# Patient Record
Sex: FEMALE | Race: WHITE | NOT HISPANIC OR LATINO | Employment: OTHER | URBAN - NONMETROPOLITAN AREA
[De-identification: names, ages, dates, MRNs, and addresses within clinical notes are randomized per-mention and may not be internally consistent; named-entity substitution may affect disease eponyms.]

---

## 2017-02-28 ENCOUNTER — TELEPHONE (OUTPATIENT)
Dept: INTERNAL MEDICINE | Facility: CLINIC | Age: 67
End: 2017-02-28

## 2017-02-28 RX ORDER — BUDESONIDE AND FORMOTEROL FUMARATE DIHYDRATE 160; 4.5 UG/1; UG/1
AEROSOL RESPIRATORY (INHALATION)
Qty: 1 INHALER | Refills: 11 | Status: SHIPPED | OUTPATIENT
Start: 2017-02-28 | End: 2017-11-14 | Stop reason: SDUPTHER

## 2017-02-28 NOTE — TELEPHONE ENCOUNTER
----- Message from Lissa Osullivan sent at 2/28/2017 12:00 PM EST -----  Contact: PATIENT  Patient called today stating that she has a coupon for Symbicort that would make her only pay $25/month, but needs a prescription written for a full year in order for the coupon to take affect. Patient would like this done, if possible. And if so, please call in to [facility] and let her know when done. Thank you.

## 2017-04-18 ENCOUNTER — OFFICE VISIT (OUTPATIENT)
Dept: PULMONOLOGY | Facility: CLINIC | Age: 67
End: 2017-04-18

## 2017-04-18 VITALS
DIASTOLIC BLOOD PRESSURE: 70 MMHG | SYSTOLIC BLOOD PRESSURE: 120 MMHG | WEIGHT: 206.8 LBS | HEART RATE: 86 BPM | RESPIRATION RATE: 16 BRPM | OXYGEN SATURATION: 96 % | HEIGHT: 65 IN | TEMPERATURE: 98.9 F | BODY MASS INDEX: 34.45 KG/M2

## 2017-04-18 DIAGNOSIS — K21.9 GASTROESOPHAGEAL REFLUX DISEASE WITHOUT ESOPHAGITIS: ICD-10-CM

## 2017-04-18 DIAGNOSIS — J43.2 CENTRILOBULAR EMPHYSEMA (HCC): Primary | ICD-10-CM

## 2017-04-18 DIAGNOSIS — Z99.81 DEPENDENCE ON SUPPLEMENTAL OXYGEN: ICD-10-CM

## 2017-04-18 DIAGNOSIS — J84.9 INTERSTITIAL LUNG DISEASE (HCC): ICD-10-CM

## 2017-04-18 DIAGNOSIS — M05.10 RHEUMATOID LUNG DISEASE (HCC): ICD-10-CM

## 2017-04-18 PROCEDURE — 99214 OFFICE O/P EST MOD 30 MIN: CPT | Performed by: NURSE PRACTITIONER

## 2017-04-18 NOTE — PROGRESS NOTES
[facility] Pulmonary Follow up    CHIEF COMPLAINT    Chronic respiratory failure    HISTORY OF PRESENT ILLNESS    Vanna Rincon is a 66 y.o.female here today for follow-up with a history of severe COPD with an FEV1 in the 20s.  As well as rheumatic lung disease.  She is on continuous oxygen at 4 L nasal cannula.  She is on Embrel for the last 16 years for her rheumatoid arthritis.    Since her last visit she has received her liquid oxygen.  It is still quite a bit heavy and cumbersome with her RA.  She still thinking about what she is can he do this summer when she goes to the pool with her oxygen.  Currently she is tolerating pulse dose at 3 L nasal cannula with activity.  She is occasionally some gas pains which precipitate some panic attack.  She does not have a rescue inhaler and would like to see if that would help her shortness of breath during the panic attacks.    She's been doing quite well.  She's very pleased that she is less short of breath and able to do her activities.  She continues on Symbicort twice a day.  She has no cough or sputum production.      Patient Active Problem List   Diagnosis   • enlg LN right axilla, bx benign 3/16   • Benign essential hypertension   • Chronic obstructive pulmonary disease   • Chronic pain syndrome   • Gastroesophageal reflux disease   • Hyperlipidemia   • Rheumatoid arthritis involving multiple sites with positive rheumatoid factor   • Rheumatoid lung disease   • Chronic respiratory failure with hypoxia   • Bronchitis   • Encounter for long-term current use of medication   • Anemia   • Anxiety   • Interstitial lung disease   • Dependence on supplemental oxygen   • BMI 36.0-36.9,adult   • Emphysema lung       Allergies   Allergen Reactions   • Ciprofloxacin    • Doxycycline    • Lipitor [Atorvastatin]    • Sulfa Antibiotics        Current Outpatient Prescriptions:   •  budesonide-formoterol (SYMBICORT) 160-4.5 MCG/ACT inhaler, INHALE TWO PUFFS BY MOUTH TWICE DAILY.  RINSE AFTER USE, Disp: 1 inhaler, Rfl: 11  •  Etanercept (ENBREL SURECLICK) 50 MG/ML solution auto-injector, Inject  under the skin., Disp: , Rfl:   •  etodolac XL (LODINE XL) 600 MG 24 hr tablet, Take 1 tablet by mouth daily., Disp: , Rfl:   •  fentaNYL (DURAGESIC) 75 MCG/HR patch, , Disp: , Rfl:   •  HYDROcodone-acetaminophen (NORCO)  MG per tablet, Take  by mouth., Disp: , Rfl:   •  loratadine (CLARITIN) 10 MG tablet, Take 1 tablet by mouth daily., Disp: 90 tablet, Rfl: 3  •  Multiple Vitamins-Minerals (CENTRUM ADULTS PO), Take  by mouth., Disp: , Rfl:   •  pantoprazole (PROTONIX) 40 MG EC tablet, Take 1 tablet by mouth daily., Disp: 90 tablet, Rfl: 3  •  promethazine-codeine (PHENERGAN with CODEINE) 6.25-10 MG/5ML syrup, Take 5 mL by mouth every 6 (six) hours as needed for cough., Disp: 118 mL, Rfl: 0  •  sertraline (ZOLOFT) 50 MG tablet, Take 1 tablet by mouth Daily., Disp: 90 tablet, Rfl: 3  •  valsartan (DIOVAN) 320 MG tablet, Take 1 tablet by mouth Daily., Disp: 90 tablet, Rfl: 3  MEDICATION LIST AND ALLERGIES REVIEWED.    Social History   Substance Use Topics   • Smoking status: Former Smoker     Packs/day: 1.00     Years: 25.00     Types: Cigarettes     Quit date: 1991   • Smokeless tobacco: Not on file   • Alcohol use No       FAMILY AND SOCIAL HISTORY REVIEWED.    Review of Systems   Constitutional: Negative for chills, fatigue, fever and unexpected weight change.   HENT: Negative for congestion, nosebleeds, postnasal drip, rhinorrhea, sinus pressure and trouble swallowing.    Respiratory: Negative for cough, chest tightness, shortness of breath and wheezing.    Cardiovascular: Negative for chest pain and leg swelling.   Gastrointestinal: Negative for abdominal pain, constipation, diarrhea, nausea and vomiting.   Genitourinary: Negative for dysuria, frequency, hematuria and urgency.   Musculoskeletal: Positive for arthralgias. Negative for myalgias.   Neurological: Negative for dizziness, weakness,  "numbness and headaches.   All other systems reviewed and are negative.  .    /70  Pulse 86  Temp 98.9 °F (37.2 °C)  Resp 16  Ht 65\" (165.1 cm)  Wt 206 lb 12.8 oz (93.8 kg)  SpO2 96%  PF (!) 3 L/min  BMI 34.41 kg/m2  Physical Exam   Constitutional: She is oriented to person, place, and time. She appears well-developed and well-nourished.   HENT:   Head: Normocephalic and atraumatic.   Eyes: EOM are normal. Pupils are equal, round, and reactive to light.   Neck: Normal range of motion. Neck supple.   Cardiovascular: Normal rate and regular rhythm.    No murmur heard.  Pulmonary/Chest: Effort normal and breath sounds normal. No respiratory distress. She has no wheezes. She has no rales.   Very decreased but no wheezing or rales   Abdominal: Soft. Bowel sounds are normal. She exhibits no distension.   Musculoskeletal: Normal range of motion. She exhibits no edema.   Neurological: She is alert and oriented to person, place, and time.   Skin: Skin is warm and dry. No erythema.   Psychiatric: She has a normal mood and affect. Her behavior is normal.   Vitals reviewed.      RESULTS        PROBLEM LIST    Problem List Items Addressed This Visit        Respiratory    Chronic obstructive pulmonary disease - Primary    Overview     Description: A.  Emphysema with severe physiology on PFTs.  Patient is a former smoker.         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 Adequate oxygenation at rest but desaturates with walking after three minutes. She will be less than 90%.  PFT: Vital capacity is 1.83 or 51%, FEV1 is 0.7 or 25%, ratio is 38%. Minimal response to bronchodilation consistent with severe obstruction and restriction         Interstitial lung disease    Overview      · A.  History of rheumatoid lung with bronchiolitis obliterans            Digestive    Gastroesophageal reflux disease       Other    Dependence on supplemental " oxygen    Overview      · A.  2 L oxygen ordered each bedtime and when necessary.                 DISCUSSION    Continue on Symbicort.  I gave her sample of Pro Air today to see if this does help during her panic attack episodes.  Continue on oxygen at 3 L as tolerated, pulse dose.    Follow-up in 6 months.    Hope Patel, JENNY  04/18/201712:06 PM  Electronically signed     Please note that portions of this note were completed with a voice recognition program. Efforts were made to edit the dictations, but occasionally words are mistranscribed.      CC: Jose Daniel Lundberg, DO

## 2017-04-25 ENCOUNTER — OFFICE VISIT (OUTPATIENT)
Dept: INTERNAL MEDICINE | Facility: CLINIC | Age: 67
End: 2017-04-25

## 2017-04-25 VITALS
TEMPERATURE: 98.1 F | HEIGHT: 65 IN | SYSTOLIC BLOOD PRESSURE: 120 MMHG | WEIGHT: 204 LBS | RESPIRATION RATE: 14 BRPM | OXYGEN SATURATION: 96 % | BODY MASS INDEX: 33.99 KG/M2 | DIASTOLIC BLOOD PRESSURE: 64 MMHG | HEART RATE: 76 BPM

## 2017-04-25 DIAGNOSIS — J40 BRONCHITIS: ICD-10-CM

## 2017-04-25 DIAGNOSIS — I10 BENIGN ESSENTIAL HYPERTENSION: ICD-10-CM

## 2017-04-25 DIAGNOSIS — J43.2 CENTRILOBULAR EMPHYSEMA (HCC): Primary | ICD-10-CM

## 2017-04-25 DIAGNOSIS — E78.01 FAMILIAL HYPERCHOLESTEROLEMIA: ICD-10-CM

## 2017-04-25 DIAGNOSIS — F41.9 ANXIETY: ICD-10-CM

## 2017-04-25 PROCEDURE — 99214 OFFICE O/P EST MOD 30 MIN: CPT | Performed by: FAMILY MEDICINE

## 2017-04-25 RX ORDER — PROMETHAZINE HYDROCHLORIDE AND CODEINE PHOSPHATE 6.25; 1 MG/5ML; MG/5ML
5 SYRUP ORAL EVERY 6 HOURS PRN
Qty: 118 ML | Refills: 0 | Status: SHIPPED | OUTPATIENT
Start: 2017-04-25 | End: 2017-09-20 | Stop reason: SDUPTHER

## 2017-04-25 NOTE — PROGRESS NOTES
Subjective   Vanna Rincon is a 66 y.o. female.     History of Present Illness   Vanna had yearly labs that were done in October.  She is due to have lipids every 6 months, but she defers testing right now, as her values have been stable for 2 years without statin use.    She wants a rx for cough syrup that she may need PRN.    She has anxiety attacks every now and then and asks for soemthing for them, that isn't controlled.  Her anxiety attacks last about 10 minutes if that.  She feels that it mostly happens when she has gas.        The following portions of the patient's history were reviewed and updated as appropriate: allergies, current medications, past social history and problem list.    Review of Systems   Constitutional: Negative for appetite change, chills, fatigue, fever and unexpected weight change.   HENT: Negative for congestion, ear pain, hearing loss, nosebleeds, postnasal drip, rhinorrhea, sore throat, tinnitus and trouble swallowing.    Eyes: Negative for photophobia, discharge and visual disturbance.   Respiratory: Negative for cough, chest tightness, shortness of breath and wheezing.    Cardiovascular: Negative for chest pain, palpitations and leg swelling.   Gastrointestinal: Negative for abdominal distention, abdominal pain, blood in stool, constipation, diarrhea, nausea and vomiting.   Endocrine: Negative for cold intolerance, heat intolerance, polydipsia, polyphagia and polyuria.   Musculoskeletal: Negative for arthralgias, back pain, joint swelling, myalgias, neck pain and neck stiffness.   Skin: Negative for color change, pallor, rash and wound.   Allergic/Immunologic: Negative for environmental allergies, food allergies and immunocompromised state.   Neurological: Negative for dizziness, tremors, seizures, weakness, numbness and headaches.   Hematological: Negative for adenopathy. Does not bruise/bleed easily.   Psychiatric/Behavioral: Negative for agitation, behavioral problems,  "confusion, hallucinations, self-injury and suicidal ideas. The patient is not nervous/anxious.        Objective   /64  Pulse 76  Temp 98.1 °F (36.7 °C)  Resp 14  Ht 65\" (165.1 cm)  Wt 204 lb (92.5 kg)  SpO2 96% Comment: ON 3 LPM  BMI 33.95 kg/m2  Physical Exam   Constitutional: She is oriented to person, place, and time. She appears well-developed and well-nourished. No distress.   HENT:   Head: Normocephalic and atraumatic.   Right Ear: External ear normal.   Left Ear: External ear normal.   Nose: Nose normal.   Mouth/Throat: Oropharynx is clear and moist.   Eyes: Conjunctivae and EOM are normal. Pupils are equal, round, and reactive to light. Right eye exhibits no discharge. Left eye exhibits no discharge. No scleral icterus.   Neck: Normal range of motion. Neck supple. No JVD present. No tracheal deviation present. No thyromegaly present.   Cardiovascular: Normal rate, regular rhythm, normal heart sounds and intact distal pulses.  Exam reveals no gallop and no friction rub.    No murmur heard.  Pulmonary/Chest: Effort normal and breath sounds normal. No stridor. No respiratory distress. She has no wheezes. She has no rales. She exhibits no tenderness.   Abdominal: Soft. Bowel sounds are normal. She exhibits no distension and no mass. There is no tenderness. There is no rebound and no guarding. No hernia.   Musculoskeletal: Normal range of motion. She exhibits no edema, tenderness or deformity.   Lymphadenopathy:     She has no cervical adenopathy.   Neurological: She is alert and oriented to person, place, and time. She has normal reflexes. She displays normal reflexes. No cranial nerve deficit. She exhibits normal muscle tone.   Skin: Skin is warm and dry. No rash noted. No erythema. No pallor.   Psychiatric: She has a normal mood and affect. Her behavior is normal. Judgment normal.       Assessment/Plan   Problem List Items Addressed This Visit        Cardiovascular and Mediastinum    Benign " essential hypertension    Hyperlipidemia       Respiratory    Chronic obstructive pulmonary disease - Primary    Relevant Medications    promethazine-codeine (PHENERGAN with CODEINE) 6.25-10 MG/5ML syrup    Bronchitis    Relevant Medications    promethazine-codeine (PHENERGAN with CODEINE) 6.25-10 MG/5ML syrup       Other    Anxiety

## 2017-08-22 ENCOUNTER — TELEPHONE (OUTPATIENT)
Dept: INTERNAL MEDICINE | Facility: CLINIC | Age: 67
End: 2017-08-22

## 2017-08-22 RX ORDER — PANTOPRAZOLE SODIUM 40 MG/1
40 TABLET, DELAYED RELEASE ORAL DAILY
Qty: 90 TABLET | Refills: 3 | Status: SHIPPED | OUTPATIENT
Start: 2017-08-22 | End: 2022-07-29

## 2017-08-22 RX ORDER — PANTOPRAZOLE SODIUM 40 MG/1
40 TABLET, DELAYED RELEASE ORAL DAILY
Qty: 90 TABLET | Refills: 3 | Status: SHIPPED | OUTPATIENT
Start: 2017-08-22 | End: 2017-08-22 | Stop reason: SDUPTHER

## 2017-08-22 NOTE — TELEPHONE ENCOUNTER
Patient called back stating that the medcation was still sent to [facility] and it needed to be sent to the new mail order, with the number attached in previous message. Also, she wants to know if the [facility] order can be canceled so that she doesn't get charged for it.

## 2017-08-22 NOTE — TELEPHONE ENCOUNTER
Requesting refill of pantoprazole, needs to go to [facility], [phone]. Patient is almost out.

## 2017-09-20 ENCOUNTER — OFFICE VISIT (OUTPATIENT)
Dept: INTERNAL MEDICINE | Facility: CLINIC | Age: 67
End: 2017-09-20

## 2017-09-20 VITALS
SYSTOLIC BLOOD PRESSURE: 150 MMHG | DIASTOLIC BLOOD PRESSURE: 70 MMHG | OXYGEN SATURATION: 97 % | HEIGHT: 65 IN | HEART RATE: 76 BPM | WEIGHT: 205 LBS | BODY MASS INDEX: 34.16 KG/M2

## 2017-09-20 DIAGNOSIS — J40 BRONCHITIS: ICD-10-CM

## 2017-09-20 DIAGNOSIS — Z99.81 DEPENDENCE ON SUPPLEMENTAL OXYGEN: ICD-10-CM

## 2017-09-20 DIAGNOSIS — J43.1 PANLOBULAR EMPHYSEMA (HCC): Primary | ICD-10-CM

## 2017-09-20 DIAGNOSIS — J96.11 CHRONIC RESPIRATORY FAILURE WITH HYPOXIA (HCC): ICD-10-CM

## 2017-09-20 DIAGNOSIS — I10 BENIGN ESSENTIAL HYPERTENSION: ICD-10-CM

## 2017-09-20 PROCEDURE — 99213 OFFICE O/P EST LOW 20 MIN: CPT | Performed by: FAMILY MEDICINE

## 2017-09-20 RX ORDER — HYDROCHLOROTHIAZIDE 25 MG/1
25 TABLET ORAL DAILY
Qty: 30 TABLET | Refills: 11 | Status: SHIPPED | OUTPATIENT
Start: 2017-09-20 | End: 2019-08-21 | Stop reason: SDUPTHER

## 2017-09-20 RX ORDER — PROMETHAZINE HYDROCHLORIDE AND CODEINE PHOSPHATE 6.25; 1 MG/5ML; MG/5ML
5 SYRUP ORAL EVERY 6 HOURS PRN
Qty: 240 ML | Refills: 0 | Status: SHIPPED | OUTPATIENT
Start: 2017-09-20 | End: 2017-12-19

## 2017-09-20 NOTE — PROGRESS NOTES
"Subjective   Vanna Rincon is a 67 y.o. female.     History of Present Illness   Vanna has been having a bad week with drainage. She asks today for refill of cough syrup.   She is having symptoms of HTN, and the 150 systolic is noted today.  The following portions of the patient's history were reviewed and updated as appropriate: allergies, current medications, past social history and problem list.    Review of Systems   Constitutional: Positive for fatigue. Negative for appetite change, chills, fever and unexpected weight change.   HENT: Negative for congestion, ear pain, hearing loss, nosebleeds, postnasal drip, rhinorrhea, sore throat, tinnitus and trouble swallowing.    Eyes: Negative for photophobia, discharge and visual disturbance.   Respiratory: Negative for cough, chest tightness, shortness of breath and wheezing.    Cardiovascular: Negative for chest pain, palpitations and leg swelling.   Gastrointestinal: Negative for abdominal distention, abdominal pain, blood in stool, constipation, diarrhea, nausea and vomiting.   Endocrine: Negative for cold intolerance, heat intolerance, polydipsia, polyphagia and polyuria.   Musculoskeletal: Negative for arthralgias, back pain, joint swelling, myalgias, neck pain and neck stiffness.   Skin: Negative for color change, pallor, rash and wound.   Allergic/Immunologic: Negative for environmental allergies, food allergies and immunocompromised state.   Neurological: Positive for tremors and weakness. Negative for dizziness, seizures, numbness and headaches.   Hematological: Negative for adenopathy. Does not bruise/bleed easily.   Psychiatric/Behavioral: Negative for agitation, behavioral problems, confusion, hallucinations, self-injury and suicidal ideas. The patient is not nervous/anxious.        Objective   /70  Pulse 76  Ht 65\" (165.1 cm)  Wt 205 lb (93 kg)  SpO2 97% Comment: 3LPM  BMI 34.11 kg/m2  Physical Exam   Constitutional: She is oriented to " person, place, and time. She appears well-developed and well-nourished. No distress.   HENT:   Head: Normocephalic and atraumatic.   Right Ear: External ear normal.   Left Ear: External ear normal.   Nose: Nose normal.   Mouth/Throat: Oropharynx is clear and moist.   Eyes: Conjunctivae and EOM are normal. Pupils are equal, round, and reactive to light. Right eye exhibits no discharge. Left eye exhibits no discharge. No scleral icterus.   Neck: Normal range of motion. Neck supple. No JVD present. No tracheal deviation present. No thyromegaly present.   Cardiovascular: Normal rate, regular rhythm, normal heart sounds and intact distal pulses.  Exam reveals no gallop and no friction rub.    No murmur heard.  Pulmonary/Chest: Effort normal and breath sounds normal. No stridor. No respiratory distress. She has no wheezes. She has no rales. She exhibits no tenderness.   Abdominal: Soft. Bowel sounds are normal. She exhibits no distension and no mass. There is no tenderness. There is no rebound and no guarding. No hernia.   Musculoskeletal: Normal range of motion. She exhibits no edema, tenderness or deformity.   Lymphadenopathy:     She has no cervical adenopathy.   Neurological: She is alert and oriented to person, place, and time. She has normal reflexes. She displays normal reflexes. No cranial nerve deficit. She exhibits normal muscle tone.   Skin: Skin is warm and dry. No rash noted. No erythema. No pallor.   Psychiatric: She has a normal mood and affect. Her behavior is normal. Judgment normal.       Assessment/Plan   Problem List Items Addressed This Visit        Cardiovascular and Mediastinum    Benign essential hypertension    Relevant Medications    hydrochlorothiazide (HYDRODIURIL) 25 MG tablet       Respiratory    Chronic respiratory failure with hypoxia    Emphysema lung - Primary       Other    Dependence on supplemental oxygen

## 2017-10-04 ENCOUNTER — TELEPHONE (OUTPATIENT)
Dept: INTERNAL MEDICINE | Facility: CLINIC | Age: 67
End: 2017-10-04

## 2017-10-24 ENCOUNTER — OFFICE VISIT (OUTPATIENT)
Dept: PULMONOLOGY | Facility: CLINIC | Age: 67
End: 2017-10-24

## 2017-10-24 VITALS
SYSTOLIC BLOOD PRESSURE: 136 MMHG | RESPIRATION RATE: 16 BRPM | OXYGEN SATURATION: 94 % | HEART RATE: 83 BPM | DIASTOLIC BLOOD PRESSURE: 72 MMHG | HEIGHT: 65 IN | WEIGHT: 205 LBS | BODY MASS INDEX: 34.16 KG/M2 | TEMPERATURE: 97.6 F

## 2017-10-24 DIAGNOSIS — M05.10 RHEUMATOID LUNG DISEASE (HCC): ICD-10-CM

## 2017-10-24 DIAGNOSIS — J96.11 CHRONIC RESPIRATORY FAILURE WITH HYPOXIA (HCC): Primary | ICD-10-CM

## 2017-10-24 DIAGNOSIS — J43.2 CENTRILOBULAR EMPHYSEMA (HCC): ICD-10-CM

## 2017-10-24 DIAGNOSIS — M05.79 RHEUMATOID ARTHRITIS INVOLVING MULTIPLE SITES WITH POSITIVE RHEUMATOID FACTOR (HCC): ICD-10-CM

## 2017-10-24 DIAGNOSIS — Z99.81 DEPENDENCE ON SUPPLEMENTAL OXYGEN: ICD-10-CM

## 2017-10-24 DIAGNOSIS — D64.9 ANEMIA, UNSPECIFIED TYPE: ICD-10-CM

## 2017-10-24 DIAGNOSIS — J84.9 INTERSTITIAL LUNG DISEASE (HCC): ICD-10-CM

## 2017-10-24 PROCEDURE — 90662 IIV NO PRSV INCREASED AG IM: CPT | Performed by: NURSE PRACTITIONER

## 2017-10-24 PROCEDURE — G0008 ADMIN INFLUENZA VIRUS VAC: HCPCS | Performed by: NURSE PRACTITIONER

## 2017-10-24 PROCEDURE — 99214 OFFICE O/P EST MOD 30 MIN: CPT | Performed by: NURSE PRACTITIONER

## 2017-10-24 RX ORDER — AZELASTINE 1 MG/ML
2 SPRAY, METERED NASAL 2 TIMES DAILY
Qty: 1 EACH | Refills: 3 | Status: SHIPPED | OUTPATIENT
Start: 2017-10-24 | End: 2017-12-04

## 2017-10-24 NOTE — PROGRESS NOTES
[facility] Pulmonary Follow up    CHIEF COMPLAINT    COPD, chronic respiratory failure, interstitial lung disease    HISTORY OF PRESENT ILLNESS    Vanna Rincon is a 67 y.o.female here today for routine follow-up.  She has a history of severe obstructive airway disease, rheumatic lung disease, chronic respiratory failure on continuous oxygen at 3-4 L nasal cannula.  She does have a history of rheumatoid arthritis and is been on Enbrel for around 16 years.  She has tolerated it well.  She also takes an NSAID daily to help with pain.  She has been having a bit of tendinitis in her upper extremities as well as worsening wrist pain lately.    She has chronic respiratory failure due to her underlying interstitial lung disease and severe obstructive airway disease.  She uses liquid oxygen at 3 L nasal cannula with activity and continuous oxygen at 3-4 L at home.  She had a bit of worsening shortness of air over the last several weeks with follow-up with her primary care.  She is a bit hypertensive and her water pill was increased.  Her breathing is much better now.  Her blood pressures still running high 180s to 160s.  She has a follow-up on Thursday.   She has no wheezing or tightness at this time.  Her dyspnea has improved.    She is complaining of a bit of sinus congestion and postnasal drainage with a non-productive dry hacking cough.  She is on Claritin and has taken it for several years.    She continues to use her Symbicort twice daily with good results.  She does have a pro-air to use as needed.  She will also uses these when she begins hyperventilating from panic attack.    Patient Active Problem List   Diagnosis   • enlg LN right axilla, bx benign 3/16   • Benign essential hypertension   • Chronic obstructive pulmonary disease   • Chronic pain syndrome   • Gastroesophageal reflux disease   • Hyperlipidemia   • Rheumatoid arthritis involving multiple sites with positive rheumatoid factor   • Rheumatoid lung  disease   • Chronic respiratory failure with hypoxia   • Bronchitis   • Encounter for long-term current use of medication   • Anemia   • Anxiety   • Interstitial lung disease   • Dependence on supplemental oxygen   • BMI 36.0-36.9,adult   • Emphysema lung       Allergies   Allergen Reactions   • Ciprofloxacin    • Doxycycline    • Lipitor [Atorvastatin]    • Sulfa Antibiotics        Current Outpatient Prescriptions:   •  budesonide-formoterol (SYMBICORT) 160-4.5 MCG/ACT inhaler, INHALE TWO PUFFS BY MOUTH TWICE DAILY. RINSE AFTER USE, Disp: 1 inhaler, Rfl: 11  •  Etanercept (ENBREL SURECLICK) 50 MG/ML solution auto-injector, Inject  under the skin., Disp: , Rfl:   •  etodolac XL (LODINE XL) 600 MG 24 hr tablet, Take 1 tablet by mouth daily., Disp: , Rfl:   •  fentaNYL (DURAGESIC) 75 MCG/HR patch, , Disp: , Rfl:   •  hydrochlorothiazide (HYDRODIURIL) 25 MG tablet, Take 1 tablet by mouth Daily., Disp: 30 tablet, Rfl: 11  •  HYDROcodone-acetaminophen (NORCO)  MG per tablet, Take  by mouth., Disp: , Rfl:   •  loratadine (CLARITIN) 10 MG tablet, Take 1 tablet by mouth daily., Disp: 90 tablet, Rfl: 3  •  Multiple Vitamins-Minerals (CENTRUM ADULTS PO), Take  by mouth., Disp: , Rfl:   •  pantoprazole (PROTONIX) 40 MG EC tablet, Take 1 tablet by mouth Daily., Disp: 90 tablet, Rfl: 3  •  promethazine-codeine (PHENERGAN with CODEINE) 6.25-10 MG/5ML syrup, Take 5 mL by mouth Every 6 (Six) Hours As Needed for Cough., Disp: 240 mL, Rfl: 0  •  sertraline (ZOLOFT) 50 MG tablet, Take 1 tablet by mouth Daily., Disp: 90 tablet, Rfl: 3  •  valsartan (DIOVAN) 320 MG tablet, Take 1 tablet by mouth Daily., Disp: 90 tablet, Rfl: 3  •  azelastine (ASTELIN) 0.1 % nasal spray, 2 sprays into each nostril 2 (Two) Times a Day. Use in each nostril as directed, Disp: 1 each, Rfl: 3  MEDICATION LIST AND ALLERGIES REVIEWED.    Social History   Substance Use Topics   • Smoking status: Former Smoker     Packs/day: 1.00     Years: 25.00     Types:  "Cigarettes     Quit date: 1991   • Smokeless tobacco: Former User   • Alcohol use No       FAMILY AND SOCIAL HISTORY REVIEWED.    Review of Systems   Constitutional: Positive for fatigue. Negative for chills, fever and unexpected weight change.   HENT: Positive for congestion, postnasal drip, rhinorrhea and sinus pain. Negative for nosebleeds, sinus pressure and trouble swallowing.    Respiratory: Positive for shortness of breath. Negative for cough, chest tightness and wheezing.    Cardiovascular: Negative for chest pain and leg swelling.   Gastrointestinal: Negative for abdominal pain, constipation, diarrhea, nausea and vomiting.   Genitourinary: Negative for dysuria, frequency, hematuria and urgency.   Musculoskeletal: Positive for arthralgias, joint swelling and myalgias.   Neurological: Negative for dizziness, weakness, numbness and headaches.   All other systems reviewed and are negative.  .    /72  Pulse 83  Temp 97.6 °F (36.4 °C)  Resp 16  Ht 65\" (165.1 cm)  Wt 205 lb (93 kg)  SpO2 94% Comment: 4 L P/D  PF (!) 4 L/min  BMI 34.11 kg/m2    Physical Exam   Constitutional: She is oriented to person, place, and time. She appears well-developed and well-nourished.   Obese   HENT:   Head: Normocephalic and atraumatic.   Eyes: EOM are normal. Pupils are equal, round, and reactive to light.   Neck: Normal range of motion. Neck supple.   Cardiovascular: Normal rate and regular rhythm.    No murmur heard.  Pulmonary/Chest: Effort normal. No respiratory distress. She has no wheezes. She has no rales.   Decreased bilaterally   Abdominal: Soft. Bowel sounds are normal. She exhibits no distension.   Musculoskeletal: Normal range of motion. She exhibits edema.   Neurological: She is alert and oriented to person, place, and time.   Skin: Skin is warm and dry. No erythema.   Psychiatric: She has a normal mood and affect. Her behavior is normal.   Vitals reviewed.        RESULTS        PROBLEM LIST    Problem " List Items Addressed This Visit        Respiratory    Chronic obstructive pulmonary disease    Overview     Description: A.  Emphysema with severe physiology on PFTs.  Patient is a former smoker.         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 Adequate oxygenation at rest but desaturates with walking after three minutes. She will be less than 90%.  PFT: Vital capacity is 1.83 or 51%, FEV1 is 0.7 or 25%, ratio is 38%. Minimal response to bronchodilation consistent with severe obstruction and restriction         Relevant Medications    azelastine (ASTELIN) 0.1 % nasal spray    Chronic respiratory failure with hypoxia - Primary    Interstitial lung disease    Overview      · A.  History of rheumatoid lung with bronchiolitis obliterans         Relevant Medications    azelastine (ASTELIN) 0.1 % nasal spray       Musculoskeletal and Integument    Rheumatoid arthritis involving multiple sites with positive rheumatoid factor    Overview     Description: A.  Diagnosed in 2001 with history of methotrexate and Enbrel therapy.            Hematopoietic and Hemostatic    Anemia       Other    Dependence on supplemental oxygen    Overview      · A.  2 L oxygen ordered each bedtime and when necessary.                 DISCUSSION    For her allergic rhinitis, sinusitis asked her to change her Claritin to Zyrtec.  As well as add some Astelin nasal spray to help with her nasal congestion.  She doesn't appear to be infected or need antibiotics at this time.  But she'll call back if she worsens.  She will get her flu shot today.  She is up-to-date on her pneumonia vaccine and received her Prevnar 13 this year.  Continue on her oxygen, continuously at 3-4 L.  Continue on her Symbicort twice daily and pro-air as needed.  She brought some labs with her today from her primary care, she is anemic at  10 and 33.  Her C-reactive protein is elevated at 24 sedimentation rate is normal at 30.  We discussed proper nutrition with her anemia.  Including increasing her protein and vegetables.  She will look into Meals on Wheels.  Due to her decreased mobility and her 's debility they do not eat full meals.  [facility].    Follow-up in 6 months with full PFTs and chest x-ray.    Hope Patel, APRN  10/24/194185:32 AM  Electronically signed     Please note that portions of this note were completed with a voice recognition program. Efforts were made to edit the dictations, but occasionally words are mistranscribed.      CC: Jose Daniel Lundberg, DO

## 2017-10-26 ENCOUNTER — OFFICE VISIT (OUTPATIENT)
Dept: INTERNAL MEDICINE | Facility: CLINIC | Age: 67
End: 2017-10-26

## 2017-10-26 VITALS
WEIGHT: 205 LBS | HEIGHT: 65 IN | HEART RATE: 88 BPM | OXYGEN SATURATION: 95 % | DIASTOLIC BLOOD PRESSURE: 60 MMHG | SYSTOLIC BLOOD PRESSURE: 110 MMHG | BODY MASS INDEX: 34.16 KG/M2

## 2017-10-26 DIAGNOSIS — I10 ESSENTIAL HYPERTENSION: Primary | ICD-10-CM

## 2017-10-26 DIAGNOSIS — R71.8 ABNORMAL RBC INDICES: ICD-10-CM

## 2017-10-26 PROCEDURE — 99213 OFFICE O/P EST LOW 20 MIN: CPT | Performed by: FAMILY MEDICINE

## 2017-10-26 NOTE — PROGRESS NOTES
"Subjective   Vanna Rincon is a 67 y.o. female.     History of Present Illness   Vanna was started last month on hctz for her BP.  The systolic is improved today.  She is with labs from last month, and she shows low RBC indices and suggestive of iron def.  She is fine with checking labs for iron status today.  VSS. NAD.  She does see blood with hard BM's at times.        The following portions of the patient's history were reviewed and updated as appropriate: allergies, current medications, past social history and problem list.    Review of Systems   Constitutional: Negative for appetite change, chills, fatigue, fever and unexpected weight change.   HENT: Negative for congestion, ear pain, hearing loss, nosebleeds, postnasal drip, rhinorrhea, sore throat, tinnitus and trouble swallowing.    Eyes: Negative for photophobia, discharge and visual disturbance.   Respiratory: Negative for cough, chest tightness, shortness of breath and wheezing.    Cardiovascular: Negative for chest pain, palpitations and leg swelling.   Gastrointestinal: Negative for abdominal distention, abdominal pain, blood in stool, constipation, diarrhea, nausea and vomiting.   Endocrine: Negative for cold intolerance, heat intolerance, polydipsia, polyphagia and polyuria.   Musculoskeletal: Negative for arthralgias, back pain, joint swelling, myalgias, neck pain and neck stiffness.   Skin: Negative for color change, pallor, rash and wound.   Allergic/Immunologic: Negative for environmental allergies, food allergies and immunocompromised state.   Neurological: Negative for dizziness, tremors, seizures, weakness, numbness and headaches.   Hematological: Negative for adenopathy. Does not bruise/bleed easily.   Psychiatric/Behavioral: Negative for agitation, behavioral problems, confusion, hallucinations, self-injury and suicidal ideas. The patient is not nervous/anxious.        Objective   /60  Pulse 88  Ht 65\" (165.1 cm)  Wt 205 lb (93 " kg)  SpO2 95% Comment: 4LPM  BMI 34.11 kg/m2  Physical Exam   Constitutional: She is oriented to person, place, and time. She appears well-developed and well-nourished. No distress.   HENT:   Head: Normocephalic and atraumatic.   Right Ear: External ear normal.   Left Ear: External ear normal.   Nose: Nose normal.   Mouth/Throat: Oropharynx is clear and moist.   Eyes: Conjunctivae and EOM are normal. Pupils are equal, round, and reactive to light. Right eye exhibits no discharge. Left eye exhibits no discharge. No scleral icterus.   Neck: Normal range of motion. Neck supple. No JVD present. No tracheal deviation present. No thyromegaly present.   Cardiovascular: Normal rate, regular rhythm, normal heart sounds and intact distal pulses.  Exam reveals no gallop and no friction rub.    No murmur heard.  Pulmonary/Chest: Effort normal and breath sounds normal. No stridor. No respiratory distress. She has no wheezes. She has no rales. She exhibits no tenderness.   Abdominal: Soft. Bowel sounds are normal. She exhibits no distension and no mass. There is no tenderness. There is no rebound and no guarding. No hernia.   Musculoskeletal: Normal range of motion. She exhibits no edema, tenderness or deformity.   Lymphadenopathy:     She has no cervical adenopathy.   Neurological: She is alert and oriented to person, place, and time. She has normal reflexes. She displays normal reflexes. No cranial nerve deficit. She exhibits normal muscle tone.   Skin: Skin is warm and dry. No rash noted. No erythema. No pallor.   Psychiatric: She has a normal mood and affect. Her behavior is normal. Judgment normal.       Assessment/Plan   Problem List Items Addressed This Visit        Cardiovascular and Mediastinum    Essential hypertension - Primary    Relevant Orders    Ferritin (Completed)    Iron Profile (Completed)    Erythropoietin (Completed)    CBC & Differential (Completed)       Hematopoietic and Hemostatic    Abnormal RBC  indices    Relevant Orders    Ferritin (Completed)    Iron Profile (Completed)    Erythropoietin (Completed)    CBC & Differential (Completed)    POC Occult Blood Stool

## 2017-10-27 LAB
BASOPHILS # BLD AUTO: 0.02 10*3/MM3 (ref 0–0.2)
BASOPHILS NFR BLD AUTO: 0.3 % (ref 0–2.5)
EOSINOPHIL # BLD AUTO: 0.26 10*3/MM3 (ref 0–0.7)
EOSINOPHIL NFR BLD AUTO: 3.9 % (ref 0–7)
EPO SERPL-ACNC: 4.5 MIU/ML (ref 2.6–18.5)
ERYTHROCYTE [DISTWIDTH] IN BLOOD BY AUTOMATED COUNT: 12.8 % (ref 11.5–14.5)
FERRITIN SERPL-MCNC: 43 NG/ML (ref 11.1–264)
HCT VFR BLD AUTO: 33 % (ref 37–47)
HGB BLD-MCNC: 9.8 G/DL (ref 12–16)
IMM GRANULOCYTES # BLD: 0.02 10*3/MM3 (ref 0–0.06)
IMM GRANULOCYTES NFR BLD: 0.3 % (ref 0–0.6)
IRON SATN MFR SERPL: 13 % (ref 11–46)
IRON SERPL-MCNC: 37 MCG/DL (ref 37–181)
LYMPHOCYTES # BLD AUTO: 1.27 10*3/MM3 (ref 0.6–3.4)
LYMPHOCYTES NFR BLD AUTO: 19 % (ref 10–50)
MCH RBC QN AUTO: 26 PG (ref 27–31)
MCHC RBC AUTO-ENTMCNC: 29.7 G/DL (ref 30–37)
MCV RBC AUTO: 87.5 FL (ref 81–99)
MONOCYTES # BLD AUTO: 0.37 10*3/MM3 (ref 0–0.9)
MONOCYTES NFR BLD AUTO: 5.5 % (ref 0–12)
NEUTROPHILS # BLD AUTO: 4.73 10*3/MM3 (ref 2–6.9)
NEUTROPHILS NFR BLD AUTO: 71 % (ref 37–80)
NRBC BLD AUTO-RTO: 0 /100 WBC (ref 0–0)
PLATELET # BLD AUTO: 269 10*3/MM3 (ref 130–400)
RBC # BLD AUTO: 3.77 10*6/MM3 (ref 4.2–5.4)
TIBC SERPL-MCNC: 276 MCG/DL (ref 261–497)
UIBC SERPL-MCNC: 239 MCG/DL
WBC # BLD AUTO: 6.67 10*3/MM3 (ref 4.8–10.8)

## 2017-11-01 DIAGNOSIS — R71.8 ABNORMAL RBC INDICES: ICD-10-CM

## 2017-11-01 LAB
DEVELOPER EXPIRATION DATE: ABNORMAL
DEVELOPER LOT NUMBER: ABNORMAL
EXPIRATION DATE: ABNORMAL
FECAL OCCULT BLOOD SCREEN, POC: POSITIVE
Lab: ABNORMAL
NEGATIVE CONTROL: NEGATIVE
POSITIVE CONTROL: POSITIVE

## 2017-11-01 PROCEDURE — 82274 ASSAY TEST FOR BLOOD FECAL: CPT | Performed by: FAMILY MEDICINE

## 2017-11-09 ENCOUNTER — OFFICE VISIT (OUTPATIENT)
Dept: INTERNAL MEDICINE | Facility: CLINIC | Age: 67
End: 2017-11-09

## 2017-11-09 VITALS
HEART RATE: 66 BPM | HEIGHT: 65 IN | OXYGEN SATURATION: 91 % | SYSTOLIC BLOOD PRESSURE: 120 MMHG | WEIGHT: 203 LBS | DIASTOLIC BLOOD PRESSURE: 70 MMHG | BODY MASS INDEX: 33.82 KG/M2

## 2017-11-09 DIAGNOSIS — D63.8 ANEMIA OF CHRONIC DISEASE: Primary | ICD-10-CM

## 2017-11-09 DIAGNOSIS — I10 BENIGN ESSENTIAL HYPERTENSION: ICD-10-CM

## 2017-11-09 DIAGNOSIS — K64.8 INTERNAL HEMORRHOIDS WITH COMPLICATION: ICD-10-CM

## 2017-11-09 PROCEDURE — 99213 OFFICE O/P EST LOW 20 MIN: CPT | Performed by: FAMILY MEDICINE

## 2017-11-09 RX ORDER — VALSARTAN 320 MG/1
320 TABLET ORAL DAILY
Qty: 90 TABLET | Refills: 3 | Status: SHIPPED | OUTPATIENT
Start: 2017-11-09 | End: 2018-09-14

## 2017-11-09 NOTE — PROGRESS NOTES
Subjective   Vanna Rincon is a 67 y.o. female.     History of Present Illness   Vanna had iron studies last month.  HGB was down to 9.8.  She has quite a bit of blood in her BM at times, and she knows she has internal hemorrhoids.  VSS. NAD.    Shes fine with checking with DR. Pastrana for colonoscopy and internal hemorrhoid banding.    She had normal EPO levels and her hgb was 9.8.  She may have anemia of chronic disease.        The following portions of the patient's history were reviewed and updated as appropriate: allergies, current medications, past social history and problem list.    Review of Systems   Constitutional: Negative for appetite change, chills, fatigue, fever and unexpected weight change.   HENT: Negative for congestion, ear pain, hearing loss, nosebleeds, postnasal drip, rhinorrhea, sore throat, tinnitus and trouble swallowing.    Eyes: Negative for photophobia, discharge and visual disturbance.   Respiratory: Negative for cough, chest tightness, shortness of breath and wheezing.    Cardiovascular: Negative for chest pain, palpitations and leg swelling.   Gastrointestinal: Negative for abdominal distention, abdominal pain, blood in stool, constipation, diarrhea, nausea and vomiting.   Endocrine: Negative for cold intolerance, heat intolerance, polydipsia, polyphagia and polyuria.   Musculoskeletal: Negative for arthralgias, back pain, joint swelling, myalgias, neck pain and neck stiffness.   Skin: Negative for color change, pallor, rash and wound.   Allergic/Immunologic: Negative for environmental allergies, food allergies and immunocompromised state.   Neurological: Negative for dizziness, tremors, seizures, weakness, numbness and headaches.   Hematological: Negative for adenopathy. Does not bruise/bleed easily.   Psychiatric/Behavioral: Negative for agitation, behavioral problems, confusion, hallucinations, self-injury and suicidal ideas. The patient is not nervous/anxious.        Objective  "  /70  Pulse 66  Ht 65\" (165.1 cm)  Wt 203 lb (92.1 kg)  SpO2 91% Comment: 3LPM  BMI 33.78 kg/m2  Physical Exam   Constitutional: She is oriented to person, place, and time. She appears well-developed and well-nourished. No distress.   HENT:   Head: Normocephalic and atraumatic.   Right Ear: External ear normal.   Left Ear: External ear normal.   Nose: Nose normal.   Mouth/Throat: Oropharynx is clear and moist.   Eyes: Conjunctivae and EOM are normal. Pupils are equal, round, and reactive to light. Right eye exhibits no discharge. Left eye exhibits no discharge. No scleral icterus.   Neck: Normal range of motion. Neck supple. No JVD present. No tracheal deviation present. No thyromegaly present.   Cardiovascular: Normal rate, regular rhythm, normal heart sounds and intact distal pulses.  Exam reveals no gallop and no friction rub.    No murmur heard.  Pulmonary/Chest: Effort normal and breath sounds normal. No stridor. No respiratory distress. She has no wheezes. She has no rales. She exhibits no tenderness.   Abdominal: Soft. Bowel sounds are normal. She exhibits no distension and no mass. There is no tenderness. There is no rebound and no guarding. No hernia.   Musculoskeletal: Normal range of motion. She exhibits no edema, tenderness or deformity.   Lymphadenopathy:     She has no cervical adenopathy.   Neurological: She is alert and oriented to person, place, and time. She has normal reflexes. She displays normal reflexes. No cranial nerve deficit. She exhibits normal muscle tone.   Skin: Skin is warm and dry. No rash noted. No erythema. No pallor.   Psychiatric: She has a normal mood and affect. Her behavior is normal. Judgment normal.       Assessment/Plan   Problem List Items Addressed This Visit        Other    Internal hemorrhoids with complication    Relevant Orders    Erythropoietin    CBC & Differential    Ambulatory Referral to Gastroenterology      Other Visit Diagnoses     Anemia of chronic " disease    -  Primary    Relevant Orders    Erythropoietin    CBC & Differential    Ambulatory Referral to Gastroenterology           FU in 2 omonths, sooner if needed.

## 2017-11-10 LAB
BASOPHILS # BLD AUTO: 0.04 10*3/MM3 (ref 0–0.2)
BASOPHILS NFR BLD AUTO: 0.4 % (ref 0–2.5)
EOSINOPHIL # BLD AUTO: 0.22 10*3/MM3 (ref 0–0.7)
EOSINOPHIL NFR BLD AUTO: 2.2 % (ref 0–7)
EPO SERPL-ACNC: 9.3 MIU/ML (ref 2.6–18.5)
ERYTHROCYTE [DISTWIDTH] IN BLOOD BY AUTOMATED COUNT: 12.7 % (ref 11.5–14.5)
HCT VFR BLD AUTO: 35.4 % (ref 37–47)
HGB BLD-MCNC: 10.8 G/DL (ref 12–16)
IMM GRANULOCYTES # BLD: 0.04 10*3/MM3 (ref 0–0.06)
IMM GRANULOCYTES NFR BLD: 0.4 % (ref 0–0.6)
LYMPHOCYTES # BLD AUTO: 1.81 10*3/MM3 (ref 0.6–3.4)
LYMPHOCYTES NFR BLD AUTO: 18.2 % (ref 10–50)
MCH RBC QN AUTO: 26.6 PG (ref 27–31)
MCHC RBC AUTO-ENTMCNC: 30.5 G/DL (ref 30–37)
MCV RBC AUTO: 87.2 FL (ref 81–99)
MONOCYTES # BLD AUTO: 0.64 10*3/MM3 (ref 0–0.9)
MONOCYTES NFR BLD AUTO: 6.5 % (ref 0–12)
NEUTROPHILS # BLD AUTO: 7.17 10*3/MM3 (ref 2–6.9)
NEUTROPHILS NFR BLD AUTO: 72.3 % (ref 37–80)
NRBC BLD AUTO-RTO: 0 /100 WBC (ref 0–0)
PLATELET # BLD AUTO: 281 10*3/MM3 (ref 130–400)
RBC # BLD AUTO: 4.06 10*6/MM3 (ref 4.2–5.4)
WBC # BLD AUTO: 9.92 10*3/MM3 (ref 4.8–10.8)

## 2017-11-14 RX ORDER — BUDESONIDE AND FORMOTEROL FUMARATE DIHYDRATE 160; 4.5 UG/1; UG/1
AEROSOL RESPIRATORY (INHALATION)
Qty: 1 INHALER | Refills: 11 | Status: SHIPPED | OUTPATIENT
Start: 2017-11-14 | End: 2017-12-04

## 2017-11-14 RX ORDER — BUDESONIDE AND FORMOTEROL FUMARATE DIHYDRATE 160; 4.5 UG/1; UG/1
AEROSOL RESPIRATORY (INHALATION)
Qty: 1 INHALER | Refills: 3 | Status: SHIPPED | OUTPATIENT
Start: 2017-11-14 | End: 2017-12-04 | Stop reason: SDUPTHER

## 2017-11-21 DIAGNOSIS — R06.09 DYSPNEA ON EXERTION: Primary | ICD-10-CM

## 2017-12-04 ENCOUNTER — HOSPITAL ENCOUNTER (OUTPATIENT)
Dept: CARDIOLOGY | Facility: HOSPITAL | Age: 67
Discharge: HOME | End: 2017-12-04
Attending: FAMILY MEDICINE

## 2017-12-04 ENCOUNTER — OFFICE VISIT (OUTPATIENT)
Dept: CARDIOLOGY | Facility: CLINIC | Age: 67
End: 2017-12-04

## 2017-12-04 VITALS
HEIGHT: 66 IN | DIASTOLIC BLOOD PRESSURE: 62 MMHG | BODY MASS INDEX: 33.27 KG/M2 | HEART RATE: 93 BPM | SYSTOLIC BLOOD PRESSURE: 110 MMHG | WEIGHT: 207 LBS | OXYGEN SATURATION: 94 %

## 2017-12-04 DIAGNOSIS — R06.09 DYSPNEA ON EXERTION: ICD-10-CM

## 2017-12-04 DIAGNOSIS — I10 ESSENTIAL HYPERTENSION: ICD-10-CM

## 2017-12-04 DIAGNOSIS — R06.09 DYSPNEA ON EXERTION: Primary | ICD-10-CM

## 2017-12-04 PROCEDURE — 93000 ELECTROCARDIOGRAM COMPLETE: CPT | Performed by: INTERNAL MEDICINE

## 2017-12-04 PROCEDURE — 99204 OFFICE O/P NEW MOD 45 MIN: CPT | Performed by: INTERNAL MEDICINE

## 2017-12-04 RX ORDER — BUDESONIDE AND FORMOTEROL FUMARATE DIHYDRATE 160; 4.5 UG/1; UG/1
2 AEROSOL RESPIRATORY (INHALATION)
COMMUNITY
End: 2020-12-01 | Stop reason: SDUPTHER

## 2017-12-04 RX ORDER — BISACODYL 5 MG/1
5 TABLET, DELAYED RELEASE ORAL DAILY PRN
COMMUNITY
End: 2018-02-13

## 2017-12-04 RX ORDER — SIMETHICONE 125 MG
125 TABLET,CHEWABLE ORAL EVERY 6 HOURS PRN
COMMUNITY

## 2017-12-04 NOTE — PROGRESS NOTES
[facility] at [facility]  Consultation H&P  Vanna Rincon  1950  [phone]    VISIT DATE:  12/04/2017    PCP: Tatiana Jefferson MD  [address]  [facility] [address]    IDENTIFICATION: A 67 y.o. female from [place], [place]    CC:  Chief Complaint   Patient presents with   • BAKER       PROBLEM LIST:  1. HTN  2. HLD  1. 10/18/16 lipids:  TG 95 HDL 67 and   3. COPD, on chronic O2 4L  1. Follows with Har[facility]   4. Rheumatoid lung disease  1. Follows with Dr. Lan   5. GERD  6. Chronic pain  7. Former tobacco abuse, cessation 1991  8. Anxiety/depression  9. Surgical Hx  1. Tubal ligation  2. Tonsillectomy    Allergies  Allergies   Allergen Reactions   • Ciprofloxacin    • Doxycycline    • Lipitor [Atorvastatin]    • Sulfa Antibiotics        Current Medications    Current Outpatient Prescriptions:   •  bisacodyl (DULCOLAX) 5 MG EC tablet, Take 5 mg by mouth Daily As Needed for Constipation., Disp: , Rfl:   •  budesonide-formoterol (SYMBICORT) 160-4.5 MCG/ACT inhaler, Inhale 2 puffs 2 (Two) Times a Day., Disp: , Rfl:   •  Etanercept (ENBREL SURECLICK) 50 MG/ML solution auto-injector, Inject  under the skin., Disp: , Rfl:   •  etodolac XL (LODINE XL) 600 MG 24 hr tablet, Take 1 tablet by mouth daily., Disp: , Rfl:   •  fentaNYL (DURAGESIC) 75 MCG/HR patch, , Disp: , Rfl:   •  fluticasone (VERAMYST) 27.5 MCG/SPRAY nasal spray, 2 sprays into each nostril Daily., Disp: , Rfl:   •  hydrochlorothiazide (HYDRODIURIL) 25 MG tablet, Take 1 tablet by mouth Daily., Disp: 30 tablet, Rfl: 11  •  HYDROcodone-acetaminophen (NORCO)  MG per tablet, Take  by mouth Every 6 (Six) Hours As Needed., Disp: , Rfl:   •  loratadine (CLARITIN) 10 MG tablet, Take 1 tablet by mouth daily., Disp: 90 tablet, Rfl: 3  •  Multiple Vitamins-Minerals (CENTRUM ADULTS PO), Take  by mouth Daily., Disp: , Rfl:   •  O2 (OXYGEN), Inhale 4 L/min 1 (One) Time., Disp: , Rfl:   •  pantoprazole (PROTONIX)  "40 MG EC tablet, Take 1 tablet by mouth Daily., Disp: 90 tablet, Rfl: 3  •  promethazine-codeine (PHENERGAN with CODEINE) 6.25-10 MG/5ML syrup, Take 5 mL by mouth Every 6 (Six) Hours As Needed for Cough., Disp: 240 mL, Rfl: 0  •  sertraline (ZOLOFT) 50 MG tablet, Take 1 tablet by mouth Daily., Disp: 90 tablet, Rfl: 3  •  simethicone (MYLICON) 125 MG chewable tablet, Chew 125 mg Every 6 (Six) Hours As Needed for Flatulence., Disp: , Rfl:   •  valsartan (DIOVAN) 320 MG tablet, Take 1 tablet by mouth Daily., Disp: 90 tablet, Rfl: 3     History of Present Illness   HPI  This is a 67-year-old  female the above mentioned PMH who presents for consult from PCP Dr. Lundberg for dyspnea on exertion.    Pt reports a 2-3 month history of worsening BAKER with very light exertion. Pt has chronic lung dz but there was a marked difference form her baseline with exertion. She has been using more O2 and using her inhalers more often with minimal improvement. She reports some cough when she is especially dyspneic. She has been on continuous O2 at 4L for over a year, had been on O2 at night for several years before.    She reports she's had cardiac testing in the past 15+ years ago that was nl, data deficient. Took Lipitor in the past for \"borderline HLD\", but d/c'd it due to myalgias. No hx DM, pre DM. BP has been well controlled.  Is fairly sedentary. NSAID use daily with etodolac.She is on various pain medication as well, all for RA. She has been on embrel for RA for 15 years. Her RA has worsened her COPD. Her weight has been stable. She plans to have a colonoscopy to evaluate for her anemia.    Pt denies any chest pain, orthopnea, PND, palpitations, lower extremity edema, or claudication. Pt denies history of CHF, DVT, PE, MI, CVA, TIA, or rheumatic fever.     ROS  Review of Systems   Constitution: Positive for malaise/fatigue.   HENT: Positive for hearing loss.    Cardiovascular: Positive for dyspnea on exertion.   Respiratory: " "Positive for cough and shortness of breath.    Endocrine: Positive for cold intolerance.   Musculoskeletal: Positive for arthritis, muscle cramps and myalgias.   Gastrointestinal: Positive for dysphagia and hematochezia.   Psychiatric/Behavioral: Positive for memory loss. The patient is nervous/anxious.    All other systems reviewed and are negative.      SOCIAL HX  Social History     Social History   • Marital status:      Spouse name: N/A   • Number of children: N/A   • Years of education: N/A     Occupational History   • disabled      Social History Main Topics   • Smoking status: Former Smoker     Packs/day: 1.00     Years: 25.00     Types: Cigarettes     Quit date: 1991   • Smokeless tobacco: Never Used   • Alcohol use No   • Drug use: No   • Sexual activity: Defer      Comment:      Other Topics Concern   • Not on file     Social History Narrative       FAMILY HX  Family History   Problem Relation Age of Onset   • Glaucoma Mother    • Coronary artery disease Mother    • Cancer Mother      bile duct   • Stroke Father    • Stomach cancer Father    • Diabetes Sister        Vitals:    12/04/17 1400   BP: 110/62   BP Location: Right arm   Patient Position: Sitting   Pulse: 93   SpO2: 94%   Weight: 207 lb (93.9 kg)   Height: 66\" (167.6 cm)       PHYSICAL EXAMINATION:  Physical Exam   Constitutional: She is oriented to person, place, and time. She appears well-developed and well-nourished. No distress.   obese   HENT:   Head: Normocephalic and atraumatic.   Right Ear: External ear normal.   Left Ear: External ear normal.   Nose: Nose normal.   Eyes: Conjunctivae and EOM are normal.   Neck: Neck supple. No hepatojugular reflux and no JVD present. Carotid bruit is not present. No thyromegaly present.   Cardiovascular: Normal rate, regular rhythm, S1 normal, S2 normal, normal heart sounds, intact distal pulses and normal pulses.  Exam reveals no gallop, no distant heart sounds and no midsystolic click.  "   No murmur heard.  Pulses:       Radial pulses are 2+ on the right side, and 2+ on the left side.        Dorsalis pedis pulses are 2+ on the right side, and 2+ on the left side.        Posterior tibial pulses are 2+ on the right side, and 2+ on the left side.   Pulmonary/Chest: Effort normal. No respiratory distress. She has decreased breath sounds. She has no wheezes. She has rhonchi (faint). She has no rales.   On O2 via nc at 4L   Abdominal: Soft. Bowel sounds are normal. There is no hepatosplenomegaly. There is no tenderness.   Musculoskeletal: Normal range of motion. She exhibits no edema.   Neurological: She is alert and oriented to person, place, and time.   No focal deficits.   Skin: Skin is warm and dry. No erythema.   Psychiatric: She has a normal mood and affect. Thought content normal.   Nursing note and vitals reviewed.      Diagnostic Data:    ECG 12 Lead  Date/Time: 12/4/2017 2:36 PM  Performed by: HIMANSHU CRAWFORD  Authorized by: HIMANSHU CRAWFORD   Rhythm: sinus rhythm  BPM: 88            Lab Results   Component Value Date    CHLPL 200 (H) 10/18/2016    TRIG 95 10/18/2016    HDL 67 10/18/2016    LDLDIRECT 118 03/27/2014     Lab Results   Component Value Date    GLUCOSE 89 03/27/2014    BUN 25 10/18/2016    CREATININE 0.96 10/18/2016     10/18/2016    K 4.6 10/18/2016    CL 97 10/18/2016    CO2 24 10/18/2016     Lab Results   Component Value Date    HGBA1C 5.6 10/02/2015     Lab Results   Component Value Date    WBC 9.92 11/09/2017    HGB 10.8 (L) 11/09/2017    HCT 35.4 (L) 11/09/2017     11/09/2017       ASSESSMENT:   Diagnosis Plan   1. Dyspnea on exertion  ECG 12 Lead   2. Essential hypertension         PLAN:  1. We anticipated completing an echo today, however the pt had a panic attack on the echo table and we were unable to complete it today. She does live in [place] so we will schedule her for an echo here at another time. Will need to document pulmonary pressures.  2. BP  controlled, continue antihypertensives    Scribed for Kraig Marquez MD by Jessa Camara PA-C. 12/4/2017  3:33 PM   I, Kraig Marquez MD, personally performed the services described in this documentation as scribed by the above named individual in my presence, and it is both accurate and complete.  12/4/2017  4:02 PM    Kraig Marquez MD, [facility]

## 2017-12-11 ENCOUNTER — TELEPHONE (OUTPATIENT)
Dept: CARDIOLOGY | Facility: CLINIC | Age: 67
End: 2017-12-11

## 2017-12-11 RX ORDER — ALPRAZOLAM 0.5 MG/1
TABLET ORAL
Qty: 2 TABLET | Refills: 0 | OUTPATIENT
Start: 2017-12-11 | End: 2017-12-19

## 2017-12-11 NOTE — TELEPHONE ENCOUNTER
Called patient back and left VM that if she is needing anxiety medication prior to echo she needs to contact her PCP to get that medication as instructed at her appt. Last week. Call back with any questions

## 2017-12-12 ENCOUNTER — OFFICE VISIT (OUTPATIENT)
Dept: GASTROENTEROLOGY | Facility: CLINIC | Age: 67
End: 2017-12-12

## 2017-12-12 ENCOUNTER — PREP FOR SURGERY (OUTPATIENT)
Dept: OTHER | Facility: HOSPITAL | Age: 67
End: 2017-12-12

## 2017-12-12 VITALS
DIASTOLIC BLOOD PRESSURE: 58 MMHG | TEMPERATURE: 97.4 F | BODY MASS INDEX: 32.62 KG/M2 | HEIGHT: 66 IN | SYSTOLIC BLOOD PRESSURE: 107 MMHG | RESPIRATION RATE: 20 BRPM | HEART RATE: 99 BPM | WEIGHT: 203 LBS

## 2017-12-12 DIAGNOSIS — Z12.11 COLON CANCER SCREENING: ICD-10-CM

## 2017-12-12 DIAGNOSIS — K92.1 MELENA: ICD-10-CM

## 2017-12-12 DIAGNOSIS — R11.0 NAUSEA: Chronic | ICD-10-CM

## 2017-12-12 DIAGNOSIS — K62.5 BRIGHT RED BLOOD PER RECTUM: ICD-10-CM

## 2017-12-12 DIAGNOSIS — R74.8 ELEVATED ALKALINE PHOSPHATASE LEVEL: ICD-10-CM

## 2017-12-12 DIAGNOSIS — D64.9 ANEMIA, UNSPECIFIED TYPE: Primary | ICD-10-CM

## 2017-12-12 DIAGNOSIS — K59.00 CONSTIPATION, UNSPECIFIED CONSTIPATION TYPE: ICD-10-CM

## 2017-12-12 DIAGNOSIS — R19.5 HEME POSITIVE STOOL: ICD-10-CM

## 2017-12-12 DIAGNOSIS — R10.32 LEFT LOWER QUADRANT PAIN: ICD-10-CM

## 2017-12-12 DIAGNOSIS — K59.00 CONSTIPATION, UNSPECIFIED CONSTIPATION TYPE: Chronic | ICD-10-CM

## 2017-12-12 DIAGNOSIS — R13.10 DYSPHAGIA, UNSPECIFIED TYPE: ICD-10-CM

## 2017-12-12 DIAGNOSIS — R13.10 DYSPHAGIA, UNSPECIFIED TYPE: Chronic | ICD-10-CM

## 2017-12-12 DIAGNOSIS — R11.0 NAUSEA: ICD-10-CM

## 2017-12-12 DIAGNOSIS — D64.9 ANEMIA, UNSPECIFIED TYPE: ICD-10-CM

## 2017-12-12 DIAGNOSIS — R10.32 LEFT LOWER QUADRANT PAIN: Primary | ICD-10-CM

## 2017-12-12 PROCEDURE — 99215 OFFICE O/P EST HI 40 MIN: CPT | Performed by: NURSE PRACTITIONER

## 2017-12-12 RX ORDER — METRONIDAZOLE 250 MG/1
250 TABLET ORAL 4 TIMES DAILY
Qty: 28 TABLET | Refills: 0 | Status: SHIPPED | OUTPATIENT
Start: 2017-12-12 | End: 2017-12-19

## 2017-12-12 RX ORDER — SODIUM CHLORIDE 9 MG/ML
70 INJECTION, SOLUTION INTRAVENOUS CONTINUOUS PRN
Status: CANCELLED | OUTPATIENT
Start: 2017-12-12

## 2017-12-12 RX ORDER — AMOXICILLIN AND CLAVULANATE POTASSIUM 875; 125 MG/1; MG/1
1 TABLET, FILM COATED ORAL 2 TIMES DAILY
Qty: 14 TABLET | Refills: 0 | Status: SHIPPED | OUTPATIENT
Start: 2017-12-12 | End: 2017-12-19

## 2017-12-12 RX ORDER — ONDANSETRON 4 MG/1
4 TABLET, FILM COATED ORAL EVERY 8 HOURS PRN
Qty: 30 TABLET | Refills: 1 | Status: SHIPPED | OUTPATIENT
Start: 2017-12-12 | End: 2018-01-11

## 2017-12-12 NOTE — PATIENT INSTRUCTIONS
1. Antireflux measures: Avoid fried, fatty foods, alcohol, chocolate, coffee, tea,  soft drinks, peppermint and spearmint, spicy foods, tomatoes and tomato based foods, onion based foods, and smoking. Other antireflux measures include weight reduction if overweight, avoiding tight clothing around the abdomen, elevating the head of the bed 6 inches with blocks under the head board, and don't drink or eat before going to bed and avoid lying down immediately after meals.  2. Pantoprazole 40 mg 1 tablet by mouth in the am 30 minutes before breakfast.  3. Zofran 4 mg 1 po every 8 hours as needed for nausea.  4. Treatment for diverticulitis:  A. Low-fat low fiber diet for 5 days thereafter low-fat high-fiber diet.   B. Augmemtin 875/125 mg 1 tablet by mouth twice a day. Side effects discussed.  C. Flagyl (metronidazole) tablets 250 mg. Take 1 tablet one by mouth 4 times a day for 7 days. Side effects were discussed.  D. Avoid laxatives, enemas for next 5 days. However, for constipation the patient may use stool softeners.  E. Colonoscopy: Description of the procedure, risks, benefits, alternatives and options, including nonoperative options, were discussed with the patient in detail. The patient understands and wishes to proceed, although it may be advisable to wait 3-4 weeks to schedule procedure.  5. Possible evaluation of elevated alkaline phosphatase level in the future.  6. Avoid NSAIDs.  7. Upper endoscopy-EGD: Description of the procedure, risks, benefits, alternatives and options, including nonoperative options, were discussed with the patient in detail. The patient understands and wishes to proceed.

## 2017-12-12 NOTE — PROGRESS NOTES
"Chief Complaint   Patient presents with   • Anemia   • Abdominal Pain   • Black or Bloody Stool   • Constipation   • Difficulty Swallowing   • Nausea   • Liver Eval     The patient has a history of anemia for the past 3 months. The patient has not been aware of anemia in the past. The patient has had a few episodes of bright red blood per rectum in the past, and has had black stools for the past 1 week. The patient has 2-3 episodes of black stools per day. For the past 2-3 days, her symptoms have improved since she stopped her NSAIDs. The patient denies hematemesis. No history of vaginal bleeding.     The patient has a history of left lower quadrant pain for past 1-2 weeks. The patient may have pain intermittently throughout the day. The patient has had improvement with the pain since stopping her NSAIDs. The pain is described as moderate. It is a dull, aching pain. Nothing worsened the pain, but stopping NSAID use has improved the pain.    The patient has a long-standing history of nausea. The patient has nausea 4-5 times per week. The nausea has been unchanged. Eating does not affect the nausea. The patient is not taking anything for the nausea.    The patient has a long-standing history of difficulty swallowing. This may occur once per week. Solids typically cause difficulty swallowing, but liquids do not cause a problem.     There is a long-standing history of constipation. The patient has at least 1 bowel movement per day. The patient does not drink water. She does not eat high fiber as she does not drink enough water for the fiber to work. The patient does take laxatives 4-5 days per week to have a bowel movement. She has tried stool softeners, but it did not work. She has also tried Miralax, but she did not feel like it \"cleaned out\" her bowels.    The patient has not been aware of elevated liver enzymes, but recently she has had mildly elevated alkaline phosphatase. The patient does have rheumatoid " arthritis.    The patient's last colonoscopy was in 2012. There is no family history of colon cancer.    Anemia   Presents for initial visit. The condition has lasted for 3 months. Symptoms include abdominal pain. There has been no bruising/bleeding easily, fever or palpitations. Signs of blood loss that are present include hematochezia and melena. Signs of blood loss that are not present include hematemesis and vaginal bleeding. Past treatments include nothing.   Abdominal Pain   This is a new problem. Episode onset: 1-2 weeks. The onset quality is sudden. The problem occurs daily. The problem has been gradually improving. The pain is located in the LLQ. The pain is moderate. The quality of the pain is aching and dull. The abdominal pain does not radiate. Associated symptoms include arthralgias, constipation, diarrhea, hematochezia, melena, myalgias and nausea. Pertinent negatives include no dysuria, fever, headaches, hematuria or vomiting. Nothing aggravates the pain. The pain is relieved by nothing. Treatments tried: stopping NSAIDs. The treatment provided significant relief.   Constipation   This is a chronic problem. Episode onset: over 50 years. The problem is unchanged. Her stool frequency is 1 time per day. The stool is described as firm. The patient is not on a high fiber diet. There has not been adequate water intake. Associated symptoms include abdominal pain, diarrhea, hematochezia, melena and nausea. Pertinent negatives include no fever or vomiting. She has tried laxatives for the symptoms. The treatment provided significant relief.   Difficulty Swallowing   This is a chronic problem. Episode onset: over 20 years. Episode frequency: once per week. The problem has been unchanged. Associated symptoms include abdominal pain, arthralgias, chills, coughing, fatigue, joint swelling, myalgias and nausea. Pertinent negatives include no chest pain, fever, headaches, rash or vomiting. The symptoms are aggravated  by eating. She has tried nothing for the symptoms.   Nausea   This is a chronic problem. The current episode started more than 1 year ago. Episode frequency: The patient has nausea 4-5 times per week. The problem has been unchanged. Associated symptoms include abdominal pain, arthralgias, chills, coughing, fatigue, joint swelling, myalgias and nausea. Pertinent negatives include no chest pain, fever, headaches, rash or vomiting. Nothing aggravates the symptoms. She has tried nothing for the symptoms.   Rectal Bleeding   This is a new problem. The current episode started more than 1 month ago. The problem occurs daily. The problem has been gradually improving. Associated symptoms include abdominal pain, arthralgias, chills, coughing, fatigue, joint swelling, myalgias and nausea. Pertinent negatives include no chest pain, fever, headaches, rash or vomiting. Nothing aggravates the symptoms. She has tried nothing for the symptoms.     Review of Systems   Constitutional: Positive for chills and fatigue. Negative for appetite change, fever and unexpected weight change.   HENT: Positive for nosebleeds and trouble swallowing. Negative for mouth sores.    Eyes: Positive for itching. Negative for discharge and redness.   Respiratory: Positive for cough and shortness of breath. Negative for apnea.    Cardiovascular: Negative for chest pain, palpitations and leg swelling.   Gastrointestinal: Positive for abdominal pain, anal bleeding, blood in stool, constipation, diarrhea, hematochezia, melena and nausea. Negative for abdominal distention, hematemesis and vomiting.   Endocrine: Positive for cold intolerance and heat intolerance. Negative for polydipsia.   Genitourinary: Negative for dysuria, hematuria, urgency and vaginal bleeding.   Musculoskeletal: Positive for arthralgias, joint swelling and myalgias.   Skin: Negative for rash.   Allergic/Immunologic: Negative for food allergies and immunocompromised state.   Neurological:  Negative for dizziness, seizures, syncope and headaches.   Hematological: Negative for adenopathy. Does not bruise/bleed easily.   Psychiatric/Behavioral: Negative for dysphoric mood. The patient is nervous/anxious. The patient is not hyperactive.      Patient Active Problem List   Diagnosis   • enlg LN right axilla, bx benign 3/16   • Benign essential hypertension   • Chronic obstructive pulmonary disease   • Chronic pain syndrome   • Gastroesophageal reflux disease   • Hyperlipidemia   • Rheumatoid arthritis involving multiple sites with positive rheumatoid factor   • Rheumatoid lung disease   • Chronic respiratory failure with hypoxia   • Bronchitis   • Encounter for long-term current use of medication   • Anemia   • Anxiety   • Interstitial lung disease   • Dependence on supplemental oxygen   • BMI 36.0-36.9,adult   • Emphysema lung   • Essential hypertension   • Abnormal RBC indices   • Internal hemorrhoids with complication   • Bright red blood per rectum   • Melena   • Heme positive stool   • Left lower quadrant pain   • Nausea   • Dysphagia   • Constipation   • Colon cancer screening   • Elevated alkaline phosphatase level     Past Medical History:   Diagnosis Date   • Abnormal mammogram    • Acute UTI    • Arthritis     knee, right   • Back pain    • Candida vaginitis    • CHF (congestive heart failure)     not diagnosed   • Chronic pain syndrome     disc disease, lumbar, neck   • Community acquired pneumonia    • COPD (chronic obstructive pulmonary disease)     Emphysema with severe physiology on PFTs.  Patient is a former smoker.   • Cough    • Deafness    • Dependence on supplemental oxygen      2 L oxygen ordered each bedtime and when necessary.   • Former smoker    • GERD (gastroesophageal reflux disease)    • H/O chest x-ray 08/11/2015    Rase base opacity consistent with pneumonia or atelectasis   • H/O chest x-ray 06/24/2015    Right basilar airspace disease and effusion consistent with a pneumonia.  F/U to radiographic reolution is recommended   • H/O chest x-ray 03/09/2010    Cardiac silhoutte is not enlarged. Lungs aare inflated. Mild nonspecifiec interstitial changes. No pleual effusions or dense consolidations. Scattered granulomatous changes. Spine with mild kyphosis but no osteopenia. No significant adenopathy   • H/O echocardiogram 03/16/2010    Normal study   • History of PFTs 12/13/2011    Goo pt cooperation and effort. Pt given 3 puffs of xopenex. PFT is acceptable and reproducible   • History of PFTs 08/11/2011    Pt unable to produce acceptabel and reproducible PFT. Pt was given 3 puffs of xopenex. Pt gave good effort Best pleth of two attempts   • History of PFTs 02/24/2011    PFT acceptable and reproducible. Pt gave good effort. Pt used bronchodilator less than 2 hours prior to testing   • Hyperlipidemia    • Hypertension    • Interstitial lung disease     History of rheumatoid lung with bronchiolitis obliterans   • Kidney stones     x 1   • Nephrolithiasis     2007, passed   • Panic attack    • Pneumonia    • RA (rheumatoid arthritis)    • Rheumatoid arthritis     · A.  Diagnosed in 2001 with history of methotrexate and Enbrel therapy.   • Rheumatoid lung     bronchiolitis obliterans   • Sinus problem    • Vertigo      Past Surgical History:   Procedure Laterality Date   • COLONOSCOPY  2012   • ORAL ANTRAL FISTULA CLOSURE      gums   • TONSILLECTOMY     • TUBAL ABDOMINAL LIGATION     • UPPER GASTROINTESTINAL ENDOSCOPY  2012     Family History   Problem Relation Age of Onset   • Glaucoma Mother    • Coronary artery disease Mother    • Liver cancer Mother      bile duct cancer   • Stroke Father    • Stomach cancer Father    • Diabetes Sister    • Colon cancer Neg Hx    • Ulcerative colitis Neg Hx      Social History   Substance Use Topics   • Smoking status: Former Smoker     Packs/day: 1.00     Years: 25.00     Types: Cigarettes     Start date: 1963     Quit date: 1991   • Smokeless tobacco: Never  Used   • Alcohol use No       Current Outpatient Prescriptions:   •  ALPRAZolam (XANAX) 0.5 MG tablet, TAKE 1-2 TAB PO X ONCE FOR SCAN, Disp: 2 tablet, Rfl: 0  •  bisacodyl (DULCOLAX) 5 MG EC tablet, Take 5 mg by mouth Daily As Needed for Constipation., Disp: , Rfl:   •  budesonide-formoterol (SYMBICORT) 160-4.5 MCG/ACT inhaler, Inhale 2 puffs 2 (Two) Times a Day., Disp: , Rfl:   •  Etanercept (ENBREL SURECLICK) 50 MG/ML solution auto-injector, Inject  under the skin., Disp: , Rfl:   •  fentaNYL (DURAGESIC) 75 MCG/HR patch, Place 1 patch on the skin Every 72 (Seventy-Two) Hours., Disp: , Rfl:   •  fluticasone (VERAMYST) 27.5 MCG/SPRAY nasal spray, 2 sprays into each nostril Daily., Disp: , Rfl:   •  hydrochlorothiazide (HYDRODIURIL) 25 MG tablet, Take 1 tablet by mouth Daily., Disp: 30 tablet, Rfl: 11  •  HYDROcodone-acetaminophen (NORCO)  MG per tablet, Take  by mouth Every 8 (Eight) Hours As Needed., Disp: , Rfl:   •  loratadine (CLARITIN) 10 MG tablet, Take 1 tablet by mouth daily., Disp: 90 tablet, Rfl: 3  •  Multiple Vitamins-Minerals (CENTRUM ADULTS PO), Take  by mouth Daily., Disp: , Rfl:   •  O2 (OXYGEN), Inhale 4 L/min 1 (One) Time., Disp: , Rfl:   •  pantoprazole (PROTONIX) 40 MG EC tablet, Take 1 tablet by mouth Daily., Disp: 90 tablet, Rfl: 3  •  promethazine-codeine (PHENERGAN with CODEINE) 6.25-10 MG/5ML syrup, Take 5 mL by mouth Every 6 (Six) Hours As Needed for Cough., Disp: 240 mL, Rfl: 0  •  sertraline (ZOLOFT) 50 MG tablet, Take 1 tablet by mouth Daily., Disp: 90 tablet, Rfl: 3  •  simethicone (MYLICON) 125 MG chewable tablet, Chew 125 mg Every 6 (Six) Hours As Needed for Flatulence., Disp: , Rfl:   •  valsartan (DIOVAN) 320 MG tablet, Take 1 tablet by mouth Daily., Disp: 90 tablet, Rfl: 3  •  amoxicillin-clavulanate (AUGMENTIN) 875-125 MG per tablet, Take 1 tablet by mouth 2 (Two) Times a Day., Disp: 14 tablet, Rfl: 0  •  metroNIDAZOLE (FLAGYL) 250 MG tablet, Take 1 tablet by mouth 4 (Four)  "Times a Day for 7 days. Take 1 tablet four times daily x 7 days, Disp: 28 tablet, Rfl: 0  •  ondansetron (ZOFRAN) 4 MG tablet, Take 1 tablet by mouth Every 8 (Eight) Hours As Needed for Nausea or Vomiting for up to 30 days., Disp: 30 tablet, Rfl: 1  •  Probiotic capsule, Take 1 capsule by mouth Daily., Disp: 30 capsule, Rfl: 1    Allergies   Allergen Reactions   • Ciprofloxacin Other (See Comments)     \"Hurt all over\"   • Doxycycline    • Lipitor [Atorvastatin] Other (See Comments)     Hurt all over     • Sulfa Antibiotics      /58  Pulse 99  Temp 97.4 °F (36.3 °C)  Resp 20  Ht 167.6 cm (66\")  Wt 92.1 kg (203 lb)  LMP  (LMP Unknown)  Breastfeeding? No  BMI 32.77 kg/m2    Physical Exam   Constitutional: She is oriented to person, place, and time. She appears well-developed and well-nourished. No distress.   HENT:   Head: Normocephalic and atraumatic.   Right Ear: Hearing and external ear normal.   Left Ear: Hearing and external ear normal.   Nose: Nose normal.   Mouth/Throat: Oropharynx is clear and moist and mucous membranes are normal. Mucous membranes are not pale, not dry and not cyanotic. No oral lesions. No oropharyngeal exudate.   Eyes: Conjunctivae and EOM are normal. Right eye exhibits no discharge. Left eye exhibits no discharge.   Neck: Trachea normal. Neck supple. No JVD present. No edema present. No thyroid mass and no thyromegaly present.   Cardiovascular: Normal rate, regular rhythm, S2 normal and normal heart sounds.  Exam reveals no gallop, no S3 and no friction rub.    No murmur heard.  Pulmonary/Chest: Effort normal and breath sounds normal. No respiratory distress (uses 4L O2 per NC). She exhibits no tenderness.   Abdominal: Normal appearance and bowel sounds are normal. She exhibits no distension, no ascites and no mass. There is no splenomegaly or hepatomegaly. There is tenderness (moderate) in the left lower quadrant. There is no rigidity, no rebound and no guarding. No hernia. "       Vascular Status -  Her exam exhibits no right foot edema. Her exam exhibits no left foot edema.  Lymphadenopathy:     She has no cervical adenopathy.        Left: No supraclavicular adenopathy present.   Neurological: She is alert and oriented to person, place, and time. She has normal strength. No cranial nerve deficit or sensory deficit. Gait (difficulty walking at times) abnormal.   Skin: No rash noted. She is not diaphoretic. No cyanosis. No pallor. Nails show no clubbing.   Psychiatric: She has a normal mood and affect.   Nursing note and vitals reviewed.  Stigmata of chronic liver disease:  None.  Asterixis:  None.    Laboratory Results:  Upon review of records:    Dated 9/7/2017 glucose 94 BUN 27 creatinine 1.0 sodium 142 potassium 4.7 chloride 97 CO2 30 calcium 8.8 albumin 4.1 total bilirubin <0.2 alkaline phosphatase 122 AST 17 ALT 9W BC 7.0 hemoglobin 10.4 hematocrit 33.1 platelet count 273 MCV 84 CRP 24.0    Dated 10/26/2017 WBC 6.67 hemoglobin 9.8 hematocrit 33.0 platelet count 269 MCV 87.5 ferritin 43.0 iron 37 TIBC 2 76 units  iron saturation 13% erythropoietin 4.5    Dated 10/30/2017 fecal occult blood positive    Dated 11/9/2017 WBC 9.92 hemoglobin 10.8 hematocrit 35.4 platelet count 281 MCV 87.2    Assessment and Plan:    Vanna was seen today for anemia, abdominal pain, black or bloody stool, constipation, difficulty swallowing, nausea and liver eval.    Diagnoses and all orders for this visit:    Anemia, unspecified type  Comments:  History of new onset anemia.  Of interest, the patient does have a history of melena and bright red blood per rectum.    Bright red blood per rectum  Comments:  Differentials include hemorrhoids, fissure, vascular ectasia, diverticular bleed, underlying colonic neoplastic disease.    Melena  Comments:  Differentials include peptic ulcer disease.  Improved after stopping NSAIDs.    Heme positive stool  Comments:  History of heme positive stools on recent  labs.    Left lower quadrant pain  Comments:  Consistent with possible diverticulitis.  Orders:  -     amoxicillin-clavulanate (AUGMENTIN) 875-125 MG per tablet; Take 1 tablet by mouth 2 (Two) Times a Day.  -     metroNIDAZOLE (FLAGYL) 250 MG tablet; Take 1 tablet by mouth 4 (Four) Times a Day for 7 days. Take 1 tablet four times daily x 7 days  -     Probiotic capsule; Take 1 capsule by mouth Daily.    Nausea  Comments:  Differentials include peptic ulcer disease, pancreatobiliary disease.  Orders:  -     ondansetron (ZOFRAN) 4 MG tablet; Take 1 tablet by mouth Every 8 (Eight) Hours As Needed for Nausea or Vomiting for up to 30 days.    Dysphagia, unspecified type  Comments:  Differentials include Schatzki's ring, esophagitis, esophageal dysmotility.    Constipation, unspecified constipation type  Comments:  History of long-standing constipation.  Of interest, patient takes stimulant laxatives on a regular basis.  Orders:  -     Probiotic capsule; Take 1 capsule by mouth Daily.    Colon cancer screening  Comments:  Last colonoscopy was in 2012.  No family history of colon cancer.    Elevated alkaline phosphatase level  Comments:  History of elevated alkaline phosphatase level.  Of interest, patient has history of rheumatoid arthritis.        Plan  and Patient Instructions:  Patient Instructions   1. Antireflux measures: Avoid fried, fatty foods, alcohol, chocolate, coffee, tea,  soft drinks, peppermint and spearmint, spicy foods, tomatoes and tomato based foods, onion based foods, and smoking. Other antireflux measures include weight reduction if overweight, avoiding tight clothing around the abdomen, elevating the head of the bed 6 inches with blocks under the head board, and don't drink or eat before going to bed and avoid lying down immediately after meals.  2. Pantoprazole 40 mg 1 tablet by mouth in the am 30 minutes before breakfast.  3. Zofran 4 mg 1 po every 8 hours as needed for nausea.  4. Treatment for  diverticulitis:  A. Low-fat low fiber diet for 5 days thereafter low-fat high-fiber diet.   B. Augmemtin 875/125 mg 1 tablet by mouth twice a day. Side effects discussed.  C. Flagyl (metronidazole) tablets 250 mg. Take 1 tablet one by mouth 4 times a day for 7 days. Side effects were discussed.  D. Avoid laxatives, enemas for next 5 days. However, for constipation the patient may use stool softeners.  E. Colonoscopy: Description of the procedure, risks, benefits, alternatives and options, including nonoperative options, were discussed with the patient in detail. The patient understands and wishes to proceed, although it may be advisable to wait 3-4 weeks to schedule procedure.  5. Possible evaluation of elevated alkaline phosphatase level in the future.  6. Avoid NSAIDs.  7. Upper endoscopy-EGD: Description of the procedure, risks, benefits, alternatives and options, including nonoperative options, were discussed with the patient in detail. The patient understands and wishes to proceed.    Vj Weathers, APRN

## 2017-12-15 ENCOUNTER — ANESTHESIA EVENT (OUTPATIENT)
Dept: GASTROENTEROLOGY | Facility: HOSPITAL | Age: 67
End: 2017-12-15

## 2017-12-15 ENCOUNTER — HOSPITAL ENCOUNTER (OUTPATIENT)
Facility: HOSPITAL | Age: 67
Setting detail: HOSPITAL OUTPATIENT SURGERY
Discharge: HOME | End: 2017-12-15
Attending: INTERNAL MEDICINE | Admitting: INTERNAL MEDICINE

## 2017-12-15 ENCOUNTER — ANESTHESIA (OUTPATIENT)
Dept: GASTROENTEROLOGY | Facility: HOSPITAL | Age: 67
End: 2017-12-15

## 2017-12-15 VITALS
OXYGEN SATURATION: 100 % | DIASTOLIC BLOOD PRESSURE: 72 MMHG | TEMPERATURE: 98.1 F | WEIGHT: 203 LBS | RESPIRATION RATE: 18 BRPM | BODY MASS INDEX: 32.62 KG/M2 | SYSTOLIC BLOOD PRESSURE: 129 MMHG | HEIGHT: 66 IN | HEART RATE: 84 BPM

## 2017-12-15 DIAGNOSIS — K92.1 MELENA: ICD-10-CM

## 2017-12-15 DIAGNOSIS — D64.9 ANEMIA, UNSPECIFIED TYPE: ICD-10-CM

## 2017-12-15 DIAGNOSIS — R13.10 DYSPHAGIA, UNSPECIFIED TYPE: ICD-10-CM

## 2017-12-15 DIAGNOSIS — R11.0 NAUSEA: ICD-10-CM

## 2017-12-15 PROCEDURE — 25010000002 ONDANSETRON PER 1 MG: Performed by: NURSE ANESTHETIST, CERTIFIED REGISTERED

## 2017-12-15 PROCEDURE — 43239 EGD BIOPSY SINGLE/MULTIPLE: CPT | Performed by: INTERNAL MEDICINE

## 2017-12-15 PROCEDURE — 43249 ESOPH EGD DILATION <30 MM: CPT | Performed by: INTERNAL MEDICINE

## 2017-12-15 PROCEDURE — 88305 TISSUE EXAM BY PATHOLOGIST: CPT | Performed by: INTERNAL MEDICINE

## 2017-12-15 PROCEDURE — C1726 CATH, BAL DIL, NON-VASCULAR: HCPCS | Performed by: INTERNAL MEDICINE

## 2017-12-15 PROCEDURE — 25010000002 PROPOFOL 200 MG/20ML EMULSION: Performed by: NURSE ANESTHETIST, CERTIFIED REGISTERED

## 2017-12-15 RX ORDER — KETAMINE HYDROCHLORIDE 50 MG/ML
INJECTION, SOLUTION, CONCENTRATE INTRAMUSCULAR; INTRAVENOUS AS NEEDED
Status: DISCONTINUED | OUTPATIENT
Start: 2017-12-15 | End: 2017-12-15 | Stop reason: SURG

## 2017-12-15 RX ORDER — ONDANSETRON 2 MG/ML
INJECTION INTRAMUSCULAR; INTRAVENOUS AS NEEDED
Status: DISCONTINUED | OUTPATIENT
Start: 2017-12-15 | End: 2017-12-15 | Stop reason: SURG

## 2017-12-15 RX ORDER — PANTOPRAZOLE SODIUM 40 MG/10ML
40 INJECTION, POWDER, LYOPHILIZED, FOR SOLUTION INTRAVENOUS ONCE
Status: COMPLETED | OUTPATIENT
Start: 2017-12-15 | End: 2017-12-15

## 2017-12-15 RX ORDER — SODIUM CHLORIDE 9 MG/ML
70 INJECTION, SOLUTION INTRAVENOUS CONTINUOUS PRN
Status: DISCONTINUED | OUTPATIENT
Start: 2017-12-15 | End: 2017-12-15 | Stop reason: HOSPADM

## 2017-12-15 RX ORDER — PROPOFOL 10 MG/ML
INJECTION, EMULSION INTRAVENOUS AS NEEDED
Status: DISCONTINUED | OUTPATIENT
Start: 2017-12-15 | End: 2017-12-15 | Stop reason: SURG

## 2017-12-15 RX ADMIN — SODIUM CHLORIDE 70 ML/HR: 9 INJECTION, SOLUTION INTRAVENOUS at 06:48

## 2017-12-15 RX ADMIN — PROPOFOL 50 MG: 10 INJECTION, EMULSION INTRAVENOUS at 07:25

## 2017-12-15 RX ADMIN — KETAMINE HYDROCHLORIDE 10 MG: 50 INJECTION, SOLUTION INTRAMUSCULAR; INTRAVENOUS at 07:34

## 2017-12-15 RX ADMIN — PROPOFOL 40 MG: 10 INJECTION, EMULSION INTRAVENOUS at 07:28

## 2017-12-15 RX ADMIN — PROPOFOL 40 MG: 10 INJECTION, EMULSION INTRAVENOUS at 07:34

## 2017-12-15 RX ADMIN — PANTOPRAZOLE SODIUM 40 MG: 40 INJECTION, POWDER, FOR SOLUTION INTRAVENOUS at 08:36

## 2017-12-15 RX ADMIN — ONDANSETRON 4 MG: 2 INJECTION INTRAMUSCULAR; INTRAVENOUS at 07:23

## 2017-12-15 RX ADMIN — PROPOFOL 50 MG: 10 INJECTION, EMULSION INTRAVENOUS at 07:31

## 2017-12-15 RX ADMIN — LIDOCAINE HYDROCHLORIDE 40 MG: 20 INJECTION, SOLUTION INTRAVENOUS at 07:25

## 2017-12-15 NOTE — DISCHARGE INSTRUCTIONS
Postprocedure instructions:  Nothing by mouth until fully alert.  Bedrest until fully alert.  Vital signs as routine.    Diet:   Nothing by mouth for 60 minutes, then if no chest pains the patient may have Clear liquids diet (No Sodas) for 6 hours.  May advance to soft diet in 6 hours if no chest pains, Fever or chills, nausea vomiting or bleeding.    The patient may eat in upright position, chew well, take small bites and take medications in upright position.   The patient should drink water after 3-4 bites, and liberally with medications.   The patient should remain upright for about 10 minutes after eating and taking oral medications.      Blood Thinner and other medications Directions:  Avoid Aspirin & other NSAIDS.  Tylenol is okay.    Other instructions:  The patient should avoid medications that have a potential to cause pill esophagitis including potassium pills, tetracycline capsules, iron pills, NSAIDs and bisphosphonates.      Follow-up:    DR. BEATRIZ LOUIS in 4 weeks.Office phone # [phone].      To assist you in voiding:  Drink plenty of fluids  Listen to running water while attempting to void.    If you are unable to urinate and you have an uncomfortable urge to void or it has been   6 hours since you were discharged, return to the Emergency Room

## 2017-12-15 NOTE — ANESTHESIA PREPROCEDURE EVALUATION
Anesthesia Evaluation     no history of anesthetic complications:  NPO Solid Status: > 8 hours  NPO Liquid Status: > 8 hours     Airway   Mallampati: II  Neck ROM: full  no difficulty expected  Dental - normal exam     Pulmonary - normal exam    breath sounds clear to auscultation  (+) pneumonia resolved , COPD severe,   Cardiovascular - normal exam    ECG reviewed  Rhythm: regular  Rate: normal    (+) hypertension well controlled, CHF, hyperlipidemia      Neuro/Psych  (+) dizziness/light headedness, psychiatric history Anxiety,    GI/Hepatic/Renal/Endo    (+) obesity,  GERD poorly controlled,     Musculoskeletal     (+) back pain, chronic pain,   Abdominal    Substance History      OB/GYN          Other   (+) arthritis     ROS/Med Hx Other: Hgb 10.8  RA  O2 24hr/day                                    Anesthesia Plan    ASA 3     MAC     Anesthetic plan and risks discussed with patient.

## 2017-12-15 NOTE — PLAN OF CARE
Problem: GI Endoscopy (Adult)  Goal: Signs and Symptoms of Listed Potential Problems Will be Absent or Manageable (GI Endoscopy)  Outcome: Outcome(s) achieved Date Met:  12/15/17    12/15/17 0753   GI Endoscopy   Problems Assessed (GI Endoscopy) all   Problems Present (GI Endoscopy) none

## 2017-12-15 NOTE — PLAN OF CARE
Problem: GI Endoscopy (Adult)  Goal: Signs and Symptoms of Listed Potential Problems Will be Absent or Manageable (GI Endoscopy)  Outcome: Ongoing (interventions implemented as appropriate)    12/15/17 4757   GI Endoscopy   Problems Assessed (GI Endoscopy) all   Problems Present (GI Endoscopy) none

## 2017-12-15 NOTE — ANESTHESIA POSTPROCEDURE EVALUATION
Patient: Vanna Rincon    Procedure Summary     Date Anesthesia Start Anesthesia Stop Room / Location    12/15/17 0722 0742 [facility] ENDOSCOPY 2 / [facility] ENDOSCOPY       Procedure Diagnosis Surgeon Provider    ESOPHAGOGASTRODUODENOSCOPY with biopsies and esophageal balloon dilitation (N/A Esophagus) Esophageal ring; Hiatal hernia; Erosive gastritis; Duodenitis  (Melena [K92.1]; Nausea [R11.0]; Anemia, unspecified type [D64.9]; Dysphagia, unspecified type [R13.10]) MD Diogenes Johnson CRNA          Anesthesia Type: MAC  Last vitals  BP   129/60 (12/15/17 0746)   Temp   98.1 °F (36.7 °C) (12/15/17 0746)   Pulse   81 (12/15/17 0746)   Resp   18 (12/15/17 0746)     SpO2   100 % (12/15/17 0746)     Post Anesthesia Care and Evaluation    Patient location during evaluation: bedside  Patient participation: complete - patient participated  Level of consciousness: awake and alert  Pain management: satisfactory to patient  Airway patency: patent  Anesthetic complications: No anesthetic complications  PONV Status: controlled  Cardiovascular status: acceptable and stable  Respiratory status: acceptable  Hydration status: acceptable

## 2017-12-15 NOTE — OP NOTE
PROCEDURE:  Upper Endoscopy with  biopsies.    DATE OF PROCEDURE: December 15, 2017.    REFERRING PROVIDER:  Jose Daniel Lundberg DO.     INSTRUMENT:  Olympus GIF H 190 video endoscope     INDICATIONS OF THE PROCEDURE:  This is a 67-year-old white female with history of recurrent dysphagia, anemia, nausea and history of melena.  Currently undergoing upper endoscopy for further evaluation.       BIOPSIES: Second portion of duodenum.  Gastric antrum, angularis and body of the stomach.       MEDICATIONS:  MAC.     PHOTOGRAPHS:  Photographs were included in the medical records.     CONSENT/PREPROCEDURE EVALUATION:  Risks, benefits, alternatives and options of the procedure including risks of anesthesia/sedation were discussed and informed consent was obtained prior to the procedure. History and physical examination were performed and nothing precluded the test.     REPORT:  The patient was placed in left lateral decubitus position. Once under the influence of IV sedation, the instrument was inserted into the mouth and esophagus was intubated under direct vision without difficulty.     Esophagus:  Z line was noted to be around 36 cm.    A moderate sliding hiatal hernia around 4 cm was noted.  No Bishop's esophagus was seen.  A distal esophageal ring was seen.     Stomach:  Antrum:  Erythematous-erosive  gastritis.  Angulus, lesser and greater curves: Normal.  Retroflex examination: Sliding hiatal hernia.  Cardia and fundus:  Normal.     Body of the stomach: Erythematous gastritis.  Good distensibility of the stomach was achieved no giant folds were noted.   Biopsies were obtained from the gastric antrum, angularis and body of the stomach.    Pylorus and pyloric channel:  normal.     Duodenum:  Bulb:Mild erythematous bulbar duodenitis.  Second portion: normal.  No scalloping was seen in the second portion of duodenum.  Biopsies were obtained from the second portion of duodenum.    Intervention:  Distal esophagus was dilated  using 15-16.5-18 mm CRE balloon to 18 mm under vision without difficulty.  Some blood was noted no active bleeding was seen.      The upper GI tract was decompressed and the scope was pulled out of the patient. The patient tolerated the procedure well.     DIAGNOSES:     1. Distal esophageal ring (Schatzki's type).  2. Sliding hiatal hernia around 4 cm.  3. Erythematous-erosive gastritis.  4. Erythematous bulbar duodenitis.    RECOMMENDATIONS:  1.  Dietary instructions.  2.  Pantoprazole 40 mg 1 p.o. q.a.m. 1/2 hour before breakfast.  3.  Follow biopsies.  4.  Follow up in office.     Thank you very much for letting me participate in the care of this patient. Please do not hesitate to call me if you have any questions.

## 2017-12-18 ENCOUNTER — HOSPITAL ENCOUNTER (OUTPATIENT)
Dept: CARDIOLOGY | Facility: HOSPITAL | Age: 67
Discharge: HOME | End: 2017-12-18

## 2017-12-18 PROCEDURE — 93306 TTE W/DOPPLER COMPLETE: CPT | Performed by: INTERNAL MEDICINE

## 2017-12-18 PROCEDURE — 93306 TTE W/DOPPLER COMPLETE: CPT

## 2017-12-19 ENCOUNTER — OFFICE VISIT (OUTPATIENT)
Dept: INTERNAL MEDICINE | Facility: CLINIC | Age: 67
End: 2017-12-19

## 2017-12-19 VITALS
TEMPERATURE: 98.7 F | WEIGHT: 204 LBS | OXYGEN SATURATION: 96 % | DIASTOLIC BLOOD PRESSURE: 60 MMHG | SYSTOLIC BLOOD PRESSURE: 110 MMHG | BODY MASS INDEX: 32.78 KG/M2 | RESPIRATION RATE: 14 BRPM | HEART RATE: 82 BPM | HEIGHT: 66 IN

## 2017-12-19 DIAGNOSIS — F41.9 ANXIETY: ICD-10-CM

## 2017-12-19 DIAGNOSIS — Z99.81 DEPENDENCE ON SUPPLEMENTAL OXYGEN: ICD-10-CM

## 2017-12-19 DIAGNOSIS — F11.90 CHRONIC NARCOTIC USE: ICD-10-CM

## 2017-12-19 DIAGNOSIS — M05.10 RHEUMATOID LUNG DISEASE (HCC): ICD-10-CM

## 2017-12-19 DIAGNOSIS — I10 ESSENTIAL HYPERTENSION: ICD-10-CM

## 2017-12-19 DIAGNOSIS — Z79.899 HIGH RISK MEDICATION USE: ICD-10-CM

## 2017-12-19 DIAGNOSIS — F41.0 PANIC ATTACK: Primary | ICD-10-CM

## 2017-12-19 LAB
BH CV ECHO MEAS - IVSD: 0.9 CM
BH CV ECHO MEAS - LVIDD: 4.6 CM
BH CV ECHO MEAS - LVIDS: 3.3 CM
BH CV ECHO MEAS - LVPWD: 0.8 CM
BH CV ECHO MEAS - RVSP: 13 MMHG
BH CV ECHO MEAS - TAPSE (>1.6): 1.9 CM2
BH CV XLRA - RV BASE: 3.4 CM
BH CV XLRA - RV LENGTH: 4.9 CM
BH CV XLRA - RV MID: 2.7 CM
LEFT ATRIUM VOLUME INDEX: 26 ML/M2
LV EF 2D ECHO EST: 60 %
MAXIMAL PREDICTED HEART RATE: 155 BPM
STRESS TARGET HR: 132 BPM

## 2017-12-19 PROCEDURE — 99214 OFFICE O/P EST MOD 30 MIN: CPT | Performed by: FAMILY MEDICINE

## 2017-12-19 RX ORDER — LORAZEPAM 0.5 MG/1
0.5 TABLET ORAL DAILY PRN
Qty: 30 TABLET | Refills: 1 | Status: SHIPPED | OUTPATIENT
Start: 2017-12-19

## 2017-12-19 RX ORDER — SERTRALINE HYDROCHLORIDE 100 MG/1
100 TABLET, FILM COATED ORAL DAILY
Qty: 90 TABLET | Refills: 3 | Status: SHIPPED | OUTPATIENT
Start: 2017-12-19

## 2017-12-19 NOTE — PROGRESS NOTES
"Subjective    Vanna Rincon is a 67 y.o. female here for:  Chief Complaint   Patient presents with   • Establish Care     Transfer care from Dr. Lundberg to Dr. Jefferson   • Hypertension   • Hyperlipidemia     History of Present Illness     Here to establish care with me. Was seeing Dr. Lundberg. Had EGD recently, scheduled for colonoscopy. Wanted heart checked to make sure she could hold up to \"puking\" for the upcoming scope. Was able to get the ECHO done with use of fast acting benzo. She notes that's the best sleep she's ever gotten that night, was able to get great rest and felt better the next morning. Difficulty sleeping is a chronic issue. She'd like to have more of the medicine to help if possible. Takes chronic narcotic for her chronic pain. On oxygen therapy chronically. Suffers from rheumatoid lung. Sees specialist for the autoimmune disease and uses inhalers to help with breathing. Also has essential hypertension which is well controlled with HCTZ and ARB.    The following portions of the patient's history were reviewed and updated as appropriate: allergies, current medications, past family history, past medical history, past social history, past surgical history and problem list.    Review of Systems   Constitutional: Positive for fatigue.   Respiratory: Positive for shortness of breath.    Musculoskeletal: Positive for arthralgias.   Psychiatric/Behavioral: Positive for sleep disturbance. The patient is nervous/anxious.        Vitals:    12/19/17 1439   BP: 110/60   Pulse: 82   Resp: 14   Temp: 98.7 °F (37.1 °C)   SpO2: 96%         Objective   Physical Exam   Constitutional: She is oriented to person, place, and time. Vital signs are normal. She appears well-developed and well-nourished. She is active. She does not have a sickly appearance. She does not appear ill.   Appears stated age. Well groomed.    HENT:   Head: Normocephalic and atraumatic. Hair is normal.   Right Ear: Hearing normal.   Left Ear: " Hearing normal.   Nose: Nose normal.   Eyes: EOM and lids are normal. Pupils are equal, round, and reactive to light. No scleral icterus.   Neck: Phonation normal. Neck supple.   Cardiovascular: Normal rate, regular rhythm and normal heart sounds.  Exam reveals no gallop and no friction rub.    No murmur heard.  Pulmonary/Chest: Effort normal and breath sounds normal.   Wearing nasal canula   Musculoskeletal: She exhibits no deformity.   Neurological: She is alert and oriented to person, place, and time. She displays no tremor. No cranial nerve deficit. Gait abnormal.   Skin: Skin is warm. No rash noted. She is not diaphoretic. No cyanosis. Nails show no clubbing.   Psychiatric: Her speech is normal and behavior is normal. Judgment and thought content normal. Her mood appears anxious. Cognition and memory are normal.   Nursing note and vitals reviewed.          Assessment/Plan       Vanna was seen today for establish care, hypertension and hyperlipidemia.    Diagnoses and all orders for this visit:    Panic attack  -     LORazepam (ATIVAN) 0.5 MG tablet; Take 1 tablet by mouth Daily As Needed for Anxiety.    Anxiety  -     sertraline (ZOLOFT) 100 MG tablet; Take 1 tablet by mouth Daily.    Rheumatoid lung disease    Dependence on supplemental oxygen    Essential hypertension  Comments:  Continue Valsartan and HCTZ.    Chronic narcotic use    High risk medication use        · Discussed use of narcotic plus benzodiazepine, increased risk of overdose which she understands and is willing to accept to get more rest. Follow up with specialists and return to clinic in about six weeks for recheck.      Tatiana Jefferson MD    Please note that portions of this note were completed with a voice recognition program. Efforts were made to edit dictation, but occasionally words are mistranscribed.

## 2017-12-20 LAB
LAB AP CASE REPORT: NORMAL
Lab: NORMAL
PATH REPORT.FINAL DX SPEC: NORMAL

## 2017-12-22 ENCOUNTER — TELEPHONE (OUTPATIENT)
Dept: INTERNAL MEDICINE | Facility: CLINIC | Age: 67
End: 2017-12-22

## 2017-12-22 RX ORDER — FLUCONAZOLE 150 MG/1
150 TABLET ORAL ONCE
Qty: 1 TABLET | Refills: 0 | Status: SHIPPED | OUTPATIENT
Start: 2017-12-22 | End: 2017-12-22

## 2017-12-27 DIAGNOSIS — R06.09 DYSPNEA ON EXERTION: Primary | ICD-10-CM

## 2018-01-23 RX ORDER — CETIRIZINE HYDROCHLORIDE 10 MG/1
10 TABLET ORAL DAILY
COMMUNITY
End: 2020-06-23

## 2018-01-23 RX ORDER — PROMETHAZINE HYDROCHLORIDE AND CODEINE PHOSPHATE 6.25; 1 MG/5ML; MG/5ML
5 SYRUP ORAL EVERY 6 HOURS PRN
COMMUNITY
End: 2022-06-23

## 2018-01-29 ENCOUNTER — ANESTHESIA (OUTPATIENT)
Dept: GASTROENTEROLOGY | Facility: HOSPITAL | Age: 68
End: 2018-01-29

## 2018-01-29 ENCOUNTER — ANESTHESIA EVENT (OUTPATIENT)
Dept: GASTROENTEROLOGY | Facility: HOSPITAL | Age: 68
End: 2018-01-29

## 2018-01-29 ENCOUNTER — HOSPITAL ENCOUNTER (OUTPATIENT)
Facility: HOSPITAL | Age: 68
Setting detail: HOSPITAL OUTPATIENT SURGERY
Discharge: HOME | End: 2018-01-29
Attending: INTERNAL MEDICINE | Admitting: INTERNAL MEDICINE

## 2018-01-29 VITALS
SYSTOLIC BLOOD PRESSURE: 162 MMHG | HEART RATE: 86 BPM | WEIGHT: 204 LBS | OXYGEN SATURATION: 100 % | HEIGHT: 66 IN | DIASTOLIC BLOOD PRESSURE: 74 MMHG | RESPIRATION RATE: 24 BRPM | BODY MASS INDEX: 32.78 KG/M2 | TEMPERATURE: 97.9 F

## 2018-01-29 DIAGNOSIS — Z12.11 COLON CANCER SCREENING: ICD-10-CM

## 2018-01-29 DIAGNOSIS — D64.9 ANEMIA, UNSPECIFIED TYPE: ICD-10-CM

## 2018-01-29 DIAGNOSIS — R10.32 LEFT LOWER QUADRANT PAIN: ICD-10-CM

## 2018-01-29 DIAGNOSIS — K59.00 CONSTIPATION, UNSPECIFIED CONSTIPATION TYPE: ICD-10-CM

## 2018-01-29 DIAGNOSIS — K62.5 BRIGHT RED BLOOD PER RECTUM: ICD-10-CM

## 2018-01-29 PROCEDURE — 25010000002 PROPOFOL 200 MG/20ML EMULSION: Performed by: NURSE ANESTHETIST, CERTIFIED REGISTERED

## 2018-01-29 PROCEDURE — S0260 H&P FOR SURGERY: HCPCS | Performed by: INTERNAL MEDICINE

## 2018-01-29 PROCEDURE — 45382 COLONOSCOPY W/CONTROL BLEED: CPT | Performed by: INTERNAL MEDICINE

## 2018-01-29 PROCEDURE — 88305 TISSUE EXAM BY PATHOLOGIST: CPT | Performed by: INTERNAL MEDICINE

## 2018-01-29 PROCEDURE — 45390 COLONOSCOPY W/RESECTION: CPT | Performed by: INTERNAL MEDICINE

## 2018-01-29 PROCEDURE — 45385 COLONOSCOPY W/LESION REMOVAL: CPT | Performed by: INTERNAL MEDICINE

## 2018-01-29 DEVICE — CLIPPING DEVICE
Type: IMPLANTABLE DEVICE | Status: FUNCTIONAL
Brand: RESOLUTION CLIP

## 2018-01-29 RX ORDER — SODIUM CHLORIDE 9 MG/ML
70 INJECTION, SOLUTION INTRAVENOUS CONTINUOUS PRN
Status: DISCONTINUED | OUTPATIENT
Start: 2018-01-29 | End: 2018-01-29 | Stop reason: HOSPADM

## 2018-01-29 RX ORDER — PROPOFOL 10 MG/ML
INJECTION, EMULSION INTRAVENOUS AS NEEDED
Status: DISCONTINUED | OUTPATIENT
Start: 2018-01-29 | End: 2018-01-29 | Stop reason: SURG

## 2018-01-29 RX ORDER — SODIUM CHLORIDE 0.9 % (FLUSH) 0.9 %
3 SYRINGE (ML) INJECTION AS NEEDED
Status: DISCONTINUED | OUTPATIENT
Start: 2018-01-29 | End: 2018-01-29 | Stop reason: HOSPADM

## 2018-01-29 RX ADMIN — PROPOFOL 50 MG: 10 INJECTION, EMULSION INTRAVENOUS at 08:41

## 2018-01-29 RX ADMIN — PROPOFOL 50 MG: 10 INJECTION, EMULSION INTRAVENOUS at 08:50

## 2018-01-29 RX ADMIN — PROPOFOL 50 MG: 10 INJECTION, EMULSION INTRAVENOUS at 09:15

## 2018-01-29 RX ADMIN — PROPOFOL 50 MG: 10 INJECTION, EMULSION INTRAVENOUS at 09:10

## 2018-01-29 RX ADMIN — PROPOFOL 50 MG: 10 INJECTION, EMULSION INTRAVENOUS at 09:00

## 2018-01-29 RX ADMIN — PROPOFOL 50 MG: 10 INJECTION, EMULSION INTRAVENOUS at 09:05

## 2018-01-29 RX ADMIN — PROPOFOL 50 MG: 10 INJECTION, EMULSION INTRAVENOUS at 08:55

## 2018-01-29 RX ADMIN — PROPOFOL 50 MG: 10 INJECTION, EMULSION INTRAVENOUS at 08:45

## 2018-01-29 RX ADMIN — LIDOCAINE HYDROCHLORIDE 60 MG: 20 INJECTION, SOLUTION INTRAVENOUS at 08:41

## 2018-01-29 RX ADMIN — SODIUM CHLORIDE 70 ML/HR: 9 INJECTION, SOLUTION INTRAVENOUS at 08:17

## 2018-01-29 NOTE — ANESTHESIA PREPROCEDURE EVALUATION
Anesthesia Evaluation     Patient summary reviewed and Nursing notes reviewed   NPO Solid Status: > 8 hours  NPO Liquid Status: > 8 hours     Airway   Mallampati: I  TM distance: >3 FB  Neck ROM: full  no difficulty expected  Dental      Pulmonary - normal exam    breath sounds clear to auscultation  (+) a smoker Former, COPD severe,   (-) pneumonia  Cardiovascular - normal exam  Exercise tolerance: poor (<4 METS)    Rhythm: regular  Rate: normal    (+) hypertension,   (-) CHF      Neuro/Psych  (+) dizziness/light headedness, psychiatric history,     GI/Hepatic/Renal/Endo    (+) obesity,  GERD,     Musculoskeletal     (+) arthralgias, back pain, myalgias,   Abdominal    Substance History      OB/GYN          Other   (+) arthritis     ROS/Med Hx Other: Oxygen dependent 4LPM    +RA                                          Anesthesia Plan    ASA 4     MAC     intravenous induction   Anesthetic plan and risks discussed with patient.

## 2018-01-29 NOTE — OP NOTE
PROCEDURE:  Colonoscopy to the terminal ileum with 4 hot snare and 6 cold snare polypectomies, submucosal injection of normal saline and placement of a resolution clip to coapt the margins.      DATE OF PROCEDURE:  January 29, 2018    REFERRING PROVIDER:  Tatiana Jefferson MD     INSTRUMENT USED:  Olympus PCF H 190 videocolonoscope.      INDICATIONS OF THE PROCEDURE:  This is a 67-year-old white female for colon cancer screening. There is history of bright red blood per rectum, constipation and anemia.      BIOPSIES:  Ascending colon: 2 cold snare polypectomies. Hepatic flexure: 2 hot snare polypectomies. One polyp could not be retrieved. Transverse colon: 2 cold snare polypectomies. Rectum: 2 hot and 2 cold snare polypectomies.      PHOTOGRAPHS:  Photographs were included in the medical records.     MEDICATIONS:  MAC.       CONSENT/PREPROCEDURE EVALUATION:  Risks, benefits, alternatives and options of the procedure including risks of sedation/anesthesia were discussed with the patient and informed consent was obtained prior to the procedure.  History and physical examination were performed and nothing precluded the test.      REPORT:  The patient was placed in left lateral decubitus position and a digital examination was performed.  Once under the influence of IV sedation, the instrument was inserted into the rectum and advanced under direct vision to cecum which was identified by the ileocecal valve, triradiate folds and appendiceal orifice. The scope was then maneuvered into the terminal ileum.        FINDINGS:      Digital rectal examination:  Good anal tone.  No perianal pathology.  No mass.        Terminal ileum:  7-8 cm.  Normal.     Cecum and ascending colon: 2, 5 mm sessile polyps in the ascending colon were removed with cold snare.       Hepatic flexure, transverse colon, splenic flexure:  Scant diverticulosis was noted. At the hepatic flexure 2, 6-10 mm sessile polyps were noted. 1 was removed with  hot snare. A second larger polyp was raised by submucosal injection of normal saline 1 ml. The polyp was then removed with the hot snare. Margins were coapted using a resolution clip. Unfortunately this polyp could not be retrieved. 2, 5 mm sessile polyps in the transverse colon were removed with cold snare.    Descending colon, sigmoid colon and rectum:  Diverticulosis.  4, 5-7 mm sessile polyps in the rectum were seen. 2 were removed with hot snare and 2 with cold snare techniques.  A retroflex examination within the rectum revealed internal hemorrhoids.        The scope was then straightened, the lower GI tract was decompressed, and the scope was pulled out of the patient.  The patient tolerated the procedure well.  There were no immediate complications and the patient was transferred in stable condition for post procedure observation.      TECHNICAL DATA:   1. [place] prep score: 8 (3+2+3).    2. Anus to cecal time: 5  minutes.  3. Difficulty of examination:  Average.    4. Withdrawal time: 10 minutes.  5. Procedure time: 29 minutes  6. Retroflex examination in right colon: Yes.    7. Second look Rectum to cecum with decompression: Yes.    DIAGNOSES:    1. Diverticulosis involving the left colon, and transverse colon.  2. Colon polyps.  3. Internal hemorrhoids.      RECOMMENDATIONS:     Dietary instructions.  Follow biopsies.    Follow-up:   2-3 weeks.   Followup colonoscopy:   Depending on the biopsy results.       Thank you very much for letting me participate in the care of this patient. Please do not hesitate to call me if you have any questions.

## 2018-01-29 NOTE — DISCHARGE INSTRUCTIONS
Rest today  No pushing,pulling,tugging,heavy lifting, or strenuous activity   No major decision making,driving,or drinking alcoholic beverages for 24 hours due to the sedation you received  Always use good hand hygiene/washing technique  No driving on pain medication.    To assist you in voiding:  Drink plenty of fluids  Listen to running water while attempting to void.    If you are unable to urinate and you have an uncomfortable urge to void or it has been   6 hours since you were discharged, return to the Emergency Room.    Postprocedure instructions:  Nothing by mouth total fully alert.  Bedrest until fully alert.  Once fully alert may have clear liquids.  Avoid soda beverages.  Advance diet as tolerated.  Vital signs as routine.    Diet:   Low Fat Diet.   Fiber One Cereal-40% Nutty Clusters 1/4 cup daily   Vale's All Bran-Bran Buds 1/4 cup daily.  Use skim, 1, 2 % or soy milk.  Drink 5-6 glasses of water daily.    Blood Thinner Directions:     Avoid Aspirin & other NSAIDS for _7__ days.  Tylenol is okay.    Treatments:    If constipation persists use Elizabeth magnesium caplet (Over-the-counter).  Take one orally at night.    CORTIZONE 10 OINTMENT (hydrocortisone 1% ointment) over the counter.  AVOID CREAM OR GEL.  Apply anorectally -3 times a day for 1 week.  May need to use a liner to protect the garments.    SITZ bath 3 times a day.  20 minutes each time for 2 days.        Call [facility] at [phone] or come to the Emergency Department if you experience the following: Chest pain, abdominal pain, bleeding (vomiting of blood or coffee colored material, black stools or lonnie blood in stools), fever/chills, nausea and vomiting or dizziness.      Follow-up:  DR. BEATRIZ LOUIS in 4 weeks.Office phone # [phone].    Follow-up colonoscopy:  Depending on the biopsy results.

## 2018-01-29 NOTE — ANESTHESIA POSTPROCEDURE EVALUATION
Patient: Vanna Rincon    Procedure Summary     Date Anesthesia Start Anesthesia Stop Room / Location    01/29/18 0835 0918 [facility] ENDOSCOPY 2 / [facility] ENDOSCOPY       Procedure Diagnosis Surgeon Provider    COLONOSCOPY WITH COLD SNARE POLYPECTOMY X 6; SUBMUCOSAL INJECTION OF SALINE; HOT SNARE POLYPECTOMY X 4; CLIP PLACEMENT X 1 (N/A Anus) Bright red blood per rectum; Left lower quadrant pain; Colon cancer screening; Constipation, unspecified constipation type; Anemia, unspecified type  (Bright red blood per rectum [K62.5]; Left lower quadrant pain [R10.32]; Colon cancer screening [Z12.11]; Constipation, unspecified constipation type [K59.00]; Anemia, unspecified type [D64.9]) MD Cole Johnson CRNA          Anesthesia Type: MAC  Last vitals  BP   162/74 (01/29/18 1000)   Temp   97.9 °F (36.6 °C) (01/29/18 1000)   Pulse   86 (01/29/18 1000)   Resp   24 (01/29/18 1000)     SpO2   100 % (01/29/18 1000)     Post Anesthesia Care and Evaluation    Patient location during evaluation: bedside  Patient participation: complete - patient participated  Level of consciousness: awake  Pain score: 0  Pain management: adequate  Airway patency: patent  Anesthetic complications: No anesthetic complications  PONV Status: controlled  Cardiovascular status: acceptable and stable  Respiratory status: acceptable and room air  Hydration status: acceptable

## 2018-01-29 NOTE — PLAN OF CARE
Problem: GI Endoscopy (Adult)  Goal: Signs and Symptoms of Listed Potential Problems Will be Absent or Manageable (GI Endoscopy)  Outcome: Ongoing (interventions implemented as appropriate)   01/29/18 0749   GI Endoscopy   Problems Assessed (GI Endoscopy) all   Problems Present (GI Endoscopy) none

## 2018-02-01 LAB
LAB AP CASE REPORT: NORMAL
Lab: NORMAL
PATH REPORT.FINAL DX SPEC: NORMAL

## 2018-02-08 ENCOUNTER — OFFICE VISIT (OUTPATIENT)
Dept: GASTROENTEROLOGY | Facility: CLINIC | Age: 68
End: 2018-02-08

## 2018-02-08 VITALS
BODY MASS INDEX: 32.47 KG/M2 | SYSTOLIC BLOOD PRESSURE: 111 MMHG | DIASTOLIC BLOOD PRESSURE: 89 MMHG | HEIGHT: 66 IN | RESPIRATION RATE: 18 BRPM | TEMPERATURE: 97.8 F | WEIGHT: 202 LBS | HEART RATE: 95 BPM

## 2018-02-08 DIAGNOSIS — R74.8 ELEVATED ALKALINE PHOSPHATASE LEVEL: Chronic | ICD-10-CM

## 2018-02-08 DIAGNOSIS — K29.70 GASTRITIS WITHOUT BLEEDING, UNSPECIFIED CHRONICITY, UNSPECIFIED GASTRITIS TYPE: Chronic | ICD-10-CM

## 2018-02-08 DIAGNOSIS — K59.00 CONSTIPATION, UNSPECIFIED CONSTIPATION TYPE: Chronic | ICD-10-CM

## 2018-02-08 DIAGNOSIS — K57.30 DIVERTICULOSIS OF COLON: Chronic | ICD-10-CM

## 2018-02-08 DIAGNOSIS — K62.5 BRIGHT RED BLOOD PER RECTUM: Chronic | ICD-10-CM

## 2018-02-08 DIAGNOSIS — K63.5 POLYP OF COLON, UNSPECIFIED PART OF COLON, UNSPECIFIED TYPE: ICD-10-CM

## 2018-02-08 DIAGNOSIS — R10.32 LEFT LOWER QUADRANT PAIN: ICD-10-CM

## 2018-02-08 DIAGNOSIS — R11.0 NAUSEA: ICD-10-CM

## 2018-02-08 DIAGNOSIS — D64.9 ANEMIA, UNSPECIFIED TYPE: Primary | ICD-10-CM

## 2018-02-08 DIAGNOSIS — R13.10 DYSPHAGIA, UNSPECIFIED TYPE: Chronic | ICD-10-CM

## 2018-02-08 DIAGNOSIS — K64.8 INTERNAL HEMORRHOIDS: Chronic | ICD-10-CM

## 2018-02-08 DIAGNOSIS — K29.80 DUODENITIS: ICD-10-CM

## 2018-02-08 DIAGNOSIS — K44.9 HIATAL HERNIA: Chronic | ICD-10-CM

## 2018-02-08 DIAGNOSIS — K92.1 MELENA: Chronic | ICD-10-CM

## 2018-02-08 DIAGNOSIS — R19.5 HEME POSITIVE STOOL: ICD-10-CM

## 2018-02-08 DIAGNOSIS — K22.2 SCHATZKI'S RING: ICD-10-CM

## 2018-02-08 PROBLEM — Z12.11 COLON CANCER SCREENING: Status: RESOLVED | Noted: 2017-12-12 | Resolved: 2018-02-08

## 2018-02-08 PROCEDURE — 99215 OFFICE O/P EST HI 40 MIN: CPT | Performed by: NURSE PRACTITIONER

## 2018-02-08 RX ORDER — ONDANSETRON HYDROCHLORIDE 8 MG/1
TABLET, FILM COATED ORAL EVERY 8 HOURS PRN
COMMUNITY

## 2018-02-08 NOTE — PATIENT INSTRUCTIONS
1. Antireflux measures: Avoid fried, fatty foods, alcohol, chocolate, coffee, tea,  soft drinks, peppermint and spearmint, spicy foods, tomatoes and tomato based foods, onion based foods, and smoking. Other antireflux measures include weight reduction if overweight, avoiding tight clothing around the abdomen, elevating the head of the bed 6 inches with blocks under the head board, and don't drink or eat before going to bed and avoid lying down immediately after meals.  2. Pantoprazole 40 mg 1 tablet by mouth in the am 30 minutes before breakfast.  3. Zofran 4 mg 1 po every 8 hours as needed for nausea.  4. High fiber diet with liberal water intake. Discussed in details.  5. Avoid laxatives.  6. Linzess 145 mcg 1 po in the am 1 hour before breakfast, sample given. If no improvement, increase to 290 mcg 1 po in the am 1 hour before breakfast, sample given. Patient is to call back with dosage that works so this can be electronically scripted.  7. CBC, CMP, iron panel, ferritin, TSH, vitamin B 12   8. Abdominal ultrasound.  9. Follow up colonoscopy in 2-3 years.  10. Follow up: 4 weeks

## 2018-02-08 NOTE — PROGRESS NOTES
"Chief Complaint   Patient presents with   • Follow-up     colon and egd   • Constipation     The patient has a history of anemia for the past 6 months. The patient has not been aware of anemia in the past. The patient has not had any recent labs. She has not had any further episodes of bright red blood per rectum or melena. The patient denies hematemesis. No history of vaginal bleeding.      The patient has a history of left lower quadrant pain in the past. The patient has completed antibiotics and has not had any further episodes of the pain. The pain was described as mild. It was a dull, aching pain.      The patient has a long-standing history of nausea. The patient has nausea 1-2 times per week. The nausea has been improving.  Eating does not affect the nausea. The patient is taking Zofran for the nausea with significant improvement.     The patient has a long-standing history of difficulty swallowing. This may occur once per week. Solids typically cause difficulty swallowing, but liquids do not cause a problem. Swallowing has gradually improved.     There is a long-standing history of constipation. The patient has at least 1 bowel movement per day. This has been unchanged. The patient does not drink water. She does not eat high fiber as she is afraid she does not drink enough water for the fiber to work. The patient does take laxatives 4-5 days per week to have a bowel movement. She has tried stool softeners, but it did not work. She has also tried Miralax, but she did not feel like it \"cleaned out\" her bowels. She would like to try Linzess.     The patient has not been aware of elevated liver enzymes, on previous she had mildly elevated alkaline phosphatase. The patient does have rheumatoid arthritis.    Constipation   This is a chronic problem. Episode onset: over 50 years. The problem is unchanged. Her stool frequency is 1 time per day. The stool is described as firm. The patient is not on a high fiber diet. " There has not been adequate water intake. Associated symptoms include back pain. Pertinent negatives include no abdominal pain, diarrhea, fever, hematochezia, melena, nausea or vomiting. She has tried laxatives for the symptoms. The treatment provided significant relief.   Anemia   Presents for follow-up visit. The condition has lasted for 3 months. There has been no abdominal pain, bruising/bleeding easily, fever or palpitations. Signs of blood loss that are not present include hematemesis, hematochezia, melena and vaginal bleeding. Past treatments include nothing.   Abdominal Pain   This is a recurrent problem. Episode onset: 1-2 weeks. The problem has been resolved. The pain is located in the LLQ. Associated symptoms include arthralgias, constipation and myalgias. Pertinent negatives include no diarrhea, dysuria, fever, headaches, hematochezia, hematuria, melena, nausea or vomiting. She has tried antibiotics for the symptoms. The treatment provided significant relief. Prior diagnostic workup includes upper endoscopy and lower endoscopy.   Difficulty Swallowing   This is a chronic problem. Episode onset: over 20 years. Episode frequency: once per week. The problem has been gradually improving. Associated symptoms include arthralgias, coughing, fatigue, joint swelling and myalgias. Pertinent negatives include no abdominal pain, chest pain, chills, fever, headaches, nausea, rash or vomiting. The symptoms are aggravated by eating. She has tried nothing for the symptoms.   Nausea   This is a chronic problem. The current episode started more than 1 year ago. Episode frequency: 1-2 times per week. The problem has been gradually improving. Associated symptoms include arthralgias, coughing, fatigue, joint swelling and myalgias. Pertinent negatives include no abdominal pain, chest pain, chills, fever, headaches, nausea, rash or vomiting. Nothing aggravates the symptoms. Treatments tried: Zofran. The treatment provided  significant relief.   Rectal Bleeding   This is a new problem. The current episode started more than 1 month ago. The problem occurs rarely. The problem has been rapidly improving. Associated symptoms include arthralgias, coughing, fatigue, joint swelling and myalgias. Pertinent negatives include no abdominal pain, chest pain, chills, fever, headaches, nausea, rash or vomiting. Nothing aggravates the symptoms. She has tried nothing for the symptoms.     Review of Systems   Constitutional: Positive for fatigue. Negative for appetite change, chills, fever and unexpected weight change.   HENT: Negative for mouth sores, nosebleeds and trouble swallowing.    Eyes: Negative for discharge and redness.   Respiratory: Positive for cough and shortness of breath. Negative for apnea.    Cardiovascular: Negative for chest pain, palpitations and leg swelling.   Gastrointestinal: Positive for constipation. Negative for abdominal distention, abdominal pain, anal bleeding, blood in stool, diarrhea, hematemesis, hematochezia, melena, nausea and vomiting.   Endocrine: Positive for cold intolerance. Negative for heat intolerance and polydipsia.   Genitourinary: Negative for dysuria, hematuria, urgency and vaginal bleeding.   Musculoskeletal: Positive for arthralgias, back pain, joint swelling and myalgias.   Skin: Negative for rash.   Allergic/Immunologic: Negative for food allergies and immunocompromised state.   Neurological: Negative for dizziness, seizures, syncope and headaches.   Hematological: Negative for adenopathy. Does not bruise/bleed easily.   Psychiatric/Behavioral: Negative for dysphoric mood. The patient is nervous/anxious. The patient is not hyperactive.      Patient Active Problem List   Diagnosis   • enlg LN right axilla, bx benign 3/16   • Benign essential hypertension   • Chronic obstructive pulmonary disease   • Chronic pain syndrome   • Gastroesophageal reflux disease   • Hyperlipidemia   • Rheumatoid arthritis  "involving multiple sites with positive rheumatoid factor   • Rheumatoid lung disease   • Chronic respiratory failure with hypoxia   • Anemia   • Anxiety   • Interstitial lung disease   • Dependence on supplemental oxygen   • BMI 36.0-36.9,adult   • Emphysema lung   • Essential hypertension   • Abnormal RBC indices   • Internal hemorrhoids with complication   • Bright red blood per rectum   • Melena   • Heme positive stool   • Left lower quadrant pain   • Nausea   • Dysphagia   • Constipation   • Elevated alkaline phosphatase level   • Schatzki's ring   • Hiatal hernia   • Gastritis without bleeding   • Duodenitis   • Diverticulosis of colon   • Polyp of colon   • Internal hemorrhoids     Past Medical History:   Diagnosis Date   • Abnormal mammogram    • Acute UTI    • Arthritis     knee, right   • Back pain    • Candida vaginitis    • CHF (congestive heart failure)     not diagnosed   • Chronic pain syndrome     disc disease, lumbar, neck   • Community acquired pneumonia    • COPD (chronic obstructive pulmonary disease)     Emphysema with severe physiology on PFTs.  Patient is a former smoker.   • Cough    • Deafness    • Dependence on supplemental oxygen     PT REPORTS \"4L ALL TIMES\"   • Former smoker    • Gastric bleed    • GERD (gastroesophageal reflux disease)    • H/O chest x-ray 08/11/2015    Rase base opacity consistent with pneumonia or atelectasis   • H/O chest x-ray 06/24/2015    Right basilar airspace disease and effusion consistent with a pneumonia. F/U to radiographic reolution is recommended   • H/O chest x-ray 03/09/2010    Cardiac silhoutte is not enlarged. Lungs aare inflated. Mild nonspecifiec interstitial changes. No pleual effusions or dense consolidations. Scattered granulomatous changes. Spine with mild kyphosis but no osteopenia. No significant adenopathy   • H/O echocardiogram 03/16/2010    Normal study   • History of PFTs 12/13/2011    Goo pt cooperation and effort. Pt given 3 puffs of " xopenex. PFT is acceptable and reproducible   • History of PFTs 08/11/2011    Pt unable to produce acceptabel and reproducible PFT. Pt was given 3 puffs of xopenex. Pt gave good effort Best pleth of two attempts   • History of PFTs 02/24/2011    PFT acceptable and reproducible. Pt gave good effort. Pt used bronchodilator less than 2 hours prior to testing   • Hyperlipidemia    • Hypertension    • Interstitial lung disease     History of rheumatoid lung with bronchiolitis obliterans   • Kidney stones     x 1   • Nephrolithiasis     2007, passed   • On home oxygen therapy     REPORTS USES AT 4L NC AT ALL TIMES   • Panic attack    • Pneumonia    • PRB (rectal bleeding)    • RA (rheumatoid arthritis)    • Rheumatoid arthritis     · A.  Diagnosed in 2001 with history of methotrexate and Enbrel therapy.   • Rheumatoid lung     bronchiolitis obliterans   • Sinus problem    • Vertigo      Past Surgical History:   Procedure Laterality Date   • COLONOSCOPY  2012   • COLONOSCOPY N/A 1/29/2018    Procedure: COLONOSCOPY WITH COLD SNARE POLYPECTOMY X 6; SUBMUCOSAL INJECTION OF SALINE; HOT SNARE POLYPECTOMY X 4; CLIP PLACEMENT X 1;  Surgeon: Kenney Pastrana MD;  Location: [facility] ENDOSCOPY;  Service:    • ENDOSCOPY N/A 12/15/2017    Procedure: ESOPHAGOGASTRODUODENOSCOPY with biopsies and esophageal balloon dilitation;  Surgeon: Kenney Pastrana MD;  Location: [facility] ENDOSCOPY;  Service:    • MOUTH SURGERY      REPORTS IMPLANT X2   • ORAL ANTRAL FISTULA CLOSURE      gums   • TONSILLECTOMY     • TUBAL ABDOMINAL LIGATION     • UPPER GASTROINTESTINAL ENDOSCOPY  2012   • UPPER GASTROINTESTINAL ENDOSCOPY  12/15/2017     Family History   Problem Relation Age of Onset   • Glaucoma Mother    • Coronary artery disease Mother    • Liver cancer Mother      bile duct cancer   • Stroke Father    • Stomach cancer Father    • Diabetes Sister    • Colon cancer Neg Hx    • Ulcerative colitis Neg Hx      Social History   Substance Use Topics   • Smoking  status: Former Smoker     Packs/day: 1.00     Years: 25.00     Types: Cigarettes     Start date: 1963     Quit date: 1991   • Smokeless tobacco: Never Used   • Alcohol use No       Current Outpatient Prescriptions:   •  bisacodyl (DULCOLAX) 5 MG EC tablet, Take 5 mg by mouth Daily As Needed for Constipation., Disp: , Rfl:   •  budesonide-formoterol (SYMBICORT) 160-4.5 MCG/ACT inhaler, Inhale 2 puffs 2 (Two) Times a Day., Disp: , Rfl:   •  cetirizine (zyrTEC) 10 MG tablet, Take 10 mg by mouth Daily., Disp: , Rfl:   •  Etanercept (ENBREL SURECLICK) 50 MG/ML solution auto-injector, Inject  under the skin 1 (One) Time Per Week., Disp: , Rfl:   •  fentaNYL (DURAGESIC) 75 MCG/HR patch, Place 1 patch on the skin Every 72 (Seventy-Two) Hours., Disp: , Rfl:   •  fluticasone (VERAMYST) 27.5 MCG/SPRAY nasal spray, 2 sprays into each nostril As Needed., Disp: , Rfl:   •  hydrochlorothiazide (HYDRODIURIL) 25 MG tablet, Take 1 tablet by mouth Daily., Disp: 30 tablet, Rfl: 11  •  HYDROcodone-acetaminophen (NORCO)  MG per tablet, Take 1 tablet by mouth Every 8 (Eight) Hours As Needed for Mild Pain ., Disp: , Rfl:   •  LORazepam (ATIVAN) 0.5 MG tablet, Take 1 tablet by mouth Daily As Needed for Anxiety., Disp: 30 tablet, Rfl: 1  •  Multiple Vitamins-Minerals (CENTRUM ADULTS PO), Take 1 tablet by mouth Daily., Disp: , Rfl:   •  O2 (OXYGEN), Inhale 4 L/min Continuous., Disp: , Rfl:   •  ondansetron (ZOFRAN) 8 MG tablet, Take  by mouth Every 8 (Eight) Hours As Needed for Nausea or Vomiting., Disp: , Rfl:   •  pantoprazole (PROTONIX) 40 MG EC tablet, Take 1 tablet by mouth Daily., Disp: 90 tablet, Rfl: 3  •  Probiotic capsule, Take 1 capsule by mouth Daily., Disp: 30 capsule, Rfl: 1  •  promethazine-codeine (PHENERGAN with CODEINE) 6.25-10 MG/5ML syrup, Take 5 mL by mouth Every 6 (Six) Hours As Needed for Cough., Disp: , Rfl:   •  sertraline (ZOLOFT) 100 MG tablet, Take 1 tablet by mouth Daily., Disp: 90 tablet, Rfl: 3  •   "simethicone (MYLICON) 125 MG chewable tablet, Chew 125 mg Every 6 (Six) Hours As Needed for Flatulence., Disp: , Rfl:   •  valsartan (DIOVAN) 320 MG tablet, Take 1 tablet by mouth Daily., Disp: 90 tablet, Rfl: 3    Allergies   Allergen Reactions   • Ciprofloxacin Other (See Comments)     \"Hurt all over\"   • Doxycycline Other (See Comments)     \"makes other medication stay in my system longer\"   • Lipitor [Atorvastatin] Other (See Comments)     Hurt all over     • Sulfa Antibiotics Other (See Comments)     \"cant remember\"     /89  Pulse 95  Temp 97.8 °F (36.6 °C)  Resp 18  Ht 167.6 cm (66\")  Wt 91.6 kg (202 lb)  LMP  (LMP Unknown)  BMI 32.6 kg/m2    Physical Exam   Constitutional: She is oriented to person, place, and time. She appears well-developed and well-nourished. No distress.   HENT:   Head: Normocephalic and atraumatic.   Right Ear: Hearing and external ear normal.   Left Ear: Hearing and external ear normal.   Nose: Nose normal.   Mouth/Throat: Oropharynx is clear and moist and mucous membranes are normal. Mucous membranes are not pale, not dry and not cyanotic. No oral lesions. No oropharyngeal exudate.   Eyes: Conjunctivae and EOM are normal. Right eye exhibits no discharge. Left eye exhibits no discharge.   Neck: Trachea normal. Neck supple. No JVD present. No edema present. No thyroid mass and no thyromegaly present.   Cardiovascular: Normal rate, regular rhythm, S2 normal and normal heart sounds.  Exam reveals no gallop, no S3 and no friction rub.    No murmur heard.  Pulmonary/Chest: Effort normal and breath sounds normal. No respiratory distress (patient on oxygen per nasal cannula). She exhibits no tenderness.   Abdominal: Normal appearance and bowel sounds are normal. She exhibits no distension, no ascites and no mass. There is no splenomegaly or hepatomegaly. There is no tenderness. There is no rigidity, no rebound and no guarding. No hernia.       Vascular Status -  Her exam exhibits " no right foot edema. Her exam exhibits no left foot edema.  Lymphadenopathy:     She has no cervical adenopathy.        Left: No supraclavicular adenopathy present.   Neurological: She is alert and oriented to person, place, and time. She has normal strength. No cranial nerve deficit or sensory deficit.   Skin: No rash noted. She is not diaphoretic. No cyanosis. No pallor. Nails show no clubbing.   Psychiatric: She has a normal mood and affect.   Nursing note and vitals reviewed.  Stigmata of chronic liver disease:  None.  Asterixis:  None.    Laboratory Results:  Upon review of records:     Dated 9/7/2017 glucose 94 BUN 27 creatinine 1.0 sodium 142 potassium 4.7 chloride 97 CO2 30 calcium 8.8 albumin 4.1 total bilirubin <0.2 alkaline phosphatase 122 AST 17 ALT 9W BC 7.0 hemoglobin 10.4 hematocrit 33.1 platelet count 273 MCV 84 CRP 24.0     Dated 10/26/2017 WBC 6.67 hemoglobin 9.8 hematocrit 33.0 platelet count 269 MCV 87.5 ferritin 43.0 iron 37 TIBC 2 76 units  iron saturation 13% erythropoietin 4.5     Dated 10/30/2017 fecal occult blood positive     Dated 11/9/2017 WBC 9.92 hemoglobin 10.8 hematocrit 35.4 platelet count 281 MCV 87.2     Procedures:    Upon review of records:    EGD dated 12/15/2017 reveals distal esophageal ring, Schatzki's type.  Sliding hiatal hernia around 4 centimeter.  Erythematous erosive gastritis.  Erythematous bulbar duodenitis.  Second portion of duodenum biopsy reveals unremarkable small bowel mucosa, no increased inflammation or adenomatous changes identified.  Antrum body angulus biopsy reveals mild chronic reactive gastritis.  No organisms identified on stain.    Assessment and Plan:    Vanna was seen today for follow-up and constipation.    Diagnoses and all orders for this visit:    Anemia, unspecified type  Comments:  No recent labs.  The patient denies any further episodes of bright red blood per rectum or melena.  Orders:  -     CBC & Differential; Future  -      Comprehensive Metabolic Panel; Future  -     Iron Profile; Future  -     Ferritin; Future  -     TSH; Future  -     Vitamin B12; Future    Nausea  Comments:  Improving.  Orders:  -     CBC & Differential; Future  -     Comprehensive Metabolic Panel; Future  -     Iron Profile; Future  -     Ferritin; Future  -     TSH; Future  -     Vitamin B12; Future  -     US Abdomen Limited; Future    Dysphagia, unspecified type  Comments:  Likely secondary to Schatzki's ring.  Dilated to 18 mm.    Bright red blood per rectum  Comments:  Improved.  Likely secondary to internal hemorrhoids.    Constipation, unspecified constipation type  Comments:  History of long-standing constipation.  Patient does take laxatives 4-5 days per week. Of interest, patient is on narcotic pain medications.    Melena  Comments:  Resolved.    Heme positive stool  Comments:  Likely secondary to internal hemorrhoids.  Orders:  -     CBC & Differential; Future  -     Comprehensive Metabolic Panel; Future  -     Iron Profile; Future  -     Ferritin; Future  -     TSH; Future  -     Vitamin B12; Future    Elevated alkaline phosphatase level  Comments:  Previous labs with mildly elevated alkaline phosphatase level.  Orders:  -     Alkaline Phosphatase, Isoenzymes; Future    Left lower quadrant pain  Comments:  Resolved with antibiotics.    Schatzki's ring  Comments:  Dilated to 18mm    Hiatal hernia  Comments:  Sliding hiatal hernia around 4 centimeter.    Gastritis without bleeding, unspecified chronicity, unspecified gastritis type  Comments:  Negative for H. pylori.    Duodenitis  Comments:  No evidence of celiac disease.    Diverticulosis of colon  Comments:  Diverticulosis involving the left colon and transverse colon.  Stable.    Polyp of colon, unspecified part of colon, unspecified type  Comments:  Tubular adenomas.    Internal hemorrhoids  Comments:  Stable.  Uncomplicated.        Plan  and Patient Instructions:  Patient Instructions   1.  Antireflux measures: Avoid fried, fatty foods, alcohol, chocolate, coffee, tea,  soft drinks, peppermint and spearmint, spicy foods, tomatoes and tomato based foods, onion based foods, and smoking. Other antireflux measures include weight reduction if overweight, avoiding tight clothing around the abdomen, elevating the head of the bed 6 inches with blocks under the head board, and don't drink or eat before going to bed and avoid lying down immediately after meals.  2. Pantoprazole 40 mg 1 tablet by mouth in the am 30 minutes before breakfast.  3. Zofran 4 mg 1 po every 8 hours as needed for nausea.  4. High fiber diet with liberal water intake. Discussed in details.  5. Avoid laxatives.  6. Linzess 145 mcg 1 po in the am 1 hour before breakfast, sample given. If no improvement, increase to 290 mcg 1 po in the am 1 hour before breakfast, sample given. Patient is to call back with dosage that works so this can be electronically scripted.  7. CBC, CMP, iron panel, ferritin, TSH, vitamin B 12   8. Abdominal ultrasound.  9. Follow up colonoscopy in 2-3 years.  10. Follow up: 4 weeks    JENNY Hayden

## 2018-02-09 LAB
ALBUMIN SERPL-MCNC: 4.1 G/DL (ref 3.5–5)
ALBUMIN/GLOB SERPL: 1.2 G/DL (ref 1–2)
ALP BONE CFR SERPL: 32 % (ref 14–68)
ALP INTEST CFR SERPL: 3 % (ref 0–18)
ALP LIVER CFR SERPL: 65 % (ref 18–85)
ALP SERPL-CCNC: 105 U/L (ref 38–126)
ALT SERPL-CCNC: 23 U/L (ref 13–69)
AMBIG ABBREV CMP14 DEFAULT: NORMAL
AST SERPL-CCNC: 16 U/L (ref 15–46)
BASOPHILS # BLD AUTO: 0.03 10*3/MM3 (ref 0–0.2)
BASOPHILS NFR BLD AUTO: 0.3 % (ref 0–2.5)
BILIRUB SERPL-MCNC: 0.3 MG/DL (ref 0.2–1.3)
BUN SERPL-MCNC: 31 MG/DL (ref 7–20)
BUN/CREAT SERPL: 19.4 (ref 7.1–23.5)
CALCIUM SERPL-MCNC: 9.6 MG/DL (ref 8.4–10.2)
CHLORIDE SERPL-SCNC: 97 MMOL/L (ref 98–107)
CO2 SERPL-SCNC: 35 MMOL/L (ref 26–30)
CREAT SERPL-MCNC: 1.6 MG/DL (ref 0.6–1.3)
EOSINOPHIL # BLD AUTO: 0.08 10*3/MM3 (ref 0–0.7)
EOSINOPHIL NFR BLD AUTO: 0.8 % (ref 0–7)
ERYTHROCYTE [DISTWIDTH] IN BLOOD BY AUTOMATED COUNT: 12.2 % (ref 11.5–14.5)
FERRITIN SERPL-MCNC: 45.3 NG/ML (ref 11.1–264)
GFR SERPLBLD CREATININE-BSD FMLA CKD-EPI: 32 ML/MIN/1.73
GFR SERPLBLD CREATININE-BSD FMLA CKD-EPI: 39 ML/MIN/1.73
GLOBULIN SER CALC-MCNC: 3.4 GM/DL
GLUCOSE SERPL-MCNC: 107 MG/DL (ref 74–98)
HCT VFR BLD AUTO: 31.4 % (ref 37–47)
HGB BLD-MCNC: 9.6 G/DL (ref 12–16)
IMM GRANULOCYTES # BLD: 0.03 10*3/MM3 (ref 0–0.06)
IMM GRANULOCYTES NFR BLD: 0.3 % (ref 0–0.6)
IRON SATN MFR SERPL: 10 % (ref 11–46)
IRON SERPL-MCNC: 27 MCG/DL (ref 37–181)
LYMPHOCYTES # BLD AUTO: 1.69 10*3/MM3 (ref 0.6–3.4)
LYMPHOCYTES NFR BLD AUTO: 17.2 % (ref 10–50)
MCH RBC QN AUTO: 26.3 PG (ref 27–31)
MCHC RBC AUTO-ENTMCNC: 30.6 G/DL (ref 30–37)
MCV RBC AUTO: 86 FL (ref 81–99)
MONOCYTES # BLD AUTO: 0.57 10*3/MM3 (ref 0–0.9)
MONOCYTES NFR BLD AUTO: 5.8 % (ref 0–12)
NEUTROPHILS # BLD AUTO: 7.43 10*3/MM3 (ref 2–6.9)
NEUTROPHILS NFR BLD AUTO: 75.6 % (ref 37–80)
NRBC BLD AUTO-RTO: 0 /100 WBC (ref 0–0)
PLATELET # BLD AUTO: 297 10*3/MM3 (ref 130–400)
POTASSIUM SERPL-SCNC: 4.4 MMOL/L (ref 3.5–5.1)
PROT SERPL-MCNC: 7.5 G/DL (ref 6.3–8.2)
RBC # BLD AUTO: 3.65 10*6/MM3 (ref 4.2–5.4)
SODIUM SERPL-SCNC: 142 MMOL/L (ref 137–145)
TIBC SERPL-MCNC: 264 MCG/DL (ref 261–497)
TSH SERPL DL<=0.005 MIU/L-ACNC: 1.51 MIU/ML (ref 0.47–4.68)
UIBC SERPL-MCNC: 237 MCG/DL
VIT B12 SERPL-MCNC: 762 PG/ML (ref 239–931)
WBC # BLD AUTO: 9.83 10*3/MM3 (ref 4.8–10.8)

## 2018-02-13 ENCOUNTER — OFFICE VISIT (OUTPATIENT)
Dept: GASTROENTEROLOGY | Facility: CLINIC | Age: 68
End: 2018-02-13

## 2018-02-13 VITALS — BODY MASS INDEX: 33.11 KG/M2 | TEMPERATURE: 97.3 F | HEIGHT: 66 IN | RESPIRATION RATE: 18 BRPM | WEIGHT: 206 LBS

## 2018-02-13 DIAGNOSIS — R79.89 ELEVATED LFTS: ICD-10-CM

## 2018-02-13 DIAGNOSIS — J44.9 CHRONIC OBSTRUCTIVE PULMONARY DISEASE, UNSPECIFIED COPD TYPE (HCC): Primary | ICD-10-CM

## 2018-02-13 DIAGNOSIS — D50.9 IRON DEFICIENCY ANEMIA, UNSPECIFIED IRON DEFICIENCY ANEMIA TYPE: ICD-10-CM

## 2018-02-13 PROCEDURE — 99214 OFFICE O/P EST MOD 30 MIN: CPT | Performed by: INTERNAL MEDICINE

## 2018-02-13 NOTE — PROGRESS NOTES
Chief Complaint   Patient presents with   • Anemia   • Elevated Hepatic Enzymes   • Hematochezia     History of Present Illness     The patient has a history of rather severe constipation off and on for the last 30-40 years. . This is described as hard stools perhaps one bowel movement twice a week. The patient takes frequent laxatives to have a bowel movement. There is no associated rectal pain. The patient denies further abdominal pain. There is no nausea or vomiting. Her dysphagic symptoms have significantly improved.  There is history of anemia.  Her bright red blood per rectum has improved. Now the patient has occasional bright red blood per rectum. There is history of mild elevation of alkaline phosphatase in the recent past. However of follow-up laboratory test revealed a normal alkaline phosphatase. Of interest that the patient has history of rheumatoid arthritis.    Review of Systems   Constitutional: Negative for appetite change, chills, fatigue, fever and unexpected weight change.   HENT: Negative for mouth sores, nosebleeds and trouble swallowing.    Eyes: Negative for discharge and redness.   Respiratory: Positive for cough and shortness of breath. Negative for apnea.    Cardiovascular: Negative for chest pain, palpitations and leg swelling.   Gastrointestinal: Negative for abdominal distention, abdominal pain, anal bleeding, blood in stool, constipation, diarrhea, nausea and vomiting.   Endocrine: Negative for cold intolerance, heat intolerance and polydipsia.   Genitourinary: Negative for dysuria, hematuria and urgency.   Musculoskeletal: Positive for arthralgias and back pain. Negative for joint swelling and myalgias.   Skin: Negative for rash.   Allergic/Immunologic: Negative for food allergies and immunocompromised state.   Neurological: Negative for dizziness, seizures, syncope and headaches.   Hematological: Negative for adenopathy. Does not bruise/bleed easily.   Psychiatric/Behavioral: Negative  "for dysphoric mood. The patient is not nervous/anxious and is not hyperactive.      Patient Active Problem List   Diagnosis   • enlg LN right axilla, bx benign 3/16   • Benign essential hypertension   • Chronic obstructive pulmonary disease   • Chronic pain syndrome   • Gastroesophageal reflux disease   • Hyperlipidemia   • Rheumatoid arthritis involving multiple sites with positive rheumatoid factor   • Rheumatoid lung disease   • Chronic respiratory failure with hypoxia   • Anemia   • Anxiety   • Interstitial lung disease   • Dependence on supplemental oxygen   • BMI 36.0-36.9,adult   • Emphysema lung   • Essential hypertension   • Abnormal RBC indices   • Internal hemorrhoids with complication   • Bright red blood per rectum   • Melena   • Heme positive stool   • Left lower quadrant pain   • Nausea   • Dysphagia   • Constipation   • Elevated alkaline phosphatase level   • Schatzki's ring   • Hiatal hernia   • Gastritis without bleeding   • Duodenitis   • Diverticulosis of colon   • Polyp of colon   • Internal hemorrhoids     Past Medical History:   Diagnosis Date   • Abnormal mammogram    • Acute UTI    • Arthritis     knee, right   • Back pain    • Candida vaginitis    • CHF (congestive heart failure)     not diagnosed   • Chronic pain syndrome     disc disease, lumbar, neck   • Community acquired pneumonia    • COPD (chronic obstructive pulmonary disease)     Emphysema with severe physiology on PFTs.  Patient is a former smoker.   • Cough    • Deafness    • Dependence on supplemental oxygen     PT REPORTS \"4L ALL TIMES\"   • Former smoker    • Gastric bleed    • GERD (gastroesophageal reflux disease)    • H/O chest x-ray 08/11/2015    Rase base opacity consistent with pneumonia or atelectasis   • H/O chest x-ray 06/24/2015    Right basilar airspace disease and effusion consistent with a pneumonia. F/U to radiographic reolution is recommended   • H/O chest x-ray 03/09/2010    Cardiac silhoutte is not enlarged. " Lungs aare inflated. Mild nonspecifiec interstitial changes. No pleual effusions or dense consolidations. Scattered granulomatous changes. Spine with mild kyphosis but no osteopenia. No significant adenopathy   • H/O echocardiogram 03/16/2010    Normal study   • History of PFTs 12/13/2011    Goo pt cooperation and effort. Pt given 3 puffs of xopenex. PFT is acceptable and reproducible   • History of PFTs 08/11/2011    Pt unable to produce acceptabel and reproducible PFT. Pt was given 3 puffs of xopenex. Pt gave good effort Best pleth of two attempts   • History of PFTs 02/24/2011    PFT acceptable and reproducible. Pt gave good effort. Pt used bronchodilator less than 2 hours prior to testing   • Hyperlipidemia    • Hypertension    • Interstitial lung disease     History of rheumatoid lung with bronchiolitis obliterans   • Kidney stones     x 1   • Nephrolithiasis     2007, passed   • On home oxygen therapy     REPORTS USES AT 4L NC AT ALL TIMES   • Panic attack    • Pneumonia    • PRB (rectal bleeding)    • RA (rheumatoid arthritis)    • Rheumatoid arthritis     · A.  Diagnosed in 2001 with history of methotrexate and Enbrel therapy.   • Rheumatoid lung     bronchiolitis obliterans   • Sinus problem    • Vertigo      Past Surgical History:   Procedure Laterality Date   • COLONOSCOPY  2012   • COLONOSCOPY N/A 1/29/2018    Procedure: COLONOSCOPY WITH COLD SNARE POLYPECTOMY X 6; SUBMUCOSAL INJECTION OF SALINE; HOT SNARE POLYPECTOMY X 4; CLIP PLACEMENT X 1;  Surgeon: Kenney Pastrana MD;  Location: [facility];  Service:    • ENDOSCOPY N/A 12/15/2017    Procedure: ESOPHAGOGASTRODUODENOSCOPY with biopsies and esophageal balloon dilitation;  Surgeon: Kenney Pastrana MD;  Location: [facility];  Service:    • MOUTH SURGERY      REPORTS IMPLANT X2   • ORAL ANTRAL FISTULA CLOSURE      gums   • TONSILLECTOMY     • TUBAL ABDOMINAL LIGATION     • UPPER GASTROINTESTINAL ENDOSCOPY  2012   • UPPER GASTROINTESTINAL  ENDOSCOPY  12/15/2017     Family History   Problem Relation Age of Onset   • Glaucoma Mother    • Coronary artery disease Mother    • Liver cancer Mother      bile duct cancer   • Stroke Father    • Stomach cancer Father    • Diabetes Sister    • Colon cancer Neg Hx    • Ulcerative colitis Neg Hx      Social History   Substance Use Topics   • Smoking status: Former Smoker     Packs/day: 1.00     Years: 25.00     Types: Cigarettes     Start date: 1963     Quit date: 1991   • Smokeless tobacco: Never Used   • Alcohol use No       Current Outpatient Prescriptions:   •  budesonide-formoterol (SYMBICORT) 160-4.5 MCG/ACT inhaler, Inhale 2 puffs 2 (Two) Times a Day., Disp: , Rfl:   •  cetirizine (zyrTEC) 10 MG tablet, Take 10 mg by mouth Daily., Disp: , Rfl:   •  Etanercept (ENBREL SURECLICK) 50 MG/ML solution auto-injector, Inject  under the skin 1 (One) Time Per Week., Disp: , Rfl:   •  fentaNYL (DURAGESIC) 75 MCG/HR patch, Place 1 patch on the skin Every 72 (Seventy-Two) Hours., Disp: , Rfl:   •  fluticasone (VERAMYST) 27.5 MCG/SPRAY nasal spray, 2 sprays into each nostril As Needed., Disp: , Rfl:   •  hydrochlorothiazide (HYDRODIURIL) 25 MG tablet, Take 1 tablet by mouth Daily., Disp: 30 tablet, Rfl: 11  •  HYDROcodone-acetaminophen (NORCO)  MG per tablet, Take 1 tablet by mouth Every 8 (Eight) Hours As Needed for Mild Pain ., Disp: , Rfl:   •  LORazepam (ATIVAN) 0.5 MG tablet, Take 1 tablet by mouth Daily As Needed for Anxiety., Disp: 30 tablet, Rfl: 1  •  Multiple Vitamins-Minerals (CENTRUM ADULTS PO), Take 1 tablet by mouth Daily., Disp: , Rfl:   •  O2 (OXYGEN), Inhale 4 L/min Continuous., Disp: , Rfl:   •  ondansetron (ZOFRAN) 8 MG tablet, Take  by mouth Every 8 (Eight) Hours As Needed for Nausea or Vomiting., Disp: , Rfl:   •  pantoprazole (PROTONIX) 40 MG EC tablet, Take 1 tablet by mouth Daily., Disp: 90 tablet, Rfl: 3  •  Probiotic capsule, Take 1 capsule by mouth Daily., Disp: 30 capsule, Rfl: 1  •   "promethazine-codeine (PHENERGAN with CODEINE) 6.25-10 MG/5ML syrup, Take 5 mL by mouth Every 6 (Six) Hours As Needed for Cough., Disp: , Rfl:   •  sertraline (ZOLOFT) 100 MG tablet, Take 1 tablet by mouth Daily., Disp: 90 tablet, Rfl: 3  •  simethicone (MYLICON) 125 MG chewable tablet, Chew 125 mg Every 6 (Six) Hours As Needed for Flatulence., Disp: , Rfl:   •  valsartan (DIOVAN) 320 MG tablet, Take 1 tablet by mouth Daily., Disp: 90 tablet, Rfl: 3  •  linaclotide (LINZESS) 145 MCG capsule capsule, Take 1 capsule by mouth Every Morning Before Breakfast., Disp: 90 capsule, Rfl: 3    Allergies   Allergen Reactions   • Ciprofloxacin Other (See Comments)     \"Hurt all over\"   • Doxycycline Other (See Comments)     \"makes other medication stay in my system longer\"   • Lipitor [Atorvastatin] Other (See Comments)     Hurt all over     • Sulfa Antibiotics Other (See Comments)     \"cant remember\"       Temperature 97.3 °F (36.3 °C), resp. rate 18, height 167.6 cm (65.98\"), weight 93.4 kg (206 lb), not currently breastfeeding.    Physical Exam   Constitutional: She is oriented to person, place, and time. She appears well-developed and well-nourished. No distress.   HENT:   Head: Normocephalic and atraumatic.   Right Ear: Hearing and external ear normal.   Left Ear: Hearing and external ear normal.   Nose: Nose normal.   Mouth/Throat: Oropharynx is clear and moist and mucous membranes are normal. Mucous membranes are not pale, not dry and not cyanotic. No oral lesions. No oropharyngeal exudate.   Eyes: Conjunctivae and EOM are normal. Right eye exhibits no discharge. Left eye exhibits no discharge. No scleral icterus.   Neck: Trachea normal. Neck supple. No JVD present. No edema present. No thyroid mass and no thyromegaly present.   Cardiovascular: Normal rate, regular rhythm, S2 normal and normal heart sounds.  Exam reveals no gallop, no S3 and no friction rub.    No murmur heard.  Pulmonary/Chest: Effort normal and breath " sounds normal. No respiratory distress. She has no wheezes. She has no rales. She exhibits no tenderness.   Abdominal: Soft. Normal appearance and bowel sounds are normal. She exhibits no distension, no ascites and no mass. There is no splenomegaly or hepatomegaly. There is no tenderness. There is no rigidity, no rebound and no guarding. No hernia.   Musculoskeletal: She exhibits no tenderness or deformity.       Vascular Status -  Her exam exhibits no right foot edema. Her exam exhibits no left foot edema.  Lymphadenopathy:     She has no cervical adenopathy.        Left: No supraclavicular adenopathy present.   Neurological: She is alert and oriented to person, place, and time. She has normal strength. No cranial nerve deficit or sensory deficit. She exhibits normal muscle tone. Coordination normal.   Skin: No rash noted. She is not diaphoretic. No cyanosis. No pallor. Nails show no clubbing.   Psychiatric: She has a normal mood and affect. Her behavior is normal. Judgment and thought content normal.   Nursing note and vitals reviewed.    Stigmata of chronic liver disease:  None.  Asterixis:  None.    Assessment and Plan:      Vanna was seen today for anemia, elevated hepatic enzymes and hematochezia.    Diagnoses and all orders for this visit:    Chronic obstructive pulmonary disease, unspecified COPD type  -     Alpha - 1 - Antitrypsin; Future  -     Alpha - 1 - Antitrypsin Phenotype; Future    Iron deficiency anemia, unspecified iron deficiency anemia type  -     CT Abdomen Pelvis With Contrast Enterography; Future  -     CBC & Differential; Future    Elevated LFTs  -     Comprehensive Metabolic Panel; Future    Other orders  -     linaclotide (LINZESS) 145 MCG capsule capsule; Take 1 capsule by mouth Every Morning Before Breakfast.              Plan  and Patient Instructions:    Patient Instructions   1. Reflux measures.  2. Low-fat-high-fiber diet with liberal water intake.  3. Pantoprazole 40 mg 1 by mouth  every morning one half hour before breakfast.  4. Linzess capsule 145 µg. Take 1 capsule on empty stomach in the morning.  5. Iron infusion. Unfortunately, due to significant side effects the patient is unable to tolerate oral iron therapy.  6. Followed by H&H in the future.  7. Follow-up LFTs perhaps in 3 months. Should there be persistent elevation further workup is recommended.  8. Follow-up in 3 months with H&H, and liver function tests.  9. CT enterography. No IV contrast. The patient may call back regarding results.        Kenney Pastrana MD

## 2018-02-13 NOTE — PATIENT INSTRUCTIONS
1. Reflux measures.  2. Low-fat-high-fiber diet with liberal water intake.  3. Pantoprazole 40 mg 1 by mouth every morning one half hour before breakfast.  4. Linzess capsule 145 µg. Take 1 capsule on empty stomach in the morning.  5. Iron infusion. Unfortunately, due to significant side effects the patient is unable to tolerate oral iron therapy.  6. Followed by H&H in the future.  7. Follow-up LFTs perhaps in 3 months. Should there be persistent elevation further workup is recommended.  8. Follow-up in 3 months with H&H, and liver function tests.  9. CT enterography. No IV contrast. The patient may call back regarding results.

## 2018-02-14 PROBLEM — D50.9 IRON DEFICIENCY ANEMIA: Status: ACTIVE | Noted: 2018-02-14

## 2018-02-15 ENCOUNTER — HOSPITAL ENCOUNTER (OUTPATIENT)
Dept: INFUSION THERAPY | Facility: HOSPITAL | Age: 68
Setting detail: INFUSION SERIES
Discharge: HOME | End: 2018-02-15

## 2018-02-15 ENCOUNTER — HOSPITAL ENCOUNTER (OUTPATIENT)
Dept: ULTRASOUND IMAGING | Facility: HOSPITAL | Age: 68
Discharge: HOME | End: 2018-02-15
Admitting: NURSE PRACTITIONER

## 2018-02-15 VITALS
RESPIRATION RATE: 18 BRPM | HEART RATE: 83 BPM | DIASTOLIC BLOOD PRESSURE: 48 MMHG | SYSTOLIC BLOOD PRESSURE: 138 MMHG | OXYGEN SATURATION: 92 % | TEMPERATURE: 98.6 F

## 2018-02-15 DIAGNOSIS — D50.9 IRON DEFICIENCY ANEMIA, UNSPECIFIED IRON DEFICIENCY ANEMIA TYPE: ICD-10-CM

## 2018-02-15 DIAGNOSIS — R11.0 NAUSEA: Chronic | ICD-10-CM

## 2018-02-15 PROCEDURE — 76705 ECHO EXAM OF ABDOMEN: CPT

## 2018-02-15 PROCEDURE — 25010000002 IRON SUCROSE PER 1 MG: Performed by: INTERNAL MEDICINE

## 2018-02-15 PROCEDURE — 96366 THER/PROPH/DIAG IV INF ADDON: CPT

## 2018-02-15 PROCEDURE — 96365 THER/PROPH/DIAG IV INF INIT: CPT

## 2018-02-15 RX ORDER — SODIUM CHLORIDE 9 MG/ML
250 INJECTION, SOLUTION INTRAVENOUS ONCE
Status: CANCELLED | OUTPATIENT
Start: 2018-02-15

## 2018-02-15 RX ORDER — SODIUM CHLORIDE 9 MG/ML
250 INJECTION, SOLUTION INTRAVENOUS ONCE
Status: COMPLETED | OUTPATIENT
Start: 2018-02-15 | End: 2018-02-15

## 2018-02-15 RX ADMIN — IRON SUCROSE 175 MG: 20 INJECTION, SOLUTION INTRAVENOUS at 09:15

## 2018-02-15 RX ADMIN — SODIUM CHLORIDE 250 ML: 9 INJECTION, SOLUTION INTRAVENOUS at 08:28

## 2018-02-15 RX ADMIN — IRON SUCROSE 25 MG: 20 INJECTION, SOLUTION INTRAVENOUS at 08:26

## 2018-02-15 NOTE — PATIENT INSTRUCTIONS
Iron Sucrose injection  What is this medicine?  IRON SUCROSE (URIAH clemons) is an iron complex. Iron is used to make healthy red blood cells, which carry oxygen and nutrients throughout the body. This medicine is used to treat iron deficiency anemia in people with chronic kidney disease.  This medicine may be used for other purposes; ask your health care provider or pharmacist if you have questions.  COMMON BRAND NAME(S): Venofer  What should I tell my health care provider before I take this medicine?  They need to know if you have any of these conditions:  -anemia not caused by low iron levels  -heart disease  -high levels of iron in the blood  -kidney disease  -liver disease  -an unusual or allergic reaction to iron, other medicines, foods, dyes, or preservatives  -pregnant or trying to get pregnant  -breast-feeding  How should I use this medicine?  This medicine is for infusion into a vein. It is given by a health care professional in a hospital or clinic setting.  Talk to your pediatrician regarding the use of this medicine in children. While this drug may be prescribed for children as young as 2 years for selected conditions, precautions do apply.  Overdosage: If you think you have taken too much of this medicine contact a poison control center or emergency room at once.  NOTE: This medicine is only for you. Do not share this medicine with others.  What if I miss a dose?  It is important not to miss your dose. Call your doctor or health care professional if you are unable to keep an appointment.  What may interact with this medicine?  Do not take this medicine with any of the following medications:  -deferoxamine  -dimercaprol  -other iron products  This medicine may also interact with the following medications:  -chloramphenicol  -deferasirox  This list may not describe all possible interactions. Give your health care provider a list of all the medicines, herbs, non-prescription drugs, or dietary  supplements you use. Also tell them if you smoke, drink alcohol, or use illegal drugs. Some items may interact with your medicine.  What should I watch for while using this medicine?  Visit your doctor or healthcare professional regularly. Tell your doctor or healthcare professional if your symptoms do not start to get better or if they get worse. You may need blood work done while you are taking this medicine.  You may need to follow a special diet. Talk to your doctor. Foods that contain iron include: whole grains/cereals, dried fruits, beans, or peas, leafy green vegetables, and organ meats (liver, kidney).  What side effects may I notice from receiving this medicine?  Side effects that you should report to your doctor or health care professional as soon as possible:  -allergic reactions like skin rash, itching or hives, swelling of the face, lips, or tongue  -breathing problems  -changes in blood pressure  -cough  -fast, irregular heartbeat  -feeling faint or lightheaded, falls  -fever or chills  -flushing, sweating, or hot feelings  -joint or muscle aches/pains  -seizures  -swelling of the ankles or feet  -unusually weak or tired  Side effects that usually do not require medical attention (report to your doctor or health care professional if they continue or are bothersome):  -diarrhea  -feeling achy  -headache  -irritation at site where injected  -nausea, vomiting  -stomach upset  -tiredness  This list may not describe all possible side effects. Call your doctor for medical advice about side effects. You may report side effects to FDA at 0-283-FDA-3616.  Where should I keep my medicine?  This drug is given in a hospital or clinic and will not be stored at home.  NOTE: This sheet is a summary. It may not cover all possible information. If you have questions about this medicine, talk to your doctor, pharmacist, or health care provider.  © 2018 [employer] (2012-09-27 17:14:35    Post IV Iron  Infusion    Notify your doctor/practitioner or come to the emergency room if the following occurs:    1.  Rash  2.  Nausea and vomiting  3.  Diarrhea   4.  Blood in your urine  5.  Rapid heartbeat   6.  Chest pain  7.  Breathing difficulty  8.  Chest pain / tightness  9.  Any other unusual symptoms  10.  Decrease of blood pressure (if Ferumoxytol was given)

## 2018-02-20 ENCOUNTER — HOSPITAL ENCOUNTER (OUTPATIENT)
Dept: CT IMAGING | Facility: HOSPITAL | Age: 68
Discharge: HOME | End: 2018-02-20
Attending: INTERNAL MEDICINE | Admitting: INTERNAL MEDICINE

## 2018-02-20 DIAGNOSIS — D50.9 IRON DEFICIENCY ANEMIA, UNSPECIFIED IRON DEFICIENCY ANEMIA TYPE: ICD-10-CM

## 2018-02-20 DIAGNOSIS — J44.9 CHRONIC OBSTRUCTIVE PULMONARY DISEASE, UNSPECIFIED COPD TYPE (HCC): ICD-10-CM

## 2018-02-20 LAB
ALBUMIN SERPL-MCNC: 4 G/DL (ref 3.5–5)
ALBUMIN/GLOB SERPL: 1.1 G/DL (ref 1–2)
ALP SERPL-CCNC: 109 U/L (ref 38–126)
ALT SERPL W P-5'-P-CCNC: 21 U/L (ref 13–69)
ANION GAP SERPL CALCULATED.3IONS-SCNC: 18 MMOL/L
AST SERPL-CCNC: 21 U/L (ref 15–46)
BASOPHILS # BLD AUTO: 0.04 10*3/MM3 (ref 0–0.2)
BASOPHILS NFR BLD AUTO: 0.4 % (ref 0–2.5)
BILIRUB SERPL-MCNC: 0.2 MG/DL (ref 0.2–1.3)
BUN BLD-MCNC: 36 MG/DL (ref 7–20)
BUN/CREAT SERPL: 18.9 (ref 7.1–23.5)
CALCIUM SPEC-SCNC: 9.2 MG/DL (ref 8.4–10.2)
CHLORIDE SERPL-SCNC: 98 MMOL/L (ref 98–107)
CO2 SERPL-SCNC: 32 MMOL/L (ref 26–30)
CREAT BLD-MCNC: 1.9 MG/DL (ref 0.6–1.3)
DEPRECATED RDW RBC AUTO: 39.8 FL (ref 37–54)
EOSINOPHIL # BLD AUTO: 0.37 10*3/MM3 (ref 0–0.7)
EOSINOPHIL NFR BLD AUTO: 3.5 % (ref 0–7)
ERYTHROCYTE [DISTWIDTH] IN BLOOD BY AUTOMATED COUNT: 12.7 % (ref 11.5–14.5)
FERRITIN SERPL-MCNC: 134 NG/ML (ref 11.1–264)
GFR SERPL CREATININE-BSD FRML MDRD: 27 ML/MIN/1.73
GLOBULIN UR ELPH-MCNC: 3.7 GM/DL
GLUCOSE BLD-MCNC: 118 MG/DL (ref 74–98)
HCT VFR BLD AUTO: 30 % (ref 37–47)
HGB BLD-MCNC: 9.3 G/DL (ref 12–16)
IMM GRANULOCYTES # BLD: 0.05 10*3/MM3 (ref 0–0.06)
IMM GRANULOCYTES NFR BLD: 0.5 % (ref 0–0.6)
IRON 24H UR-MRATE: 22 MCG/DL (ref 37–181)
IRON SATN MFR SERPL: 8 % (ref 11–46)
LYMPHOCYTES # BLD AUTO: 2.14 10*3/MM3 (ref 0.6–3.4)
LYMPHOCYTES NFR BLD AUTO: 20.2 % (ref 10–50)
MCH RBC QN AUTO: 26.7 PG (ref 27–31)
MCHC RBC AUTO-ENTMCNC: 31 G/DL (ref 30–37)
MCV RBC AUTO: 86.2 FL (ref 81–99)
MONOCYTES # BLD AUTO: 0.68 10*3/MM3 (ref 0–0.9)
MONOCYTES NFR BLD AUTO: 6.4 % (ref 0–12)
NEUTROPHILS # BLD AUTO: 7.29 10*3/MM3 (ref 2–6.9)
NEUTROPHILS NFR BLD AUTO: 69 % (ref 37–80)
NRBC BLD MANUAL-RTO: 0 /100 WBC (ref 0–0)
PLATELET # BLD AUTO: 284 10*3/MM3 (ref 130–400)
PMV BLD AUTO: 8.9 FL (ref 6–12)
POTASSIUM BLD-SCNC: 4 MMOL/L (ref 3.5–5.1)
PROT SERPL-MCNC: 7.7 G/DL (ref 6.3–8.2)
RBC # BLD AUTO: 3.48 10*6/MM3 (ref 4.2–5.4)
SODIUM BLD-SCNC: 144 MMOL/L (ref 137–145)
TIBC SERPL-MCNC: 271 MCG/DL (ref 261–497)
TSH SERPL DL<=0.05 MIU/L-ACNC: 2.37 MIU/ML (ref 0.47–4.68)
VIT B12 BLD-MCNC: 831 PG/ML (ref 239–931)
WBC NRBC COR # BLD: 10.57 10*3/MM3 (ref 4.8–10.8)

## 2018-02-20 PROCEDURE — 80053 COMPREHEN METABOLIC PANEL: CPT | Performed by: NURSE PRACTITIONER

## 2018-02-20 PROCEDURE — 74176 CT ABD & PELVIS W/O CONTRAST: CPT

## 2018-02-20 PROCEDURE — 82607 VITAMIN B-12: CPT | Performed by: NURSE PRACTITIONER

## 2018-02-20 PROCEDURE — 82104 ALPHA-1-ANTITRYPSIN PHENO: CPT | Performed by: INTERNAL MEDICINE

## 2018-02-20 PROCEDURE — 84075 ASSAY ALKALINE PHOSPHATASE: CPT | Performed by: NURSE PRACTITIONER

## 2018-02-20 PROCEDURE — 83550 IRON BINDING TEST: CPT | Performed by: NURSE PRACTITIONER

## 2018-02-20 PROCEDURE — 36415 COLL VENOUS BLD VENIPUNCTURE: CPT | Performed by: NURSE PRACTITIONER

## 2018-02-20 PROCEDURE — 82728 ASSAY OF FERRITIN: CPT | Performed by: NURSE PRACTITIONER

## 2018-02-20 PROCEDURE — 82103 ALPHA-1-ANTITRYPSIN TOTAL: CPT | Performed by: INTERNAL MEDICINE

## 2018-02-20 PROCEDURE — 84443 ASSAY THYROID STIM HORMONE: CPT | Performed by: NURSE PRACTITIONER

## 2018-02-20 PROCEDURE — 83540 ASSAY OF IRON: CPT | Performed by: NURSE PRACTITIONER

## 2018-02-20 PROCEDURE — 85025 COMPLETE CBC W/AUTO DIFF WBC: CPT | Performed by: NURSE PRACTITIONER

## 2018-02-20 PROCEDURE — 84080 ASSAY ALKALINE PHOSPHATASES: CPT | Performed by: NURSE PRACTITIONER

## 2018-02-22 ENCOUNTER — HOSPITAL ENCOUNTER (OUTPATIENT)
Dept: INFUSION THERAPY | Facility: HOSPITAL | Age: 68
Setting detail: INFUSION SERIES
Discharge: HOME | End: 2018-02-22

## 2018-02-22 VITALS
DIASTOLIC BLOOD PRESSURE: 71 MMHG | HEART RATE: 89 BPM | RESPIRATION RATE: 18 BRPM | SYSTOLIC BLOOD PRESSURE: 144 MMHG | OXYGEN SATURATION: 96 % | TEMPERATURE: 98.9 F

## 2018-02-22 DIAGNOSIS — D50.9 IRON DEFICIENCY ANEMIA, UNSPECIFIED IRON DEFICIENCY ANEMIA TYPE: ICD-10-CM

## 2018-02-22 LAB
ALP BONE CFR SERPL: 27 % (ref 14–68)
ALP INTEST CFR SERPL: 10 % (ref 0–18)
ALP LIVER CFR SERPL: 62 % (ref 18–85)
ALP SERPL-CCNC: 107 IU/L (ref 39–117)

## 2018-02-22 PROCEDURE — 25010000002 IRON SUCROSE PER 1 MG: Performed by: INTERNAL MEDICINE

## 2018-02-22 PROCEDURE — 96365 THER/PROPH/DIAG IV INF INIT: CPT

## 2018-02-22 RX ORDER — SODIUM CHLORIDE 9 MG/ML
250 INJECTION, SOLUTION INTRAVENOUS ONCE
Status: DISCONTINUED | OUTPATIENT
Start: 2018-02-22 | End: 2018-02-24 | Stop reason: HOSPADM

## 2018-02-22 RX ORDER — SODIUM CHLORIDE 9 MG/ML
250 INJECTION, SOLUTION INTRAVENOUS ONCE
Status: CANCELLED | OUTPATIENT
Start: 2018-02-22

## 2018-02-22 RX ADMIN — IRON SUCROSE 200 MG: 20 INJECTION, SOLUTION INTRAVENOUS at 14:56

## 2018-02-23 LAB
A1AT SERPL-MCNC: 139 MG/DL (ref 90–200)
PHENOTYPE: NORMAL

## 2018-02-26 DIAGNOSIS — K59.00 CONSTIPATION, UNSPECIFIED CONSTIPATION TYPE: Primary | ICD-10-CM

## 2018-05-08 ENCOUNTER — OFFICE VISIT (OUTPATIENT)
Dept: PULMONOLOGY | Facility: CLINIC | Age: 68
End: 2018-05-08

## 2018-05-08 VITALS
RESPIRATION RATE: 20 BRPM | OXYGEN SATURATION: 95 % | SYSTOLIC BLOOD PRESSURE: 108 MMHG | TEMPERATURE: 97.7 F | HEIGHT: 66 IN | WEIGHT: 200.5 LBS | DIASTOLIC BLOOD PRESSURE: 60 MMHG | BODY MASS INDEX: 32.22 KG/M2 | HEART RATE: 102 BPM

## 2018-05-08 DIAGNOSIS — J96.11 CHRONIC RESPIRATORY FAILURE WITH HYPOXIA (HCC): ICD-10-CM

## 2018-05-08 DIAGNOSIS — M05.10 RHEUMATOID LUNG DISEASE (HCC): ICD-10-CM

## 2018-05-08 DIAGNOSIS — K21.9 GASTROESOPHAGEAL REFLUX DISEASE WITHOUT ESOPHAGITIS: ICD-10-CM

## 2018-05-08 DIAGNOSIS — M05.79 RHEUMATOID ARTHRITIS INVOLVING MULTIPLE SITES WITH POSITIVE RHEUMATOID FACTOR (HCC): ICD-10-CM

## 2018-05-08 DIAGNOSIS — J43.2 CENTRILOBULAR EMPHYSEMA (HCC): ICD-10-CM

## 2018-05-08 DIAGNOSIS — J84.9 INTERSTITIAL LUNG DISEASE (HCC): Primary | ICD-10-CM

## 2018-05-08 PROCEDURE — 94375 RESPIRATORY FLOW VOLUME LOOP: CPT | Performed by: NURSE PRACTITIONER

## 2018-05-08 PROCEDURE — 99214 OFFICE O/P EST MOD 30 MIN: CPT | Performed by: NURSE PRACTITIONER

## 2018-05-08 NOTE — PROGRESS NOTES
[facility] Pulmonary Follow up    CHIEF COMPLAINT    COPD, chronic respiration failure, interstitial lung disease    HISTORY OF PRESENT ILLNESS    Vanna Rincon is a 67 y.o.female here today for routine six-month follow-up.  We see her for interstitial lung disease with history of rheumatoid arthritis, severe obstructive airway disease, and chronic respiratory failure.  She does have a history of rheumatoid arthritis and then on Enbrel for greater than 15 years.  She had been taking some NSAIDs to help with her daily pain however she's had to stop those due to some kidney function issues as well as GI issues.  Since she has stopped her NSAIDs her pain has worsened extremely.  She is fairly debilitated now and rarely gets out of her home.  [facility] and no longer can do that secondary to the pain.    She does have chronic respiratory failure with her underlying interstitial disease as well as severe obstructive airway disease.  She uses 4 L continuously.  Her shortness of breath is basically at baseline, however she has had overall decrease in her activity as noted above.  She did not have any acute illnesses over the winter.    She does have some seasonal allergies but is not complaining of any worsening postnasal drainage or congestion today.  She takes some Flonase as needed.    She is on the patient assistance program for her Symbicort she, she takes it twice daily with some albuterol as needed.        Patient Active Problem List   Diagnosis   • enlg LN right axilla, bx benign 3/16   • Benign essential hypertension   • Chronic obstructive pulmonary disease   • Chronic pain syndrome   • Gastroesophageal reflux disease   • Hyperlipidemia   • Rheumatoid arthritis involving multiple sites with positive rheumatoid factor   • Rheumatoid lung disease   • Chronic respiratory failure with hypoxia   • Anemia   • Anxiety   • Interstitial lung disease   • Dependence on supplemental  "oxygen   • BMI 36.0-36.9,adult   • Emphysema lung   • Essential hypertension   • Abnormal RBC indices   • Internal hemorrhoids with complication   • Bright red blood per rectum   • Melena   • Heme positive stool   • Left lower quadrant pain   • Nausea   • Dysphagia   • Constipation   • Elevated alkaline phosphatase level   • Schatzki's ring   • Hiatal hernia   • Gastritis without bleeding   • Duodenitis   • Diverticulosis of colon   • Polyp of colon   • Internal hemorrhoids   • Iron deficiency anemia       Allergies   Allergen Reactions   • Ciprofloxacin Other (See Comments)     \"Hurt all over\"   • Doxycycline Other (See Comments)     \"makes other medication stay in my system longer\"   • Lipitor [Atorvastatin] Other (See Comments)     Hurt all over     • Sulfa Antibiotics Other (See Comments)     \"cant remember\"       Current Outpatient Prescriptions:   •  budesonide-formoterol (SYMBICORT) 160-4.5 MCG/ACT inhaler, Inhale 2 puffs 2 (Two) Times a Day., Disp: , Rfl:   •  cetirizine (zyrTEC) 10 MG tablet, Take 10 mg by mouth Daily., Disp: , Rfl:   •  Etanercept (ENBREL SURECLICK) 50 MG/ML solution auto-injector, Inject  under the skin 1 (One) Time Per Week., Disp: , Rfl:   •  fentaNYL (DURAGESIC) 75 MCG/HR patch, Place 1 patch on the skin Every 72 (Seventy-Two) Hours., Disp: , Rfl:   •  fluticasone (VERAMYST) 27.5 MCG/SPRAY nasal spray, 2 sprays into each nostril As Needed., Disp: , Rfl:   •  hydrochlorothiazide (HYDRODIURIL) 25 MG tablet, Take 1 tablet by mouth Daily., Disp: 30 tablet, Rfl: 11  •  HYDROcodone-acetaminophen (NORCO)  MG per tablet, Take 1 tablet by mouth Every 8 (Eight) Hours As Needed for Mild Pain ., Disp: , Rfl:   •  linaclotide (LINZESS) 145 MCG capsule capsule, 1 tablet daily 30 minutes before meal, Disp: 30 capsule, Rfl: 11  •  LORazepam (ATIVAN) 0.5 MG tablet, Take 1 tablet by mouth Daily As Needed for Anxiety., Disp: 30 tablet, Rfl: 1  •  Multiple Vitamins-Minerals (CENTRUM ADULTS PO), Take " 1 tablet by mouth Daily., Disp: , Rfl:   •  O2 (OXYGEN), Inhale 4 L/min Continuous., Disp: , Rfl:   •  ondansetron (ZOFRAN) 8 MG tablet, Take  by mouth Every 8 (Eight) Hours As Needed for Nausea or Vomiting., Disp: , Rfl:   •  pantoprazole (PROTONIX) 40 MG EC tablet, Take 1 tablet by mouth Daily., Disp: 90 tablet, Rfl: 3  •  Probiotic capsule, Take 1 capsule by mouth Daily., Disp: 30 capsule, Rfl: 1  •  promethazine-codeine (PHENERGAN with CODEINE) 6.25-10 MG/5ML syrup, Take 5 mL by mouth Every 6 (Six) Hours As Needed for Cough., Disp: , Rfl:   •  sertraline (ZOLOFT) 100 MG tablet, Take 1 tablet by mouth Daily., Disp: 90 tablet, Rfl: 3  •  simethicone (MYLICON) 125 MG chewable tablet, Chew 125 mg Every 6 (Six) Hours As Needed for Flatulence., Disp: , Rfl:   •  valsartan (DIOVAN) 320 MG tablet, Take 1 tablet by mouth Daily., Disp: 90 tablet, Rfl: 3  MEDICATION LIST AND ALLERGIES REVIEWED.    Social History   Substance Use Topics   • Smoking status: Former Smoker     Packs/day: 1.00     Years: 25.00     Types: Cigarettes     Start date: 1963     Quit date: 1991   • Smokeless tobacco: Never Used   • Alcohol use No       FAMILY AND SOCIAL HISTORY REVIEWED.    Review of Systems   Constitutional: Positive for activity change. Negative for chills, fatigue, fever and unexpected weight change.   HENT: Positive for postnasal drip. Negative for congestion, nosebleeds, rhinorrhea, sinus pressure and trouble swallowing.    Respiratory: Positive for shortness of breath. Negative for cough, chest tightness and wheezing.    Cardiovascular: Positive for leg swelling. Negative for chest pain and palpitations.   Gastrointestinal: Negative for abdominal pain, constipation, diarrhea, nausea and vomiting.   Genitourinary: Negative for dysuria, frequency, hematuria and urgency.   Musculoskeletal: Positive for arthralgias, gait problem, joint swelling, myalgias and neck pain.   Neurological: Positive for weakness. Negative for dizziness,  "numbness and headaches.   All other systems reviewed and are negative.  .    /60 (BP Location: Right arm, Patient Position: Sitting, Cuff Size: Adult)   Pulse 102   Temp 97.7 °F (36.5 °C)   Resp 20   Ht 167.6 cm (65.98\")   Wt 90.9 kg (200 lb 8 oz)   LMP  (LMP Unknown)   SpO2 95%   BMI 32.38 kg/m²     Immunization History   Administered Date(s) Administered   • Flu Vaccine High Dose PF 65YR+ 10/24/2017   • Flu Vaccine Quad PF >18YRS 10/04/2016   • Pneumococcal Conjugate 13-Valent (PCV13) 10/01/2015   • Pneumococcal Polysaccharide (PPSV23) 10/01/2013       Physical Exam   Constitutional: She is oriented to person, place, and time. She appears well-developed and well-nourished.   HENT:   Head: Normocephalic and atraumatic.   Eyes: EOM are normal. Pupils are equal, round, and reactive to light.   Neck: Normal range of motion. Neck supple.   Cardiovascular: Normal rate and regular rhythm.    No murmur heard.  Pulmonary/Chest: Effort normal and breath sounds normal. No respiratory distress. She has no wheezes. She has no rales.   Abdominal: Soft. Bowel sounds are normal. She exhibits no distension.   Musculoskeletal: Normal range of motion. She exhibits no edema.   Neurological: She is alert and oriented to person, place, and time.   Skin: Skin is warm and dry. No erythema.   Psychiatric: She has a normal mood and affect. Her behavior is normal.   Vitals reviewed.        RESULTS    PFTS in the office today, read by me:  Very severe obstructive airway disease, no significant changes from last year with an FEV1 0.59, 24%    PA and lateral chest x-ray done in the office today, reviewed by me:  No significant changes in bilateral interstitial lung disease.  No acute infiltrates or effusions.    PROBLEM LIST    Problem List Items Addressed This Visit        Respiratory    Chronic obstructive pulmonary disease    Overview     Description: A.  Emphysema with severe physiology on PFTs.  Patient is a former smoker.   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 Adequate oxygenation at rest but desaturates with walking after three minutes. She will be less than 90%.  PFT: Vital capacity is 1.83 or 51%, FEV1 is 0.7 or 25%, ratio is 38%. Minimal response to bronchodilation consistent with severe obstruction and restriction         Chronic respiratory failure with hypoxia    Interstitial lung disease - Primary    Overview      · A.  History of rheumatoid lung with bronchiolitis obliterans         Relevant Orders    XR Chest PA & Lateral    Spirometry Without Bronchodilator (Completed)       Digestive    Gastroesophageal reflux disease       Musculoskeletal and Integument    Rheumatoid arthritis involving multiple sites with positive rheumatoid factor    Overview     Description: A.  Diagnosed in 2001 with history of methotrexate and Enbrel therapy.           Other Visit Diagnoses    None.           DISCUSSION    We did discuss management of her rheumatoid arthritis, she will follow-up with her rheumatologist for further management of her chronic pain.  As well as her worsening functional decline.    Continue on her oxygen at 4 L nasal cannula, continuously.  She uses [employer] in [place].  It is beneficial, she requires continuous oxygen, and she is compliant.    Continue on her Symbicort twice daily and albuterol as needed.    Follow-up in 6 months or sooner if needed for any worsening shortness of air, acute illnesses, or difficulties.    I spent 25 minutes with the patient. I spent > 50% percent of this time counseling and discussing diagnostic testing, evaluation, current status and management.    Hope Patel, JENNY  05/08/201812:12 PM  Electronically signed     Please note that portions of this note were completed with a voice recognition program. Efforts were made to edit the dictations, but occasionally words are mistranscribed.      CC:  Tatiana Jefferson MD

## 2018-05-21 DIAGNOSIS — R74.8 ELEVATED LIVER ENZYMES: ICD-10-CM

## 2018-05-21 DIAGNOSIS — D64.9 ANEMIA, UNSPECIFIED TYPE: Primary | ICD-10-CM

## 2018-05-22 LAB
ALBUMIN SERPL-MCNC: 3.9 G/DL (ref 3.5–5)
ALBUMIN/GLOB SERPL: 1.1 G/DL (ref 1–2)
ALP SERPL-CCNC: 114 U/L (ref 38–126)
ALT SERPL-CCNC: 19 U/L (ref 13–69)
AMBIG ABBREV CMP14 DEFAULT: NORMAL
AST SERPL-CCNC: 17 U/L (ref 15–46)
BASOPHILS # BLD AUTO: 0.02 10*3/MM3 (ref 0–0.2)
BASOPHILS NFR BLD AUTO: 0.2 % (ref 0–2.5)
BILIRUB SERPL-MCNC: 0.1 MG/DL (ref 0.2–1.3)
BUN SERPL-MCNC: 32 MG/DL (ref 7–20)
BUN/CREAT SERPL: 26.7 (ref 7.1–23.5)
CALCIUM SERPL-MCNC: 9.1 MG/DL (ref 8.4–10.2)
CHLORIDE SERPL-SCNC: 98 MMOL/L (ref 98–107)
CO2 SERPL-SCNC: 32 MMOL/L (ref 26–30)
CREAT SERPL-MCNC: 1.2 MG/DL (ref 0.6–1.3)
DIFFERENTIAL COMMENT: NORMAL
EOSINOPHIL # BLD AUTO: 0.26 10*3/MM3 (ref 0–0.7)
EOSINOPHIL NFR BLD AUTO: 3.1 % (ref 0–7)
ERYTHROCYTE [DISTWIDTH] IN BLOOD BY AUTOMATED COUNT: 14.4 % (ref 11.5–14.5)
GFR SERPLBLD CREATININE-BSD FMLA CKD-EPI: 45 ML/MIN/1.73
GFR SERPLBLD CREATININE-BSD FMLA CKD-EPI: 55 ML/MIN/1.73
GLOBULIN SER CALC-MCNC: 3.5 GM/DL
GLUCOSE SERPL-MCNC: 103 MG/DL (ref 74–98)
HCT VFR BLD AUTO: 30 % (ref 37–47)
HGB BLD-MCNC: 8.9 G/DL (ref 12–16)
IMM GRANULOCYTES # BLD: 0.03 10*3/MM3 (ref 0–0.06)
IMM GRANULOCYTES NFR BLD: 0.4 % (ref 0–0.6)
LYMPHOCYTES # BLD AUTO: 2.04 10*3/MM3 (ref 0.6–3.4)
LYMPHOCYTES NFR BLD AUTO: 24.6 % (ref 10–50)
MCH RBC QN AUTO: 24.7 PG (ref 27–31)
MCHC RBC AUTO-ENTMCNC: 29.7 G/DL (ref 30–37)
MCV RBC AUTO: 83.1 FL (ref 81–99)
MONOCYTES # BLD AUTO: 0.63 10*3/MM3 (ref 0–0.9)
MONOCYTES NFR BLD AUTO: 7.6 % (ref 0–12)
NEUTROPHILS # BLD AUTO: 5.31 10*3/MM3 (ref 2–6.9)
NEUTROPHILS NFR BLD AUTO: 64.1 % (ref 37–80)
NRBC BLD AUTO-RTO: 0 /100 WBC (ref 0–0)
PLATELET # BLD AUTO: 285 10*3/MM3 (ref 130–400)
PLATELET BLD QL SMEAR: NORMAL
POTASSIUM SERPL-SCNC: 4.3 MMOL/L (ref 3.5–5.1)
PROT SERPL-MCNC: 7.4 G/DL (ref 6.3–8.2)
RBC # BLD AUTO: 3.61 10*6/MM3 (ref 4.2–5.4)
RBC MORPH BLD: NORMAL
SODIUM SERPL-SCNC: 141 MMOL/L (ref 137–145)
WBC # BLD AUTO: 8.29 10*3/MM3 (ref 4.8–10.8)

## 2018-05-23 ENCOUNTER — OFFICE VISIT (OUTPATIENT)
Dept: GASTROENTEROLOGY | Facility: CLINIC | Age: 68
End: 2018-05-23

## 2018-05-23 VITALS
RESPIRATION RATE: 20 BRPM | BODY MASS INDEX: 32.62 KG/M2 | SYSTOLIC BLOOD PRESSURE: 142 MMHG | TEMPERATURE: 98 F | DIASTOLIC BLOOD PRESSURE: 52 MMHG | WEIGHT: 203 LBS | HEART RATE: 94 BPM | HEIGHT: 66 IN

## 2018-05-23 DIAGNOSIS — K59.00 CONSTIPATION, UNSPECIFIED CONSTIPATION TYPE: Primary | ICD-10-CM

## 2018-05-23 DIAGNOSIS — R12 HEARTBURN: ICD-10-CM

## 2018-05-23 DIAGNOSIS — D50.0 IRON DEFICIENCY ANEMIA DUE TO CHRONIC BLOOD LOSS: ICD-10-CM

## 2018-05-23 DIAGNOSIS — K62.5 BRBPR (BRIGHT RED BLOOD PER RECTUM): ICD-10-CM

## 2018-05-23 PROCEDURE — 99214 OFFICE O/P EST MOD 30 MIN: CPT | Performed by: INTERNAL MEDICINE

## 2018-05-25 LAB
FERRITIN SERPL-MCNC: 110 NG/ML (ref 11.1–264)
IRON SATN MFR SERPL: 12 % (ref 11–46)
IRON SERPL-MCNC: 31 MCG/DL (ref 37–181)
TIBC SERPL-MCNC: 252 MCG/DL (ref 261–497)
UIBC SERPL-MCNC: 222 MCG/DL
WRITTEN AUTHORIZATION: NORMAL

## 2018-07-12 ENCOUNTER — OFFICE VISIT (OUTPATIENT)
Dept: CARDIOLOGY | Facility: CLINIC | Age: 68
End: 2018-07-12

## 2018-07-12 VITALS
HEIGHT: 66 IN | DIASTOLIC BLOOD PRESSURE: 52 MMHG | HEART RATE: 89 BPM | BODY MASS INDEX: 32.47 KG/M2 | WEIGHT: 202 LBS | SYSTOLIC BLOOD PRESSURE: 126 MMHG

## 2018-07-12 DIAGNOSIS — R06.09 DOE (DYSPNEA ON EXERTION): Primary | ICD-10-CM

## 2018-07-12 DIAGNOSIS — I10 ESSENTIAL HYPERTENSION: ICD-10-CM

## 2018-07-12 DIAGNOSIS — E78.2 MIXED HYPERLIPIDEMIA: ICD-10-CM

## 2018-07-12 PROCEDURE — 99213 OFFICE O/P EST LOW 20 MIN: CPT | Performed by: INTERNAL MEDICINE

## 2018-07-12 NOTE — PROGRESS NOTES
"[facility]  Consultation H&P  Vanna Rincon  1950  [phone]    VISIT DATE:  12/04/2017    PCP: Jose Daniel Lundberg,   [address]  [facility] [address]    IDENTIFICATION: A 67 y.o. female from [place], [place]    CC:  Chief Complaint   Patient presents with   • Dyspnea on exertion   • Shortness of Breath       PROBLEM LIST:  1. HTN  2. HLD  1. 10/18/16 lipids:  TG 95 HDL 67 and   3. COPD, on chronic O2 4L  1. Follows with Harjeet   2. 11/17 echo EF 60%, rvsp 13  4. Rheumatoid lung disease  1. Follows with Dr. Lan   5. GERD  6. Chronic pain  7. Former tobacco abuse, cessation 1991  8. Anxiety/depression  9. Surgical Hx  1. Tubal ligation  2. Tonsillectomy    Allergies  Allergies   Allergen Reactions   • Ciprofloxacin Other (See Comments)     \"Hurt all over\"   • Doxycycline Other (See Comments)     \"makes other medication stay in my system longer\"   • Lipitor [Atorvastatin] Other (See Comments)     Hurt all over     • Sulfa Antibiotics Other (See Comments)     \"cant remember\"       Current Medications    Current Outpatient Prescriptions:   •  budesonide-formoterol (SYMBICORT) 160-4.5 MCG/ACT inhaler, Inhale 2 puffs 2 (Two) Times a Day., Disp: , Rfl:   •  cetirizine (zyrTEC) 10 MG tablet, Take 10 mg by mouth Daily., Disp: , Rfl:   •  Etanercept (ENBREL SURECLICK) 50 MG/ML solution auto-injector, Inject  under the skin 1 (One) Time Per Week., Disp: , Rfl:   •  fentaNYL (DURAGESIC) 75 MCG/HR patch, Place 1 patch on the skin Every 72 (Seventy-Two) Hours., Disp: , Rfl:   •  fluticasone (VERAMYST) 27.5 MCG/SPRAY nasal spray, 2 sprays into each nostril As Needed., Disp: , Rfl:   •  hydrochlorothiazide (HYDRODIURIL) 25 MG tablet, Take 1 tablet by mouth Daily., Disp: 30 tablet, Rfl: 11  •  HYDROcodone-acetaminophen (NORCO)  MG per tablet, Take 1 tablet by mouth Every 8 (Eight) Hours As Needed for Mild Pain ., Disp: , Rfl:   •  LORazepam (ATIVAN) 0.5 MG " tablet, Take 1 tablet by mouth Daily As Needed for Anxiety., Disp: 30 tablet, Rfl: 1  •  Multiple Vitamins-Minerals (CENTRUM ADULTS PO), Take 1 tablet by mouth Daily., Disp: , Rfl:   •  O2 (OXYGEN), Inhale 4 L/min Continuous., Disp: , Rfl:   •  ondansetron (ZOFRAN) 8 MG tablet, Take  by mouth Every 8 (Eight) Hours As Needed for Nausea or Vomiting., Disp: , Rfl:   •  pantoprazole (PROTONIX) 40 MG EC tablet, Take 1 tablet by mouth Daily., Disp: 90 tablet, Rfl: 3  •  promethazine-codeine (PHENERGAN with CODEINE) 6.25-10 MG/5ML syrup, Take 5 mL by mouth Every 6 (Six) Hours As Needed for Cough., Disp: , Rfl:   •  sertraline (ZOLOFT) 100 MG tablet, Take 1 tablet by mouth Daily., Disp: 90 tablet, Rfl: 3  •  simethicone (MYLICON) 125 MG chewable tablet, Chew 125 mg Every 6 (Six) Hours As Needed for Flatulence., Disp: , Rfl:   •  valsartan (DIOVAN) 320 MG tablet, Take 1 tablet by mouth Daily., Disp: 90 tablet, Rfl: 3     History of Present Illness   HPI  This is a 67-year-old  female the above mentioned PMH who presents for fu from PCP Dr. Lundberg for dyspnea on exertion.   She continues high flow O2 and antiinflammatories .  She is concerned she will not be able to get her pain medication.l      Pt denies any chest pain, orthopnea, PND, palpitations, lower extremity edema, or claudication. Pt denies history of CHF, DVT, PE, MI, CVA, TIA, or rheumatic fever.     ROS  Review of Systems   Respiratory: Positive for shortness of breath.        SOCIAL HX  Social History     Social History   • Marital status:      Spouse name: N/A   • Number of children: N/A   • Years of education: N/A     Occupational History   • disabled      Social History Main Topics   • Smoking status: Former Smoker     Packs/day: 1.00     Years: 25.00     Types: Cigarettes     Start date: 1963     Quit date: 1991   • Smokeless tobacco: Never Used   • Alcohol use No   • Drug use: No   • Sexual activity: Defer      Comment:      Other  "Topics Concern   • Not on file     Social History Narrative   • No narrative on file       FAMILY HX  Family History   Problem Relation Age of Onset   • Glaucoma Mother    • Coronary artery disease Mother    • Liver cancer Mother         bile duct cancer   • Stroke Father    • Stomach cancer Father    • Diabetes Sister    • Colon cancer Neg Hx    • Ulcerative colitis Neg Hx        Vitals:    07/12/18 1357   BP: 126/52   BP Location: Right arm   Patient Position: Sitting   Pulse: 89   Weight: 91.6 kg (202 lb)   Height: 166.4 cm (65.5\")       PHYSICAL EXAMINATION:  Physical Exam   Constitutional: She is oriented to person, place, and time. She appears well-developed and well-nourished. No distress.   obese   HENT:   Head: Normocephalic and atraumatic.   Right Ear: External ear normal.   Left Ear: External ear normal.   Nose: Nose normal.   Eyes: Conjunctivae and EOM are normal.   Neck: Neck supple. No hepatojugular reflux and no JVD present. Carotid bruit is not present. No thyromegaly present.   Cardiovascular: Normal rate, regular rhythm, S1 normal, S2 normal, normal heart sounds, intact distal pulses and normal pulses.  Exam reveals no gallop, no distant heart sounds and no midsystolic click.    No murmur heard.  Pulses:       Radial pulses are 2+ on the right side, and 2+ on the left side.        Dorsalis pedis pulses are 2+ on the right side, and 2+ on the left side.        Posterior tibial pulses are 2+ on the right side, and 2+ on the left side.   Pulmonary/Chest: Effort normal. No respiratory distress. She has decreased breath sounds. She has wheezes. She has rhonchi (faint). She has no rales.   On O2 via nc at 4L   Abdominal: Soft. Bowel sounds are normal. There is no hepatosplenomegaly. There is no tenderness.   Musculoskeletal: Normal range of motion. She exhibits no edema.   Neurological: She is alert and oriented to person, place, and time.   No focal deficits.   Skin: Skin is warm and dry. No erythema. "   Psychiatric: She has a normal mood and affect. Thought content normal.   Nursing note and vitals reviewed.      Diagnostic Data:  Procedures  Lab Results   Component Value Date    CHLPL 200 (H) 10/18/2016    TRIG 95 10/18/2016    HDL 67 10/18/2016     Lab Results   Component Value Date    GLUCOSE 118 (H) 02/20/2018    BUN 32 (H) 05/22/2018    CREATININE 1.20 05/22/2018     05/22/2018    K 4.3 05/22/2018    CL 98 05/22/2018    CO2 32.0 (H) 05/22/2018     Lab Results   Component Value Date    HGBA1C 5.6 10/02/2015     Lab Results   Component Value Date    WBC 10.57 02/20/2018    HGB 8.9 (L) 05/22/2018    HCT 30.0 (L) 05/22/2018     05/22/2018       ASSESSMENT:   Diagnosis Plan   1. BAKER (dyspnea on exertion)     2. Essential hypertension     3. Mixed hyperlipidemia         PLAN:  BAKER- baseline severe copd, interestingly echo not c/w pulmonary htn.    Htn controlled    Fu 1 yr   7/12/2018  2:05 PM    Kraig Marquez MD, FACC

## 2018-07-20 ENCOUNTER — TELEPHONE (OUTPATIENT)
Dept: GASTROENTEROLOGY | Facility: CLINIC | Age: 68
End: 2018-07-20

## 2018-07-27 ENCOUNTER — TELEPHONE (OUTPATIENT)
Dept: GASTROENTEROLOGY | Facility: CLINIC | Age: 68
End: 2018-07-27

## 2018-07-27 NOTE — TELEPHONE ENCOUNTER
FOLLOWING UP ON OVERDUE IRON STUDIES AND SCHEDULING CANCELLED APPT.LEFT MESSAGES AT BOTH NUMBERS. WILL MAIL LETTER.

## 2018-08-17 ENCOUNTER — TELEPHONE (OUTPATIENT)
Dept: GASTROENTEROLOGY | Facility: CLINIC | Age: 68
End: 2018-08-17

## 2018-08-17 NOTE — TELEPHONE ENCOUNTER
FOLLOWING UP ON OVERDUE IRON STUDIES AND SCHEDULING CANCELLED APPT. LEFT MESSAGES AT BOTH NUMBERS.

## 2018-09-14 ENCOUNTER — OFFICE VISIT (OUTPATIENT)
Dept: PULMONOLOGY | Facility: CLINIC | Age: 68
End: 2018-09-14

## 2018-09-14 VITALS
WEIGHT: 202 LBS | DIASTOLIC BLOOD PRESSURE: 86 MMHG | TEMPERATURE: 98.6 F | SYSTOLIC BLOOD PRESSURE: 130 MMHG | HEART RATE: 90 BPM | HEIGHT: 65 IN | BODY MASS INDEX: 33.66 KG/M2 | OXYGEN SATURATION: 94 %

## 2018-09-14 DIAGNOSIS — M05.10 RHEUMATOID LUNG DISEASE (HCC): ICD-10-CM

## 2018-09-14 DIAGNOSIS — Z99.81 DEPENDENCE ON SUPPLEMENTAL OXYGEN: ICD-10-CM

## 2018-09-14 DIAGNOSIS — K21.9 GASTROESOPHAGEAL REFLUX DISEASE WITHOUT ESOPHAGITIS: ICD-10-CM

## 2018-09-14 DIAGNOSIS — J43.2 CENTRILOBULAR EMPHYSEMA (HCC): Primary | ICD-10-CM

## 2018-09-14 DIAGNOSIS — J84.9 INTERSTITIAL LUNG DISEASE (HCC): ICD-10-CM

## 2018-09-14 DIAGNOSIS — M05.79 RHEUMATOID ARTHRITIS INVOLVING MULTIPLE SITES WITH POSITIVE RHEUMATOID FACTOR (HCC): ICD-10-CM

## 2018-09-14 DIAGNOSIS — J96.11 CHRONIC RESPIRATORY FAILURE WITH HYPOXIA (HCC): ICD-10-CM

## 2018-09-14 PROBLEM — E66.811 CLASS 1 OBESITY DUE TO EXCESS CALORIES WITH BODY MASS INDEX (BMI) OF 33.0 TO 33.9 IN ADULT: Status: ACTIVE | Noted: 2018-09-14

## 2018-09-14 PROBLEM — E66.09 CLASS 1 OBESITY DUE TO EXCESS CALORIES WITH BODY MASS INDEX (BMI) OF 33.0 TO 33.9 IN ADULT: Status: ACTIVE | Noted: 2018-09-14

## 2018-09-14 PROCEDURE — 99214 OFFICE O/P EST MOD 30 MIN: CPT | Performed by: NURSE PRACTITIONER

## 2018-09-14 RX ORDER — IRBESARTAN 300 MG/1
TABLET ORAL
COMMUNITY
Start: 2018-08-22 | End: 2019-08-21 | Stop reason: SDUPTHER

## 2018-09-14 NOTE — PROGRESS NOTES
[facility] Pulmonary Follow up    CHIEF COMPLAINT    Shortness of air    HISTORY OF PRESENT ILLNESS    Vanna Rincon is a 68 y.o.female here today for complaints of worsening shortness of air.  We follow her here in the office for chronic respiratory failure is multifactorial including interstitial lung disease related to her rheumatoid arthritis, obstructive airway disease, and generalized debility.  She does have rheumatoid arthritis and is had had some adjustments in her medications due to chronic kidney disease and GI issues.  Overall she's become quite debilitated lately.  Now she's having worsening shortness of air.  She's had no acute exacerbations.  She's had no acute illnesses, worsening bronchospasms, cough or sputum production.  She does have a chronic dry nonproductive cough.    She's also been having quite a bit of abdominal issues with constipation and bloating.  When she is having a bad day she will have an anxiety attack or panic attack related to her dyspnea.    She has chronic joint pain secondary to her rheumatoid arthritis that limits her activity level.  Recently she fell and twisted her right ankle.  It's a bit tender and swollen today.        Patient Active Problem List   Diagnosis   • enlg LN right axilla, bx benign 3/16   • Benign essential hypertension   • Chronic obstructive pulmonary disease (CMS/HCC)   • Chronic pain syndrome   • Gastroesophageal reflux disease   • Hyperlipidemia   • Rheumatoid arthritis involving multiple sites with positive rheumatoid factor (CMS/HCC)   • Rheumatoid lung disease (CMS/HCC)   • Chronic respiratory failure with hypoxia (CMS/HCC)   • Anemia   • Anxiety   • Interstitial lung disease (CMS/HCC)   • Dependence on supplemental oxygen   • Emphysema lung (CMS/HCC)   • Essential hypertension   • Abnormal RBC indices   • Internal hemorrhoids with complication   • Bright red blood per rectum   • Melena   • Heme positive stool   • Left lower quadrant pain   • Nausea  "  • Dysphagia   • Constipation   • Elevated alkaline phosphatase level   • Schatzki's ring   • Hiatal hernia   • Gastritis without bleeding   • Duodenitis   • Diverticulosis of colon   • Polyp of colon   • Internal hemorrhoids   • Iron deficiency anemia   • Class 1 obesity due to excess calories with body mass index (BMI) of 33.0 to 33.9 in adult       Allergies   Allergen Reactions   • Ciprofloxacin Other (See Comments)     \"Hurt all over\"   • Doxycycline Other (See Comments)     \"makes other medication stay in my system longer\"   • Lipitor [Atorvastatin] Other (See Comments)     Hurt all over     • Sulfa Antibiotics Other (See Comments)     \"cant remember\"       Current Outpatient Prescriptions:   •  budesonide-formoterol (SYMBICORT) 160-4.5 MCG/ACT inhaler, Inhale 2 puffs 2 (Two) Times a Day., Disp: , Rfl:   •  cetirizine (zyrTEC) 10 MG tablet, Take 10 mg by mouth Daily., Disp: , Rfl:   •  Etanercept (ENBREL SURECLICK) 50 MG/ML solution auto-injector, Inject  under the skin 1 (One) Time Per Week., Disp: , Rfl:   •  fentaNYL (DURAGESIC) 75 MCG/HR patch, Place 1 patch on the skin Every 72 (Seventy-Two) Hours., Disp: , Rfl:   •  fluticasone (VERAMYST) 27.5 MCG/SPRAY nasal spray, 2 sprays into each nostril As Needed., Disp: , Rfl:   •  hydrochlorothiazide (HYDRODIURIL) 25 MG tablet, Take 1 tablet by mouth Daily., Disp: 30 tablet, Rfl: 11  •  HYDROcodone-acetaminophen (NORCO)  MG per tablet, Take 1 tablet by mouth Every 8 (Eight) Hours As Needed for Mild Pain ., Disp: , Rfl:   •  irbesartan (AVAPRO) 300 MG tablet, , Disp: , Rfl:   •  LORazepam (ATIVAN) 0.5 MG tablet, Take 1 tablet by mouth Daily As Needed for Anxiety., Disp: 30 tablet, Rfl: 1  •  Multiple Vitamins-Minerals (CENTRUM ADULTS PO), Take 1 tablet by mouth Daily., Disp: , Rfl:   •  O2 (OXYGEN), Inhale 4 L/min Continuous., Disp: , Rfl:   •  ondansetron (ZOFRAN) 8 MG tablet, Take  by mouth Every 8 (Eight) Hours As Needed for Nausea or Vomiting., Disp: , " "Rfl:   •  pantoprazole (PROTONIX) 40 MG EC tablet, Take 1 tablet by mouth Daily., Disp: 90 tablet, Rfl: 3  •  promethazine-codeine (PHENERGAN with CODEINE) 6.25-10 MG/5ML syrup, Take 5 mL by mouth Every 6 (Six) Hours As Needed for Cough., Disp: , Rfl:   •  sertraline (ZOLOFT) 100 MG tablet, Take 1 tablet by mouth Daily., Disp: 90 tablet, Rfl: 3  •  simethicone (MYLICON) 125 MG chewable tablet, Chew 125 mg Every 6 (Six) Hours As Needed for Flatulence., Disp: , Rfl:   MEDICATION LIST AND ALLERGIES REVIEWED.    Social History   Substance Use Topics   • Smoking status: Former Smoker     Packs/day: 1.00     Years: 25.00     Types: Cigarettes     Start date: 1963     Quit date: 1991   • Smokeless tobacco: Never Used   • Alcohol use No       FAMILY AND SOCIAL HISTORY REVIEWED.    Review of Systems   Constitutional: Positive for activity change and fatigue. Negative for chills, fever and unexpected weight change.   HENT: Positive for sinus pressure, trouble swallowing and voice change. Negative for congestion, nosebleeds, postnasal drip and rhinorrhea.    Respiratory: Positive for cough, chest tightness, shortness of breath and wheezing.    Cardiovascular: Negative for chest pain and leg swelling.   Gastrointestinal: Positive for constipation and nausea. Negative for abdominal pain, diarrhea and vomiting.   Genitourinary: Negative for dysuria, frequency, hematuria and urgency.   Musculoskeletal: Positive for arthralgias, back pain, gait problem and joint swelling. Negative for myalgias.   Neurological: Positive for tremors and light-headedness. Negative for dizziness, weakness, numbness and headaches.   Psychiatric/Behavioral: The patient is nervous/anxious.    All other systems reviewed and are negative.  .    /86   Pulse 90   Temp 98.6 °F (37 °C)   Ht 165.1 cm (65\")   Wt 91.6 kg (202 lb)   LMP  (LMP Unknown)   SpO2 94% Comment: 4 liters at rest   pulse dose  BMI 33.61 kg/m²   Wt Readings from Last 3 " Encounters:   09/14/18 91.6 kg (202 lb)   07/12/18 91.6 kg (202 lb)   05/23/18 92.1 kg (203 lb)       Immunization History   Administered Date(s) Administered   • Flu Vaccine High Dose PF 65YR+ 10/24/2017   • Flu Vaccine Quad PF >18YRS 10/04/2016   • Pneumococcal Conjugate 13-Valent (PCV13) 10/01/2015   • Pneumococcal Polysaccharide (PPSV23) 10/01/2013       Physical Exam   Constitutional: She is oriented to person, place, and time. She appears well-developed and well-nourished.   obese   HENT:   Head: Normocephalic and atraumatic.   Eyes: Pupils are equal, round, and reactive to light. EOM are normal.   Neck: Normal range of motion. Neck supple.   Cardiovascular: Normal rate and regular rhythm.    No murmur heard.  Pulmonary/Chest: Effort normal. No respiratory distress. She has wheezes (supply to expiratory wheeze bilaterally). She has no rales.   Abdominal: Soft. Bowel sounds are normal. She exhibits no distension.   Musculoskeletal: Normal range of motion. She exhibits edema (right ankle).   Neurological: She is alert and oriented to person, place, and time.   Skin: Skin is warm and dry. No erythema.   Psychiatric: She has a normal mood and affect. Her behavior is normal.   Vitals reviewed.        RESULTS    Last ECHO 12/2017  · Left ventricular systolic function is normal. Estimated EF = 60%.  · Left ventricular diastolic dysfunction (grade I a) consistent with impaired relaxation.  · RVSP(TR) 13 mmHg    PROBLEM LIST    Problem List Items Addressed This Visit        Respiratory    Chronic obstructive pulmonary disease (CMS/[facility]) - Primary    Overview     Description: A.  Emphysema with severe physiology on PFTs.  Patient is a former smoker.         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 Adequate oxygenation at rest but desaturates with walking after three minutes. She will be less than 90%.  PFT: Vital capacity is 1.83 or  51%, FEV1 is 0.7 or 25%, ratio is 38%. Minimal response to bronchodilation consistent with severe obstruction and restriction         Chronic respiratory failure with hypoxia (CMS/HCC)    Interstitial lung disease (CMS/HCC)    Overview      · A.  History of rheumatoid lung with bronchiolitis obliterans            Digestive    Gastroesophageal reflux disease       Musculoskeletal and Integument    Rheumatoid arthritis involving multiple sites with positive rheumatoid factor (CMS/HCC)    Overview     Description: A.  Diagnosed in 2001 with history of methotrexate and Enbrel therapy.            Other    Dependence on supplemental oxygen    Overview      · A.  2 L oxygen ordered each bedtime and when necessary.                 DISCUSSION    We spent a lot of time today discussing that her overall dyspnea is likely related to overall decline in her function.  She has become quite sedentary and homebound at this point.  The reasons are multifactorial including her rheumatoid arthritis and her chronic hypoxic respiratory failure from her interstitial lung disease.  On her next follow-up will repeat her full PFTs as well as a chest x-ray to evaluate for any worsening in her function.  At this time she does not have any acute exacerbation.  I encouraged her to possibly start with home physical therapy and then outpatient therapy.  She would also be candidate for pulmonary rehabilitation if she gets a bit stronger.      I did offer her a BiPAP at night for her chronic hypoxic respiratory failure as well as likely obstructive sleep apnea.  She declines.  Due to her anxiety she would not be able to tolerate it.  We also talked about possibility of nebulizers to help with secretion clearance and bronchospasms, she's tried them before and they cause quite a bit of tachycardia and chest pain.      Continue follow-up with rheumatology for her rheumatoid arthritis, currently she is tolerating her Enbrel well and she is back on  NSAIDs, hopefully her pain will decrease since she can increase her activity.    Follow-up in 3 months with full PFTs and a chest x-ray      I spent 25 minutes with the patient and family. I spent > 50% percent of this time counseling and discussing diagnostic testing, evaluation, current status, treatment options and management.    Hope Patel, APRN  09/14/20189:05 AM  Electronically signed     Please note that portions of this note were completed with a voice recognition program. Efforts were made to edit the dictations, but occasionally words are mistranscribed.      CC: Jose Daniel Lundberg, DO

## 2019-08-21 ENCOUNTER — OFFICE VISIT (OUTPATIENT)
Dept: CARDIOLOGY | Facility: CLINIC | Age: 69
End: 2019-08-21

## 2019-08-21 VITALS
SYSTOLIC BLOOD PRESSURE: 158 MMHG | HEART RATE: 76 BPM | HEIGHT: 66 IN | DIASTOLIC BLOOD PRESSURE: 80 MMHG | WEIGHT: 190.6 LBS | BODY MASS INDEX: 30.63 KG/M2

## 2019-08-21 DIAGNOSIS — E78.01 FAMILIAL HYPERCHOLESTEROLEMIA: ICD-10-CM

## 2019-08-21 DIAGNOSIS — R06.09 DYSPNEA ON EXERTION: ICD-10-CM

## 2019-08-21 DIAGNOSIS — I10 BENIGN ESSENTIAL HYPERTENSION: Primary | ICD-10-CM

## 2019-08-21 PROCEDURE — 99213 OFFICE O/P EST LOW 20 MIN: CPT | Performed by: INTERNAL MEDICINE

## 2019-08-21 RX ORDER — VALSARTAN AND HYDROCHLOROTHIAZIDE 320; 25 MG/1; MG/1
1 TABLET, FILM COATED ORAL DAILY
COMMUNITY
Start: 2019-08-05 | End: 2023-01-09 | Stop reason: ALTCHOICE

## 2019-08-21 RX ORDER — ETODOLAC 600 MG/1
TABLET, EXTENDED RELEASE ORAL DAILY
COMMUNITY
Start: 2019-06-03 | End: 2020-06-23

## 2019-08-21 NOTE — PROGRESS NOTES
"[facility]  Office Progress Note  Vanna Rincon  1950      Visit Date: 08/21/19    PCP: Jose Daniel Lundberg,   [address] [place]  [facility] [address]    IDENTIFICATION: A 69 y.o. female  from [place], [place]    PROBLEM LIST:  1. HTN  2. HLD  1. 10/18/16 lipids:  TG 95 HDL 67 and   3. COPD, on chronic O2 4L  1. Follows with [facility]   2. 11/17 echo EF 60%, rvsp 13  4. Rheumatoid lung disease  1. Follows with Dr. Lan   5. GERD  6. Chronic pain  7. Former tobacco abuse, cessation 1991  8. Anxiety/depression  9. Surgical Hx  1. Tubal ligation  2. Tonsillectomy    Chief Complaint   Patient presents with   • Leg Swelling     1 year follow up   • Shortness of Breath   • Hypertension   • Hyperlipidemia       Allergies  Allergies   Allergen Reactions   • Ciprofloxacin Other (See Comments)     \"Hurt all over\"   • Doxycycline Other (See Comments)     \"makes other medication stay in my system longer\"   • Lipitor [Atorvastatin] Other (See Comments)     Hurt all over     • Sulfa Antibiotics Other (See Comments)     \"cant remember\"       Current Medications    Current Outpatient Medications:   •  budesonide-formoterol (SYMBICORT) 160-4.5 MCG/ACT inhaler, Inhale 2 puffs 2 (Two) Times a Day., Disp: , Rfl:   •  cetirizine (zyrTEC) 10 MG tablet, Take 10 mg by mouth Daily., Disp: , Rfl:   •  Etanercept (ENBREL SURECLICK) 50 MG/ML solution auto-injector, Inject  under the skin 1 (One) Time Per Week., Disp: , Rfl:   •  etodolac XL (LODINE XL) 600 MG 24 hr tablet, Daily., Disp: , Rfl:   •  fentaNYL (DURAGESIC) 75 MCG/HR patch, Place 1 patch on the skin Every 72 (Seventy-Two) Hours., Disp: , Rfl:   •  fluticasone (VERAMYST) 27.5 MCG/SPRAY nasal spray, 2 sprays into each nostril As Needed., Disp: , Rfl:   •  LORazepam (ATIVAN) 0.5 MG tablet, Take 1 tablet by mouth Daily As Needed for Anxiety., Disp: 30 tablet, Rfl: 1  •  Multiple Vitamins-Minerals (CENTRUM ADULTS PO), Take 1 tablet by " "mouth Daily., Disp: , Rfl:   •  O2 (OXYGEN), Inhale 4 L/min Continuous., Disp: , Rfl:   •  ondansetron (ZOFRAN) 8 MG tablet, Take  by mouth Every 8 (Eight) Hours As Needed for Nausea or Vomiting., Disp: , Rfl:   •  pantoprazole (PROTONIX) 40 MG EC tablet, Take 1 tablet by mouth Daily., Disp: 90 tablet, Rfl: 3  •  promethazine-codeine (PHENERGAN with CODEINE) 6.25-10 MG/5ML syrup, Take 5 mL by mouth Every 6 (Six) Hours As Needed for Cough., Disp: , Rfl:   •  sertraline (ZOLOFT) 100 MG tablet, Take 1 tablet by mouth Daily., Disp: 90 tablet, Rfl: 3  •  simethicone (MYLICON) 125 MG chewable tablet, Chew 125 mg Every 6 (Six) Hours As Needed for Flatulence., Disp: , Rfl:   •  valsartan-hydrochlorothiazide (DIOVAN-HCT) 320-25 MG per tablet, Daily., Disp: , Rfl:   •  HYDROcodone-acetaminophen (NORCO)  MG per tablet, Take 1 tablet by mouth Every 8 (Eight) Hours As Needed for Mild Pain ., Disp: , Rfl:       History of Present Illness   Vanna Rincon is a 69 y.o. year old female here for follow up.  Pt denies any chest pain,  orthopnea, PND, palpitations, lower extremity edema, or claudication. Dyspnea w severe COPD is stable. Stable on 4L O2  Reports several months ago she had BLE edema and rash on bilateral LEs, she then saw PCP and was given a short course of lasix, and her edema improved but she got \"terrible shakes\" from the lasix. She states after drinking large amounts of water she felt better. Since then, she's had worsened R hip pain.   She has not followed w rheumatology recently.   BP is elevated today, but she reports she has not taken her meds today. BP at home is usually 140s-150s/80s. Was switched from irbesartan-hctz to valsartan-hctz d/t recall.   Had dental surgery 2 weeks ago prepping for implants w Dr. Blackwell.           OBJECTIVE:  Vitals:    08/21/19 1510   BP: 158/80   BP Location: Right arm   Patient Position: Sitting   Pulse: 76   Weight: 86.5 kg (190 lb 9.6 oz)   Height: 166.4 cm (65.5\") "     Physical Exam   Constitutional: She is oriented to person, place, and time. She appears well-developed and well-nourished. No distress.   Cardiovascular: Normal rate, regular rhythm, normal heart sounds and intact distal pulses.   No murmur heard.  Pulses:       Radial pulses are 2+ on the right side, and 2+ on the left side.        Dorsalis pedis pulses are 2+ on the right side, and 2+ on the left side.        Posterior tibial pulses are 2+ on the right side, and 2+ on the left side.   Pulmonary/Chest: Effort normal. She has decreased breath sounds. She has wheezes. She has rhonchi. She has no rales.   O2 via nc    Abdominal: Soft. Bowel sounds are normal. There is no tenderness. There is no guarding.   Musculoskeletal: She exhibits edema (trace). She exhibits no tenderness.   Neurological: She is alert and oriented to person, place, and time.   Skin: Skin is warm and dry. No rash noted.   Psychiatric: She has a normal mood and affect.   Nursing note and vitals reviewed.      Diagnostic Data:  Procedures      ASSESSMENT:   Diagnosis Plan   1. Benign essential hypertension     2. Familial hypercholesterolemia     3. Dyspnea on exertion         PLAN:  1. BP is elevated after change from irbesartan to valsartan. W recent LE edema, we will change the hctz to chlorthalidone, and call in plain valsartan as well. Pt just got a refill, so we will wait until she runs out and then will switch her to chlorthalidone.  2. Labs per PCP  3. Stable BAKER w COPD on chornic O2 4L w no pulm htn on last echo. Cont following w pulm.       Jose Daniel Lundberg, , thank you for referring Ms. Rincon for evaluation.  I have forwarded my electronically generated recommendations to you for review.  Please do not hesitate to call with any questions.    Scribed for Kraig Marquez MD by Jessa Camara PA-C. 8/21/2019  3:32 PM   Kraig Marquez MD, [facility]  I, Kraig Marquez MD, personally performed the services described in this  documentation as scribed by the above named individual in my presence, and it is both accurate and complete.  8/21/2019  7:03 PM

## 2020-05-19 ENCOUNTER — TELEPHONE (OUTPATIENT)
Dept: GASTROENTEROLOGY | Facility: CLINIC | Age: 70
End: 2020-05-19

## 2020-05-19 NOTE — TELEPHONE ENCOUNTER
The patient left a voicemail on 05/14/2020 to schedule an iron infusion.  I returned her call and left a voicemail for her to return my call.  When she returned the call, I offered her an appointment for 06/01/2020.  Explained to patient she had not been seen since 05/23/2018 and would need to come in.  She stated she did not want to wait that long and declined to schedule.  Option given for patient to call PCP for iron infusion.

## 2020-06-04 PROBLEM — N18.30 CHRONIC KIDNEY DISEASE, STAGE III (MODERATE) (HCC): Status: ACTIVE | Noted: 2020-06-04

## 2020-06-09 ENCOUNTER — HOSPITAL ENCOUNTER (OUTPATIENT)
Dept: INFUSION THERAPY | Facility: HOSPITAL | Age: 70
Setting detail: INFUSION SERIES
Discharge: HOME | End: 2020-06-09

## 2020-06-09 VITALS
DIASTOLIC BLOOD PRESSURE: 57 MMHG | RESPIRATION RATE: 18 BRPM | OXYGEN SATURATION: 98 % | SYSTOLIC BLOOD PRESSURE: 137 MMHG | TEMPERATURE: 98.4 F | HEART RATE: 76 BPM

## 2020-06-09 DIAGNOSIS — N18.30 CHRONIC KIDNEY DISEASE, STAGE III (MODERATE) (HCC): Primary | ICD-10-CM

## 2020-06-09 DIAGNOSIS — D50.9 IRON DEFICIENCY ANEMIA, UNSPECIFIED IRON DEFICIENCY ANEMIA TYPE: ICD-10-CM

## 2020-06-09 PROCEDURE — 96375 TX/PRO/DX INJ NEW DRUG ADDON: CPT

## 2020-06-09 PROCEDURE — 96361 HYDRATE IV INFUSION ADD-ON: CPT

## 2020-06-09 PROCEDURE — 96360 HYDRATION IV INFUSION INIT: CPT

## 2020-06-09 PROCEDURE — 25010000002 FERUMOXYTOL 510 MG/17ML SOLUTION 510 MG VIAL: Performed by: PHYSICIAN ASSISTANT

## 2020-06-09 PROCEDURE — 96374 THER/PROPH/DIAG INJ IV PUSH: CPT

## 2020-06-09 RX ORDER — SODIUM CHLORIDE 9 MG/ML
250 INJECTION, SOLUTION INTRAVENOUS ONCE
Status: COMPLETED | OUTPATIENT
Start: 2020-06-09 | End: 2020-06-09

## 2020-06-09 RX ORDER — BISACODYL 5 MG/1
5 TABLET, DELAYED RELEASE ORAL DAILY PRN
COMMUNITY
End: 2022-06-01

## 2020-06-09 RX ORDER — AZELASTINE 1 MG/ML
2 SPRAY, METERED NASAL 2 TIMES DAILY
COMMUNITY
End: 2021-06-14

## 2020-06-09 RX ORDER — SODIUM CHLORIDE 9 MG/ML
250 INJECTION, SOLUTION INTRAVENOUS ONCE
Status: CANCELLED | OUTPATIENT
Start: 2020-06-23

## 2020-06-09 RX ADMIN — SODIUM CHLORIDE 250 ML: 9 INJECTION, SOLUTION INTRAVENOUS at 13:39

## 2020-06-09 RX ADMIN — FERUMOXYTOL 510 MG: 510 INJECTION INTRAVENOUS at 13:49

## 2020-06-23 ENCOUNTER — HOSPITAL ENCOUNTER (OUTPATIENT)
Dept: INFUSION THERAPY | Facility: HOSPITAL | Age: 70
Setting detail: INFUSION SERIES
Discharge: HOME | End: 2020-06-23

## 2020-06-23 VITALS
TEMPERATURE: 98.6 F | HEART RATE: 69 BPM | SYSTOLIC BLOOD PRESSURE: 136 MMHG | BODY MASS INDEX: 28.12 KG/M2 | WEIGHT: 175 LBS | OXYGEN SATURATION: 100 % | DIASTOLIC BLOOD PRESSURE: 65 MMHG | RESPIRATION RATE: 18 BRPM | HEIGHT: 66 IN

## 2020-06-23 DIAGNOSIS — N18.30 CHRONIC KIDNEY DISEASE, STAGE III (MODERATE) (HCC): Primary | ICD-10-CM

## 2020-06-23 DIAGNOSIS — D50.9 IRON DEFICIENCY ANEMIA, UNSPECIFIED IRON DEFICIENCY ANEMIA TYPE: ICD-10-CM

## 2020-06-23 PROCEDURE — 96374 THER/PROPH/DIAG INJ IV PUSH: CPT

## 2020-06-23 PROCEDURE — 96360 HYDRATION IV INFUSION INIT: CPT

## 2020-06-23 PROCEDURE — 25010000002 FERUMOXYTOL 510 MG/17ML SOLUTION 510 MG VIAL: Performed by: PHYSICIAN ASSISTANT

## 2020-06-23 PROCEDURE — 96365 THER/PROPH/DIAG IV INF INIT: CPT

## 2020-06-23 RX ORDER — SODIUM CHLORIDE 9 MG/ML
250 INJECTION, SOLUTION INTRAVENOUS ONCE
Status: CANCELLED | OUTPATIENT
Start: 2020-06-23

## 2020-06-23 RX ORDER — SODIUM CHLORIDE 9 MG/ML
250 INJECTION, SOLUTION INTRAVENOUS ONCE
Status: COMPLETED | OUTPATIENT
Start: 2020-06-23 | End: 2020-06-23

## 2020-06-23 RX ADMIN — SODIUM CHLORIDE 250 ML: 9 INJECTION, SOLUTION INTRAVENOUS at 13:41

## 2020-06-23 RX ADMIN — FERUMOXYTOL 510 MG: 510 INJECTION INTRAVENOUS at 14:00

## 2020-08-04 ENCOUNTER — TELEPHONE (OUTPATIENT)
Dept: INFUSION THERAPY | Facility: HOSPITAL | Age: 70
End: 2020-08-04

## 2020-08-04 NOTE — TELEPHONE ENCOUNTER
Left message with pt advising her to call [facility] back to do covid screening prior to tomorrow's appt.

## 2020-08-05 ENCOUNTER — HOSPITAL ENCOUNTER (OUTPATIENT)
Dept: INFUSION THERAPY | Facility: HOSPITAL | Age: 70
Discharge: HOME | End: 2020-08-05
Admitting: INTERNAL MEDICINE

## 2020-08-05 VITALS
OXYGEN SATURATION: 100 % | HEART RATE: 76 BPM | RESPIRATION RATE: 18 BRPM | WEIGHT: 171 LBS | BODY MASS INDEX: 28.49 KG/M2 | DIASTOLIC BLOOD PRESSURE: 55 MMHG | HEIGHT: 65 IN | TEMPERATURE: 98.7 F | SYSTOLIC BLOOD PRESSURE: 142 MMHG

## 2020-08-05 DIAGNOSIS — N18.30 ANEMIA DUE TO STAGE 3 CHRONIC KIDNEY DISEASE TREATED WITH ERYTHROPOIETIN (HCC): Primary | ICD-10-CM

## 2020-08-05 DIAGNOSIS — D63.1 ANEMIA DUE TO STAGE 3 CHRONIC KIDNEY DISEASE TREATED WITH ERYTHROPOIETIN (HCC): Primary | ICD-10-CM

## 2020-08-05 DIAGNOSIS — N18.30 CHRONIC KIDNEY DISEASE, STAGE III (MODERATE) (HCC): ICD-10-CM

## 2020-08-05 LAB
ALBUMIN SERPL-MCNC: 4.1 G/DL (ref 3.5–5.2)
ANION GAP SERPL CALCULATED.3IONS-SCNC: 8.3 MMOL/L (ref 5–15)
BACTERIA UR QL AUTO: ABNORMAL /HPF
BILIRUB UR QL STRIP: NEGATIVE
BUN SERPL-MCNC: 25 MG/DL (ref 8–23)
BUN/CREAT SERPL: 19.7 (ref 7–25)
CALCIUM SPEC-SCNC: 9.3 MG/DL (ref 8.6–10.5)
CHLORIDE SERPL-SCNC: 99 MMOL/L (ref 98–107)
CLARITY UR: CLEAR
CO2 SERPL-SCNC: 33.7 MMOL/L (ref 22–29)
COLOR UR: YELLOW
CREAT SERPL-MCNC: 1.27 MG/DL (ref 0.57–1)
CREAT UR-MCNC: 160.5 MG/DL
DEPRECATED RDW RBC AUTO: 42.6 FL (ref 37–54)
ERYTHROCYTE [DISTWIDTH] IN BLOOD BY AUTOMATED COUNT: 13.2 % (ref 12.3–15.4)
GFR SERPL CREATININE-BSD FRML MDRD: 42 ML/MIN/1.73
GLUCOSE SERPL-MCNC: 126 MG/DL (ref 65–99)
GLUCOSE UR STRIP-MCNC: NEGATIVE MG/DL
HCT VFR BLD AUTO: 29.4 % (ref 34–46.6)
HGB BLD-MCNC: 9.3 G/DL (ref 12–15.9)
HGB UR QL STRIP.AUTO: ABNORMAL
HYALINE CASTS UR QL AUTO: ABNORMAL /LPF
KETONES UR QL STRIP: NEGATIVE
LEUKOCYTE ESTERASE UR QL STRIP.AUTO: ABNORMAL
MCH RBC QN AUTO: 27.8 PG (ref 26.6–33)
MCHC RBC AUTO-ENTMCNC: 31.6 G/DL (ref 31.5–35.7)
MCV RBC AUTO: 88 FL (ref 79–97)
MUCOUS THREADS URNS QL MICRO: ABNORMAL /HPF
NITRITE UR QL STRIP: NEGATIVE
PH UR STRIP.AUTO: <=5 [PH] (ref 5–8)
PHOSPHATE SERPL-MCNC: 4.8 MG/DL (ref 2.5–4.5)
PLATELET # BLD AUTO: 188 10*3/MM3 (ref 140–450)
PMV BLD AUTO: 9.6 FL (ref 6–12)
POTASSIUM SERPL-SCNC: 4.3 MMOL/L (ref 3.5–5.2)
PROT UR QL STRIP: NEGATIVE
PROT UR-MCNC: 11 MG/DL
PROT/CREAT UR: 68.5 MG/G CREA (ref 0–200)
RBC # BLD AUTO: 3.34 10*6/MM3 (ref 3.77–5.28)
RBC # UR: ABNORMAL /HPF
REF LAB TEST METHOD: ABNORMAL
SODIUM SERPL-SCNC: 141 MMOL/L (ref 136–145)
SP GR UR STRIP: 1.02 (ref 1–1.03)
SQUAMOUS #/AREA URNS HPF: ABNORMAL /HPF
URATE SERPL-MCNC: 5.7 MG/DL (ref 2.4–5.7)
UROBILINOGEN UR QL STRIP: ABNORMAL
WBC # BLD AUTO: 6.27 10*3/MM3 (ref 3.4–10.8)
WBC UR QL AUTO: ABNORMAL /HPF

## 2020-08-05 PROCEDURE — 82570 ASSAY OF URINE CREATININE: CPT | Performed by: INTERNAL MEDICINE

## 2020-08-05 PROCEDURE — 85027 COMPLETE CBC AUTOMATED: CPT | Performed by: INTERNAL MEDICINE

## 2020-08-05 PROCEDURE — 96372 THER/PROPH/DIAG INJ SC/IM: CPT

## 2020-08-05 PROCEDURE — 80069 RENAL FUNCTION PANEL: CPT | Performed by: INTERNAL MEDICINE

## 2020-08-05 PROCEDURE — 84550 ASSAY OF BLOOD/URIC ACID: CPT | Performed by: INTERNAL MEDICINE

## 2020-08-05 PROCEDURE — 81001 URINALYSIS AUTO W/SCOPE: CPT | Performed by: INTERNAL MEDICINE

## 2020-08-05 PROCEDURE — 84156 ASSAY OF PROTEIN URINE: CPT | Performed by: INTERNAL MEDICINE

## 2020-08-05 PROCEDURE — 25010000002 EPOETIN ALFA PER 1000 UNITS: Performed by: NURSE PRACTITIONER

## 2020-08-05 PROCEDURE — 36415 COLL VENOUS BLD VENIPUNCTURE: CPT

## 2020-08-05 RX ADMIN — ERYTHROPOIETIN 40000 UNITS: 40000 INJECTION, SOLUTION INTRAVENOUS; SUBCUTANEOUS at 15:05

## 2020-08-05 NOTE — PATIENT INSTRUCTIONS
Epoetin Rick injection  What is this medicine?  EPOETIN RICK (e LUIS ENRIQUE e tin AL fa) helps your body make more red blood cells. This medicine is used to treat anemia caused by chronic kidney disease, cancer chemotherapy, or HIV-therapy. It may also be used before surgery if you have anemia.  This medicine may be used for other purposes; ask your health care provider or pharmacist if you have questions.  COMMON BRAND NAME(S): Epogen, Procrit, Retacrit  What should I tell my health care provider before I take this medicine?  They need to know if you have any of these conditions:  · cancer  · heart disease  · high blood pressure  · history of blood clots  · history of stroke  · low levels of folate, iron, or vitamin B12 in the blood  · seizures  · an unusual or allergic reaction to erythropoietin, albumin, benzyl alcohol, hamster proteins, other medicines, foods, dyes, or preservatives  · pregnant or trying to get pregnant  · breast-feeding  How should I use this medicine?  This medicine is for injection into a vein or under the skin. It is usually given by a health care professional in a hospital or clinic setting.  If you get this medicine at home, you will be taught how to prepare and give this medicine. Use exactly as directed. Take your medicine at regular intervals. Do not take your medicine more often than directed.  It is important that you put your used needles and syringes in a special sharps container. Do not put them in a trash can. If you do not have a sharps container, call your pharmacist or healthcare provider to get one.  A special MedGuide will be given to you by the pharmacist with each prescription and refill. Be sure to read this information carefully each time.  Talk to your pediatrician regarding the use of this medicine in children. While this drug may be prescribed for selected conditions, precautions do apply.  Overdosage: If you think you have taken too much of this medicine contact a poison  control center or emergency room at once.  NOTE: This medicine is only for you. Do not share this medicine with others.  What if I miss a dose?  If you miss a dose, take it as soon as you can. If it is almost time for your next dose, take only that dose. Do not take double or extra doses.  What may interact with this medicine?  Interactions have not been studied.  This list may not describe all possible interactions. Give your health care provider a list of all the medicines, herbs, non-prescription drugs, or dietary supplements you use. Also tell them if you smoke, drink alcohol, or use illegal drugs. Some items may interact with your medicine.  What should I watch for while using this medicine?  Your condition will be monitored carefully while you are receiving this medicine.  You may need blood work done while you are taking this medicine.  This medicine may cause a decrease in vitamin B6. You should make sure that you get enough vitamin B6 while you are taking this medicine. Discuss the foods you eat and the vitamins you take with your health care professional.  What side effects may I notice from receiving this medicine?  Side effects that you should report to your doctor or health care professional as soon as possible:  · allergic reactions like skin rash, itching or hives, swelling of the face, lips, or tongue  · seizures  · signs and symptoms of a blood clot such as breathing problems; changes in vision; chest pain; severe, sudden headache; pain, swelling, warmth in the leg; trouble speaking; sudden numbness or weakness of the face, arm or leg  · signs and symptoms of a stroke like changes in vision; confusion; trouble speaking or understanding; severe headaches; sudden numbness or weakness of the face, arm or leg; trouble walking; dizziness; loss of balance or coordination  Side effects that usually do not require medical attention (report to your doctor or health care professional if they continue or are  bothersome):  · chills  · cough  · dizziness  · fever  · headaches  · joint pain  · muscle cramps  · muscle pain  · nausea, vomiting  · pain, redness, or irritation at site where injected  This list may not describe all possible side effects. Call your doctor for medical advice about side effects. You may report side effects to FDA at 1-624-DPP-5202.  Where should I keep my medicine?  Keep out of the reach of children.  Store in a refrigerator between 2 and 8 degrees C (36 and 46 degrees F). Do not freeze or shake. Throw away any unused portion if using a single-dose vial. Multi-dose vials can be kept in the refrigerator for up to 21 days after the initial dose. Throw away unused medicine.  NOTE: This sheet is a summary. It may not cover all possible information. If you have questions about this medicine, talk to your doctor, pharmacist, or health care provider.  © 2020 [employer]/Gold Standard (2018-07-27 08:35:19)

## 2020-08-24 DIAGNOSIS — J44.9 CHRONIC OBSTRUCTIVE PULMONARY DISEASE, UNSPECIFIED COPD TYPE (HCC): Primary | ICD-10-CM

## 2020-08-31 ENCOUNTER — OFFICE VISIT (OUTPATIENT)
Dept: PULMONOLOGY | Facility: CLINIC | Age: 70
End: 2020-08-31

## 2020-08-31 ENCOUNTER — HOSPITAL ENCOUNTER (OUTPATIENT)
Dept: GENERAL RADIOLOGY | Facility: HOSPITAL | Age: 70
Discharge: HOME | End: 2020-08-31
Admitting: NURSE PRACTITIONER

## 2020-08-31 VITALS
SYSTOLIC BLOOD PRESSURE: 130 MMHG | TEMPERATURE: 97.3 F | DIASTOLIC BLOOD PRESSURE: 78 MMHG | HEIGHT: 65 IN | WEIGHT: 167.4 LBS | HEART RATE: 91 BPM | OXYGEN SATURATION: 98 % | BODY MASS INDEX: 27.89 KG/M2

## 2020-08-31 DIAGNOSIS — J96.11 CHRONIC RESPIRATORY FAILURE WITH HYPOXIA (HCC): ICD-10-CM

## 2020-08-31 DIAGNOSIS — M05.10 RHEUMATOID LUNG DISEASE (HCC): ICD-10-CM

## 2020-08-31 DIAGNOSIS — J44.9 CHRONIC OBSTRUCTIVE PULMONARY DISEASE, UNSPECIFIED COPD TYPE (HCC): Primary | ICD-10-CM

## 2020-08-31 DIAGNOSIS — J44.9 CHRONIC OBSTRUCTIVE PULMONARY DISEASE, UNSPECIFIED COPD TYPE (HCC): ICD-10-CM

## 2020-08-31 DIAGNOSIS — M05.79 RHEUMATOID ARTHRITIS INVOLVING MULTIPLE SITES WITH POSITIVE RHEUMATOID FACTOR (HCC): ICD-10-CM

## 2020-08-31 DIAGNOSIS — J84.9 INTERSTITIAL LUNG DISEASE (HCC): ICD-10-CM

## 2020-08-31 PROCEDURE — 71046 X-RAY EXAM CHEST 2 VIEWS: CPT

## 2020-08-31 PROCEDURE — 99214 OFFICE O/P EST MOD 30 MIN: CPT | Performed by: NURSE PRACTITIONER

## 2020-08-31 PROCEDURE — 94618 PULMONARY STRESS TESTING: CPT | Performed by: NURSE PRACTITIONER

## 2020-09-02 ENCOUNTER — HOSPITAL ENCOUNTER (OUTPATIENT)
Dept: INFUSION THERAPY | Facility: HOSPITAL | Age: 70
Discharge: HOME | End: 2020-09-02
Admitting: NURSE PRACTITIONER

## 2020-09-02 VITALS
HEART RATE: 75 BPM | OXYGEN SATURATION: 100 % | DIASTOLIC BLOOD PRESSURE: 70 MMHG | RESPIRATION RATE: 18 BRPM | TEMPERATURE: 98.9 F | SYSTOLIC BLOOD PRESSURE: 156 MMHG

## 2020-09-02 DIAGNOSIS — D63.1 ANEMIA DUE TO STAGE 3 CHRONIC KIDNEY DISEASE TREATED WITH ERYTHROPOIETIN (HCC): Primary | ICD-10-CM

## 2020-09-02 DIAGNOSIS — N18.30 ANEMIA DUE TO STAGE 3 CHRONIC KIDNEY DISEASE TREATED WITH ERYTHROPOIETIN (HCC): Primary | ICD-10-CM

## 2020-09-02 LAB
DEPRECATED RDW RBC AUTO: 42.3 FL (ref 37–54)
ERYTHROCYTE [DISTWIDTH] IN BLOOD BY AUTOMATED COUNT: 13.2 % (ref 12.3–15.4)
HCT VFR BLD AUTO: 33.3 % (ref 34–46.6)
HGB BLD-MCNC: 10.6 G/DL (ref 12–15.9)
MCH RBC QN AUTO: 27.9 PG (ref 26.6–33)
MCHC RBC AUTO-ENTMCNC: 31.8 G/DL (ref 31.5–35.7)
MCV RBC AUTO: 87.6 FL (ref 79–97)
PLATELET # BLD AUTO: 211 10*3/MM3 (ref 140–450)
PMV BLD AUTO: 9.5 FL (ref 6–12)
RBC # BLD AUTO: 3.8 10*6/MM3 (ref 3.77–5.28)
WBC # BLD AUTO: 6.28 10*3/MM3 (ref 3.4–10.8)

## 2020-09-02 PROCEDURE — G0463 HOSPITAL OUTPT CLINIC VISIT: HCPCS

## 2020-09-02 PROCEDURE — 85027 COMPLETE CBC AUTOMATED: CPT | Performed by: INTERNAL MEDICINE

## 2020-09-02 PROCEDURE — 36415 COLL VENOUS BLD VENIPUNCTURE: CPT

## 2020-09-16 ENCOUNTER — APPOINTMENT (OUTPATIENT)
Dept: CT IMAGING | Facility: HOSPITAL | Age: 70
End: 2020-09-16

## 2020-09-21 ENCOUNTER — HOSPITAL ENCOUNTER (OUTPATIENT)
Dept: CT IMAGING | Facility: HOSPITAL | Age: 70
Discharge: HOME | End: 2020-09-21
Admitting: NURSE PRACTITIONER

## 2020-09-21 PROCEDURE — 71250 CT THORAX DX C-: CPT

## 2020-09-22 ENCOUNTER — TELEPHONE (OUTPATIENT)
Dept: PULMONOLOGY | Facility: CLINIC | Age: 70
End: 2020-09-22

## 2020-09-22 NOTE — TELEPHONE ENCOUNTER
I called Ms. Rincon regarding her CT scan.  There is no significant interstitial lung disease but she does have some bronchiectasis.  She continues have quite a bit of episodes of dysphasia as well as emesis after eating.  We did review that she had a moderately large hiatal hernia as well as thickening of the distal esophagus.  She has not followed up with gastroenterology in a couple years.  I did recommend she follow-up with someone, she is good to try to find a local provider if not she will call back and I will refer her to 1 here at [facility].

## 2020-09-30 ENCOUNTER — HOSPITAL ENCOUNTER (OUTPATIENT)
Dept: INFUSION THERAPY | Facility: HOSPITAL | Age: 70
Discharge: HOME | End: 2020-09-30
Admitting: NURSE PRACTITIONER

## 2020-09-30 VITALS
DIASTOLIC BLOOD PRESSURE: 58 MMHG | OXYGEN SATURATION: 100 % | TEMPERATURE: 98 F | RESPIRATION RATE: 18 BRPM | HEART RATE: 78 BPM | SYSTOLIC BLOOD PRESSURE: 97 MMHG

## 2020-09-30 DIAGNOSIS — N18.30 ANEMIA DUE TO STAGE 3 CHRONIC KIDNEY DISEASE TREATED WITH ERYTHROPOIETIN (HCC): Primary | ICD-10-CM

## 2020-09-30 DIAGNOSIS — D63.1 ANEMIA DUE TO STAGE 3 CHRONIC KIDNEY DISEASE TREATED WITH ERYTHROPOIETIN (HCC): Primary | ICD-10-CM

## 2020-09-30 LAB
DEPRECATED RDW RBC AUTO: 42.8 FL (ref 37–54)
ERYTHROCYTE [DISTWIDTH] IN BLOOD BY AUTOMATED COUNT: 13.1 % (ref 12.3–15.4)
HCT VFR BLD AUTO: 32.1 % (ref 34–46.6)
HGB BLD-MCNC: 9.9 G/DL (ref 12–15.9)
MCH RBC QN AUTO: 27.6 PG (ref 26.6–33)
MCHC RBC AUTO-ENTMCNC: 30.8 G/DL (ref 31.5–35.7)
MCV RBC AUTO: 89.4 FL (ref 79–97)
PLATELET # BLD AUTO: 182 10*3/MM3 (ref 140–450)
PMV BLD AUTO: 9.7 FL (ref 6–12)
RBC # BLD AUTO: 3.59 10*6/MM3 (ref 3.77–5.28)
WBC # BLD AUTO: 7.4 10*3/MM3 (ref 3.4–10.8)

## 2020-09-30 PROCEDURE — 36415 COLL VENOUS BLD VENIPUNCTURE: CPT

## 2020-09-30 PROCEDURE — 96372 THER/PROPH/DIAG INJ SC/IM: CPT

## 2020-09-30 PROCEDURE — 25010000002 EPOETIN ALFA PER 1000 UNITS: Performed by: NURSE PRACTITIONER

## 2020-09-30 PROCEDURE — 85027 COMPLETE CBC AUTOMATED: CPT | Performed by: INTERNAL MEDICINE

## 2020-09-30 RX ADMIN — ERYTHROPOIETIN 40000 UNITS: 40000 INJECTION, SOLUTION INTRAVENOUS; SUBCUTANEOUS at 14:40

## 2020-10-26 DIAGNOSIS — K44.9 HIATAL HERNIA: Primary | ICD-10-CM

## 2020-10-28 ENCOUNTER — HOSPITAL ENCOUNTER (OUTPATIENT)
Dept: INFUSION THERAPY | Facility: HOSPITAL | Age: 70
Discharge: HOME | End: 2020-10-28
Admitting: NURSE PRACTITIONER

## 2020-10-28 VITALS
WEIGHT: 170 LBS | SYSTOLIC BLOOD PRESSURE: 154 MMHG | BODY MASS INDEX: 27.32 KG/M2 | RESPIRATION RATE: 18 BRPM | HEIGHT: 66 IN | DIASTOLIC BLOOD PRESSURE: 60 MMHG | OXYGEN SATURATION: 100 % | HEART RATE: 81 BPM | TEMPERATURE: 98.2 F

## 2020-10-28 DIAGNOSIS — D63.1 ANEMIA DUE TO STAGE 3 CHRONIC KIDNEY DISEASE TREATED WITH ERYTHROPOIETIN (HCC): Primary | ICD-10-CM

## 2020-10-28 DIAGNOSIS — N18.30 ANEMIA DUE TO STAGE 3 CHRONIC KIDNEY DISEASE TREATED WITH ERYTHROPOIETIN (HCC): Primary | ICD-10-CM

## 2020-10-28 LAB
DEPRECATED RDW RBC AUTO: 42.6 FL (ref 37–54)
ERYTHROCYTE [DISTWIDTH] IN BLOOD BY AUTOMATED COUNT: 13.2 % (ref 12.3–15.4)
HCT VFR BLD AUTO: 34.1 % (ref 34–46.6)
HGB BLD-MCNC: 10.4 G/DL (ref 12–15.9)
MCH RBC QN AUTO: 27 PG (ref 26.6–33)
MCHC RBC AUTO-ENTMCNC: 30.5 G/DL (ref 31.5–35.7)
MCV RBC AUTO: 88.6 FL (ref 79–97)
PLATELET # BLD AUTO: 197 10*3/MM3 (ref 140–450)
PMV BLD AUTO: 9.1 FL (ref 6–12)
RBC # BLD AUTO: 3.85 10*6/MM3 (ref 3.77–5.28)
WBC # BLD AUTO: 6.65 10*3/MM3 (ref 3.4–10.8)

## 2020-10-28 PROCEDURE — 36415 COLL VENOUS BLD VENIPUNCTURE: CPT

## 2020-10-28 PROCEDURE — 85027 COMPLETE CBC AUTOMATED: CPT | Performed by: NURSE PRACTITIONER

## 2020-10-28 PROCEDURE — G0463 HOSPITAL OUTPT CLINIC VISIT: HCPCS

## 2020-11-04 ENCOUNTER — OFFICE VISIT (OUTPATIENT)
Dept: CARDIOLOGY | Facility: CLINIC | Age: 70
End: 2020-11-04

## 2020-11-04 VITALS
WEIGHT: 164 LBS | SYSTOLIC BLOOD PRESSURE: 132 MMHG | OXYGEN SATURATION: 99 % | DIASTOLIC BLOOD PRESSURE: 70 MMHG | HEIGHT: 66 IN | BODY MASS INDEX: 26.36 KG/M2 | HEART RATE: 95 BPM

## 2020-11-04 DIAGNOSIS — R06.09 DOE (DYSPNEA ON EXERTION): ICD-10-CM

## 2020-11-04 DIAGNOSIS — E78.2 MIXED HYPERLIPIDEMIA: ICD-10-CM

## 2020-11-04 DIAGNOSIS — I10 ESSENTIAL HYPERTENSION: Primary | ICD-10-CM

## 2020-11-04 PROCEDURE — 99213 OFFICE O/P EST LOW 20 MIN: CPT | Performed by: INTERNAL MEDICINE

## 2020-11-04 NOTE — PROGRESS NOTES
"[facility]  Office Progress Note  Vanna Rincon  1950      Visit Date: 11/04/20    PCP: Farhad Chowdary MD  [address]  [facility] [address]    IDENTIFICATION: A 70 y.o. female  from [place], [place]    PROBLEM LIST:  1. HTN  2. HLD  1. 10/18/16 lipids:  TG 95 HDL 67 and   3. COPD, on chronic O2 4L  1. Follows with Harjeet   2. 11/17 echo EF 60%, rvsp 13  4. Rheumatoid lung disease  1. Follows with Dr. Lan   5. GERD  6. Chronic pain  7. Former tobacco abuse, cessation 1991  8. Anxiety/depression  9. Surgical Hx  1. Tubal ligation  2. Tonsillectomy    Chief Complaint   Patient presents with   • Benign Essential Hypertension       Allergies  Allergies   Allergen Reactions   • Ciprofloxacin Other (See Comments)     \"Hurt all over\"   • Doxycycline Other (See Comments)     \"makes other medication stay in my system longer\"   • Lipitor [Atorvastatin] Other (See Comments)     Hurt all over     • Sulfa Antibiotics Other (See Comments)     \"cant remember\"       Current Medications    Current Outpatient Medications:   •  azelastine (ASTELIN) 0.1 % nasal spray, 2 sprays into the nostril(s) as directed by provider 2 (Two) Times a Day. Use in each nostril as directed, Disp: , Rfl:   •  bisacodyl (Dulcolax) 5 MG EC tablet, Take 5 mg by mouth Daily As Needed for Constipation., Disp: , Rfl:   •  budesonide-formoterol (SYMBICORT) 160-4.5 MCG/ACT inhaler, Inhale 2 puffs 2 (Two) Times a Day., Disp: , Rfl:   •  Etanercept (ENBREL SURECLICK) 50 MG/ML solution auto-injector, Inject  under the skin 1 (One) Time Per Week., Disp: , Rfl:   •  fentaNYL (DURAGESIC) 75 MCG/HR patch, Place 1 patch on the skin Every 72 (Seventy-Two) Hours., Disp: , Rfl:   •  gentamicin (GARAMYCIN) 0.3 % ophthalmic ointment, 3 (Three) Times a Day., Disp: , Rfl:   •  LORazepam (ATIVAN) 0.5 MG tablet, Take 1 tablet by mouth Daily As Needed for Anxiety., Disp: 30 tablet, Rfl: 1  •  Multiple " "Vitamins-Minerals (CENTRUM ADULTS PO), Take 1 tablet by mouth Daily., Disp: , Rfl:   •  O2 (OXYGEN), Inhale 6 L/min Continuous., Disp: , Rfl:   •  ondansetron (ZOFRAN) 8 MG tablet, Take  by mouth Every 8 (Eight) Hours As Needed for Nausea or Vomiting., Disp: , Rfl:   •  pantoprazole (PROTONIX) 40 MG EC tablet, Take 1 tablet by mouth Daily., Disp: 90 tablet, Rfl: 3  •  promethazine-codeine (PHENERGAN with CODEINE) 6.25-10 MG/5ML syrup, Take 5 mL by mouth Every 6 (Six) Hours As Needed for Cough., Disp: , Rfl:   •  sertraline (ZOLOFT) 100 MG tablet, Take 1 tablet by mouth Daily., Disp: 90 tablet, Rfl: 3  •  simethicone (MYLICON) 125 MG chewable tablet, Chew 125 mg Every 6 (Six) Hours As Needed for Flatulence., Disp: , Rfl:   •  valsartan-hydrochlorothiazide (DIOVAN-HCT) 320-25 MG per tablet, Take 1 tablet by mouth Daily., Disp: , Rfl:       History of Present Illness   Vanna Rincon is a 70 y.o. year old female here for follow up.  Unfortunately has had worsened oxygenation had to go up to 6 L by supplemental oxygen.  States she has had increased stress as her  is Democrat and she has had frustration dealing with this campaign          OBJECTIVE:  Vitals:    11/04/20 1544   BP: 132/70   BP Location: Right arm   Patient Position: Sitting   Pulse: 95   SpO2: 99%   Weight: 74.4 kg (164 lb)   Height: 166.4 cm (65.5\")     Physical Exam   Constitutional: She is oriented to person, place, and time. She appears well-developed and well-nourished. No distress.   Cardiovascular: Normal rate, regular rhythm, normal heart sounds and intact distal pulses.   No murmur heard.  Pulses:       Radial pulses are 2+ on the right side and 2+ on the left side.        Dorsalis pedis pulses are 2+ on the right side and 2+ on the left side.        Posterior tibial pulses are 2+ on the right side and 2+ on the left side.   Pulmonary/Chest: Effort normal. She has decreased breath sounds. She has wheezes. She has rhonchi. She has no " rales.   O2 via nc    Abdominal: Soft. Bowel sounds are normal. There is no abdominal tenderness. There is no guarding.   Musculoskeletal:         General: Edema (trace) present. No tenderness.   Neurological: She is alert and oriented to person, place, and time.   Skin: Skin is warm and dry. No rash noted.   Psychiatric: She has a normal mood and affect.   Nursing note and vitals reviewed.      Diagnostic Data:  Procedures      ASSESSMENT:   Diagnosis Plan   1. Essential hypertension     2. BAKER (dyspnea on exertion)     3. Mixed hyperlipidemia         PLAN:  1. BP is elevated after change from irbesartan to valsartan. W recent LE edema, we will change the hctz to chlorthalidone, and call in plain valsartan as well. Pt just got a refill, so we will wait until she runs out and then will switch her to chlorthalidone.  2. Labs per PCP  3. Stable BAKER w COPD on chornic O2 4L w no pulm htn on last echo. Cont following w pulm.       Farhad Chowdary MD, thank you for referring Ms. Rincon for evaluation.  I have forwarded my electronically generated recommendations to you for review.  Please do not hesitate to call with any questions.     11/4/2020  15:57 EST

## 2020-11-13 ENCOUNTER — TELEPHONE (OUTPATIENT)
Dept: GASTROENTEROLOGY | Facility: CLINIC | Age: 70
End: 2020-11-13

## 2020-11-13 ENCOUNTER — APPOINTMENT (OUTPATIENT)
Dept: LAB | Facility: HOSPITAL | Age: 70
End: 2020-11-13

## 2020-11-13 NOTE — TELEPHONE ENCOUNTER
I spoke to the patient and she was asking what you planned to do during the procedure.  She stated that dilation hasn't worked for her in the past and she's wanting the hernia fixed during the EGD.  She doesn't want to have to be put under twice if she does require surgery.  Please advise.

## 2020-11-13 NOTE — TELEPHONE ENCOUNTER
Dr Sneed - We spoke late yesterday afternoon about this patient possibly needing to have her case moved to [facility].  Is it just due to the amount of oxygen she's currently on or was there another issue?  Per Courtney we wouldn't be able to move her over on Monday, but possibly on Tuesday. Please advise as patient is supposed to go to her COVID-19 test today at 12:40pm.  Thank you!

## 2020-11-13 NOTE — TELEPHONE ENCOUNTER
Left message for patient to call back to possibly get scheduled for a telehealth visit prior to doing any procedures.

## 2020-11-13 NOTE — TELEPHONE ENCOUNTER
Left message for patient that we would need to reschedule her appointment and to not have her COVID test done today.

## 2020-11-16 ENCOUNTER — OFFICE VISIT (OUTPATIENT)
Dept: GASTROENTEROLOGY | Facility: CLINIC | Age: 70
End: 2020-11-16

## 2020-11-16 DIAGNOSIS — R13.10 DYSPHAGIA, UNSPECIFIED TYPE: ICD-10-CM

## 2020-11-16 DIAGNOSIS — J43.2 CENTRILOBULAR EMPHYSEMA (HCC): ICD-10-CM

## 2020-11-16 DIAGNOSIS — K44.9 HIATAL HERNIA: Primary | ICD-10-CM

## 2020-11-16 PROCEDURE — 99442 PR PHYS/QHP TELEPHONE EVALUATION 11-20 MIN: CPT | Performed by: INTERNAL MEDICINE

## 2020-11-16 NOTE — PROGRESS NOTES
"You have chosen to receive care through a telephone visit. Do you consent to use a telephone visit for your medical care today? YES   PCP: Farhad Chowdary MD    Chief Complaint   Patient presents with   • Hiatal Hernia   • EGD CONSULT   • Difficulty Swallowing     CAUSES VOMITING   Acid reflux and swallowing issue at times.    History of Present Illness:   HPI  This was a telephone visit.  The patient consented for telephone visit.  Mrs. Rincon is a 70-year-old with a history of COPD oxygen dependent, rheumatoid arthritis and hypertension.  She has been evaluated by pulmonary and has complaint of issues with swallowing at times.  Ms. Rincon was previously evaluated by Dr. Pastrana in [place], [place].  She denies any progressive difficult or painful swallowing.  The patient has complaints that sometimes with meals the food will go down and is caught in an air pocket.  She will then regurgitate food back up.  This does not happen on a daily basis.  Mrs. Rincon may have this occur a couple times a week.  She has been on Protonix for several years and this was prescribed by Dr. Pastrana.  Mrs. Rincon denies any abdominal pain after meals.  There is no history of unexplained weight loss.  She denies any nausea or vomiting.  She has no change in bowel habits or signs of blood in the stool.  Past Medical History:   Diagnosis Date   • Abnormal mammogram    • Acute UTI    • Arthritis     knee, right   • Back pain    • Candida vaginitis    • CHF (congestive heart failure) (CMS/[facility])     not diagnosed   • Chronic pain syndrome     disc disease, lumbar, neck   • Community acquired pneumonia    • COPD (chronic obstructive pulmonary disease) (CMS/[facility])     Emphysema with severe physiology on PFTs.  Patient is a former smoker.   • Cough    • Deafness    • Dependence on supplemental oxygen     PT REPORTS \"4L ALL TIMES\"   • Former smoker    • Gastric bleed    • GERD (gastroesophageal reflux disease)    • H/O chest x-ray 08/11/2015 "    Rase base opacity consistent with pneumonia or atelectasis   • H/O chest x-ray 06/24/2015    Right basilar airspace disease and effusion consistent with a pneumonia. F/U to radiographic reolution is recommended   • H/O chest x-ray 03/09/2010    Cardiac silhoutte is not enlarged. Lungs aare inflated. Mild nonspecifiec interstitial changes. No pleual effusions or dense consolidations. Scattered granulomatous changes. Spine with mild kyphosis but no osteopenia. No significant adenopathy   • H/O echocardiogram 03/16/2010    Normal study   • History of PFTs 12/13/2011    Goo pt cooperation and effort. Pt given 3 puffs of xopenex. PFT is acceptable and reproducible   • History of PFTs 08/11/2011    Pt unable to produce acceptabel and reproducible PFT. Pt was given 3 puffs of xopenex. Pt gave good effort Best pleth of two attempts   • History of PFTs 02/24/2011    PFT acceptable and reproducible. Pt gave good effort. Pt used bronchodilator less than 2 hours prior to testing   • Hyperlipidemia    • Hypertension    • Interstitial lung disease (CMS/HCC)     History of rheumatoid lung with bronchiolitis obliterans   • Kidney stones     x 1   • Nephrolithiasis     2007, passed   • On home oxygen therapy     REPORTS USES AT 4L NC AT ALL TIMES   • Panic attack    • Pneumonia    • PRB (rectal bleeding)    • RA (rheumatoid arthritis) (CMS/HCC)    • Rheumatoid arthritis (CMS/HCC)     · A.  Diagnosed in 2001 with history of methotrexate and Enbrel therapy.   • Rheumatoid lung (CMS/HCC)     bronchiolitis obliterans   • Sinus problem    • Vertigo        Past Surgical History:   Procedure Laterality Date   • COLONOSCOPY  2012   • COLONOSCOPY N/A 1/29/2018    Procedure: COLONOSCOPY WITH COLD SNARE POLYPECTOMY X 6; SUBMUCOSAL INJECTION OF SALINE; HOT SNARE POLYPECTOMY X 4; CLIP PLACEMENT X 1;  Surgeon: Kenney Pastrana MD;  Location: [facility] ENDOSCOPY;  Service:    • DENTAL PROCEDURE     • ENDOSCOPY N/A 12/15/2017    Procedure:  ESOPHAGOGASTRODUODENOSCOPY with biopsies and esophageal balloon dilitation;  Surgeon: Kenney Pastrana MD;  Location: [facility] ENDOSCOPY;  Service:    • MOUTH SURGERY      REPORTS IMPLANT X2   • ORAL ANTRAL FISTULA CLOSURE      gums   • TONSILLECTOMY     • TUBAL ABDOMINAL LIGATION     • UPPER GASTROINTESTINAL ENDOSCOPY  2012   • UPPER GASTROINTESTINAL ENDOSCOPY  12/15/2017         Current Outpatient Medications:   •  azelastine (ASTELIN) 0.1 % nasal spray, 2 sprays into the nostril(s) as directed by provider 2 (Two) Times a Day. Use in each nostril as directed, Disp: , Rfl:   •  bisacodyl (Dulcolax) 5 MG EC tablet, Take 5 mg by mouth Daily As Needed for Constipation., Disp: , Rfl:   •  budesonide-formoterol (SYMBICORT) 160-4.5 MCG/ACT inhaler, Inhale 2 puffs 2 (Two) Times a Day., Disp: , Rfl:   •  Etanercept (ENBREL SURECLICK) 50 MG/ML solution auto-injector, Inject  under the skin 1 (One) Time Per Week., Disp: , Rfl:   •  fentaNYL (DURAGESIC) 75 MCG/HR patch, Place 1 patch on the skin Every 72 (Seventy-Two) Hours., Disp: , Rfl:   •  gentamicin (GARAMYCIN) 0.3 % ophthalmic ointment, 3 (Three) Times a Day., Disp: , Rfl:   •  LORazepam (ATIVAN) 0.5 MG tablet, Take 1 tablet by mouth Daily As Needed for Anxiety., Disp: 30 tablet, Rfl: 1  •  Multiple Vitamins-Minerals (CENTRUM ADULTS PO), Take 1 tablet by mouth Daily., Disp: , Rfl:   •  O2 (OXYGEN), Inhale 6 L/min Continuous., Disp: , Rfl:   •  ondansetron (ZOFRAN) 8 MG tablet, Take  by mouth Every 8 (Eight) Hours As Needed for Nausea or Vomiting., Disp: , Rfl:   •  pantoprazole (PROTONIX) 40 MG EC tablet, Take 1 tablet by mouth Daily., Disp: 90 tablet, Rfl: 3  •  promethazine-codeine (PHENERGAN with CODEINE) 6.25-10 MG/5ML syrup, Take 5 mL by mouth Every 6 (Six) Hours As Needed for Cough., Disp: , Rfl:   •  sertraline (ZOLOFT) 100 MG tablet, Take 1 tablet by mouth Daily., Disp: 90 tablet, Rfl: 3  •  simethicone (MYLICON) 125 MG chewable tablet, Chew 125 mg Every 6 (Six)  "Hours As Needed for Flatulence., Disp: , Rfl:   •  valsartan-hydrochlorothiazide (DIOVAN-HCT) 320-25 MG per tablet, Take 1 tablet by mouth Daily., Disp: , Rfl:     Allergies   Allergen Reactions   • Ciprofloxacin Other (See Comments)     \"Hurt all over\"   • Doxycycline Other (See Comments)     \"makes other medication stay in my system longer\"   • Lipitor [Atorvastatin] Other (See Comments)     Hurt all over     • Sulfa Antibiotics Other (See Comments)     \"cant remember\"       Family History   Problem Relation Age of Onset   • Glaucoma Mother    • Coronary artery disease Mother    • Liver cancer Mother         bile duct cancer   • Stroke Father    • Stomach cancer Father    • Diabetes Sister    • Colon cancer Neg Hx    • Ulcerative colitis Neg Hx        Social History     Socioeconomic History   • Marital status:      Spouse name: Not on file   • Number of children: Not on file   • Years of education: Not on file   • Highest education level: Not on file   Occupational History   • Occupation: disabled   Tobacco Use   • Smoking status: Former Smoker     Packs/day: 1.00     Years: 25.00     Pack years: 25.00     Types: Cigarettes     Start date:      Quit date:      Years since quittin.8   • Smokeless tobacco: Never Used   Substance and Sexual Activity   • Alcohol use: No   • Drug use: No   • Sexual activity: Defer     Comment:        Review of Systems   Constitutional: Negative for appetite change, fatigue, fever and unexpected weight change.   HENT: Positive for trouble swallowing. Negative for dental problem, mouth sores, postnasal drip, sneezing and voice change.    Eyes: Negative for pain, redness and itching.   Respiratory: Negative for cough, shortness of breath and wheezing.    Cardiovascular: Negative for chest pain, palpitations and leg swelling.   Gastrointestinal: Negative for abdominal distention, abdominal pain, anal bleeding, blood in stool, constipation, diarrhea, nausea, " rectal pain and vomiting.   Endocrine: Negative for cold intolerance, heat intolerance, polydipsia and polyuria.   Genitourinary: Negative for dysuria, enuresis, flank pain, hematuria and urgency.   Musculoskeletal: Negative for arthralgias, back pain, joint swelling and myalgias.   Skin: Negative for color change, pallor and rash.   Allergic/Immunologic: Negative for environmental allergies, food allergies and immunocompromised state.   Neurological: Negative for dizziness, tremors, seizures, facial asymmetry, numbness and headaches.   Psychiatric/Behavioral: Negative for behavioral problems, dysphoric mood, hallucinations and self-injury.   All other systems reviewed and are negative.      There were no vitals filed for this visit.    Physical Exam    Diagnoses and all orders for this visit:    1. Hiatal hernia (Primary)    2. Dysphagia, unspecified type    3. Centrilobular emphysema (CMS/HCC)    I reviewed the previous endoscopy performed by Dr. Pastrana in 2017.  There is evidence of a hiatal hernia and given the clinical history the patient may have a paraesophageal component.  The findings would explain the swallowing difficulty and sensation that food is not going down well intermittently.  The patient is on 6 L of oxygen throughout the day.      Plan: Will schedule an upper GI to assess for paraesophageal component.           The patient will continue Protonix daily.           Discussed small portion meals daily.      This was a telephone visit for 15 minutes.

## 2020-11-25 ENCOUNTER — HOSPITAL ENCOUNTER (OUTPATIENT)
Dept: INFUSION THERAPY | Facility: HOSPITAL | Age: 70
Discharge: HOME | End: 2020-11-25
Admitting: INTERNAL MEDICINE

## 2020-11-25 VITALS
DIASTOLIC BLOOD PRESSURE: 42 MMHG | HEART RATE: 77 BPM | OXYGEN SATURATION: 100 % | TEMPERATURE: 98.6 F | SYSTOLIC BLOOD PRESSURE: 156 MMHG

## 2020-11-25 DIAGNOSIS — D63.1 ANEMIA DUE TO STAGE 3 CHRONIC KIDNEY DISEASE TREATED WITH ERYTHROPOIETIN (HCC): Primary | ICD-10-CM

## 2020-11-25 DIAGNOSIS — N18.30 ANEMIA DUE TO STAGE 3 CHRONIC KIDNEY DISEASE TREATED WITH ERYTHROPOIETIN (HCC): Primary | ICD-10-CM

## 2020-11-25 LAB
DEPRECATED RDW RBC AUTO: 43.2 FL (ref 37–54)
ERYTHROCYTE [DISTWIDTH] IN BLOOD BY AUTOMATED COUNT: 13.3 % (ref 12.3–15.4)
FERRITIN SERPL-MCNC: 207.4 NG/ML (ref 13–150)
HCT VFR BLD AUTO: 29.3 % (ref 34–46.6)
HGB BLD-MCNC: 9 G/DL (ref 12–15.9)
IRON 24H UR-MRATE: 68 MCG/DL (ref 37–145)
IRON SATN MFR SERPL: 25 % (ref 20–50)
MCH RBC QN AUTO: 27.1 PG (ref 26.6–33)
MCHC RBC AUTO-ENTMCNC: 30.7 G/DL (ref 31.5–35.7)
MCV RBC AUTO: 88.3 FL (ref 79–97)
PLATELET # BLD AUTO: 205 10*3/MM3 (ref 140–450)
PMV BLD AUTO: 9.7 FL (ref 6–12)
RBC # BLD AUTO: 3.32 10*6/MM3 (ref 3.77–5.28)
RETICS # AUTO: 0.02 10*6/MM3 (ref 0.02–0.13)
RETICS/RBC NFR AUTO: 0.74 % (ref 0.7–1.9)
TIBC SERPL-MCNC: 271 MCG/DL (ref 298–536)
TRANSFERRIN SERPL-MCNC: 182 MG/DL (ref 200–360)
WBC # BLD AUTO: 6.13 10*3/MM3 (ref 3.4–10.8)

## 2020-11-25 PROCEDURE — 25010000002 EPOETIN ALFA PER 1000 UNITS: Performed by: NURSE PRACTITIONER

## 2020-11-25 PROCEDURE — 84466 ASSAY OF TRANSFERRIN: CPT | Performed by: INTERNAL MEDICINE

## 2020-11-25 PROCEDURE — 85027 COMPLETE CBC AUTOMATED: CPT | Performed by: INTERNAL MEDICINE

## 2020-11-25 PROCEDURE — 96372 THER/PROPH/DIAG INJ SC/IM: CPT

## 2020-11-25 PROCEDURE — 85045 AUTOMATED RETICULOCYTE COUNT: CPT | Performed by: INTERNAL MEDICINE

## 2020-11-25 PROCEDURE — 82728 ASSAY OF FERRITIN: CPT | Performed by: INTERNAL MEDICINE

## 2020-11-25 PROCEDURE — 36415 COLL VENOUS BLD VENIPUNCTURE: CPT

## 2020-11-25 PROCEDURE — 83540 ASSAY OF IRON: CPT | Performed by: INTERNAL MEDICINE

## 2020-11-25 RX ADMIN — ERYTHROPOIETIN 40000 UNITS: 40000 INJECTION, SOLUTION INTRAVENOUS; SUBCUTANEOUS at 14:32

## 2020-12-01 ENCOUNTER — OFFICE VISIT (OUTPATIENT)
Dept: PULMONOLOGY | Facility: CLINIC | Age: 70
End: 2020-12-01

## 2020-12-01 ENCOUNTER — TELEPHONE (OUTPATIENT)
Dept: GASTROENTEROLOGY | Facility: CLINIC | Age: 70
End: 2020-12-01

## 2020-12-01 DIAGNOSIS — J96.11 CHRONIC RESPIRATORY FAILURE WITH HYPOXIA (HCC): ICD-10-CM

## 2020-12-01 DIAGNOSIS — J84.9 INTERSTITIAL LUNG DISEASE (HCC): ICD-10-CM

## 2020-12-01 DIAGNOSIS — J44.9 CHRONIC OBSTRUCTIVE PULMONARY DISEASE, UNSPECIFIED COPD TYPE (HCC): Primary | ICD-10-CM

## 2020-12-01 PROCEDURE — 99442 PR PHYS/QHP TELEPHONE EVALUATION 11-20 MIN: CPT | Performed by: NURSE PRACTITIONER

## 2020-12-01 RX ORDER — CEPHALEXIN 500 MG/1
500 CAPSULE ORAL 2 TIMES DAILY
Qty: 14 CAPSULE | Refills: 0 | Status: SHIPPED | OUTPATIENT
Start: 2020-12-01 | End: 2021-03-01

## 2020-12-01 RX ORDER — BUDESONIDE AND FORMOTEROL FUMARATE DIHYDRATE 160; 4.5 UG/1; UG/1
2 AEROSOL RESPIRATORY (INHALATION)
Qty: 1 INHALER | Refills: 3 | Status: SHIPPED | OUTPATIENT
Start: 2020-12-01 | End: 2021-03-01 | Stop reason: SDUPTHER

## 2020-12-01 NOTE — TELEPHONE ENCOUNTER
RETURNED MRS. WILKES PHONE MESSAGE. TOLD HER SHE WOULD NEED TO CALL DR MENDEZ'S OFFICE AND AND LEFT HER THERE NUMBER.

## 2020-12-01 NOTE — PROGRESS NOTES
[facility] Pulmonary Telephone Visit    CHIEF COMPLAINT    Chronic hypoxic respiratory failure, ILD, COPD     You have chosen to receive care through a telephone visit. Do you consent to use a telephone visit for your medical care today? Yes      Subjective   HISTORY OF PRESENT ILLNESS    Vanna Rincon is a 70 y.o.female was evaluated today for routine follow up on her chronic respiratory failure.      Doing better when turning her oxygen up to 6L with activity.  At her last visit She did require 6 L with activity.  She does humidify oxygen at home.  Humidifies room during the winter.      Some issues with sinus congestion and sinus pressure and nasal congestion.  She recently only had developing infection as well.  Has been worried that she is getting a sinus infection.    Anemia stable on procrit injections and iron supplements     Overall she still complains of quite a bit of generalized fatigue and malaise as well as activity intolerance.     Patient Active Problem List   Diagnosis   • enlg LN right axilla, bx benign 3/16   • Benign essential hypertension   • Chronic obstructive pulmonary disease (CMS/HCC)   • Chronic pain syndrome   • Gastroesophageal reflux disease   • Hyperlipidemia   • Rheumatoid arthritis involving multiple sites with positive rheumatoid factor (CMS/HCC)   • Rheumatoid lung disease (CMS/HCC)   • Chronic respiratory failure with hypoxia (CMS/HCC)   • Anemia   • Anxiety   • Interstitial lung disease (CMS/HCC)   • Dependence on supplemental oxygen   • Emphysema lung (CMS/HCC)   • Essential hypertension   • Abnormal RBC indices   • Internal hemorrhoids with complication   • Bright red blood per rectum   • Melena   • Heme positive stool   • Left lower quadrant pain   • Nausea   • Dysphagia   • Constipation   • Elevated alkaline phosphatase level   • Schatzki's ring   • Hiatal hernia   • Gastritis without bleeding   • Duodenitis   • Diverticulosis of colon   • Polyp of colon   • Internal  "hemorrhoids   • Iron deficiency anemia   • Class 1 obesity due to excess calories with body mass index (BMI) of 33.0 to 33.9 in adult   • Chronic kidney disease, stage III (moderate)   • Anemia due to stage 3 chronic kidney disease treated with erythropoietin       Allergies   Allergen Reactions   • Ciprofloxacin Other (See Comments)     \"Hurt all over\"   • Doxycycline Other (See Comments)     \"makes other medication stay in my system longer\"   • Lipitor [Atorvastatin] Other (See Comments)     Hurt all over     • Sulfa Antibiotics Other (See Comments)     \"cant remember\"       Current Outpatient Medications:   •  azelastine (ASTELIN) 0.1 % nasal spray, 2 sprays into the nostril(s) as directed by provider 2 (Two) Times a Day. Use in each nostril as directed, Disp: , Rfl:   •  bisacodyl (Dulcolax) 5 MG EC tablet, Take 5 mg by mouth Daily As Needed for Constipation., Disp: , Rfl:   •  budesonide-formoterol (SYMBICORT) 160-4.5 MCG/ACT inhaler, Inhale 2 puffs 2 (Two) Times a Day., Disp: 1 inhaler, Rfl: 3  •  cephalexin (KEFLEX) 500 MG capsule, Take 1 capsule by mouth 2 (Two) Times a Day., Disp: 14 capsule, Rfl: 0  •  Etanercept (ENBREL SURECLICK) 50 MG/ML solution auto-injector, Inject  under the skin 1 (One) Time Per Week., Disp: , Rfl:   •  fentaNYL (DURAGESIC) 75 MCG/HR patch, Place 1 patch on the skin Every 72 (Seventy-Two) Hours., Disp: , Rfl:   •  gentamicin (GARAMYCIN) 0.3 % ophthalmic ointment, 3 (Three) Times a Day., Disp: , Rfl:   •  LORazepam (ATIVAN) 0.5 MG tablet, Take 1 tablet by mouth Daily As Needed for Anxiety., Disp: 30 tablet, Rfl: 1  •  Multiple Vitamins-Minerals (CENTRUM ADULTS PO), Take 1 tablet by mouth Daily., Disp: , Rfl:   •  O2 (OXYGEN), Inhale 6 L/min Continuous., Disp: , Rfl:   •  ondansetron (ZOFRAN) 8 MG tablet, Take  by mouth Every 8 (Eight) Hours As Needed for Nausea or Vomiting., Disp: , Rfl:   •  pantoprazole (PROTONIX) 40 MG EC tablet, Take 1 tablet by mouth Daily., Disp: 90 tablet, " Rfl: 3  •  promethazine-codeine (PHENERGAN with CODEINE) 6.25-10 MG/5ML syrup, Take 5 mL by mouth Every 6 (Six) Hours As Needed for Cough., Disp: , Rfl:   •  sertraline (ZOLOFT) 100 MG tablet, Take 1 tablet by mouth Daily., Disp: 90 tablet, Rfl: 3  •  simethicone (MYLICON) 125 MG chewable tablet, Chew 125 mg Every 6 (Six) Hours As Needed for Flatulence., Disp: , Rfl:   •  valsartan-hydrochlorothiazide (DIOVAN-HCT) 320-25 MG per tablet, Take 1 tablet by mouth Daily., Disp: , Rfl:   MEDICATION LIST AND ALLERGIES REVIEWED.    Social History     Tobacco Use   • Smoking status: Former Smoker     Packs/day: 1.00     Years: 25.00     Pack years: 25.00     Types: Cigarettes     Start date:      Quit date:      Years since quittin.9   • Smokeless tobacco: Never Used   Substance Use Topics   • Alcohol use: No   • Drug use: No       FAMILY AND SOCIAL HISTORY REVIEWED.    Review of Systems.  Objective       Immunization History   Administered Date(s) Administered   • Flu Vaccine Quad PF >18YRS 10/04/2016   • Fluzone High Dose =>65 Years ([employer] ONLY) 10/24/2017   • Pneumococcal Conjugate 13-Valent (PCV13) 10/01/2015   • Pneumococcal Polysaccharide (PPSV23) 10/01/2013         Physical exam unable to be completed secondary to nature visit  Appeared in no acute distress, no difficulty with conversation.    Oriented to person, place and time.   Normal respiratory effort.        RESULTS        Assessment/Plan     PROBLEM LIST    Problem List Items Addressed This Visit        Respiratory    Chronic obstructive pulmonary disease (CMS/HCC) - Primary    Overview     Description: A.  Emphysema with severe physiology on PFTs.  Patient is a former smoker.         Relevant Medications    budesonide-formoterol (SYMBICORT) 160-4.5 MCG/ACT inhaler    Chronic respiratory failure with hypoxia (CMS/HCC)    Interstitial lung disease (CMS/HCC)    Overview      · A.  History of rheumatoid lung with bronchiolitis obliterans          Relevant Medications    budesonide-formoterol (SYMBICORT) 160-4.5 MCG/ACT inhaler            DISCUSSION    At this time she seems fairly stable.  She is doing much better on the 6 L with activity.    She does continue on the Symbicort for her underlying COPD.  She has had no recent acute exacerbation or illnesses.    There is been no significant change of her underlying interstitial lung disease on her most recent high-resolution CT scan.    It does appear she has an episode of acute sinusitis.  I will call in a course of antibiotics, she has a course of steroid taper at home.  Continue on her Astelin.  I did instruct the importance of humidifying her oxygen at home especially when she is up to 6 L.    Follow up as needed over the winter.  An office appointment in 4 to 6 months.    This visit has been rescheduled as a phone visit to comply with patient safety concerns in accordance with CDC recommendations. Total time of discussion was 15 minutes.      Hope Patel, JENNY  12/01/202012:50 EST  Electronically signed     Please note that portions of this note were completed with a voice recognition program. Efforts were made to edit the dictations, but occasionally words are mistranscribed.      CC: Farhad Chowdary MD

## 2020-12-03 ENCOUNTER — APPOINTMENT (OUTPATIENT)
Dept: GENERAL RADIOLOGY | Facility: HOSPITAL | Age: 70
End: 2020-12-03

## 2020-12-22 ENCOUNTER — INFUSION (OUTPATIENT)
Dept: ONCOLOGY | Facility: HOSPITAL | Age: 70
End: 2020-12-22

## 2020-12-22 VITALS
DIASTOLIC BLOOD PRESSURE: 51 MMHG | BODY MASS INDEX: 26.55 KG/M2 | RESPIRATION RATE: 20 BRPM | SYSTOLIC BLOOD PRESSURE: 112 MMHG | WEIGHT: 162 LBS | TEMPERATURE: 98.1 F | HEART RATE: 80 BPM

## 2020-12-22 DIAGNOSIS — N18.30 ANEMIA DUE TO STAGE 3 CHRONIC KIDNEY DISEASE TREATED WITH ERYTHROPOIETIN (HCC): ICD-10-CM

## 2020-12-22 DIAGNOSIS — D63.1 ANEMIA DUE TO STAGE 3 CHRONIC KIDNEY DISEASE TREATED WITH ERYTHROPOIETIN (HCC): ICD-10-CM

## 2020-12-22 DIAGNOSIS — I10 BENIGN ESSENTIAL HYPERTENSION: Primary | ICD-10-CM

## 2020-12-22 LAB
BASOPHILS # BLD AUTO: 0.02 10*3/MM3 (ref 0–0.2)
BASOPHILS NFR BLD AUTO: 0.3 % (ref 0–1.5)
DEPRECATED RDW RBC AUTO: 45.2 FL (ref 37–54)
EOSINOPHIL # BLD AUTO: 0.13 10*3/MM3 (ref 0–0.4)
EOSINOPHIL NFR BLD AUTO: 1.6 % (ref 0.3–6.2)
ERYTHROCYTE [DISTWIDTH] IN BLOOD BY AUTOMATED COUNT: 13.9 % (ref 12.3–15.4)
HCT VFR BLD AUTO: 30.7 % (ref 34–46.6)
HGB BLD-MCNC: 9.5 G/DL (ref 12–15.9)
IMM GRANULOCYTES # BLD AUTO: 0.02 10*3/MM3 (ref 0–0.05)
IMM GRANULOCYTES NFR BLD AUTO: 0.3 % (ref 0–0.5)
LYMPHOCYTES # BLD AUTO: 1.23 10*3/MM3 (ref 0.7–3.1)
LYMPHOCYTES NFR BLD AUTO: 15.4 % (ref 19.6–45.3)
MCH RBC QN AUTO: 28.2 PG (ref 26.6–33)
MCHC RBC AUTO-ENTMCNC: 30.9 G/DL (ref 31.5–35.7)
MCV RBC AUTO: 91.1 FL (ref 79–97)
MONOCYTES # BLD AUTO: 0.57 10*3/MM3 (ref 0.1–0.9)
MONOCYTES NFR BLD AUTO: 7.1 % (ref 5–12)
NEUTROPHILS NFR BLD AUTO: 6.02 10*3/MM3 (ref 1.7–7)
NEUTROPHILS NFR BLD AUTO: 75.3 % (ref 42.7–76)
NRBC BLD AUTO-RTO: 0 /100 WBC (ref 0–0.2)
PLATELET # BLD AUTO: 197 10*3/MM3 (ref 140–450)
PMV BLD AUTO: 9.5 FL (ref 6–12)
RBC # BLD AUTO: 3.37 10*6/MM3 (ref 3.77–5.28)
WBC # BLD AUTO: 7.99 10*3/MM3 (ref 3.4–10.8)

## 2020-12-22 PROCEDURE — 36415 COLL VENOUS BLD VENIPUNCTURE: CPT

## 2020-12-22 PROCEDURE — 96372 THER/PROPH/DIAG INJ SC/IM: CPT

## 2020-12-22 PROCEDURE — 25010000002 EPOETIN ALFA PER 1000 UNITS: Performed by: NURSE PRACTITIONER

## 2020-12-22 PROCEDURE — 85025 COMPLETE CBC W/AUTO DIFF WBC: CPT

## 2020-12-22 RX ADMIN — ERYTHROPOIETIN 40000 UNITS: 40000 INJECTION, SOLUTION INTRAVENOUS; SUBCUTANEOUS at 14:24

## 2020-12-23 ENCOUNTER — APPOINTMENT (OUTPATIENT)
Dept: INFUSION THERAPY | Facility: HOSPITAL | Age: 70
End: 2020-12-23

## 2021-01-20 ENCOUNTER — HOSPITAL ENCOUNTER (OUTPATIENT)
Dept: INFUSION THERAPY | Facility: HOSPITAL | Age: 71
Discharge: HOME | End: 2021-01-20
Admitting: NURSE PRACTITIONER

## 2021-01-20 VITALS
RESPIRATION RATE: 18 BRPM | DIASTOLIC BLOOD PRESSURE: 68 MMHG | OXYGEN SATURATION: 100 % | TEMPERATURE: 98.2 F | SYSTOLIC BLOOD PRESSURE: 152 MMHG | HEART RATE: 83 BPM

## 2021-01-20 DIAGNOSIS — N18.30 ANEMIA DUE TO STAGE 3 CHRONIC KIDNEY DISEASE TREATED WITH ERYTHROPOIETIN (HCC): Primary | ICD-10-CM

## 2021-01-20 DIAGNOSIS — D63.1 ANEMIA DUE TO STAGE 3 CHRONIC KIDNEY DISEASE TREATED WITH ERYTHROPOIETIN (HCC): Primary | ICD-10-CM

## 2021-01-20 LAB
25(OH)D3 SERPL-MCNC: 52 NG/ML (ref 30–100)
ALBUMIN SERPL-MCNC: 3.8 G/DL (ref 3.5–5.2)
ANION GAP SERPL CALCULATED.3IONS-SCNC: 10.9 MMOL/L (ref 5–15)
BUN SERPL-MCNC: 28 MG/DL (ref 8–23)
BUN/CREAT SERPL: 19.6 (ref 7–25)
CALCIUM SPEC-SCNC: 8.9 MG/DL (ref 8.6–10.5)
CHLORIDE SERPL-SCNC: 99 MMOL/L (ref 98–107)
CO2 SERPL-SCNC: 29.1 MMOL/L (ref 22–29)
CREAT SERPL-MCNC: 1.43 MG/DL (ref 0.57–1)
DEPRECATED RDW RBC AUTO: 44.2 FL (ref 37–54)
ERYTHROCYTE [DISTWIDTH] IN BLOOD BY AUTOMATED COUNT: 13.7 % (ref 12.3–15.4)
GFR SERPL CREATININE-BSD FRML MDRD: 36 ML/MIN/1.73
GLUCOSE SERPL-MCNC: 130 MG/DL (ref 65–99)
HCT VFR BLD AUTO: 34.2 % (ref 34–46.6)
HGB BLD-MCNC: 10.5 G/DL (ref 12–15.9)
IRON 24H UR-MRATE: 50 MCG/DL (ref 37–145)
IRON SATN MFR SERPL: 18 % (ref 20–50)
MCH RBC QN AUTO: 27.3 PG (ref 26.6–33)
MCHC RBC AUTO-ENTMCNC: 30.7 G/DL (ref 31.5–35.7)
MCV RBC AUTO: 89.1 FL (ref 79–97)
PHOSPHATE SERPL-MCNC: 3.5 MG/DL (ref 2.5–4.5)
PLATELET # BLD AUTO: 249 10*3/MM3 (ref 140–450)
PMV BLD AUTO: 10.3 FL (ref 6–12)
POTASSIUM SERPL-SCNC: 3.8 MMOL/L (ref 3.5–5.2)
PTH-INTACT SERPL-MCNC: 37.4 PG/ML (ref 15–65)
RBC # BLD AUTO: 3.84 10*6/MM3 (ref 3.77–5.28)
SODIUM SERPL-SCNC: 139 MMOL/L (ref 136–145)
TIBC SERPL-MCNC: 276 MCG/DL (ref 298–536)
TRANSFERRIN SERPL-MCNC: 185 MG/DL (ref 200–360)
URATE SERPL-MCNC: 5.4 MG/DL (ref 2.4–5.7)
WBC # BLD AUTO: 7.03 10*3/MM3 (ref 3.4–10.8)

## 2021-01-20 PROCEDURE — 80069 RENAL FUNCTION PANEL: CPT | Performed by: INTERNAL MEDICINE

## 2021-01-20 PROCEDURE — 25010000002 EPOETIN ALFA PER 1000 UNITS: Performed by: NURSE PRACTITIONER

## 2021-01-20 PROCEDURE — 82306 VITAMIN D 25 HYDROXY: CPT | Performed by: INTERNAL MEDICINE

## 2021-01-20 PROCEDURE — 84466 ASSAY OF TRANSFERRIN: CPT | Performed by: INTERNAL MEDICINE

## 2021-01-20 PROCEDURE — 96372 THER/PROPH/DIAG INJ SC/IM: CPT

## 2021-01-20 PROCEDURE — 36415 COLL VENOUS BLD VENIPUNCTURE: CPT

## 2021-01-20 PROCEDURE — 83970 ASSAY OF PARATHORMONE: CPT | Performed by: INTERNAL MEDICINE

## 2021-01-20 PROCEDURE — 84550 ASSAY OF BLOOD/URIC ACID: CPT | Performed by: INTERNAL MEDICINE

## 2021-01-20 PROCEDURE — 83540 ASSAY OF IRON: CPT | Performed by: INTERNAL MEDICINE

## 2021-01-20 PROCEDURE — 85027 COMPLETE CBC AUTOMATED: CPT | Performed by: INTERNAL MEDICINE

## 2021-01-20 RX ADMIN — ERYTHROPOIETIN 40000 UNITS: 40000 INJECTION, SOLUTION INTRAVENOUS; SUBCUTANEOUS at 13:39

## 2021-02-12 NOTE — PROGRESS NOTES
[facility]    DATE OF VISIT: April 20, 2020    SUBJECTIVE: She is doing well denies any new complaints    REVIEW OF SYSTEMS:  A full 14-point review of systems was obtained, pertinent positives include none    PHYSICAL EXAM:  VITAL SIGNS:  Vital Signs (last 24 hrs)_____ Last Charted___________Minimum____________ Maximum____________  Temp    98.1  (APR 20 07:17) 98.1  (APR 20 07:17) 98.5  (APR 19 20:27)  Heart Rate   76  (APR 20 07:17) 76  (APR 20 07:17) 82  (APR 19 20:27)  Resp Rate       18  (APR 20 07:17) 14  (APR 19 20:27) 18  (APR 20 07:17)  SBP    100  (APR 20 07:17) 94  (APR 19 16:23) 100  (APR 20 07:17)  DBP    65  (APR 20 07:17) L 55 (APR 19 16:23) 65  (APR 20 07:17)      CONSTITUTIONAL: Patient is alert, lying comfortably in bed, nad     GASTROINTESTINAL:  Abdomen is soft, nontender, nondistended.  Normal bowel sounds.     EXTREMITIES:  No cyanosis, clubbing, or edema.    LABORATORY DATA:  All laboratory data reviewed   Labs (Last four charted values)  WBC                  7.2 (APR 20) 6.2 (APR 19) H 11.4 (APR 18)   Hgb                  11.7 (APR 20) 12.3 (APR 19) H 16.0 (APR 18)   Hct                  35 (APR 20) 38 (APR 19) H 48 (APR 18)   Plt                  153 (APR 20) 175 (APR 19) 288 (APR 18)   Na                   141 (APR 20) 140 (APR 19) L 133 (APR 18)   K                    L 2.9 (APR 20) 3.6 (APR 19) 4.5 (APR 18)   CO2                  28 (APR 20) 22 (APR 19) L 17 (APR 18)   Cl                   104 (APR 20) H 109 (APR 19) 102 (APR 18)   Cr                   0.80 (APR 20) 1.19 (APR 19) H 2.17 (APR 18)   BUN                  H 26 (APR 20) H 51 (APR 19) H 88 (APR 18)   Glucose              92 (APR 20) 85 (APR 19) H 151 (APR 18)   Mg                   L 1.5 (APR 20) 1.8 (APR 19)   Phos                 L 1.5 (APR 20) 2.6 (APR 19)   Ca                   L 7.2 (APR 20) L 7.5 (APR 19) H 10.3 (APR 18)     IMAGING: All imaging reviewed      IMPRESSION: Pain around ostomy  "[facility]    CHIEF COMPLAINT    Shortness of breath, COPD, 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    Unfortunately have not seen her since over a year ago.  She has missed several of her follow-ups.    She states she is having worsening shortness of breath.  She has been turning her oxygen up at home to as high as it up on her concentrator as well as using her portable oxygen at the same time.    She denies a worsening cough or sputum production.  She denies any fevers or chills.  No recent acute exacerbations or illnesses.  No use of antibiotics or steroids.    She does complain of episodes of worsening shortness of breath where she just \"cannot get her breath\", she denies wheezing or chest tightness during these episodes.  She has tried a rescue inhaler in the past without benefit.  She continues to use her Symbicort usually 3 times a day.    She has been having trouble with anemia and her chronic kidney disease.  She has been on iron infusions as well as Procrit.    Patient Active Problem List   Diagnosis   • enlg LN right axilla, bx benign 3/16   • Benign essential hypertension   • Chronic obstructive pulmonary disease (CMS/HCC)   • Chronic pain syndrome   • Gastroesophageal reflux disease   • Hyperlipidemia   • Rheumatoid arthritis involving multiple sites with positive rheumatoid factor (CMS/HCC)   • Rheumatoid lung disease (CMS/HCC)   • Chronic respiratory failure with hypoxia (CMS/HCC)   • Anemia   • Anxiety   • Interstitial lung disease (CMS/HCC)   • Dependence on supplemental oxygen   • Emphysema lung (CMS/HCC)   • Essential hypertension   • Abnormal RBC indices   • Internal hemorrhoids with complication   • Bright red blood per " "rectum   • Melena   • Heme positive stool   • Left lower quadrant pain   • Nausea   • Dysphagia   • Constipation   • Elevated alkaline phosphatase level   • Schatzki's ring   • Hiatal hernia   • Gastritis without bleeding   • Duodenitis   • Diverticulosis of colon   • Polyp of colon   • Internal hemorrhoids   • Iron deficiency anemia   • Class 1 obesity due to excess calories with body mass index (BMI) of 33.0 to 33.9 in adult   • Chronic kidney disease, stage III (moderate) (CMS/[facility])   • Anemia due to stage 3 chronic kidney disease treated with erythropoietin (CMS/[facility])       Allergies   Allergen Reactions   • Ciprofloxacin Other (See Comments)     \"Hurt all over\"   • Doxycycline Other (See Comments)     \"makes other medication stay in my system longer\"   • Lipitor [Atorvastatin] Other (See Comments)     Hurt all over     • Sulfa Antibiotics Other (See Comments)     \"cant remember\"       Current Outpatient Medications:   •  azelastine (ASTELIN) 0.1 % nasal spray, 2 sprays into the nostril(s) as directed by provider 2 (Two) Times a Day. Use in each nostril as directed, Disp: , Rfl:   •  bisacodyl (Dulcolax) 5 MG EC tablet, Take 5 mg by mouth Daily As Needed for Constipation., Disp: , Rfl:   •  budesonide-formoterol (SYMBICORT) 160-4.5 MCG/ACT inhaler, Inhale 2 puffs 2 (Two) Times a Day., Disp: , Rfl:   •  Etanercept (ENBREL SURECLICK) 50 MG/ML solution auto-injector, Inject  under the skin 1 (One) Time Per Week., Disp: , Rfl:   •  fentaNYL (DURAGESIC) 75 MCG/HR patch, Place 1 patch on the skin Every 72 (Seventy-Two) Hours., Disp: , Rfl:   •  gentamicin (GARAMYCIN) 0.3 % ophthalmic ointment, 3 (Three) Times a Day., Disp: , Rfl:   •  LORazepam (ATIVAN) 0.5 MG tablet, Take 1 tablet by mouth Daily As Needed for Anxiety., Disp: 30 tablet, Rfl: 1  •  Multiple Vitamins-Minerals (CENTRUM ADULTS PO), Take 1 tablet by mouth Daily., Disp: , Rfl:   •  O2 (OXYGEN), Inhale 4 L/min Continuous., Disp: , Rfl:   •  ondansetron " site, improved overall    RECOMMENDATIONS: Her situation was discussed with Dr. Kay.  There are no further GI recommendations and we will standby at this time.  Please call with any further questions or concerns.             Electronically Signed On 04.20.2020 11:12  ___________________________________________________   Margarito Masters MD     (ZOFRAN) 8 MG tablet, Take  by mouth Every 8 (Eight) Hours As Needed for Nausea or Vomiting., Disp: , Rfl:   •  pantoprazole (PROTONIX) 40 MG EC tablet, Take 1 tablet by mouth Daily., Disp: 90 tablet, Rfl: 3  •  promethazine-codeine (PHENERGAN with CODEINE) 6.25-10 MG/5ML syrup, Take 5 mL by mouth Every 6 (Six) Hours As Needed for Cough., Disp: , Rfl:   •  sertraline (ZOLOFT) 100 MG tablet, Take 1 tablet by mouth Daily., Disp: 90 tablet, Rfl: 3  •  simethicone (MYLICON) 125 MG chewable tablet, Chew 125 mg Every 6 (Six) Hours As Needed for Flatulence., Disp: , Rfl:   •  valsartan-hydrochlorothiazide (DIOVAN-HCT) 320-25 MG per tablet, Daily., Disp: , Rfl:   MEDICATION LIST AND ALLERGIES REVIEWED.    Social History     Tobacco Use   • Smoking status: Former Smoker     Packs/day: 1.00     Years: 25.00     Pack years: 25.00     Types: Cigarettes     Start date:      Last attempt to quit:      Years since quittin.6   • Smokeless tobacco: Never Used   Substance Use Topics   • Alcohol use: No   • Drug use: No       FAMILY AND SOCIAL HISTORY REVIEWED.    Review of Systems   Constitutional: Positive for activity change and fatigue. Negative for chills, fever and unexpected weight change.   HENT: Negative for congestion, nosebleeds, postnasal drip, rhinorrhea, sinus pressure and trouble swallowing.    Respiratory: Positive for shortness of breath. Negative for cough, chest tightness and wheezing.    Cardiovascular: Negative for chest pain and leg swelling.   Gastrointestinal: Negative for abdominal pain, constipation, diarrhea, nausea and vomiting.   Genitourinary: Negative for dysuria, frequency, hematuria and urgency.   Musculoskeletal: Positive for arthralgias, gait problem and joint swelling. Negative for myalgias.   Neurological: Negative for dizziness, weakness, numbness and headaches.   All other systems reviewed and are negative.  .  Objective   /78 (BP Location: Right arm, Patient Position: Sitting,  "Cuff Size: Adult)   Pulse 91   Temp 97.3 °F (36.3 °C) (Temporal)   Ht 165.1 cm (65\")   Wt 75.9 kg (167 lb 6.4 oz)   LMP  (LMP Unknown)   SpO2 98% Comment: 4 liters at rest  BMI 27.86 kg/m²     Immunization History   Administered Date(s) Administered   • Flu Vaccine Quad PF >18YRS 10/04/2016   • Fluzone High Dose =>65 Years ([employer] ONLY) 10/24/2017   • Pneumococcal Conjugate 13-Valent (PCV13) 10/01/2015   • Pneumococcal Polysaccharide (PPSV23) 10/01/2013       Physical Exam   Constitutional: She is oriented to person, place, and time. She appears well-developed and well-nourished.   HENT:   Head: Normocephalic and atraumatic.   Eyes: Pupils are equal, round, and reactive to light. EOM are normal.   Neck: Normal range of motion. Neck supple.   Cardiovascular: Normal rate and regular rhythm.   No murmur heard.  Pulmonary/Chest: Effort normal and breath sounds normal. No respiratory distress. She has no wheezes. She has no rales.   Decreased bilaterally no wheezing or rales   Abdominal: Soft. Bowel sounds are normal. She exhibits no distension.   Musculoskeletal: Normal range of motion. She exhibits no edema.   Neurological: She is alert and oriented to person, place, and time.   Skin: Skin is warm and dry. No erythema.   Psychiatric: She has a normal mood and affect. Her behavior is normal.   Vitals reviewed.        RESULTS    6-minute walk test done in the office today  Desaturation to 83% on 2 L pulse dose, switch to 3 L continuous flow.  With activity required up to 6 L to keep her 93%.  Minimal activity tolerance at 41% predicted, 600 feet.      PFTS in the office today, read by me:  Unable to complete secondary to the COVID epidemic  Refused testing      EXAMINATION: XR CHEST, PA AND LATERAL-08/31/2020:      INDICATION: COPD; J44.9-Chronic obstructive pulmonary disease,  unspecified.      COMPARISON: 05/08/2018.     FINDINGS: PA and lateral views of the chest reveal underlying chronic  and emphysematous " changes seen within the lung fields bilaterally. No  focal parenchymal opacification present. No pleural effusion or  pneumothorax. The bony structures are unremarkable. Pulmonary  vascularity is within normal limits.         IMPRESSION:  No acute cardiopulmonary disease.     D:  08/31/2020  E:  08/31/2020       Lab Results   Component Value Date    WBC 6.27 08/05/2020    HGB 9.3 (L) 08/05/2020    HCT 29.4 (L) 08/05/2020    MCV 88.0 08/05/2020     08/05/2020           Assessment/Plan     PROBLEM LIST         ICD-10-CM ICD-9-CM   1. Chronic obstructive pulmonary disease, unspecified COPD type (CMS/[facility]) J44.9 496   2. Chronic respiratory failure with hypoxia (CMS/[facility]) J96.11 518.83     799.02   3. Interstitial lung disease (CMS/[facility]) J84.9 515   4. Rheumatoid lung disease (CMS/[facility]) M05.10 714.81   5. Rheumatoid arthritis involving multiple sites with positive rheumatoid factor (CMS/[facility]) M05.79 714.0       Problem List Items Addressed This Visit        Respiratory    Chronic obstructive pulmonary disease (CMS/[facility]) - Primary    Overview     Description: A.  Emphysema with severe physiology on PFTs.  Patient is a former smoker.         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 Adequate oxygenation at rest but desaturates with walking after three minutes. She will be less than 90%.  PFT: Vital capacity is 1.83 or 51%, FEV1 is 0.7 or 25%, ratio is 38%. Minimal response to bronchodilation consistent with severe obstruction and restriction         Chronic respiratory failure with hypoxia (CMS/[facility])    Interstitial lung disease (CMS/[facility])    Overview      · A.  History of rheumatoid lung with bronchiolitis obliterans            Musculoskeletal and Integument    Rheumatoid arthritis involving multiple sites with positive rheumatoid factor (CMS/[facility])    Overview     Description: A.  Diagnosed in 2001 with history of methotrexate and Enbrel  therapy.                 DISCUSSION    We did discuss that her shortness of breath with activity is multifactorial.  She does have underlying emphysema and interstitial lung disease related to her rheumatoid arthritis.  However she is also become quite anemic which would cause of shortness of breath with activity as well.    Her 6-minute walk test did indicate that she requires 6 L continuous flow with activity to keep her greater than 88%.  She did tolerate 3 L at rest or minimal activity.  I did encourage her to buy oxygen sensor to use at home to help her understand when her oxygen levels are appropriate and her shortness of breath is related to her other comorbidities and deconditioning.    With her worsening shortness of breath I would like to follow-up on high-resolution CT scan given her underlying interstitial lung disease to see if it has progressed.  At this time she is not following up with rheumatology but plans on after the first a year.  She continues to be on Enbrel.    We did discuss using her Symbicort no more than 2 puffs twice daily.  If she needs an additional inhaled corticosteroid I will give her a sample of Arnuity today in the office and can cause some benefits beneficial.  I did remind her to make sure she rinses her mouth brushes her teeth after every inhaled corticosteroid use..    Continue to follow-up with nephrology with her anemia.    Follow up in 3 months.     I spent 25 minutes face to face with the patient. I spent > 50% percent of this time counseling and discussing diagnostic testing, evaluation, current status, treatment options and management.    Hope Patel, APRN  08/31/20208:50 AM  Electronically signed     Please note that portions of this note were completed with a voice recognition program. Efforts were made to edit the dictations, but occasionally words are mistranscribed.      CC: Farhad Chowdary MD

## 2021-02-16 RX ORDER — SODIUM CHLORIDE 9 MG/ML
250 INJECTION, SOLUTION INTRAVENOUS CONTINUOUS PRN
Status: CANCELLED | OUTPATIENT
Start: 2021-02-17

## 2021-02-17 ENCOUNTER — HOSPITAL ENCOUNTER (OUTPATIENT)
Dept: INFUSION THERAPY | Facility: HOSPITAL | Age: 71
Setting detail: INFUSION SERIES
End: 2021-02-17

## 2021-02-25 ENCOUNTER — HOSPITAL ENCOUNTER (OUTPATIENT)
Dept: INFUSION THERAPY | Facility: HOSPITAL | Age: 71
Discharge: HOME | End: 2021-02-25
Admitting: NURSE PRACTITIONER

## 2021-02-25 VITALS
OXYGEN SATURATION: 100 % | BODY MASS INDEX: 26.66 KG/M2 | SYSTOLIC BLOOD PRESSURE: 140 MMHG | RESPIRATION RATE: 18 BRPM | WEIGHT: 160 LBS | DIASTOLIC BLOOD PRESSURE: 48 MMHG | TEMPERATURE: 98.7 F | HEART RATE: 72 BPM | HEIGHT: 65 IN

## 2021-02-25 DIAGNOSIS — N18.30 STAGE 3 CHRONIC KIDNEY DISEASE, UNSPECIFIED WHETHER STAGE 3A OR 3B CKD (HCC): Primary | ICD-10-CM

## 2021-02-25 DIAGNOSIS — N18.30 ANEMIA DUE TO STAGE 3 CHRONIC KIDNEY DISEASE TREATED WITH ERYTHROPOIETIN (HCC): ICD-10-CM

## 2021-02-25 DIAGNOSIS — D63.1 ANEMIA DUE TO STAGE 3 CHRONIC KIDNEY DISEASE TREATED WITH ERYTHROPOIETIN (HCC): ICD-10-CM

## 2021-02-25 DIAGNOSIS — D50.9 IRON DEFICIENCY ANEMIA, UNSPECIFIED IRON DEFICIENCY ANEMIA TYPE: ICD-10-CM

## 2021-02-25 LAB
DEPRECATED RDW RBC AUTO: 44.7 FL (ref 37–54)
ERYTHROCYTE [DISTWIDTH] IN BLOOD BY AUTOMATED COUNT: 13.7 % (ref 12.3–15.4)
HCT VFR BLD AUTO: 32.6 % (ref 34–46.6)
HGB BLD-MCNC: 9.8 G/DL (ref 12–15.9)
MCH RBC QN AUTO: 26.8 PG (ref 26.6–33)
MCHC RBC AUTO-ENTMCNC: 30.1 G/DL (ref 31.5–35.7)
MCV RBC AUTO: 89.1 FL (ref 79–97)
PLATELET # BLD AUTO: 184 10*3/MM3 (ref 140–450)
PMV BLD AUTO: 10 FL (ref 6–12)
RBC # BLD AUTO: 3.66 10*6/MM3 (ref 3.77–5.28)
WBC # BLD AUTO: 6.86 10*3/MM3 (ref 3.4–10.8)

## 2021-02-25 PROCEDURE — 96372 THER/PROPH/DIAG INJ SC/IM: CPT

## 2021-02-25 PROCEDURE — 96360 HYDRATION IV INFUSION INIT: CPT

## 2021-02-25 PROCEDURE — 25010000002 FERRIC CARBOXYMALTOSE 750 MG/15ML SOLUTION 15 ML VIAL: Performed by: NURSE PRACTITIONER

## 2021-02-25 PROCEDURE — 25010000002 EPOETIN ALFA PER 1000 UNITS: Performed by: INTERNAL MEDICINE

## 2021-02-25 PROCEDURE — 96361 HYDRATE IV INFUSION ADD-ON: CPT

## 2021-02-25 PROCEDURE — 85027 COMPLETE CBC AUTOMATED: CPT | Performed by: NURSE PRACTITIONER

## 2021-02-25 PROCEDURE — 96365 THER/PROPH/DIAG IV INF INIT: CPT

## 2021-02-25 RX ORDER — SODIUM CHLORIDE 9 MG/ML
250 INJECTION, SOLUTION INTRAVENOUS CONTINUOUS PRN
Status: CANCELLED | OUTPATIENT
Start: 2021-03-04

## 2021-02-25 RX ORDER — SODIUM CHLORIDE 9 MG/ML
250 INJECTION, SOLUTION INTRAVENOUS CONTINUOUS PRN
Status: DISCONTINUED | OUTPATIENT
Start: 2021-02-25 | End: 2021-02-27 | Stop reason: HOSPADM

## 2021-02-25 RX ADMIN — FERRIC CARBOXYMALTOSE INJECTION 750 MG: 50 INJECTION, SOLUTION INTRAVENOUS at 14:13

## 2021-02-25 RX ADMIN — SODIUM CHLORIDE 250 ML: 9 INJECTION, SOLUTION INTRAVENOUS at 14:39

## 2021-02-25 RX ADMIN — ERYTHROPOIETIN 40000 UNITS: 40000 INJECTION, SOLUTION INTRAVENOUS; SUBCUTANEOUS at 14:30

## 2021-03-01 ENCOUNTER — OFFICE VISIT (OUTPATIENT)
Dept: PULMONOLOGY | Facility: CLINIC | Age: 71
End: 2021-03-01

## 2021-03-01 VITALS
WEIGHT: 161.4 LBS | HEART RATE: 63 BPM | DIASTOLIC BLOOD PRESSURE: 80 MMHG | SYSTOLIC BLOOD PRESSURE: 120 MMHG | TEMPERATURE: 97 F | OXYGEN SATURATION: 100 % | HEIGHT: 65 IN | BODY MASS INDEX: 26.89 KG/M2

## 2021-03-01 DIAGNOSIS — J44.9 CHRONIC OBSTRUCTIVE PULMONARY DISEASE, UNSPECIFIED COPD TYPE (HCC): Primary | ICD-10-CM

## 2021-03-01 DIAGNOSIS — J84.9 INTERSTITIAL LUNG DISEASE (HCC): ICD-10-CM

## 2021-03-01 DIAGNOSIS — J96.11 CHRONIC RESPIRATORY FAILURE WITH HYPOXIA (HCC): ICD-10-CM

## 2021-03-01 PROCEDURE — 99213 OFFICE O/P EST LOW 20 MIN: CPT | Performed by: NURSE PRACTITIONER

## 2021-03-01 RX ORDER — BUDESONIDE AND FORMOTEROL FUMARATE DIHYDRATE 160; 4.5 UG/1; UG/1
2 AEROSOL RESPIRATORY (INHALATION)
Qty: 3 EACH | Refills: 3 | Status: SHIPPED | OUTPATIENT
Start: 2021-03-01 | End: 2021-11-09 | Stop reason: SDUPTHER

## 2021-03-01 NOTE — PROGRESS NOTES
"[facility] Pulmonary Follow up      Chief Complaint  COPD, Emphysema, and Shortness of Breath    Subjective          Vanna Rincon presents to [facility] for routine follow-up.  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    Currently she is on 6 L of oxygen with activity and 4 L at rest.  She does have a concentrator at home that is up to 10 L.  She uses humidification at home due to her high flow.    She is having quite a bit of nasal dryness and congestion with occasional epistaxis.  She is following up with ENT next week for evaluation.    She denies a worsening cough or chest congestion.  She continues to use her Symbicort twice daily.    She does feel like overall she is probably slowly declining in the last couple years.  She finds it a little harder to catch her breath with activity.    Objective     Vital Signs:   /80   Pulse 63   Temp 97 °F (36.1 °C)   Ht 165.1 cm (65\")   Wt 73.2 kg (161 lb 6.4 oz)   SpO2 100% Comment: 6 liters  BMI 26.86 kg/m²       Immunization History   Administered Date(s) Administered   • Flu Vaccine Quad PF >18YRS 10/04/2016   • Fluzone High Dose =>65 Years ([employer] ONLY) 10/24/2017   • Pneumococcal Conjugate 13-Valent (PCV13) 10/01/2015   • Pneumococcal Polysaccharide (PPSV23) 10/01/2013       Physical Exam  Vitals signs reviewed.   Constitutional:       Appearance: She is well-developed.   HENT:      Head: Normocephalic and atraumatic.   Eyes:      Pupils: Pupils are equal, round, and reactive to light.   Neck:      Musculoskeletal: Normal range of motion and neck supple.   Cardiovascular:      Rate and Rhythm: Normal rate and regular rhythm.      Heart sounds: No murmur.   Pulmonary:      Effort: Pulmonary effort is normal. No respiratory distress.      Breath sounds: Rales present. No wheezing.      " Comments: Very decreased bilaterally   Abdominal:      General: Bowel sounds are normal. There is no distension.      Palpations: Abdomen is soft.   Musculoskeletal: Normal range of motion.   Skin:     General: Skin is warm and dry.      Findings: No erythema.   Neurological:      Mental Status: She is alert and oriented to person, place, and time.   Psychiatric:         Behavior: Behavior normal.          Result Review :       Data reviewed: Radiologic studies CT scan of the chest                  Assessment and Plan    Problem List Items Addressed This Visit        Pulmonary and Pneumonias    Chronic obstructive pulmonary disease (CMS/HCC) - Primary    Overview     Description: A.  Emphysema with severe physiology on PFTs.  Patient is a former smoker.         Relevant Medications    budesonide-formoterol (SYMBICORT) 160-4.5 MCG/ACT inhaler    Chronic respiratory failure with hypoxia (CMS/HCC)    Interstitial lung disease (CMS/HCC)    Overview      · A.  History of rheumatoid lung with bronchiolitis obliterans         Relevant Medications    budesonide-formoterol (SYMBICORT) 160-4.5 MCG/ACT inhaler          I encouraged her to continue with activity daily to help her overall deconditioning.    Her most recent CT scan of the chest showed no significant changes.  She does have a severe COPD on her last PFTs in 2018.  She is very intolerant to PFTs.    Continue on her oxygen 4 L at rest and 6 L with activity.  I encouraged her to make sure she keeps humidifying her oxygen at home secondary to her nasal congestion issues.  Follow-up with ENT next week for further recommendations.    It is hard for her to get out to appointments due to her respiratory failure and deconditioning.  She would like to do a telehealth visit on her next follow-up in 3 to 4 months.      I spent 25 minutes caring for Vanna on this date of service. This time includes time spent by me in the following activities:preparing for the visit,  reviewing tests, obtaining and/or reviewing a separately obtained history, performing a medically appropriate examination and/or evaluation , counseling and educating the patient/family/caregiver, referring and communicating with other health care professionals  and documenting information in the medical record     Follow Up     Return in about 3 months (around 6/1/2021) for Video visit.  Patient was given instructions and counseling regarding her condition or for health maintenance advice. Please see specific information pulled into the AVS if appropriate.     JENNY Aguilar, ACNP  [facility]  Electronically signed

## 2021-03-11 ENCOUNTER — HOSPITAL ENCOUNTER (OUTPATIENT)
Dept: INFUSION THERAPY | Facility: HOSPITAL | Age: 71
Discharge: HOME | End: 2021-03-11
Admitting: NURSE PRACTITIONER

## 2021-03-11 VITALS
TEMPERATURE: 97.8 F | OXYGEN SATURATION: 97 % | HEART RATE: 80 BPM | RESPIRATION RATE: 18 BRPM | DIASTOLIC BLOOD PRESSURE: 93 MMHG | HEIGHT: 65 IN | SYSTOLIC BLOOD PRESSURE: 125 MMHG | BODY MASS INDEX: 26.66 KG/M2 | WEIGHT: 160 LBS

## 2021-03-11 DIAGNOSIS — N18.30 STAGE 3 CHRONIC KIDNEY DISEASE, UNSPECIFIED WHETHER STAGE 3A OR 3B CKD (HCC): Primary | ICD-10-CM

## 2021-03-11 DIAGNOSIS — D50.9 IRON DEFICIENCY ANEMIA, UNSPECIFIED IRON DEFICIENCY ANEMIA TYPE: ICD-10-CM

## 2021-03-11 PROCEDURE — 25010000002 FERRIC CARBOXYMALTOSE 750 MG/15ML SOLUTION 15 ML VIAL: Performed by: NURSE PRACTITIONER

## 2021-03-11 PROCEDURE — 96360 HYDRATION IV INFUSION INIT: CPT

## 2021-03-11 PROCEDURE — 96374 THER/PROPH/DIAG INJ IV PUSH: CPT

## 2021-03-11 RX ORDER — SODIUM CHLORIDE 9 MG/ML
250 INJECTION, SOLUTION INTRAVENOUS CONTINUOUS PRN
Status: CANCELLED | OUTPATIENT
Start: 2021-03-11

## 2021-03-11 RX ORDER — SODIUM CHLORIDE 9 MG/ML
250 INJECTION, SOLUTION INTRAVENOUS CONTINUOUS PRN
Status: DISCONTINUED | OUTPATIENT
Start: 2021-03-11 | End: 2021-03-13 | Stop reason: HOSPADM

## 2021-03-11 RX ADMIN — FERRIC CARBOXYMALTOSE INJECTION 750 MG: 50 INJECTION, SOLUTION INTRAVENOUS at 13:36

## 2021-03-11 RX ADMIN — SODIUM CHLORIDE 250 ML: 9 INJECTION, SOLUTION INTRAVENOUS at 13:39

## 2021-03-17 ENCOUNTER — HOSPITAL ENCOUNTER (OUTPATIENT)
Dept: INFUSION THERAPY | Facility: HOSPITAL | Age: 71
Setting detail: INFUSION SERIES
Discharge: HOME | End: 2021-03-17

## 2021-03-17 VITALS
RESPIRATION RATE: 18 BRPM | OXYGEN SATURATION: 99 % | TEMPERATURE: 98.2 F | DIASTOLIC BLOOD PRESSURE: 74 MMHG | SYSTOLIC BLOOD PRESSURE: 162 MMHG | HEART RATE: 82 BPM

## 2021-03-17 DIAGNOSIS — D63.1 ANEMIA DUE TO STAGE 3 CHRONIC KIDNEY DISEASE TREATED WITH ERYTHROPOIETIN (HCC): Primary | ICD-10-CM

## 2021-03-17 DIAGNOSIS — N18.30 ANEMIA DUE TO STAGE 3 CHRONIC KIDNEY DISEASE TREATED WITH ERYTHROPOIETIN (HCC): Primary | ICD-10-CM

## 2021-03-17 LAB
DEPRECATED RDW RBC AUTO: 50.8 FL (ref 37–54)
ERYTHROCYTE [DISTWIDTH] IN BLOOD BY AUTOMATED COUNT: 15.6 % (ref 12.3–15.4)
HCT VFR BLD AUTO: 38.8 % (ref 34–46.6)
HGB BLD-MCNC: 11.5 G/DL (ref 12–15.9)
MCH RBC QN AUTO: 27.1 PG (ref 26.6–33)
MCHC RBC AUTO-ENTMCNC: 29.6 G/DL (ref 31.5–35.7)
MCV RBC AUTO: 91.3 FL (ref 79–97)
PLATELET # BLD AUTO: 250 10*3/MM3 (ref 140–450)
PMV BLD AUTO: 10.5 FL (ref 6–12)
RBC # BLD AUTO: 4.25 10*6/MM3 (ref 3.77–5.28)
WBC # BLD AUTO: 8.37 10*3/MM3 (ref 3.4–10.8)

## 2021-03-17 PROCEDURE — 85027 COMPLETE CBC AUTOMATED: CPT | Performed by: INTERNAL MEDICINE

## 2021-03-17 PROCEDURE — 36415 COLL VENOUS BLD VENIPUNCTURE: CPT

## 2021-04-14 ENCOUNTER — HOSPITAL ENCOUNTER (OUTPATIENT)
Dept: INFUSION THERAPY | Facility: HOSPITAL | Age: 71
Discharge: HOME | End: 2021-04-14
Admitting: INTERNAL MEDICINE

## 2021-04-14 VITALS
TEMPERATURE: 97.3 F | RESPIRATION RATE: 18 BRPM | DIASTOLIC BLOOD PRESSURE: 54 MMHG | SYSTOLIC BLOOD PRESSURE: 130 MMHG | HEART RATE: 83 BPM | OXYGEN SATURATION: 100 %

## 2021-04-14 DIAGNOSIS — D63.1 ANEMIA DUE TO STAGE 3 CHRONIC KIDNEY DISEASE TREATED WITH ERYTHROPOIETIN (HCC): Primary | ICD-10-CM

## 2021-04-14 DIAGNOSIS — N18.30 ANEMIA DUE TO STAGE 3 CHRONIC KIDNEY DISEASE TREATED WITH ERYTHROPOIETIN (HCC): Primary | ICD-10-CM

## 2021-04-14 LAB
ALBUMIN SERPL-MCNC: 4.1 G/DL (ref 3.5–5.2)
ANION GAP SERPL CALCULATED.3IONS-SCNC: 5.1 MMOL/L (ref 5–15)
BUN SERPL-MCNC: 31 MG/DL (ref 8–23)
BUN/CREAT SERPL: 17.5 (ref 7–25)
CALCIUM SPEC-SCNC: 9 MG/DL (ref 8.6–10.5)
CHLORIDE SERPL-SCNC: 98 MMOL/L (ref 98–107)
CO2 SERPL-SCNC: 36.9 MMOL/L (ref 22–29)
CREAT SERPL-MCNC: 1.77 MG/DL (ref 0.57–1)
DEPRECATED RDW RBC AUTO: 50.6 FL (ref 37–54)
ERYTHROCYTE [DISTWIDTH] IN BLOOD BY AUTOMATED COUNT: 15 % (ref 12.3–15.4)
GFR SERPL CREATININE-BSD FRML MDRD: 29 ML/MIN/1.73
GLUCOSE SERPL-MCNC: 126 MG/DL (ref 65–99)
HCT VFR BLD AUTO: 33.5 % (ref 34–46.6)
HGB BLD-MCNC: 10.1 G/DL (ref 12–15.9)
IRON 24H UR-MRATE: 47 MCG/DL (ref 37–145)
IRON SATN MFR SERPL: 20 % (ref 20–50)
MCH RBC QN AUTO: 27.7 PG (ref 26.6–33)
MCHC RBC AUTO-ENTMCNC: 30.1 G/DL (ref 31.5–35.7)
MCV RBC AUTO: 92 FL (ref 79–97)
PHOSPHATE SERPL-MCNC: 3.7 MG/DL (ref 2.5–4.5)
PLATELET # BLD AUTO: 182 10*3/MM3 (ref 140–450)
PMV BLD AUTO: 10.2 FL (ref 6–12)
POTASSIUM SERPL-SCNC: 4.6 MMOL/L (ref 3.5–5.2)
RBC # BLD AUTO: 3.64 10*6/MM3 (ref 3.77–5.28)
SODIUM SERPL-SCNC: 140 MMOL/L (ref 136–145)
TIBC SERPL-MCNC: 237 MCG/DL (ref 298–536)
TRANSFERRIN SERPL-MCNC: 159 MG/DL (ref 200–360)
URATE SERPL-MCNC: 6.3 MG/DL (ref 2.4–5.7)
WBC # BLD AUTO: 7.86 10*3/MM3 (ref 3.4–10.8)

## 2021-04-14 PROCEDURE — 84550 ASSAY OF BLOOD/URIC ACID: CPT | Performed by: NURSE PRACTITIONER

## 2021-04-14 PROCEDURE — 83540 ASSAY OF IRON: CPT | Performed by: NURSE PRACTITIONER

## 2021-04-14 PROCEDURE — 96372 THER/PROPH/DIAG INJ SC/IM: CPT

## 2021-04-14 PROCEDURE — 36415 COLL VENOUS BLD VENIPUNCTURE: CPT

## 2021-04-14 PROCEDURE — 84466 ASSAY OF TRANSFERRIN: CPT | Performed by: NURSE PRACTITIONER

## 2021-04-14 PROCEDURE — 85027 COMPLETE CBC AUTOMATED: CPT | Performed by: NURSE PRACTITIONER

## 2021-04-14 PROCEDURE — 80069 RENAL FUNCTION PANEL: CPT | Performed by: NURSE PRACTITIONER

## 2021-04-14 PROCEDURE — 25010000002 EPOETIN ALFA PER 1000 UNITS: Performed by: INTERNAL MEDICINE

## 2021-04-14 RX ADMIN — ERYTHROPOIETIN 40000 UNITS: 40000 INJECTION, SOLUTION INTRAVENOUS; SUBCUTANEOUS at 13:35

## 2021-04-23 ENCOUNTER — TRANSCRIBE ORDERS (OUTPATIENT)
Dept: LAB | Facility: HOSPITAL | Age: 71
End: 2021-04-23

## 2021-04-23 ENCOUNTER — LAB (OUTPATIENT)
Dept: LAB | Facility: HOSPITAL | Age: 71
End: 2021-04-23

## 2021-04-23 DIAGNOSIS — I13.10 MALIGNANT HYPERTENSIVE HEART AND CHRONIC KIDNEY DISEASE STAGE III (HCC): ICD-10-CM

## 2021-04-23 DIAGNOSIS — N18.30 MALIGNANT HYPERTENSIVE HEART AND CHRONIC KIDNEY DISEASE STAGE III (HCC): ICD-10-CM

## 2021-04-23 DIAGNOSIS — N18.30 MALIGNANT HYPERTENSIVE HEART AND CHRONIC KIDNEY DISEASE STAGE III (HCC): Primary | ICD-10-CM

## 2021-04-23 DIAGNOSIS — I13.10 MALIGNANT HYPERTENSIVE HEART AND CHRONIC KIDNEY DISEASE STAGE III (HCC): Primary | ICD-10-CM

## 2021-04-23 LAB
ALBUMIN SERPL-MCNC: 3.9 G/DL (ref 3.5–5.2)
ANION GAP SERPL CALCULATED.3IONS-SCNC: 10.1 MMOL/L (ref 5–15)
BUN SERPL-MCNC: 25 MG/DL (ref 8–23)
BUN/CREAT SERPL: 18 (ref 7–25)
CALCIUM SPEC-SCNC: 8.8 MG/DL (ref 8.6–10.5)
CHLORIDE SERPL-SCNC: 100 MMOL/L (ref 98–107)
CO2 SERPL-SCNC: 32.9 MMOL/L (ref 22–29)
CREAT SERPL-MCNC: 1.39 MG/DL (ref 0.57–1)
GFR SERPL CREATININE-BSD FRML MDRD: 38 ML/MIN/1.73
GLUCOSE SERPL-MCNC: 109 MG/DL (ref 65–99)
PHOSPHATE SERPL-MCNC: 5.7 MG/DL (ref 2.5–4.5)
POTASSIUM SERPL-SCNC: 5 MMOL/L (ref 3.5–5.2)
SODIUM SERPL-SCNC: 143 MMOL/L (ref 136–145)
URATE SERPL-MCNC: 6.5 MG/DL (ref 2.4–5.7)

## 2021-04-23 PROCEDURE — 36415 COLL VENOUS BLD VENIPUNCTURE: CPT

## 2021-04-23 PROCEDURE — 80069 RENAL FUNCTION PANEL: CPT

## 2021-04-23 PROCEDURE — 84550 ASSAY OF BLOOD/URIC ACID: CPT

## 2021-05-05 ENCOUNTER — TELEPHONE (OUTPATIENT)
Dept: GASTROENTEROLOGY | Facility: CLINIC | Age: 71
End: 2021-05-05

## 2021-05-05 NOTE — TELEPHONE ENCOUNTER
I TRIED TO CALL [facility] TO REMIND TO SCHEDULE UPPER GI. PLEASE CALL CENTRAL SCHEDULING [phone]. NO ANSWER;L EFT VOICE MESSAGE.

## 2021-05-12 ENCOUNTER — APPOINTMENT (OUTPATIENT)
Dept: INFUSION THERAPY | Facility: HOSPITAL | Age: 71
End: 2021-05-12

## 2021-05-13 ENCOUNTER — APPOINTMENT (OUTPATIENT)
Dept: INFUSION THERAPY | Facility: HOSPITAL | Age: 71
End: 2021-05-13

## 2021-05-17 ENCOUNTER — HOSPITAL ENCOUNTER (OUTPATIENT)
Dept: INFUSION THERAPY | Facility: HOSPITAL | Age: 71
Discharge: HOME | End: 2021-05-17
Admitting: NURSE PRACTITIONER

## 2021-05-17 VITALS
DIASTOLIC BLOOD PRESSURE: 51 MMHG | SYSTOLIC BLOOD PRESSURE: 105 MMHG | RESPIRATION RATE: 16 BRPM | HEART RATE: 77 BPM | TEMPERATURE: 98 F | OXYGEN SATURATION: 100 %

## 2021-05-17 DIAGNOSIS — N18.30 ANEMIA DUE TO STAGE 3 CHRONIC KIDNEY DISEASE TREATED WITH ERYTHROPOIETIN (HCC): Primary | ICD-10-CM

## 2021-05-17 DIAGNOSIS — D63.1 ANEMIA DUE TO STAGE 3 CHRONIC KIDNEY DISEASE TREATED WITH ERYTHROPOIETIN (HCC): Primary | ICD-10-CM

## 2021-05-17 LAB
ALBUMIN SERPL-MCNC: 3.8 G/DL (ref 3.5–5.2)
ANION GAP SERPL CALCULATED.3IONS-SCNC: 10.1 MMOL/L (ref 5–15)
BUN SERPL-MCNC: 30 MG/DL (ref 8–23)
BUN/CREAT SERPL: 15 (ref 7–25)
CALCIUM SPEC-SCNC: 9.1 MG/DL (ref 8.6–10.5)
CHLORIDE SERPL-SCNC: 99 MMOL/L (ref 98–107)
CO2 SERPL-SCNC: 32.9 MMOL/L (ref 22–29)
CREAT SERPL-MCNC: 2 MG/DL (ref 0.57–1)
DEPRECATED RDW RBC AUTO: 48.4 FL (ref 37–54)
ERYTHROCYTE [DISTWIDTH] IN BLOOD BY AUTOMATED COUNT: 14.3 % (ref 12.3–15.4)
GFR SERPL CREATININE-BSD FRML MDRD: 25 ML/MIN/1.73
GLUCOSE SERPL-MCNC: 120 MG/DL (ref 65–99)
HCT VFR BLD AUTO: 32.7 % (ref 34–46.6)
HGB BLD-MCNC: 10.2 G/DL (ref 12–15.9)
IRON 24H UR-MRATE: 37 MCG/DL (ref 37–145)
IRON SATN MFR SERPL: 15 % (ref 20–50)
MCH RBC QN AUTO: 28.8 PG (ref 26.6–33)
MCHC RBC AUTO-ENTMCNC: 31.2 G/DL (ref 31.5–35.7)
MCV RBC AUTO: 92.4 FL (ref 79–97)
PHOSPHATE SERPL-MCNC: 5.4 MG/DL (ref 2.5–4.5)
PLATELET # BLD AUTO: 194 10*3/MM3 (ref 140–450)
PMV BLD AUTO: 10 FL (ref 6–12)
POTASSIUM SERPL-SCNC: 3.9 MMOL/L (ref 3.5–5.2)
RBC # BLD AUTO: 3.54 10*6/MM3 (ref 3.77–5.28)
SODIUM SERPL-SCNC: 142 MMOL/L (ref 136–145)
TIBC SERPL-MCNC: 240 MCG/DL (ref 298–536)
TRANSFERRIN SERPL-MCNC: 161 MG/DL (ref 200–360)
URATE SERPL-MCNC: 5.6 MG/DL (ref 2.4–5.7)
WBC # BLD AUTO: 7.49 10*3/MM3 (ref 3.4–10.8)

## 2021-05-17 PROCEDURE — 25010000002 EPOETIN ALFA PER 1000 UNITS: Performed by: INTERNAL MEDICINE

## 2021-05-17 PROCEDURE — 84466 ASSAY OF TRANSFERRIN: CPT | Performed by: NURSE PRACTITIONER

## 2021-05-17 PROCEDURE — 83540 ASSAY OF IRON: CPT | Performed by: NURSE PRACTITIONER

## 2021-05-17 PROCEDURE — 84550 ASSAY OF BLOOD/URIC ACID: CPT | Performed by: NURSE PRACTITIONER

## 2021-05-17 PROCEDURE — 80069 RENAL FUNCTION PANEL: CPT | Performed by: NURSE PRACTITIONER

## 2021-05-17 PROCEDURE — 96372 THER/PROPH/DIAG INJ SC/IM: CPT

## 2021-05-17 PROCEDURE — 85027 COMPLETE CBC AUTOMATED: CPT | Performed by: NURSE PRACTITIONER

## 2021-05-17 PROCEDURE — 36415 COLL VENOUS BLD VENIPUNCTURE: CPT

## 2021-05-17 RX ADMIN — ERYTHROPOIETIN 40000 UNITS: 40000 INJECTION, SOLUTION INTRAVENOUS; SUBCUTANEOUS at 15:05

## 2021-06-07 DIAGNOSIS — D63.1 ANEMIA DUE TO STAGE 3 CHRONIC KIDNEY DISEASE TREATED WITH ERYTHROPOIETIN (HCC): Primary | ICD-10-CM

## 2021-06-07 DIAGNOSIS — N18.30 ANEMIA DUE TO STAGE 3 CHRONIC KIDNEY DISEASE TREATED WITH ERYTHROPOIETIN (HCC): Primary | ICD-10-CM

## 2021-06-14 ENCOUNTER — HOSPITAL ENCOUNTER (OUTPATIENT)
Dept: INFUSION THERAPY | Facility: HOSPITAL | Age: 71
Discharge: HOME | End: 2021-06-14
Admitting: NURSE PRACTITIONER

## 2021-06-14 VITALS
SYSTOLIC BLOOD PRESSURE: 132 MMHG | HEART RATE: 82 BPM | OXYGEN SATURATION: 100 % | DIASTOLIC BLOOD PRESSURE: 66 MMHG | WEIGHT: 156.6 LBS | TEMPERATURE: 98.2 F | BODY MASS INDEX: 26.06 KG/M2 | RESPIRATION RATE: 16 BRPM

## 2021-06-14 DIAGNOSIS — N18.30 ANEMIA DUE TO STAGE 3 CHRONIC KIDNEY DISEASE TREATED WITH ERYTHROPOIETIN (HCC): Primary | ICD-10-CM

## 2021-06-14 DIAGNOSIS — D63.1 ANEMIA DUE TO STAGE 3 CHRONIC KIDNEY DISEASE TREATED WITH ERYTHROPOIETIN (HCC): Primary | ICD-10-CM

## 2021-06-14 LAB
ALBUMIN SERPL-MCNC: 4.1 G/DL (ref 3.5–5.2)
ANION GAP SERPL CALCULATED.3IONS-SCNC: 7.6 MMOL/L (ref 5–15)
BUN SERPL-MCNC: 23 MG/DL (ref 8–23)
BUN/CREAT SERPL: 20.9 (ref 7–25)
CALCIUM SPEC-SCNC: 9.8 MG/DL (ref 8.6–10.5)
CHLORIDE SERPL-SCNC: 98 MMOL/L (ref 98–107)
CO2 SERPL-SCNC: 32.4 MMOL/L (ref 22–29)
CREAT SERPL-MCNC: 1.1 MG/DL (ref 0.57–1)
DEPRECATED RDW RBC AUTO: 46.5 FL (ref 37–54)
ERYTHROCYTE [DISTWIDTH] IN BLOOD BY AUTOMATED COUNT: 13.8 % (ref 12.3–15.4)
GFR SERPL CREATININE-BSD FRML MDRD: 49 ML/MIN/1.73
GLUCOSE SERPL-MCNC: 103 MG/DL (ref 65–99)
HCT VFR BLD AUTO: 36.2 % (ref 34–46.6)
HGB BLD-MCNC: 11.2 G/DL (ref 12–15.9)
MCH RBC QN AUTO: 28.6 PG (ref 26.6–33)
MCHC RBC AUTO-ENTMCNC: 30.9 G/DL (ref 31.5–35.7)
MCV RBC AUTO: 92.3 FL (ref 79–97)
PHOSPHATE SERPL-MCNC: 3.9 MG/DL (ref 2.5–4.5)
PLATELET # BLD AUTO: 202 10*3/MM3 (ref 140–450)
PMV BLD AUTO: 10.2 FL (ref 6–12)
POTASSIUM SERPL-SCNC: 4 MMOL/L (ref 3.5–5.2)
RBC # BLD AUTO: 3.92 10*6/MM3 (ref 3.77–5.28)
SODIUM SERPL-SCNC: 138 MMOL/L (ref 136–145)
URATE SERPL-MCNC: 5.4 MG/DL (ref 2.4–5.7)
WBC # BLD AUTO: 4.98 10*3/MM3 (ref 3.4–10.8)

## 2021-06-14 PROCEDURE — G0463 HOSPITAL OUTPT CLINIC VISIT: HCPCS

## 2021-06-14 PROCEDURE — 84550 ASSAY OF BLOOD/URIC ACID: CPT | Performed by: NURSE PRACTITIONER

## 2021-06-14 PROCEDURE — 85027 COMPLETE CBC AUTOMATED: CPT | Performed by: NURSE PRACTITIONER

## 2021-06-14 PROCEDURE — 36415 COLL VENOUS BLD VENIPUNCTURE: CPT

## 2021-06-14 PROCEDURE — 80069 RENAL FUNCTION PANEL: CPT | Performed by: NURSE PRACTITIONER

## 2021-06-14 RX ORDER — HYDROCODONE BITARTRATE AND ACETAMINOPHEN 5; 325 MG/1; MG/1
1 TABLET ORAL EVERY 6 HOURS PRN
COMMUNITY

## 2021-07-12 ENCOUNTER — APPOINTMENT (OUTPATIENT)
Dept: INFUSION THERAPY | Facility: HOSPITAL | Age: 71
End: 2021-07-12

## 2021-07-13 ENCOUNTER — HOSPITAL ENCOUNTER (OUTPATIENT)
Dept: INFUSION THERAPY | Facility: HOSPITAL | Age: 71
Setting detail: INFUSION SERIES
Discharge: HOME | End: 2021-07-13

## 2021-07-13 VITALS
HEART RATE: 79 BPM | OXYGEN SATURATION: 99 % | SYSTOLIC BLOOD PRESSURE: 178 MMHG | DIASTOLIC BLOOD PRESSURE: 49 MMHG | RESPIRATION RATE: 18 BRPM | TEMPERATURE: 97.9 F

## 2021-07-13 DIAGNOSIS — N18.30 ANEMIA DUE TO STAGE 3 CHRONIC KIDNEY DISEASE TREATED WITH ERYTHROPOIETIN (HCC): Primary | ICD-10-CM

## 2021-07-13 DIAGNOSIS — D63.1 ANEMIA DUE TO STAGE 3 CHRONIC KIDNEY DISEASE TREATED WITH ERYTHROPOIETIN (HCC): Primary | ICD-10-CM

## 2021-07-13 LAB
ALBUMIN SERPL-MCNC: 4 G/DL (ref 3.5–5.2)
ANION GAP SERPL CALCULATED.3IONS-SCNC: 12.2 MMOL/L (ref 5–15)
BUN SERPL-MCNC: 30 MG/DL (ref 8–23)
BUN/CREAT SERPL: 18.2 (ref 7–25)
CALCIUM SPEC-SCNC: 9.1 MG/DL (ref 8.6–10.5)
CHLORIDE SERPL-SCNC: 95 MMOL/L (ref 98–107)
CO2 SERPL-SCNC: 27.8 MMOL/L (ref 22–29)
CREAT SERPL-MCNC: 1.65 MG/DL (ref 0.57–1)
DEPRECATED RDW RBC AUTO: 43.3 FL (ref 37–54)
ERYTHROCYTE [DISTWIDTH] IN BLOOD BY AUTOMATED COUNT: 13.2 % (ref 12.3–15.4)
GFR SERPL CREATININE-BSD FRML MDRD: 31 ML/MIN/1.73
GLUCOSE SERPL-MCNC: 112 MG/DL (ref 65–99)
HCT VFR BLD AUTO: 32 % (ref 34–46.6)
HGB BLD-MCNC: 10.2 G/DL (ref 12–15.9)
IRON 24H UR-MRATE: 63 MCG/DL (ref 37–145)
IRON SATN MFR SERPL: 26 % (ref 20–50)
MCH RBC QN AUTO: 28.6 PG (ref 26.6–33)
MCHC RBC AUTO-ENTMCNC: 31.9 G/DL (ref 31.5–35.7)
MCV RBC AUTO: 89.6 FL (ref 79–97)
PHOSPHATE SERPL-MCNC: 3.9 MG/DL (ref 2.5–4.5)
PLATELET # BLD AUTO: 139 10*3/MM3 (ref 140–450)
PMV BLD AUTO: 9.6 FL (ref 6–12)
POTASSIUM SERPL-SCNC: 4 MMOL/L (ref 3.5–5.2)
RBC # BLD AUTO: 3.57 10*6/MM3 (ref 3.77–5.28)
SODIUM SERPL-SCNC: 135 MMOL/L (ref 136–145)
TIBC SERPL-MCNC: 238 MCG/DL (ref 298–536)
TRANSFERRIN SERPL-MCNC: 160 MG/DL (ref 200–360)
URATE SERPL-MCNC: 6.4 MG/DL (ref 2.4–5.7)
WBC # BLD AUTO: 5.74 10*3/MM3 (ref 3.4–10.8)

## 2021-07-13 PROCEDURE — 85027 COMPLETE CBC AUTOMATED: CPT | Performed by: NURSE PRACTITIONER

## 2021-07-13 PROCEDURE — 82310 ASSAY OF CALCIUM: CPT | Performed by: NURSE PRACTITIONER

## 2021-07-13 PROCEDURE — 83970 ASSAY OF PARATHORMONE: CPT | Performed by: NURSE PRACTITIONER

## 2021-07-13 PROCEDURE — 25010000002 EPOETIN ALFA PER 1000 UNITS: Performed by: INTERNAL MEDICINE

## 2021-07-13 PROCEDURE — 80069 RENAL FUNCTION PANEL: CPT | Performed by: NURSE PRACTITIONER

## 2021-07-13 PROCEDURE — 84550 ASSAY OF BLOOD/URIC ACID: CPT | Performed by: NURSE PRACTITIONER

## 2021-07-13 PROCEDURE — 96372 THER/PROPH/DIAG INJ SC/IM: CPT

## 2021-07-13 PROCEDURE — 83540 ASSAY OF IRON: CPT | Performed by: NURSE PRACTITIONER

## 2021-07-13 PROCEDURE — 84466 ASSAY OF TRANSFERRIN: CPT | Performed by: NURSE PRACTITIONER

## 2021-07-13 RX ADMIN — ERYTHROPOIETIN 40000 UNITS: 40000 INJECTION, SOLUTION INTRAVENOUS; SUBCUTANEOUS at 14:24

## 2021-07-14 LAB
CALCIUM SPEC-SCNC: 8.6 MG/DL (ref 8.6–10.5)
PTH-INTACT SERPL-MCNC: 49.1 PG/ML (ref 15–65)

## 2021-08-12 ENCOUNTER — HOSPITAL ENCOUNTER (OUTPATIENT)
Dept: INFUSION THERAPY | Facility: HOSPITAL | Age: 71
Discharge: HOME | End: 2021-08-12
Admitting: NURSE PRACTITIONER

## 2021-08-12 ENCOUNTER — APPOINTMENT (OUTPATIENT)
Dept: INFUSION THERAPY | Facility: HOSPITAL | Age: 71
End: 2021-08-12

## 2021-08-12 VITALS
HEART RATE: 77 BPM | OXYGEN SATURATION: 100 % | DIASTOLIC BLOOD PRESSURE: 87 MMHG | SYSTOLIC BLOOD PRESSURE: 188 MMHG | RESPIRATION RATE: 18 BRPM | TEMPERATURE: 98.6 F

## 2021-08-12 DIAGNOSIS — D50.9 IRON DEFICIENCY ANEMIA, UNSPECIFIED IRON DEFICIENCY ANEMIA TYPE: ICD-10-CM

## 2021-08-12 DIAGNOSIS — N18.30 STAGE 3 CHRONIC KIDNEY DISEASE, UNSPECIFIED WHETHER STAGE 3A OR 3B CKD (HCC): Primary | ICD-10-CM

## 2021-08-12 DIAGNOSIS — N18.30 ANEMIA DUE TO STAGE 3 CHRONIC KIDNEY DISEASE TREATED WITH ERYTHROPOIETIN (HCC): ICD-10-CM

## 2021-08-12 DIAGNOSIS — D63.1 ANEMIA DUE TO STAGE 3 CHRONIC KIDNEY DISEASE TREATED WITH ERYTHROPOIETIN (HCC): ICD-10-CM

## 2021-08-12 LAB
ALBUMIN SERPL-MCNC: 4.1 G/DL (ref 3.5–5.2)
ANION GAP SERPL CALCULATED.3IONS-SCNC: 8.3 MMOL/L (ref 5–15)
BUN SERPL-MCNC: 19 MG/DL (ref 8–23)
BUN/CREAT SERPL: 22.1 (ref 7–25)
CALCIUM SPEC-SCNC: 9.4 MG/DL (ref 8.6–10.5)
CHLORIDE SERPL-SCNC: 101 MMOL/L (ref 98–107)
CO2 SERPL-SCNC: 30.7 MMOL/L (ref 22–29)
CREAT SERPL-MCNC: 0.86 MG/DL (ref 0.57–1)
DEPRECATED RDW RBC AUTO: 44.5 FL (ref 37–54)
ERYTHROCYTE [DISTWIDTH] IN BLOOD BY AUTOMATED COUNT: 13.4 % (ref 12.3–15.4)
GFR SERPL CREATININE-BSD FRML MDRD: 65 ML/MIN/1.73
GLUCOSE SERPL-MCNC: 103 MG/DL (ref 65–99)
HCT VFR BLD AUTO: 35.9 % (ref 34–46.6)
HGB BLD-MCNC: 11 G/DL (ref 12–15.9)
MCH RBC QN AUTO: 28.1 PG (ref 26.6–33)
MCHC RBC AUTO-ENTMCNC: 30.6 G/DL (ref 31.5–35.7)
MCV RBC AUTO: 91.6 FL (ref 79–97)
PHOSPHATE SERPL-MCNC: 3.9 MG/DL (ref 2.5–4.5)
PLATELET # BLD AUTO: 181 10*3/MM3 (ref 140–450)
PMV BLD AUTO: 9.8 FL (ref 6–12)
POTASSIUM SERPL-SCNC: 4.4 MMOL/L (ref 3.5–5.2)
RBC # BLD AUTO: 3.92 10*6/MM3 (ref 3.77–5.28)
SODIUM SERPL-SCNC: 140 MMOL/L (ref 136–145)
WBC # BLD AUTO: 7.08 10*3/MM3 (ref 3.4–10.8)

## 2021-08-12 PROCEDURE — 96372 THER/PROPH/DIAG INJ SC/IM: CPT

## 2021-08-12 PROCEDURE — 36415 COLL VENOUS BLD VENIPUNCTURE: CPT

## 2021-08-12 PROCEDURE — 80069 RENAL FUNCTION PANEL: CPT | Performed by: NURSE PRACTITIONER

## 2021-08-12 PROCEDURE — 85027 COMPLETE CBC AUTOMATED: CPT | Performed by: NURSE PRACTITIONER

## 2021-08-12 PROCEDURE — 25010000002 EPOETIN ALFA PER 1000 UNITS: Performed by: INTERNAL MEDICINE

## 2021-08-12 RX ADMIN — ERYTHROPOIETIN 40000 UNITS: 40000 INJECTION, SOLUTION INTRAVENOUS; SUBCUTANEOUS at 13:33

## 2021-09-09 ENCOUNTER — HOSPITAL ENCOUNTER (OUTPATIENT)
Dept: INFUSION THERAPY | Facility: HOSPITAL | Age: 71
Discharge: HOME | End: 2021-09-09
Admitting: INTERNAL MEDICINE

## 2021-09-09 VITALS
DIASTOLIC BLOOD PRESSURE: 71 MMHG | SYSTOLIC BLOOD PRESSURE: 130 MMHG | HEART RATE: 65 BPM | OXYGEN SATURATION: 100 % | TEMPERATURE: 98 F | RESPIRATION RATE: 18 BRPM

## 2021-09-09 DIAGNOSIS — D63.1 ANEMIA DUE TO STAGE 3 CHRONIC KIDNEY DISEASE TREATED WITH ERYTHROPOIETIN (HCC): ICD-10-CM

## 2021-09-09 DIAGNOSIS — N18.30 STAGE 3 CHRONIC KIDNEY DISEASE, UNSPECIFIED WHETHER STAGE 3A OR 3B CKD (HCC): ICD-10-CM

## 2021-09-09 DIAGNOSIS — D50.9 IRON DEFICIENCY ANEMIA, UNSPECIFIED IRON DEFICIENCY ANEMIA TYPE: Primary | ICD-10-CM

## 2021-09-09 DIAGNOSIS — N18.30 ANEMIA DUE TO STAGE 3 CHRONIC KIDNEY DISEASE TREATED WITH ERYTHROPOIETIN (HCC): ICD-10-CM

## 2021-09-09 LAB
ALBUMIN SERPL-MCNC: 4.2 G/DL (ref 3.5–5.2)
ANION GAP SERPL CALCULATED.3IONS-SCNC: 6.8 MMOL/L (ref 5–15)
BASOPHILS # BLD AUTO: 0.04 10*3/MM3 (ref 0–0.2)
BASOPHILS NFR BLD AUTO: 0.5 % (ref 0–1.5)
BUN SERPL-MCNC: 26 MG/DL (ref 8–23)
BUN/CREAT SERPL: 19.7 (ref 7–25)
CALCIUM SPEC-SCNC: 9.1 MG/DL (ref 8.6–10.5)
CHLORIDE SERPL-SCNC: 98 MMOL/L (ref 98–107)
CO2 SERPL-SCNC: 33.2 MMOL/L (ref 22–29)
CREAT SERPL-MCNC: 1.32 MG/DL (ref 0.57–1)
DEPRECATED RDW RBC AUTO: 45.7 FL (ref 37–54)
EOSINOPHIL # BLD AUTO: 0.35 10*3/MM3 (ref 0–0.4)
EOSINOPHIL NFR BLD AUTO: 4.1 % (ref 0.3–6.2)
ERYTHROCYTE [DISTWIDTH] IN BLOOD BY AUTOMATED COUNT: 13.7 % (ref 12.3–15.4)
GFR SERPL CREATININE-BSD FRML MDRD: 40 ML/MIN/1.73
GLUCOSE SERPL-MCNC: 121 MG/DL (ref 65–99)
HCT VFR BLD AUTO: 35.7 % (ref 34–46.6)
HGB BLD-MCNC: 11.1 G/DL (ref 12–15.9)
IMM GRANULOCYTES # BLD AUTO: 0.02 10*3/MM3 (ref 0–0.05)
IMM GRANULOCYTES NFR BLD AUTO: 0.2 % (ref 0–0.5)
LYMPHOCYTES # BLD AUTO: 2.08 10*3/MM3 (ref 0.7–3.1)
LYMPHOCYTES NFR BLD AUTO: 24.6 % (ref 19.6–45.3)
MCH RBC QN AUTO: 28.3 PG (ref 26.6–33)
MCHC RBC AUTO-ENTMCNC: 31.1 G/DL (ref 31.5–35.7)
MCV RBC AUTO: 91.1 FL (ref 79–97)
MONOCYTES # BLD AUTO: 0.65 10*3/MM3 (ref 0.1–0.9)
MONOCYTES NFR BLD AUTO: 7.7 % (ref 5–12)
NEUTROPHILS NFR BLD AUTO: 5.31 10*3/MM3 (ref 1.7–7)
NEUTROPHILS NFR BLD AUTO: 62.9 % (ref 42.7–76)
NRBC BLD AUTO-RTO: 0 /100 WBC (ref 0–0.2)
PHOSPHATE SERPL-MCNC: 3.8 MG/DL (ref 2.5–4.5)
PLATELET # BLD AUTO: 257 10*3/MM3 (ref 140–450)
PMV BLD AUTO: 10.7 FL (ref 6–12)
POTASSIUM SERPL-SCNC: 3.6 MMOL/L (ref 3.5–5.2)
RBC # BLD AUTO: 3.92 10*6/MM3 (ref 3.77–5.28)
SODIUM SERPL-SCNC: 138 MMOL/L (ref 136–145)
WBC # BLD AUTO: 8.45 10*3/MM3 (ref 3.4–10.8)

## 2021-09-09 PROCEDURE — 96365 THER/PROPH/DIAG IV INF INIT: CPT

## 2021-09-09 PROCEDURE — 25010000002 FERRIC CARBOXYMALTOSE 750 MG/15ML SOLUTION 15 ML VIAL: Performed by: NURSE PRACTITIONER

## 2021-09-09 PROCEDURE — 85025 COMPLETE CBC W/AUTO DIFF WBC: CPT | Performed by: INTERNAL MEDICINE

## 2021-09-09 PROCEDURE — 80069 RENAL FUNCTION PANEL: CPT | Performed by: INTERNAL MEDICINE

## 2021-09-09 RX ORDER — METHYLPREDNISOLONE SODIUM SUCCINATE 125 MG/2ML
125 INJECTION, POWDER, LYOPHILIZED, FOR SOLUTION INTRAMUSCULAR; INTRAVENOUS AS NEEDED
Status: CANCELLED | OUTPATIENT
Start: 2021-09-09

## 2021-09-09 RX ORDER — DIPHENHYDRAMINE HYDROCHLORIDE 50 MG/ML
50 INJECTION INTRAMUSCULAR; INTRAVENOUS AS NEEDED
Status: CANCELLED | OUTPATIENT
Start: 2021-09-09

## 2021-09-09 RX ORDER — METHYLPREDNISOLONE SODIUM SUCCINATE 125 MG/2ML
125 INJECTION, POWDER, LYOPHILIZED, FOR SOLUTION INTRAMUSCULAR; INTRAVENOUS AS NEEDED
Status: CANCELLED | OUTPATIENT
Start: 2021-09-23

## 2021-09-09 RX ORDER — DIPHENHYDRAMINE HYDROCHLORIDE 50 MG/ML
50 INJECTION INTRAMUSCULAR; INTRAVENOUS AS NEEDED
Status: CANCELLED | OUTPATIENT
Start: 2021-09-23

## 2021-09-09 RX ADMIN — FERRIC CARBOXYMALTOSE INJECTION 750 MG: 50 INJECTION, SOLUTION INTRAVENOUS at 14:05

## 2021-09-22 DIAGNOSIS — N18.30 STAGE 3 CHRONIC KIDNEY DISEASE, UNSPECIFIED WHETHER STAGE 3A OR 3B CKD (HCC): Primary | ICD-10-CM

## 2021-09-23 ENCOUNTER — HOSPITAL ENCOUNTER (OUTPATIENT)
Dept: INFUSION THERAPY | Facility: HOSPITAL | Age: 71
Setting detail: INFUSION SERIES
Discharge: HOME | End: 2021-09-23

## 2021-09-23 VITALS
DIASTOLIC BLOOD PRESSURE: 77 MMHG | RESPIRATION RATE: 18 BRPM | HEART RATE: 86 BPM | OXYGEN SATURATION: 99 % | TEMPERATURE: 98.2 F | SYSTOLIC BLOOD PRESSURE: 154 MMHG

## 2021-09-23 DIAGNOSIS — N18.30 ANEMIA DUE TO STAGE 3 CHRONIC KIDNEY DISEASE TREATED WITH ERYTHROPOIETIN (HCC): Primary | ICD-10-CM

## 2021-09-23 DIAGNOSIS — N18.30 ANEMIA DUE TO STAGE 3 CHRONIC KIDNEY DISEASE TREATED WITH ERYTHROPOIETIN (HCC): ICD-10-CM

## 2021-09-23 DIAGNOSIS — D63.1 ANEMIA DUE TO STAGE 3 CHRONIC KIDNEY DISEASE TREATED WITH ERYTHROPOIETIN (HCC): Primary | ICD-10-CM

## 2021-09-23 DIAGNOSIS — D63.1 ANEMIA DUE TO STAGE 3 CHRONIC KIDNEY DISEASE TREATED WITH ERYTHROPOIETIN (HCC): ICD-10-CM

## 2021-09-23 PROCEDURE — 25010000002 FERRIC CARBOXYMALTOSE 750 MG/15ML SOLUTION 15 ML VIAL: Performed by: NURSE PRACTITIONER

## 2021-09-23 PROCEDURE — 96365 THER/PROPH/DIAG IV INF INIT: CPT

## 2021-09-23 RX ORDER — DIPHENHYDRAMINE HYDROCHLORIDE 50 MG/ML
50 INJECTION INTRAMUSCULAR; INTRAVENOUS AS NEEDED
Status: CANCELLED | OUTPATIENT
Start: 2021-10-07

## 2021-09-23 RX ORDER — METHYLPREDNISOLONE SODIUM SUCCINATE 125 MG/2ML
125 INJECTION, POWDER, LYOPHILIZED, FOR SOLUTION INTRAMUSCULAR; INTRAVENOUS AS NEEDED
Status: CANCELLED | OUTPATIENT
Start: 2021-10-07

## 2021-09-23 RX ADMIN — FERRIC CARBOXYMALTOSE INJECTION 750 MG: 50 INJECTION, SOLUTION INTRAVENOUS at 13:37

## 2021-10-07 ENCOUNTER — HOSPITAL ENCOUNTER (OUTPATIENT)
Dept: INFUSION THERAPY | Facility: HOSPITAL | Age: 71
Discharge: HOME | End: 2021-10-07
Admitting: NURSE PRACTITIONER

## 2021-10-07 VITALS
SYSTOLIC BLOOD PRESSURE: 165 MMHG | HEART RATE: 91 BPM | RESPIRATION RATE: 16 BRPM | TEMPERATURE: 98 F | BODY MASS INDEX: 24.96 KG/M2 | OXYGEN SATURATION: 99 % | WEIGHT: 150 LBS | DIASTOLIC BLOOD PRESSURE: 78 MMHG

## 2021-10-07 DIAGNOSIS — D63.1 ANEMIA DUE TO STAGE 3 CHRONIC KIDNEY DISEASE TREATED WITH ERYTHROPOIETIN (HCC): Primary | ICD-10-CM

## 2021-10-07 DIAGNOSIS — D50.9 IRON DEFICIENCY ANEMIA, UNSPECIFIED IRON DEFICIENCY ANEMIA TYPE: ICD-10-CM

## 2021-10-07 DIAGNOSIS — N18.30 ANEMIA DUE TO STAGE 3 CHRONIC KIDNEY DISEASE TREATED WITH ERYTHROPOIETIN (HCC): Primary | ICD-10-CM

## 2021-10-07 DIAGNOSIS — N18.30 STAGE 3 CHRONIC KIDNEY DISEASE, UNSPECIFIED WHETHER STAGE 3A OR 3B CKD (HCC): ICD-10-CM

## 2021-10-07 LAB
ALBUMIN SERPL-MCNC: 4.1 G/DL (ref 3.5–5.2)
ANION GAP SERPL CALCULATED.3IONS-SCNC: 7.2 MMOL/L (ref 5–15)
BUN SERPL-MCNC: 26 MG/DL (ref 8–23)
BUN/CREAT SERPL: 25 (ref 7–25)
CALCIUM SPEC-SCNC: 8.8 MG/DL (ref 8.6–10.5)
CHLORIDE SERPL-SCNC: 101 MMOL/L (ref 98–107)
CO2 SERPL-SCNC: 32.8 MMOL/L (ref 22–29)
CREAT SERPL-MCNC: 1.04 MG/DL (ref 0.57–1)
DEPRECATED RDW RBC AUTO: 46.1 FL (ref 37–54)
ERYTHROCYTE [DISTWIDTH] IN BLOOD BY AUTOMATED COUNT: 13.7 % (ref 12.3–15.4)
GFR SERPL CREATININE-BSD FRML MDRD: 53 ML/MIN/1.73
GLUCOSE SERPL-MCNC: 126 MG/DL (ref 65–99)
HCT VFR BLD AUTO: 32.6 % (ref 34–46.6)
HGB BLD-MCNC: 10.3 G/DL (ref 12–15.9)
MCH RBC QN AUTO: 29 PG (ref 26.6–33)
MCHC RBC AUTO-ENTMCNC: 31.6 G/DL (ref 31.5–35.7)
MCV RBC AUTO: 91.8 FL (ref 79–97)
PHOSPHATE SERPL-MCNC: 2.8 MG/DL (ref 2.5–4.5)
PLATELET # BLD AUTO: 162 10*3/MM3 (ref 140–450)
PMV BLD AUTO: 9.5 FL (ref 6–12)
POTASSIUM SERPL-SCNC: 4.3 MMOL/L (ref 3.5–5.2)
RBC # BLD AUTO: 3.55 10*6/MM3 (ref 3.77–5.28)
SODIUM SERPL-SCNC: 141 MMOL/L (ref 136–145)
WBC # BLD AUTO: 7.08 10*3/MM3 (ref 3.4–10.8)

## 2021-10-07 PROCEDURE — 25010000002 EPOETIN ALFA PER 1000 UNITS: Performed by: INTERNAL MEDICINE

## 2021-10-07 PROCEDURE — 96372 THER/PROPH/DIAG INJ SC/IM: CPT

## 2021-10-07 PROCEDURE — 80069 RENAL FUNCTION PANEL: CPT | Performed by: INTERNAL MEDICINE

## 2021-10-07 PROCEDURE — 85027 COMPLETE CBC AUTOMATED: CPT | Performed by: NURSE PRACTITIONER

## 2021-10-07 PROCEDURE — 36415 COLL VENOUS BLD VENIPUNCTURE: CPT

## 2021-10-07 RX ADMIN — ERYTHROPOIETIN 40000 UNITS: 40000 INJECTION, SOLUTION INTRAVENOUS; SUBCUTANEOUS at 13:21

## 2021-11-04 ENCOUNTER — HOSPITAL ENCOUNTER (OUTPATIENT)
Dept: INFUSION THERAPY | Facility: HOSPITAL | Age: 71
Discharge: HOME | End: 2021-11-04
Admitting: INTERNAL MEDICINE

## 2021-11-04 VITALS
SYSTOLIC BLOOD PRESSURE: 142 MMHG | TEMPERATURE: 98.6 F | DIASTOLIC BLOOD PRESSURE: 59 MMHG | OXYGEN SATURATION: 100 % | HEART RATE: 100 BPM

## 2021-11-04 DIAGNOSIS — D50.9 IRON DEFICIENCY ANEMIA, UNSPECIFIED IRON DEFICIENCY ANEMIA TYPE: ICD-10-CM

## 2021-11-04 DIAGNOSIS — N18.30 STAGE 3 CHRONIC KIDNEY DISEASE, UNSPECIFIED WHETHER STAGE 3A OR 3B CKD (HCC): Primary | ICD-10-CM

## 2021-11-04 LAB
ALBUMIN SERPL-MCNC: 4.5 G/DL (ref 3.5–5.2)
ANION GAP SERPL CALCULATED.3IONS-SCNC: 9.9 MMOL/L (ref 5–15)
BASOPHILS # BLD AUTO: 0.02 10*3/MM3 (ref 0–0.2)
BASOPHILS NFR BLD AUTO: 0.3 % (ref 0–1.5)
BUN SERPL-MCNC: 22 MG/DL (ref 8–23)
BUN/CREAT SERPL: 19.6 (ref 7–25)
CALCIUM SPEC-SCNC: 9.5 MG/DL (ref 8.6–10.5)
CHLORIDE SERPL-SCNC: 96 MMOL/L (ref 98–107)
CO2 SERPL-SCNC: 30.1 MMOL/L (ref 22–29)
CREAT SERPL-MCNC: 1.12 MG/DL (ref 0.57–1)
DEPRECATED RDW RBC AUTO: 46 FL (ref 37–54)
EOSINOPHIL # BLD AUTO: 0.1 10*3/MM3 (ref 0–0.4)
EOSINOPHIL NFR BLD AUTO: 1.3 % (ref 0.3–6.2)
ERYTHROCYTE [DISTWIDTH] IN BLOOD BY AUTOMATED COUNT: 13.6 % (ref 12.3–15.4)
GFR SERPL CREATININE-BSD FRML MDRD: 48 ML/MIN/1.73
GLUCOSE SERPL-MCNC: 128 MG/DL (ref 65–99)
HCT VFR BLD AUTO: 40.2 % (ref 34–46.6)
HGB BLD-MCNC: 12.5 G/DL (ref 12–15.9)
IMM GRANULOCYTES # BLD AUTO: 0.01 10*3/MM3 (ref 0–0.05)
IMM GRANULOCYTES NFR BLD AUTO: 0.1 % (ref 0–0.5)
LYMPHOCYTES # BLD AUTO: 1.06 10*3/MM3 (ref 0.7–3.1)
LYMPHOCYTES NFR BLD AUTO: 14.3 % (ref 19.6–45.3)
MCH RBC QN AUTO: 28.8 PG (ref 26.6–33)
MCHC RBC AUTO-ENTMCNC: 31.1 G/DL (ref 31.5–35.7)
MCV RBC AUTO: 92.6 FL (ref 79–97)
MONOCYTES # BLD AUTO: 0.47 10*3/MM3 (ref 0.1–0.9)
MONOCYTES NFR BLD AUTO: 6.3 % (ref 5–12)
NEUTROPHILS NFR BLD AUTO: 5.77 10*3/MM3 (ref 1.7–7)
NEUTROPHILS NFR BLD AUTO: 77.7 % (ref 42.7–76)
NRBC BLD AUTO-RTO: 0 /100 WBC (ref 0–0.2)
PHOSPHATE SERPL-MCNC: 3.1 MG/DL (ref 2.5–4.5)
PLATELET # BLD AUTO: 199 10*3/MM3 (ref 140–450)
PMV BLD AUTO: 10 FL (ref 6–12)
POTASSIUM SERPL-SCNC: 3.9 MMOL/L (ref 3.5–5.2)
RBC # BLD AUTO: 4.34 10*6/MM3 (ref 3.77–5.28)
SODIUM SERPL-SCNC: 136 MMOL/L (ref 136–145)
WBC # BLD AUTO: 7.43 10*3/MM3 (ref 3.4–10.8)

## 2021-11-04 PROCEDURE — 85025 COMPLETE CBC W/AUTO DIFF WBC: CPT | Performed by: INTERNAL MEDICINE

## 2021-11-04 PROCEDURE — 36415 COLL VENOUS BLD VENIPUNCTURE: CPT

## 2021-11-04 PROCEDURE — 80069 RENAL FUNCTION PANEL: CPT | Performed by: INTERNAL MEDICINE

## 2021-11-04 PROCEDURE — G0463 HOSPITAL OUTPT CLINIC VISIT: HCPCS

## 2021-11-09 DIAGNOSIS — J44.9 CHRONIC OBSTRUCTIVE PULMONARY DISEASE, UNSPECIFIED COPD TYPE (HCC): Primary | ICD-10-CM

## 2021-11-09 RX ORDER — BUDESONIDE AND FORMOTEROL FUMARATE DIHYDRATE 160; 4.5 UG/1; UG/1
2 AEROSOL RESPIRATORY (INHALATION)
Qty: 3 EACH | Refills: 3 | Status: SHIPPED | OUTPATIENT
Start: 2021-11-09 | End: 2022-12-30 | Stop reason: SDUPTHER

## 2021-11-17 ENCOUNTER — OFFICE VISIT (OUTPATIENT)
Dept: CARDIOLOGY | Facility: CLINIC | Age: 71
End: 2021-11-17

## 2021-11-17 VITALS
DIASTOLIC BLOOD PRESSURE: 72 MMHG | OXYGEN SATURATION: 99 % | SYSTOLIC BLOOD PRESSURE: 142 MMHG | HEART RATE: 89 BPM | HEIGHT: 66 IN | BODY MASS INDEX: 25.04 KG/M2 | WEIGHT: 155.8 LBS

## 2021-11-17 DIAGNOSIS — R07.1 CHEST PAIN ON BREATHING: ICD-10-CM

## 2021-11-17 DIAGNOSIS — R06.09 DOE (DYSPNEA ON EXERTION): ICD-10-CM

## 2021-11-17 DIAGNOSIS — K59.04 CHRONIC IDIOPATHIC CONSTIPATION: Primary | ICD-10-CM

## 2021-11-17 DIAGNOSIS — I10 PRIMARY HYPERTENSION: ICD-10-CM

## 2021-11-17 PROCEDURE — 99213 OFFICE O/P EST LOW 20 MIN: CPT | Performed by: INTERNAL MEDICINE

## 2021-11-17 RX ORDER — CYCLOBENZAPRINE HCL 5 MG
TABLET ORAL AS NEEDED
COMMUNITY
Start: 2021-08-25 | End: 2022-06-01

## 2021-11-17 RX ORDER — LACTULOSE 10 G/15ML
SOLUTION ORAL AS NEEDED
COMMUNITY
End: 2022-06-01

## 2021-11-17 RX ORDER — ERYTHROPOIETIN 2000 [IU]/ML
INJECTION, SOLUTION INTRAVENOUS; SUBCUTANEOUS
COMMUNITY

## 2021-11-17 NOTE — PROGRESS NOTES
"[facility] Cardiology  Office Progress Note  Vanna Rincon  1950  [address] [place] [address]       Visit Date: 11/17/21    PCP: Farhad Chowdary MD  [address]  [place] [address]    IDENTIFICATION: A 71 y.o. female from [place], [place]    PROBLEM LIST:   1. HTN  2. HLD  1. 10/18/16 lipids:  TG 95 HDL 67 and   3. COPD, on chronic O2 4L  1. Follows with Harjeet   2. 11/17 echo EF 60%, rvsp 13  4. Rheumatoid lung disease  1. Follows with Dr. Lan   5. GERD  6. Chronic pain  7. Former tobacco abuse, cessation 1991  8. Rheumatoid arthritis  9. Anxiety/depression  10. Hiatal hernia  11. Surgical Hx  1. Tubal ligation  2. Tonsillectomy      CC:   Chief Complaint   Patient presents with   • Essential hypertension   • Benign essential hypertension   • BAKER (dyspnea on exertion)       Allergies  Allergies   Allergen Reactions   • Ciprofloxacin Other (See Comments)     \"Hurt all over\"   • Doxycycline Other (See Comments)     \"makes other medication stay in my system longer\"   • Lipitor [Atorvastatin] Other (See Comments)     Hurt all over     • Sulfa Antibiotics Other (See Comments)     \"cant remember\"       Current Medications    Current Outpatient Medications:   •  bisacodyl (Dulcolax) 5 MG EC tablet, Take 5 mg by mouth Daily As Needed for Constipation., Disp: , Rfl:   •  budesonide-formoterol (SYMBICORT) 160-4.5 MCG/ACT inhaler, Inhale 2 puffs 2 (Two) Times a Day., Disp: 3 each, Rfl: 3  •  cyclobenzaprine (FLEXERIL) 5 MG tablet, As Needed., Disp: , Rfl:   •  epoetin chidi (Procrit) 2000 UNIT/ML injection, Inject  under the skin into the appropriate area as directed Every 30 (Thirty) Days., Disp: , Rfl:   •  Etanercept (ENBREL SURECLICK) 50 MG/ML solution auto-injector, Inject  under the skin 1 (One) Time Per Week., Disp: , Rfl:   •  fentaNYL (DURAGESIC) 75 MCG/HR patch, Place 1 patch on the skin Every 72 (Seventy-Two) Hours., Disp: , Rfl:   •  " "HYDROcodone-acetaminophen (NORCO) 5-325 MG per tablet, Take 1 tablet by mouth Every 6 (Six) Hours As Needed., Disp: , Rfl:   •  lactulose (CHRONULAC) 10 GM/15ML solution, As Needed., Disp: , Rfl:   •  LORazepam (ATIVAN) 0.5 MG tablet, Take 1 tablet by mouth Daily As Needed for Anxiety., Disp: 30 tablet, Rfl: 1  •  Multiple Vitamins-Minerals (CENTRUM ADULTS PO), Take 1 tablet by mouth Daily., Disp: , Rfl:   •  O2 (OXYGEN), Inhale 6 L/min Continuous., Disp: , Rfl:   •  ondansetron (ZOFRAN) 8 MG tablet, Take  by mouth Every 8 (Eight) Hours As Needed for Nausea or Vomiting., Disp: , Rfl:   •  pantoprazole (PROTONIX) 40 MG EC tablet, Take 1 tablet by mouth Daily., Disp: 90 tablet, Rfl: 3  •  promethazine-codeine (PHENERGAN with CODEINE) 6.25-10 MG/5ML syrup, Take 5 mL by mouth Every 6 (Six) Hours As Needed for Cough., Disp: , Rfl:   •  sertraline (ZOLOFT) 100 MG tablet, Take 1 tablet by mouth Daily., Disp: 90 tablet, Rfl: 3  •  simethicone (MYLICON) 125 MG chewable tablet, Chew 125 mg Every 6 (Six) Hours As Needed for Flatulence., Disp: , Rfl:   •  valsartan-hydrochlorothiazide (DIOVAN-HCT) 320-25 MG per tablet, Take 1 tablet by mouth Daily., Disp: , Rfl:       History of Present Illness   Vanna Rincon is a 71 y.o. year old female here for follow up.    Pt denies any chest pain,  orthopnea, PND, palpitations, lower extremity edema, or claudication.  Her baseline lung issues rheumatoid lung with high flow oxygen remains unchanged.  She is largely concerned that she has such severe constipation that she may have difficulty from a cardiac standpoint    OBJECTIVE:  Vitals:    11/17/21 1439   BP: 142/72   BP Location: Right arm   Patient Position: Sitting   Pulse: 89   SpO2: 99%   Weight: 70.7 kg (155 lb 12.8 oz)   Height: 166.4 cm (65.5\")     Body mass index is 25.53 kg/m².    Constitutional:       Appearance: Cachectic and frail. Chronically ill-appearing.      Comments: 2 portable tanks with nasal cannulas   Neck: "      Vascular: No JVR. JVD normal.   Pulmonary:      Effort: Pulmonary effort is normal.      Breath sounds: Normal breath sounds. No wheezing. No rhonchi. No rales.   Chest:      Chest wall: Not tender to palpatation.   Cardiovascular:      PMI at left midclavicular line. Normal rate. Regular rhythm. Normal S1. Normal S2.      Murmurs: There is no murmur.      No gallop. No click. No rub.   Pulses:     Intact distal pulses.   Edema:     Peripheral edema absent.   Abdominal:      General: Bowel sounds are normal.      Palpations: Abdomen is soft.      Tenderness: There is no abdominal tenderness.   Musculoskeletal: Normal range of motion.         General: No tenderness. Skin:     General: Skin is warm and dry.   Neurological:      General: No focal deficit present.      Mental Status: Alert and oriented to person, place and time.         Diagnostic Data:  Procedures      ASSESSMENT:   Diagnosis Plan   1. Chronic idiopathic constipation  Ambulatory Referral to Gastroenterology   2. Chest pain on breathing     3. BAKER (dyspnea on exertion)     4. Primary hypertension         PLAN:  Dyspnea multifactorial with rheumatoid lung historical echo with no evidence of pulmonary hypertension continued oxygen therapy is oxygen saturation acceptable in the office today    Hypertension controlled on Diovan HCT    Constipation likely opioid related.  I discussed with her agents that potentially could assist short of discontinuing opiates.  She states that her primary care provider discussed this but it was cost prohibitive.  Potentially gastroenterology would have resources coupons and/or samples in regards to such.          Kraig Marquez MD, [facility]

## 2021-12-02 ENCOUNTER — HOSPITAL ENCOUNTER (OUTPATIENT)
Dept: INFUSION THERAPY | Facility: HOSPITAL | Age: 71
Discharge: HOME | End: 2021-12-02
Admitting: NURSE PRACTITIONER

## 2021-12-02 VITALS
TEMPERATURE: 99.1 F | DIASTOLIC BLOOD PRESSURE: 72 MMHG | SYSTOLIC BLOOD PRESSURE: 137 MMHG | OXYGEN SATURATION: 100 % | RESPIRATION RATE: 18 BRPM | HEART RATE: 94 BPM

## 2021-12-02 DIAGNOSIS — N18.30 STAGE 3 CHRONIC KIDNEY DISEASE, UNSPECIFIED WHETHER STAGE 3A OR 3B CKD (HCC): ICD-10-CM

## 2021-12-02 DIAGNOSIS — D50.9 IRON DEFICIENCY ANEMIA, UNSPECIFIED IRON DEFICIENCY ANEMIA TYPE: ICD-10-CM

## 2021-12-02 DIAGNOSIS — D63.1 ANEMIA DUE TO STAGE 3 CHRONIC KIDNEY DISEASE TREATED WITH ERYTHROPOIETIN (HCC): Primary | ICD-10-CM

## 2021-12-02 DIAGNOSIS — N18.30 ANEMIA DUE TO STAGE 3 CHRONIC KIDNEY DISEASE TREATED WITH ERYTHROPOIETIN (HCC): Primary | ICD-10-CM

## 2021-12-02 LAB
25(OH)D3 SERPL-MCNC: 59.3 NG/ML
ALBUMIN SERPL-MCNC: 4.2 G/DL (ref 3.5–5.2)
ANION GAP SERPL CALCULATED.3IONS-SCNC: 9.4 MMOL/L (ref 5–15)
BUN SERPL-MCNC: 39 MG/DL (ref 8–23)
BUN/CREAT SERPL: 35.8 (ref 7–25)
CALCIUM SPEC-SCNC: 9.2 MG/DL (ref 8.6–10.5)
CALCIUM SPEC-SCNC: 9.4 MG/DL (ref 8.6–10.5)
CHLORIDE SERPL-SCNC: 100 MMOL/L (ref 98–107)
CO2 SERPL-SCNC: 30.6 MMOL/L (ref 22–29)
CREAT SERPL-MCNC: 1.09 MG/DL (ref 0.57–1)
DEPRECATED RDW RBC AUTO: 45.1 FL (ref 37–54)
ERYTHROCYTE [DISTWIDTH] IN BLOOD BY AUTOMATED COUNT: 13.3 % (ref 12.3–15.4)
GFR SERPL CREATININE-BSD FRML MDRD: 50 ML/MIN/1.73
GLUCOSE SERPL-MCNC: 119 MG/DL (ref 65–99)
HCT VFR BLD AUTO: 33.7 % (ref 34–46.6)
HGB BLD-MCNC: 10.4 G/DL (ref 12–15.9)
IRON 24H UR-MRATE: 72 MCG/DL (ref 37–145)
IRON SATN MFR SERPL: 28 % (ref 20–50)
MCH RBC QN AUTO: 28.7 PG (ref 26.6–33)
MCHC RBC AUTO-ENTMCNC: 30.9 G/DL (ref 31.5–35.7)
MCV RBC AUTO: 93.1 FL (ref 79–97)
PHOSPHATE SERPL-MCNC: 4.5 MG/DL (ref 2.5–4.5)
PLATELET # BLD AUTO: 174 10*3/MM3 (ref 140–450)
PMV BLD AUTO: 9.3 FL (ref 6–12)
POTASSIUM SERPL-SCNC: 4.3 MMOL/L (ref 3.5–5.2)
PTH-INTACT SERPL-MCNC: 28.1 PG/ML (ref 15–65)
RBC # BLD AUTO: 3.62 10*6/MM3 (ref 3.77–5.28)
SODIUM SERPL-SCNC: 140 MMOL/L (ref 136–145)
TIBC SERPL-MCNC: 255 MCG/DL (ref 298–536)
TRANSFERRIN SERPL-MCNC: 171 MG/DL (ref 200–360)
URATE SERPL-MCNC: 6.1 MG/DL (ref 2.4–5.7)
WBC NRBC COR # BLD: 7.24 10*3/MM3 (ref 3.4–10.8)

## 2021-12-02 PROCEDURE — 84550 ASSAY OF BLOOD/URIC ACID: CPT | Performed by: NURSE PRACTITIONER

## 2021-12-02 PROCEDURE — 82310 ASSAY OF CALCIUM: CPT | Performed by: NURSE PRACTITIONER

## 2021-12-02 PROCEDURE — 83970 ASSAY OF PARATHORMONE: CPT | Performed by: NURSE PRACTITIONER

## 2021-12-02 PROCEDURE — 25010000002 EPOETIN ALFA PER 1000 UNITS: Performed by: INTERNAL MEDICINE

## 2021-12-02 PROCEDURE — 82306 VITAMIN D 25 HYDROXY: CPT | Performed by: NURSE PRACTITIONER

## 2021-12-02 PROCEDURE — 83540 ASSAY OF IRON: CPT | Performed by: NURSE PRACTITIONER

## 2021-12-02 PROCEDURE — 96372 THER/PROPH/DIAG INJ SC/IM: CPT

## 2021-12-02 PROCEDURE — 36415 COLL VENOUS BLD VENIPUNCTURE: CPT

## 2021-12-02 PROCEDURE — 84466 ASSAY OF TRANSFERRIN: CPT | Performed by: NURSE PRACTITIONER

## 2021-12-02 PROCEDURE — 85027 COMPLETE CBC AUTOMATED: CPT | Performed by: NURSE PRACTITIONER

## 2021-12-02 PROCEDURE — 80069 RENAL FUNCTION PANEL: CPT | Performed by: INTERNAL MEDICINE

## 2021-12-02 RX ADMIN — ERYTHROPOIETIN 40000 UNITS: 40000 INJECTION, SOLUTION INTRAVENOUS; SUBCUTANEOUS at 12:02

## 2021-12-29 ENCOUNTER — APPOINTMENT (OUTPATIENT)
Dept: INFUSION THERAPY | Facility: HOSPITAL | Age: 71
End: 2021-12-29

## 2021-12-30 ENCOUNTER — APPOINTMENT (OUTPATIENT)
Dept: INFUSION THERAPY | Facility: HOSPITAL | Age: 71
End: 2021-12-30

## 2022-01-04 ENCOUNTER — HOSPITAL ENCOUNTER (OUTPATIENT)
Dept: INFUSION THERAPY | Facility: HOSPITAL | Age: 72
Discharge: HOME | End: 2022-01-04
Admitting: INTERNAL MEDICINE

## 2022-01-04 VITALS
RESPIRATION RATE: 18 BRPM | HEART RATE: 68 BPM | TEMPERATURE: 98.2 F | WEIGHT: 150 LBS | DIASTOLIC BLOOD PRESSURE: 70 MMHG | SYSTOLIC BLOOD PRESSURE: 162 MMHG | OXYGEN SATURATION: 93 % | BODY MASS INDEX: 24.58 KG/M2

## 2022-01-04 DIAGNOSIS — D50.9 IRON DEFICIENCY ANEMIA, UNSPECIFIED IRON DEFICIENCY ANEMIA TYPE: ICD-10-CM

## 2022-01-04 DIAGNOSIS — N18.30 STAGE 3 CHRONIC KIDNEY DISEASE, UNSPECIFIED WHETHER STAGE 3A OR 3B CKD: ICD-10-CM

## 2022-01-04 PROCEDURE — 36415 COLL VENOUS BLD VENIPUNCTURE: CPT

## 2022-01-04 PROCEDURE — 85025 COMPLETE CBC W/AUTO DIFF WBC: CPT | Performed by: INTERNAL MEDICINE

## 2022-01-04 NOTE — ADDENDUM NOTE
Encounter addended by: Apolonia Bhatia on: 1/4/2022 3:59 PM   Actions taken: Pharmacy for encounter modified

## 2022-01-04 NOTE — ADDENDUM NOTE
Encounter addended by: Eryn Barth on: 1/4/2022 3:22 PM   Actions taken: Pharmacy for encounter modified

## 2022-02-01 ENCOUNTER — APPOINTMENT (OUTPATIENT)
Dept: INFUSION THERAPY | Facility: HOSPITAL | Age: 72
End: 2022-02-01

## 2022-02-07 ENCOUNTER — HOSPITAL ENCOUNTER (OUTPATIENT)
Dept: INFUSION THERAPY | Facility: HOSPITAL | Age: 72
Discharge: HOME | End: 2022-02-07
Admitting: NURSE PRACTITIONER

## 2022-02-07 DIAGNOSIS — N18.30 STAGE 3 CHRONIC KIDNEY DISEASE, UNSPECIFIED WHETHER STAGE 3A OR 3B CKD: ICD-10-CM

## 2022-02-07 DIAGNOSIS — D63.1 ANEMIA DUE TO STAGE 3 CHRONIC KIDNEY DISEASE TREATED WITH ERYTHROPOIETIN: Primary | ICD-10-CM

## 2022-02-07 DIAGNOSIS — D50.9 IRON DEFICIENCY ANEMIA, UNSPECIFIED IRON DEFICIENCY ANEMIA TYPE: ICD-10-CM

## 2022-02-07 DIAGNOSIS — N18.30 ANEMIA DUE TO STAGE 3 CHRONIC KIDNEY DISEASE TREATED WITH ERYTHROPOIETIN: Primary | ICD-10-CM

## 2022-02-07 LAB
ALBUMIN SERPL-MCNC: 4.2 G/DL (ref 3.5–5.2)
ANION GAP SERPL CALCULATED.3IONS-SCNC: 9.3 MMOL/L (ref 5–15)
BUN SERPL-MCNC: 28 MG/DL (ref 8–23)
BUN/CREAT SERPL: 26.7 (ref 7–25)
CALCIUM SPEC-SCNC: 9.2 MG/DL (ref 8.6–10.5)
CHLORIDE SERPL-SCNC: 99 MMOL/L (ref 98–107)
CO2 SERPL-SCNC: 29.7 MMOL/L (ref 22–29)
CREAT SERPL-MCNC: 1.05 MG/DL (ref 0.57–1)
DEPRECATED RDW RBC AUTO: 42.3 FL (ref 37–54)
ERYTHROCYTE [DISTWIDTH] IN BLOOD BY AUTOMATED COUNT: 12.9 % (ref 12.3–15.4)
GFR SERPL CREATININE-BSD FRML MDRD: 52 ML/MIN/1.73
GLUCOSE SERPL-MCNC: 108 MG/DL (ref 65–99)
HCT VFR BLD AUTO: 31.6 % (ref 34–46.6)
HGB BLD-MCNC: 10.2 G/DL (ref 12–15.9)
MCH RBC QN AUTO: 29.2 PG (ref 26.6–33)
MCHC RBC AUTO-ENTMCNC: 32.3 G/DL (ref 31.5–35.7)
MCV RBC AUTO: 90.5 FL (ref 79–97)
PHOSPHATE SERPL-MCNC: 3.9 MG/DL (ref 2.5–4.5)
PLATELET # BLD AUTO: 190 10*3/MM3 (ref 140–450)
PMV BLD AUTO: 9.3 FL (ref 6–12)
POTASSIUM SERPL-SCNC: 4.1 MMOL/L (ref 3.5–5.2)
RBC # BLD AUTO: 3.49 10*6/MM3 (ref 3.77–5.28)
SODIUM SERPL-SCNC: 138 MMOL/L (ref 136–145)
WBC NRBC COR # BLD: 6.3 10*3/MM3 (ref 3.4–10.8)

## 2022-02-07 PROCEDURE — 85027 COMPLETE CBC AUTOMATED: CPT | Performed by: NURSE PRACTITIONER

## 2022-02-07 PROCEDURE — 36415 COLL VENOUS BLD VENIPUNCTURE: CPT

## 2022-02-07 PROCEDURE — 25010000002 EPOETIN ALFA PER 1000 UNITS: Performed by: INTERNAL MEDICINE

## 2022-02-07 PROCEDURE — 80069 RENAL FUNCTION PANEL: CPT | Performed by: INTERNAL MEDICINE

## 2022-02-07 PROCEDURE — 96372 THER/PROPH/DIAG INJ SC/IM: CPT

## 2022-02-07 RX ADMIN — ERYTHROPOIETIN 40000 UNITS: 40000 INJECTION, SOLUTION INTRAVENOUS; SUBCUTANEOUS at 13:46

## 2022-02-08 NOTE — ADDENDUM NOTE
Encounter addended by: Kelley Bennett on: 2/8/2022 10:00 AM   Actions taken: Pharmacy for encounter modified

## 2022-03-07 ENCOUNTER — HOSPITAL ENCOUNTER (OUTPATIENT)
Dept: INFUSION THERAPY | Facility: HOSPITAL | Age: 72
Discharge: HOME | End: 2022-03-07
Admitting: INTERNAL MEDICINE

## 2022-03-07 VITALS
BODY MASS INDEX: 23.76 KG/M2 | HEART RATE: 87 BPM | SYSTOLIC BLOOD PRESSURE: 111 MMHG | DIASTOLIC BLOOD PRESSURE: 52 MMHG | OXYGEN SATURATION: 99 % | WEIGHT: 145 LBS | TEMPERATURE: 98.8 F | RESPIRATION RATE: 18 BRPM

## 2022-03-07 DIAGNOSIS — D50.9 IRON DEFICIENCY ANEMIA, UNSPECIFIED IRON DEFICIENCY ANEMIA TYPE: ICD-10-CM

## 2022-03-07 DIAGNOSIS — N18.30 STAGE 3 CHRONIC KIDNEY DISEASE, UNSPECIFIED WHETHER STAGE 3A OR 3B CKD: Primary | ICD-10-CM

## 2022-03-07 LAB
ALBUMIN SERPL-MCNC: 3.8 G/DL (ref 3.5–5.2)
ANION GAP SERPL CALCULATED.3IONS-SCNC: 11 MMOL/L (ref 5–15)
BASOPHILS # BLD AUTO: 0.02 10*3/MM3 (ref 0–0.2)
BASOPHILS NFR BLD AUTO: 0.3 % (ref 0–1.5)
BUN SERPL-MCNC: 29 MG/DL (ref 8–23)
BUN/CREAT SERPL: 28.4 (ref 7–25)
CALCIUM SPEC-SCNC: 9.2 MG/DL (ref 8.6–10.5)
CHLORIDE SERPL-SCNC: 99 MMOL/L (ref 98–107)
CO2 SERPL-SCNC: 30 MMOL/L (ref 22–29)
CREAT SERPL-MCNC: 1.02 MG/DL (ref 0.57–1)
DEPRECATED RDW RBC AUTO: 43.2 FL (ref 37–54)
EGFRCR SERPLBLD CKD-EPI 2021: 59.7 ML/MIN/1.73
EOSINOPHIL # BLD AUTO: 0.2 10*3/MM3 (ref 0–0.4)
EOSINOPHIL NFR BLD AUTO: 3.3 % (ref 0.3–6.2)
ERYTHROCYTE [DISTWIDTH] IN BLOOD BY AUTOMATED COUNT: 13.2 % (ref 12.3–15.4)
GLUCOSE SERPL-MCNC: 193 MG/DL (ref 65–99)
HCT VFR BLD AUTO: 33 % (ref 34–46.6)
HGB BLD-MCNC: 10.6 G/DL (ref 12–15.9)
IMM GRANULOCYTES # BLD AUTO: 0.01 10*3/MM3 (ref 0–0.05)
IMM GRANULOCYTES NFR BLD AUTO: 0.2 % (ref 0–0.5)
LYMPHOCYTES # BLD AUTO: 1.28 10*3/MM3 (ref 0.7–3.1)
LYMPHOCYTES NFR BLD AUTO: 21.3 % (ref 19.6–45.3)
MCH RBC QN AUTO: 29.6 PG (ref 26.6–33)
MCHC RBC AUTO-ENTMCNC: 32.1 G/DL (ref 31.5–35.7)
MCV RBC AUTO: 92.2 FL (ref 79–97)
MONOCYTES # BLD AUTO: 0.34 10*3/MM3 (ref 0.1–0.9)
MONOCYTES NFR BLD AUTO: 5.7 % (ref 5–12)
NEUTROPHILS NFR BLD AUTO: 4.15 10*3/MM3 (ref 1.7–7)
NEUTROPHILS NFR BLD AUTO: 69.2 % (ref 42.7–76)
NRBC BLD AUTO-RTO: 0 /100 WBC (ref 0–0.2)
PHOSPHATE SERPL-MCNC: 3.9 MG/DL (ref 2.5–4.5)
PLATELET # BLD AUTO: 207 10*3/MM3 (ref 140–450)
PMV BLD AUTO: 9.7 FL (ref 6–12)
POTASSIUM SERPL-SCNC: 4 MMOL/L (ref 3.5–5.2)
RBC # BLD AUTO: 3.58 10*6/MM3 (ref 3.77–5.28)
SODIUM SERPL-SCNC: 140 MMOL/L (ref 136–145)
WBC NRBC COR # BLD: 6 10*3/MM3 (ref 3.4–10.8)

## 2022-03-07 PROCEDURE — 80069 RENAL FUNCTION PANEL: CPT | Performed by: INTERNAL MEDICINE

## 2022-03-07 PROCEDURE — 36415 COLL VENOUS BLD VENIPUNCTURE: CPT

## 2022-03-07 PROCEDURE — 85025 COMPLETE CBC W/AUTO DIFF WBC: CPT | Performed by: INTERNAL MEDICINE

## 2022-03-07 PROCEDURE — 96372 THER/PROPH/DIAG INJ SC/IM: CPT

## 2022-03-07 PROCEDURE — 25010000002 EPOETIN ALFA PER 1000 UNITS: Performed by: INTERNAL MEDICINE

## 2022-03-07 RX ADMIN — ERYTHROPOIETIN 40000 UNITS: 40000 INJECTION, SOLUTION INTRAVENOUS; SUBCUTANEOUS at 13:36

## 2022-04-04 ENCOUNTER — APPOINTMENT (OUTPATIENT)
Dept: INFUSION THERAPY | Facility: HOSPITAL | Age: 72
End: 2022-04-04

## 2022-04-05 ENCOUNTER — HOSPITAL ENCOUNTER (OUTPATIENT)
Dept: INFUSION THERAPY | Facility: HOSPITAL | Age: 72
Discharge: HOME | End: 2022-04-05
Admitting: INTERNAL MEDICINE

## 2022-04-05 DIAGNOSIS — D50.9 IRON DEFICIENCY ANEMIA, UNSPECIFIED IRON DEFICIENCY ANEMIA TYPE: ICD-10-CM

## 2022-04-05 DIAGNOSIS — N18.30 STAGE 3 CHRONIC KIDNEY DISEASE, UNSPECIFIED WHETHER STAGE 3A OR 3B CKD: Primary | ICD-10-CM

## 2022-04-05 LAB
ALBUMIN SERPL-MCNC: 4.3 G/DL (ref 3.5–5.2)
ANION GAP SERPL CALCULATED.3IONS-SCNC: 11.6 MMOL/L (ref 5–15)
BASOPHILS # BLD AUTO: 0.04 10*3/MM3 (ref 0–0.2)
BASOPHILS NFR BLD AUTO: 0.5 % (ref 0–1.5)
BUN SERPL-MCNC: 21 MG/DL (ref 8–23)
BUN/CREAT SERPL: 17.6 (ref 7–25)
CALCIUM SPEC-SCNC: 9.4 MG/DL (ref 8.6–10.5)
CHLORIDE SERPL-SCNC: 97 MMOL/L (ref 98–107)
CO2 SERPL-SCNC: 33.4 MMOL/L (ref 22–29)
CREAT SERPL-MCNC: 1.19 MG/DL (ref 0.57–1)
DEPRECATED RDW RBC AUTO: 44.8 FL (ref 37–54)
EGFRCR SERPLBLD CKD-EPI 2021: 49.6 ML/MIN/1.73
EOSINOPHIL # BLD AUTO: 0.03 10*3/MM3 (ref 0–0.4)
EOSINOPHIL NFR BLD AUTO: 0.4 % (ref 0.3–6.2)
ERYTHROCYTE [DISTWIDTH] IN BLOOD BY AUTOMATED COUNT: 13.6 % (ref 12.3–15.4)
GLUCOSE SERPL-MCNC: 154 MG/DL (ref 65–99)
HCT VFR BLD AUTO: 39.6 % (ref 34–46.6)
HGB BLD-MCNC: 12.7 G/DL (ref 12–15.9)
IMM GRANULOCYTES # BLD AUTO: 0.02 10*3/MM3 (ref 0–0.05)
IMM GRANULOCYTES NFR BLD AUTO: 0.3 % (ref 0–0.5)
LYMPHOCYTES # BLD AUTO: 1.15 10*3/MM3 (ref 0.7–3.1)
LYMPHOCYTES NFR BLD AUTO: 14.9 % (ref 19.6–45.3)
MCH RBC QN AUTO: 29.1 PG (ref 26.6–33)
MCHC RBC AUTO-ENTMCNC: 32.1 G/DL (ref 31.5–35.7)
MCV RBC AUTO: 90.8 FL (ref 79–97)
MONOCYTES # BLD AUTO: 0.66 10*3/MM3 (ref 0.1–0.9)
MONOCYTES NFR BLD AUTO: 8.6 % (ref 5–12)
NEUTROPHILS NFR BLD AUTO: 5.8 10*3/MM3 (ref 1.7–7)
NEUTROPHILS NFR BLD AUTO: 75.3 % (ref 42.7–76)
NRBC BLD AUTO-RTO: 0 /100 WBC (ref 0–0.2)
PHOSPHATE SERPL-MCNC: 3.2 MG/DL (ref 2.5–4.5)
PLATELET # BLD AUTO: 242 10*3/MM3 (ref 140–450)
PMV BLD AUTO: 10 FL (ref 6–12)
POTASSIUM SERPL-SCNC: 3.4 MMOL/L (ref 3.5–5.2)
RBC # BLD AUTO: 4.36 10*6/MM3 (ref 3.77–5.28)
SODIUM SERPL-SCNC: 142 MMOL/L (ref 136–145)
WBC NRBC COR # BLD: 7.7 10*3/MM3 (ref 3.4–10.8)

## 2022-04-05 PROCEDURE — 80069 RENAL FUNCTION PANEL: CPT | Performed by: INTERNAL MEDICINE

## 2022-04-05 PROCEDURE — 36415 COLL VENOUS BLD VENIPUNCTURE: CPT

## 2022-04-05 PROCEDURE — 85025 COMPLETE CBC W/AUTO DIFF WBC: CPT | Performed by: INTERNAL MEDICINE

## 2022-04-05 PROCEDURE — G0463 HOSPITAL OUTPT CLINIC VISIT: HCPCS

## 2022-04-05 RX ORDER — LATANOPROST 50 UG/ML
1 SOLUTION/ DROPS OPHTHALMIC NIGHTLY
COMMUNITY
End: 2022-06-01

## 2022-04-05 RX ORDER — NEOMYCIN SULFATE, POLYMYXIN B SULFATE, AND DEXAMETHASONE 3.5; 10000; 1 MG/G; [USP'U]/G; MG/G
OINTMENT OPHTHALMIC EVERY 6 HOURS SCHEDULED
COMMUNITY
End: 2022-06-01

## 2022-05-02 ENCOUNTER — APPOINTMENT (OUTPATIENT)
Dept: INFUSION THERAPY | Facility: HOSPITAL | Age: 72
End: 2022-05-02

## 2022-05-04 ENCOUNTER — LAB REQUISITION (OUTPATIENT)
Dept: LAB | Facility: HOSPITAL | Age: 72
End: 2022-05-04

## 2022-05-04 ENCOUNTER — HOSPITAL ENCOUNTER (OUTPATIENT)
Dept: INFUSION THERAPY | Facility: HOSPITAL | Age: 72
Discharge: HOME | End: 2022-05-04
Admitting: INTERNAL MEDICINE

## 2022-05-04 VITALS — DIASTOLIC BLOOD PRESSURE: 63 MMHG | SYSTOLIC BLOOD PRESSURE: 148 MMHG | TEMPERATURE: 97.8 F | HEART RATE: 79 BPM

## 2022-05-04 DIAGNOSIS — G62.9 POLYNEUROPATHY, UNSPECIFIED: ICD-10-CM

## 2022-05-04 DIAGNOSIS — N28.9 DISORDER OF KIDNEY AND URETER, UNSPECIFIED: ICD-10-CM

## 2022-05-04 DIAGNOSIS — M06.9 RHEUMATOID ARTHRITIS, UNSPECIFIED: ICD-10-CM

## 2022-05-04 DIAGNOSIS — M79.18 MYALGIA, OTHER SITE: ICD-10-CM

## 2022-05-04 DIAGNOSIS — D50.9 IRON DEFICIENCY ANEMIA, UNSPECIFIED IRON DEFICIENCY ANEMIA TYPE: Primary | ICD-10-CM

## 2022-05-04 DIAGNOSIS — N18.30 STAGE 3 CHRONIC KIDNEY DISEASE, UNSPECIFIED WHETHER STAGE 3A OR 3B CKD: ICD-10-CM

## 2022-05-04 DIAGNOSIS — M54.9 DORSALGIA, UNSPECIFIED: ICD-10-CM

## 2022-05-04 LAB
ALBUMIN SERPL-MCNC: 4.3 G/DL (ref 3.5–5.2)
ALBUMIN/GLOB SERPL: 1.7 G/DL
ALP SERPL-CCNC: 70 U/L (ref 39–117)
ALT SERPL W P-5'-P-CCNC: 9 U/L (ref 1–33)
ANION GAP SERPL CALCULATED.3IONS-SCNC: 9.2 MMOL/L (ref 5–15)
AST SERPL-CCNC: 17 U/L (ref 1–32)
BASOPHILS # BLD AUTO: 0.01 10*3/MM3 (ref 0–0.2)
BASOPHILS NFR BLD AUTO: 0.2 % (ref 0–1.5)
BILIRUB SERPL-MCNC: 0.3 MG/DL (ref 0–1.2)
BUN SERPL-MCNC: 16 MG/DL (ref 8–23)
BUN/CREAT SERPL: 16 (ref 7–25)
CALCIUM SPEC-SCNC: 9.5 MG/DL (ref 8.6–10.5)
CHLORIDE SERPL-SCNC: 98 MMOL/L (ref 98–107)
CO2 SERPL-SCNC: 31.8 MMOL/L (ref 22–29)
CREAT SERPL-MCNC: 1 MG/DL (ref 0.57–1)
DEPRECATED RDW RBC AUTO: 43.8 FL (ref 37–54)
EGFRCR SERPLBLD CKD-EPI 2021: 61.1 ML/MIN/1.73
EOSINOPHIL # BLD AUTO: 0.11 10*3/MM3 (ref 0–0.4)
EOSINOPHIL NFR BLD AUTO: 1.8 % (ref 0.3–6.2)
ERYTHROCYTE [DISTWIDTH] IN BLOOD BY AUTOMATED COUNT: 13.2 % (ref 12.3–15.4)
GLOBULIN UR ELPH-MCNC: 2.6 GM/DL
GLUCOSE SERPL-MCNC: 116 MG/DL (ref 65–99)
HCT VFR BLD AUTO: 32.9 % (ref 34–46.6)
HGB BLD-MCNC: 10.3 G/DL (ref 12–15.9)
IMM GRANULOCYTES # BLD AUTO: 0.01 10*3/MM3 (ref 0–0.05)
IMM GRANULOCYTES NFR BLD AUTO: 0.2 % (ref 0–0.5)
LYMPHOCYTES # BLD AUTO: 0.84 10*3/MM3 (ref 0.7–3.1)
LYMPHOCYTES NFR BLD AUTO: 14.1 % (ref 19.6–45.3)
MCH RBC QN AUTO: 28.7 PG (ref 26.6–33)
MCHC RBC AUTO-ENTMCNC: 31.3 G/DL (ref 31.5–35.7)
MCV RBC AUTO: 91.6 FL (ref 79–97)
MONOCYTES # BLD AUTO: 0.32 10*3/MM3 (ref 0.1–0.9)
MONOCYTES NFR BLD AUTO: 5.4 % (ref 5–12)
NEUTROPHILS NFR BLD AUTO: 4.68 10*3/MM3 (ref 1.7–7)
NEUTROPHILS NFR BLD AUTO: 78.3 % (ref 42.7–76)
NRBC BLD AUTO-RTO: 0 /100 WBC (ref 0–0.2)
PHOSPHATE SERPL-MCNC: 3.5 MG/DL (ref 2.5–4.5)
PLATELET # BLD AUTO: 176 10*3/MM3 (ref 140–450)
PMV BLD AUTO: 9.9 FL (ref 6–12)
POTASSIUM SERPL-SCNC: 3.9 MMOL/L (ref 3.5–5.2)
PROT SERPL-MCNC: 6.9 G/DL (ref 6–8.5)
RBC # BLD AUTO: 3.59 10*6/MM3 (ref 3.77–5.28)
SODIUM SERPL-SCNC: 139 MMOL/L (ref 136–145)
WBC NRBC COR # BLD: 5.97 10*3/MM3 (ref 3.4–10.8)

## 2022-05-04 PROCEDURE — 96372 THER/PROPH/DIAG INJ SC/IM: CPT

## 2022-05-04 PROCEDURE — 84100 ASSAY OF PHOSPHORUS: CPT | Performed by: INTERNAL MEDICINE

## 2022-05-04 PROCEDURE — 80053 COMPREHEN METABOLIC PANEL: CPT | Performed by: INTERNAL MEDICINE

## 2022-05-04 PROCEDURE — 85025 COMPLETE CBC W/AUTO DIFF WBC: CPT | Performed by: INTERNAL MEDICINE

## 2022-05-04 PROCEDURE — 25010000002 EPOETIN ALFA PER 1000 UNITS: Performed by: INTERNAL MEDICINE

## 2022-05-04 RX ADMIN — ERYTHROPOIETIN 40000 UNITS: 40000 INJECTION, SOLUTION INTRAVENOUS; SUBCUTANEOUS at 14:45

## 2022-06-01 ENCOUNTER — HOSPITAL ENCOUNTER (OUTPATIENT)
Dept: INFUSION THERAPY | Facility: HOSPITAL | Age: 72
Discharge: HOME | End: 2022-06-01
Admitting: INTERNAL MEDICINE

## 2022-06-01 VITALS — RESPIRATION RATE: 18 BRPM | TEMPERATURE: 98 F | OXYGEN SATURATION: 100 % | HEART RATE: 77 BPM

## 2022-06-01 DIAGNOSIS — N18.30 STAGE 3 CHRONIC KIDNEY DISEASE, UNSPECIFIED WHETHER STAGE 3A OR 3B CKD: Primary | ICD-10-CM

## 2022-06-01 DIAGNOSIS — D50.9 IRON DEFICIENCY ANEMIA, UNSPECIFIED IRON DEFICIENCY ANEMIA TYPE: ICD-10-CM

## 2022-06-01 LAB
ALBUMIN SERPL-MCNC: 4.1 G/DL (ref 3.5–5.2)
ANION GAP SERPL CALCULATED.3IONS-SCNC: 9.9 MMOL/L (ref 5–15)
BASOPHILS # BLD AUTO: 0.02 10*3/MM3 (ref 0–0.2)
BASOPHILS NFR BLD AUTO: 0.3 % (ref 0–1.5)
BUN SERPL-MCNC: 29 MG/DL (ref 8–23)
BUN/CREAT SERPL: 21.5 (ref 7–25)
CALCIUM SPEC-SCNC: 9.3 MG/DL (ref 8.6–10.5)
CHLORIDE SERPL-SCNC: 102 MMOL/L (ref 98–107)
CO2 SERPL-SCNC: 31.1 MMOL/L (ref 22–29)
CREAT SERPL-MCNC: 1.35 MG/DL (ref 0.57–1)
DEPRECATED RDW RBC AUTO: 44.2 FL (ref 37–54)
EGFRCR SERPLBLD CKD-EPI 2021: 42.6 ML/MIN/1.73
EOSINOPHIL # BLD AUTO: 0.28 10*3/MM3 (ref 0–0.4)
EOSINOPHIL NFR BLD AUTO: 4.4 % (ref 0.3–6.2)
ERYTHROCYTE [DISTWIDTH] IN BLOOD BY AUTOMATED COUNT: 13.3 % (ref 12.3–15.4)
GLUCOSE SERPL-MCNC: 99 MG/DL (ref 65–99)
HCT VFR BLD AUTO: 34 % (ref 34–46.6)
HGB BLD-MCNC: 10.7 G/DL (ref 12–15.9)
IMM GRANULOCYTES # BLD AUTO: 0.01 10*3/MM3 (ref 0–0.05)
IMM GRANULOCYTES NFR BLD AUTO: 0.2 % (ref 0–0.5)
LYMPHOCYTES # BLD AUTO: 1.77 10*3/MM3 (ref 0.7–3.1)
LYMPHOCYTES NFR BLD AUTO: 27.6 % (ref 19.6–45.3)
MCH RBC QN AUTO: 28.8 PG (ref 26.6–33)
MCHC RBC AUTO-ENTMCNC: 31.5 G/DL (ref 31.5–35.7)
MCV RBC AUTO: 91.6 FL (ref 79–97)
MONOCYTES # BLD AUTO: 0.47 10*3/MM3 (ref 0.1–0.9)
MONOCYTES NFR BLD AUTO: 7.3 % (ref 5–12)
NEUTROPHILS NFR BLD AUTO: 3.86 10*3/MM3 (ref 1.7–7)
NEUTROPHILS NFR BLD AUTO: 60.2 % (ref 42.7–76)
NRBC BLD AUTO-RTO: 0 /100 WBC (ref 0–0.2)
PHOSPHATE SERPL-MCNC: 3.7 MG/DL (ref 2.5–4.5)
PLATELET # BLD AUTO: 186 10*3/MM3 (ref 140–450)
PMV BLD AUTO: 10.3 FL (ref 6–12)
POTASSIUM SERPL-SCNC: 4.1 MMOL/L (ref 3.5–5.2)
RBC # BLD AUTO: 3.71 10*6/MM3 (ref 3.77–5.28)
SODIUM SERPL-SCNC: 143 MMOL/L (ref 136–145)
WBC NRBC COR # BLD: 6.41 10*3/MM3 (ref 3.4–10.8)

## 2022-06-01 PROCEDURE — 80069 RENAL FUNCTION PANEL: CPT | Performed by: INTERNAL MEDICINE

## 2022-06-01 PROCEDURE — 85025 COMPLETE CBC W/AUTO DIFF WBC: CPT | Performed by: INTERNAL MEDICINE

## 2022-06-01 PROCEDURE — 96372 THER/PROPH/DIAG INJ SC/IM: CPT

## 2022-06-01 PROCEDURE — 25010000002 EPOETIN ALFA PER 1000 UNITS: Performed by: INTERNAL MEDICINE

## 2022-06-01 PROCEDURE — 36415 COLL VENOUS BLD VENIPUNCTURE: CPT

## 2022-06-01 RX ORDER — DORZOLAMIDE HCL 20 MG/ML
1 SOLUTION/ DROPS OPHTHALMIC 3 TIMES DAILY
COMMUNITY
End: 2022-07-29

## 2022-06-01 RX ADMIN — ERYTHROPOIETIN 40000 UNITS: 40000 INJECTION, SOLUTION INTRAVENOUS; SUBCUTANEOUS at 14:16

## 2022-06-09 ENCOUNTER — TELEPHONE (OUTPATIENT)
Dept: CARDIOLOGY | Facility: CLINIC | Age: 72
End: 2022-06-09

## 2022-06-09 NOTE — TELEPHONE ENCOUNTER
Pt called to report her BP has been down as low as 90/40 with profound fatigue. She has held valsartan/HCTZ for 2 days and BP is rebounding. She reports she is not eating well or staying hydrated. Lost weight over the last several months. Follows with nephrology and left office prior to scheduled appt yesterday because she felt bad. She denies chest pain, unusual shortness of breath, or presyncope/syncope.     Readings -   134/66, 80  110/54, 80  102/59, 80  123/69, 79    Pt advised to continue holding AHT. Encouraged to hydrate and increase nutritional intake. Update our office on Monday, 6/13/2022.      Lab Results   Component Value Date    WBC 6.41 06/01/2022    HGB 10.7 (L) 06/01/2022    HCT 34.0 06/01/2022    MCV 91.6 06/01/2022     06/01/2022      Lab Results   Component Value Date    GLUCOSE 99 06/01/2022    BUN 29 (H) 06/01/2022    CREATININE 1.35 (H) 06/01/2022    EGFRIFNONA 52 (L) 02/07/2022    EGFRIFAFRI 55 (L) 05/22/2018    BCR 21.5 06/01/2022    K 4.1 06/01/2022    CO2 31.1 (H) 06/01/2022    CALCIUM 9.3 06/01/2022    PROTENTOTREF 7.4 05/22/2018    ALBUMIN 4.10 06/01/2022    LABIL2 1.1 05/22/2018    AST 17 05/04/2022    ALT 9 05/04/2022      Update - 6/13/2022  140-186/60-70 off AHT  Back on valsartan/HCT -- 153/76, coming down slowly  She will update if BP becomes low again for possible dose adjustment.

## 2022-06-23 ENCOUNTER — OFFICE VISIT (OUTPATIENT)
Dept: PULMONOLOGY | Facility: CLINIC | Age: 72
End: 2022-06-23

## 2022-06-23 VITALS
SYSTOLIC BLOOD PRESSURE: 130 MMHG | DIASTOLIC BLOOD PRESSURE: 70 MMHG | OXYGEN SATURATION: 99 % | HEIGHT: 66 IN | BODY MASS INDEX: 23.75 KG/M2 | HEART RATE: 84 BPM | TEMPERATURE: 98.2 F

## 2022-06-23 DIAGNOSIS — J84.9 INTERSTITIAL LUNG DISEASE: ICD-10-CM

## 2022-06-23 DIAGNOSIS — R06.02 SHORTNESS OF BREATH: Primary | ICD-10-CM

## 2022-06-23 DIAGNOSIS — J44.9 CHRONIC OBSTRUCTIVE PULMONARY DISEASE, UNSPECIFIED COPD TYPE: ICD-10-CM

## 2022-06-23 DIAGNOSIS — J96.11 CHRONIC RESPIRATORY FAILURE WITH HYPOXIA: ICD-10-CM

## 2022-06-23 PROCEDURE — 99214 OFFICE O/P EST MOD 30 MIN: CPT | Performed by: NURSE PRACTITIONER

## 2022-06-23 RX ORDER — ONDANSETRON HYDROCHLORIDE 8 MG/1
TABLET, FILM COATED ORAL
COMMUNITY
End: 2022-06-23 | Stop reason: SDUPTHER

## 2022-06-23 RX ORDER — NETARSUDIL 0.2 MG/ML
1 SOLUTION/ DROPS OPHTHALMIC; TOPICAL
COMMUNITY
End: 2022-07-29

## 2022-06-23 NOTE — PROGRESS NOTES
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    She was last seen in the office in March 2021.    Overall she is having more shortness of breath with activity.  Currently she is on 6 L of oxygen and uses portable liquid oxygen and at 6 L.  She has become less active due to her dyspnea.    Currently she uses Symbicort twice daily and really does the best.  She has tried nebulizers in the past without much benefit.    She is also having quite a bit of issues with abdominal bloating and discomfort after meals as well as constipation.  She does notice if her abdomen is very bloated she gets more short of breath.        Objective     Vital Signs:   /70 (BP Location: Left arm, Patient Position: Sitting, Cuff Size: Adult)   Pulse 84   Temp 98.2 °F (36.8 °C) (Infrared)   Ht 166.4 cm (65.51\")   SpO2 99% Comment: 6liters, continuous, at rest  BMI 23.75 kg/m²       Immunization History   Administered Date(s) Administered   • COVID-19 (PFIZER) PURPLE CAP 03/31/2021, 04/23/2021, 11/22/2021   • Covid-19 (Pfizer) Gray Cap 05/20/2022   • Fluzone High Dose =>65 Years ([employer] ONLY) 10/24/2017   • Fluzone High-Dose 65+yrs 11/04/2021   • Fluzone Split Quad (Multi-dose) 10/04/2016   • Influenza Quad Vaccine (Inpatient) 10/01/2021   • Pneumococcal Conjugate 13-Valent (PCV13) 10/01/2015   • Pneumococcal Polysaccharide (PPSV23) 10/01/2013       Physical Exam  Vitals reviewed.   Constitutional:       General: She is not in acute distress.     Appearance: She is well-developed. She is not ill-appearing.   HENT:      Head: Normocephalic and atraumatic.   Eyes:      Pupils: Pupils are equal, round, and reactive to light.   Cardiovascular:      Rate and Rhythm: Normal " rate and regular rhythm.      Heart sounds: No murmur heard.  Pulmonary:      Effort: Pulmonary effort is normal. No respiratory distress.      Breath sounds: Wheezing present. No rales.      Comments: Decreased with wheezing    Abdominal:      General: Bowel sounds are normal. There is no distension.      Palpations: Abdomen is soft.   Musculoskeletal:         General: Normal range of motion.      Cervical back: Normal range of motion and neck supple.   Skin:     General: Skin is warm and dry.      Findings: No erythema.   Neurological:      Mental Status: She is alert and oriented to person, place, and time.   Psychiatric:         Behavior: Behavior normal.            Result Review :              PA and lateral chest x-ray done the office today reviewed by me  Awaiting final MD interpretation                 Assessment and Plan    Problem List Items Addressed This Visit        Pulmonary and Pneumonias    Chronic obstructive pulmonary disease (HCC)    Overview     Description: A.  Emphysema with severe physiology on PFTs.  Patient is a former smoker.           Chronic respiratory failure with hypoxia (HCC)    Interstitial lung disease (HCC)    Overview      · A.  History of rheumatoid lung with bronchiolitis obliterans             Other Visit Diagnoses     Shortness of breath    -  Primary    Relevant Orders    XR Chest PA & Lateral (Completed)        We reviewed her chest x-ray with no acute findings.  We did discuss her worsening shortness of breath.  Likely multifactorial given her underlying end-stage lung disease with fibrotic changes as well as deconditioning.    She is now using 6 L continuous flow oxygen.  Encouraged her to try to remain active daily, we discussed walking in her home for 5 minutes 2-3 times a day.    Continue on her Symbicort.  She has tried other inhalers and nebulizers in the past without much benefit she still feels like Symbicort does the best.    Will continue with supportive care.   Follow-up in 3 months.    Follow Up {Instructions Charge Capture  Follow-up Communications :23}    No follow-ups on file.  Patient was given instructions and counseling regarding her condition or for health maintenance advice. Please see specific information pulled into the AVS if appropriate.     I spent 35 minutes caring for Vanna on this date of service. This time includes time spent by me in the following activities:preparing for the visit, reviewing tests, obtaining and/or reviewing a separately obtained history, performing a medically appropriate examination and/or evaluation , counseling and educating the patient/family/caregiver, ordering medications, tests, or procedures, referring and communicating with other health care professionals , documenting information in the medical record and independently interpreting results and communicating that information with the patient/family/caregiver      JENNY Aguilar, ACNP  [facility] Pulmonary Critical Care Medicine  Electronically signed

## 2022-06-29 ENCOUNTER — HOSPITAL ENCOUNTER (OUTPATIENT)
Dept: INFUSION THERAPY | Facility: HOSPITAL | Age: 72
Discharge: HOME | End: 2022-06-29
Admitting: INTERNAL MEDICINE

## 2022-06-29 VITALS
SYSTOLIC BLOOD PRESSURE: 151 MMHG | OXYGEN SATURATION: 99 % | DIASTOLIC BLOOD PRESSURE: 72 MMHG | RESPIRATION RATE: 16 BRPM | BODY MASS INDEX: 23.1 KG/M2 | WEIGHT: 141 LBS | HEART RATE: 90 BPM | TEMPERATURE: 98.6 F

## 2022-06-29 DIAGNOSIS — N18.30 STAGE 3 CHRONIC KIDNEY DISEASE, UNSPECIFIED WHETHER STAGE 3A OR 3B CKD: Primary | ICD-10-CM

## 2022-06-29 DIAGNOSIS — D50.9 IRON DEFICIENCY ANEMIA, UNSPECIFIED IRON DEFICIENCY ANEMIA TYPE: ICD-10-CM

## 2022-06-29 LAB
ALBUMIN SERPL-MCNC: 4.3 G/DL (ref 3.5–5.2)
ANION GAP SERPL CALCULATED.3IONS-SCNC: 12.2 MMOL/L (ref 5–15)
BASOPHILS # BLD AUTO: 0.02 10*3/MM3 (ref 0–0.2)
BASOPHILS NFR BLD AUTO: 0.4 % (ref 0–1.5)
BUN SERPL-MCNC: 28 MG/DL (ref 8–23)
BUN/CREAT SERPL: 26.7 (ref 7–25)
CALCIUM SPEC-SCNC: 9.8 MG/DL (ref 8.6–10.5)
CHLORIDE SERPL-SCNC: 99 MMOL/L (ref 98–107)
CO2 SERPL-SCNC: 28.8 MMOL/L (ref 22–29)
CREAT SERPL-MCNC: 1.05 MG/DL (ref 0.57–1)
DEPRECATED RDW RBC AUTO: 42.5 FL (ref 37–54)
EGFRCR SERPLBLD CKD-EPI 2021: 57.6 ML/MIN/1.73
EOSINOPHIL # BLD AUTO: 0.09 10*3/MM3 (ref 0–0.4)
EOSINOPHIL NFR BLD AUTO: 1.7 % (ref 0.3–6.2)
ERYTHROCYTE [DISTWIDTH] IN BLOOD BY AUTOMATED COUNT: 13.1 % (ref 12.3–15.4)
GLUCOSE SERPL-MCNC: 129 MG/DL (ref 65–99)
HCT VFR BLD AUTO: 37.2 % (ref 34–46.6)
HGB BLD-MCNC: 11.7 G/DL (ref 12–15.9)
IMM GRANULOCYTES # BLD AUTO: 0.02 10*3/MM3 (ref 0–0.05)
IMM GRANULOCYTES NFR BLD AUTO: 0.4 % (ref 0–0.5)
IRON 24H UR-MRATE: 52 MCG/DL (ref 37–145)
IRON SATN MFR SERPL: 21 % (ref 20–50)
LYMPHOCYTES # BLD AUTO: 0.81 10*3/MM3 (ref 0.7–3.1)
LYMPHOCYTES NFR BLD AUTO: 15.3 % (ref 19.6–45.3)
MCH RBC QN AUTO: 28.2 PG (ref 26.6–33)
MCHC RBC AUTO-ENTMCNC: 31.5 G/DL (ref 31.5–35.7)
MCV RBC AUTO: 89.6 FL (ref 79–97)
MONOCYTES # BLD AUTO: 0.28 10*3/MM3 (ref 0.1–0.9)
MONOCYTES NFR BLD AUTO: 5.3 % (ref 5–12)
NEUTROPHILS NFR BLD AUTO: 4.07 10*3/MM3 (ref 1.7–7)
NEUTROPHILS NFR BLD AUTO: 76.9 % (ref 42.7–76)
NRBC BLD AUTO-RTO: 0 /100 WBC (ref 0–0.2)
PHOSPHATE SERPL-MCNC: 3.5 MG/DL (ref 2.5–4.5)
PLATELET # BLD AUTO: 213 10*3/MM3 (ref 140–450)
PMV BLD AUTO: 10.1 FL (ref 6–12)
POTASSIUM SERPL-SCNC: 4.2 MMOL/L (ref 3.5–5.2)
RBC # BLD AUTO: 4.15 10*6/MM3 (ref 3.77–5.28)
SODIUM SERPL-SCNC: 140 MMOL/L (ref 136–145)
TIBC SERPL-MCNC: 249 MCG/DL (ref 298–536)
TRANSFERRIN SERPL-MCNC: 167 MG/DL (ref 200–360)
WBC NRBC COR # BLD: 5.29 10*3/MM3 (ref 3.4–10.8)

## 2022-06-29 PROCEDURE — 36415 COLL VENOUS BLD VENIPUNCTURE: CPT

## 2022-06-29 PROCEDURE — G0463 HOSPITAL OUTPT CLINIC VISIT: HCPCS

## 2022-06-29 PROCEDURE — 85025 COMPLETE CBC W/AUTO DIFF WBC: CPT | Performed by: INTERNAL MEDICINE

## 2022-06-29 PROCEDURE — 80069 RENAL FUNCTION PANEL: CPT | Performed by: INTERNAL MEDICINE

## 2022-06-29 PROCEDURE — 84466 ASSAY OF TRANSFERRIN: CPT | Performed by: NURSE PRACTITIONER

## 2022-06-29 PROCEDURE — 83540 ASSAY OF IRON: CPT | Performed by: NURSE PRACTITIONER

## 2022-06-29 NOTE — ADDENDUM NOTE
Encounter addended by: Linda Heller, PharmD on: 6/29/2022 1:57 PM   Actions taken: Order list changed

## 2022-07-27 ENCOUNTER — APPOINTMENT (OUTPATIENT)
Dept: INFUSION THERAPY | Facility: HOSPITAL | Age: 72
End: 2022-07-27

## 2022-07-29 ENCOUNTER — HOSPITAL ENCOUNTER (OUTPATIENT)
Dept: INFUSION THERAPY | Facility: HOSPITAL | Age: 72
Discharge: HOME | End: 2022-07-29
Admitting: INTERNAL MEDICINE

## 2022-07-29 VITALS
TEMPERATURE: 98.4 F | SYSTOLIC BLOOD PRESSURE: 128 MMHG | RESPIRATION RATE: 16 BRPM | HEART RATE: 82 BPM | OXYGEN SATURATION: 99 % | DIASTOLIC BLOOD PRESSURE: 72 MMHG

## 2022-07-29 DIAGNOSIS — N18.30 STAGE 3 CHRONIC KIDNEY DISEASE, UNSPECIFIED WHETHER STAGE 3A OR 3B CKD: Primary | ICD-10-CM

## 2022-07-29 DIAGNOSIS — D50.9 IRON DEFICIENCY ANEMIA, UNSPECIFIED IRON DEFICIENCY ANEMIA TYPE: ICD-10-CM

## 2022-07-29 LAB
ALBUMIN SERPL-MCNC: 4.4 G/DL (ref 3.5–5.2)
ANION GAP SERPL CALCULATED.3IONS-SCNC: 13.1 MMOL/L (ref 5–15)
BASOPHILS # BLD AUTO: 0.02 10*3/MM3 (ref 0–0.2)
BASOPHILS NFR BLD AUTO: 0.3 % (ref 0–1.5)
BUN SERPL-MCNC: 25 MG/DL (ref 8–23)
BUN/CREAT SERPL: 16.4 (ref 7–25)
CALCIUM SPEC-SCNC: 9.6 MG/DL (ref 8.6–10.5)
CHLORIDE SERPL-SCNC: 97 MMOL/L (ref 98–107)
CO2 SERPL-SCNC: 29.9 MMOL/L (ref 22–29)
CREAT SERPL-MCNC: 1.52 MG/DL (ref 0.57–1)
DEPRECATED RDW RBC AUTO: 42 FL (ref 37–54)
EGFRCR SERPLBLD CKD-EPI 2021: 36.7 ML/MIN/1.73
EOSINOPHIL # BLD AUTO: 0.08 10*3/MM3 (ref 0–0.4)
EOSINOPHIL NFR BLD AUTO: 1.2 % (ref 0.3–6.2)
ERYTHROCYTE [DISTWIDTH] IN BLOOD BY AUTOMATED COUNT: 13.1 % (ref 12.3–15.4)
GLUCOSE SERPL-MCNC: 148 MG/DL (ref 65–99)
HCT VFR BLD AUTO: 34.5 % (ref 34–46.6)
HGB BLD-MCNC: 10.9 G/DL (ref 12–15.9)
IMM GRANULOCYTES # BLD AUTO: 0.02 10*3/MM3 (ref 0–0.05)
IMM GRANULOCYTES NFR BLD AUTO: 0.3 % (ref 0–0.5)
LYMPHOCYTES # BLD AUTO: 1.08 10*3/MM3 (ref 0.7–3.1)
LYMPHOCYTES NFR BLD AUTO: 15.7 % (ref 19.6–45.3)
MCH RBC QN AUTO: 28.2 PG (ref 26.6–33)
MCHC RBC AUTO-ENTMCNC: 31.6 G/DL (ref 31.5–35.7)
MCV RBC AUTO: 89.1 FL (ref 79–97)
MONOCYTES # BLD AUTO: 0.44 10*3/MM3 (ref 0.1–0.9)
MONOCYTES NFR BLD AUTO: 6.4 % (ref 5–12)
NEUTROPHILS NFR BLD AUTO: 5.23 10*3/MM3 (ref 1.7–7)
NEUTROPHILS NFR BLD AUTO: 76.1 % (ref 42.7–76)
NRBC BLD AUTO-RTO: 0 /100 WBC (ref 0–0.2)
PHOSPHATE SERPL-MCNC: 5.2 MG/DL (ref 2.5–4.5)
PLATELET # BLD AUTO: 193 10*3/MM3 (ref 140–450)
PMV BLD AUTO: 9.7 FL (ref 6–12)
POTASSIUM SERPL-SCNC: 4.2 MMOL/L (ref 3.5–5.2)
RBC # BLD AUTO: 3.87 10*6/MM3 (ref 3.77–5.28)
SODIUM SERPL-SCNC: 140 MMOL/L (ref 136–145)
WBC NRBC COR # BLD: 6.87 10*3/MM3 (ref 3.4–10.8)

## 2022-07-29 PROCEDURE — 85025 COMPLETE CBC W/AUTO DIFF WBC: CPT | Performed by: INTERNAL MEDICINE

## 2022-07-29 PROCEDURE — 96372 THER/PROPH/DIAG INJ SC/IM: CPT

## 2022-07-29 PROCEDURE — 80069 RENAL FUNCTION PANEL: CPT | Performed by: INTERNAL MEDICINE

## 2022-07-29 PROCEDURE — 25010000002 EPOETIN ALFA PER 1000 UNITS: Performed by: INTERNAL MEDICINE

## 2022-07-29 PROCEDURE — 36415 COLL VENOUS BLD VENIPUNCTURE: CPT

## 2022-07-29 RX ORDER — PREDNISOLONE ACETATE 10 MG/ML
1 SUSPENSION/ DROPS OPHTHALMIC 2 TIMES DAILY
COMMUNITY

## 2022-07-29 RX ADMIN — ERYTHROPOIETIN 40000 UNITS: 40000 INJECTION, SOLUTION INTRAVENOUS; SUBCUTANEOUS at 14:58

## 2022-08-16 ENCOUNTER — HOSPITAL ENCOUNTER (EMERGENCY)
Facility: HOSPITAL | Age: 72
Discharge: HOME | End: 2022-08-17
Attending: EMERGENCY MEDICINE | Admitting: EMERGENCY MEDICINE

## 2022-08-16 ENCOUNTER — APPOINTMENT (OUTPATIENT)
Dept: GENERAL RADIOLOGY | Facility: HOSPITAL | Age: 72
End: 2022-08-16

## 2022-08-16 DIAGNOSIS — R06.00 DYSPNEA, UNSPECIFIED TYPE: Primary | ICD-10-CM

## 2022-08-16 PROCEDURE — 85379 FIBRIN DEGRADATION QUANT: CPT | Performed by: EMERGENCY MEDICINE

## 2022-08-16 PROCEDURE — 84145 PROCALCITONIN (PCT): CPT | Performed by: EMERGENCY MEDICINE

## 2022-08-16 PROCEDURE — 80053 COMPREHEN METABOLIC PANEL: CPT | Performed by: EMERGENCY MEDICINE

## 2022-08-16 PROCEDURE — 99283 EMERGENCY DEPT VISIT LOW MDM: CPT

## 2022-08-16 PROCEDURE — 83880 ASSAY OF NATRIURETIC PEPTIDE: CPT | Performed by: EMERGENCY MEDICINE

## 2022-08-16 PROCEDURE — 85025 COMPLETE CBC W/AUTO DIFF WBC: CPT | Performed by: EMERGENCY MEDICINE

## 2022-08-16 PROCEDURE — 93005 ELECTROCARDIOGRAM TRACING: CPT

## 2022-08-16 PROCEDURE — 84484 ASSAY OF TROPONIN QUANT: CPT | Performed by: EMERGENCY MEDICINE

## 2022-08-16 PROCEDURE — 71045 X-RAY EXAM CHEST 1 VIEW: CPT

## 2022-08-16 PROCEDURE — 83605 ASSAY OF LACTIC ACID: CPT | Performed by: EMERGENCY MEDICINE

## 2022-08-16 RX ORDER — SODIUM CHLORIDE 0.9 % (FLUSH) 0.9 %
10 SYRINGE (ML) INJECTION AS NEEDED
Status: DISCONTINUED | OUTPATIENT
Start: 2022-08-16 | End: 2022-08-17 | Stop reason: HOSPADM

## 2022-08-17 VITALS
DIASTOLIC BLOOD PRESSURE: 94 MMHG | OXYGEN SATURATION: 100 % | SYSTOLIC BLOOD PRESSURE: 160 MMHG | HEIGHT: 66 IN | WEIGHT: 140 LBS | TEMPERATURE: 98.5 F | RESPIRATION RATE: 16 BRPM | HEART RATE: 90 BPM | BODY MASS INDEX: 22.5 KG/M2

## 2022-08-17 LAB
ALBUMIN SERPL-MCNC: 4.4 G/DL (ref 3.5–5.2)
ALBUMIN/GLOB SERPL: 1.8 G/DL
ALP SERPL-CCNC: 80 U/L (ref 39–117)
ALT SERPL W P-5'-P-CCNC: 10 U/L (ref 1–33)
ANION GAP SERPL CALCULATED.3IONS-SCNC: 11 MMOL/L (ref 5–15)
AST SERPL-CCNC: 18 U/L (ref 1–32)
BASOPHILS # BLD AUTO: 0.03 10*3/MM3 (ref 0–0.2)
BASOPHILS NFR BLD AUTO: 0.5 % (ref 0–1.5)
BILIRUB SERPL-MCNC: 0.5 MG/DL (ref 0–1.2)
BUN SERPL-MCNC: 17 MG/DL (ref 8–23)
BUN/CREAT SERPL: 19.1 (ref 7–25)
CALCIUM SPEC-SCNC: 9.8 MG/DL (ref 8.6–10.5)
CHLORIDE SERPL-SCNC: 99 MMOL/L (ref 98–107)
CO2 SERPL-SCNC: 30 MMOL/L (ref 22–29)
CREAT SERPL-MCNC: 0.89 MG/DL (ref 0.57–1)
D DIMER PPP FEU-MCNC: 0.47 MCGFEU/ML (ref 0–0.57)
D-LACTATE SERPL-SCNC: 0.6 MMOL/L (ref 0.5–2)
DEPRECATED RDW RBC AUTO: 44.9 FL (ref 37–54)
EGFRCR SERPLBLD CKD-EPI 2021: 69.8 ML/MIN/1.73
EOSINOPHIL # BLD AUTO: 0.14 10*3/MM3 (ref 0–0.4)
EOSINOPHIL NFR BLD AUTO: 2.2 % (ref 0.3–6.2)
ERYTHROCYTE [DISTWIDTH] IN BLOOD BY AUTOMATED COUNT: 13.8 % (ref 12.3–15.4)
GLOBULIN UR ELPH-MCNC: 2.5 GM/DL
GLUCOSE SERPL-MCNC: 113 MG/DL (ref 65–99)
HCT VFR BLD AUTO: 37.8 % (ref 34–46.6)
HGB BLD-MCNC: 11.7 G/DL (ref 12–15.9)
HOLD SPECIMEN: NORMAL
HOLD SPECIMEN: NORMAL
IMM GRANULOCYTES # BLD AUTO: 0.02 10*3/MM3 (ref 0–0.05)
IMM GRANULOCYTES NFR BLD AUTO: 0.3 % (ref 0–0.5)
LYMPHOCYTES # BLD AUTO: 0.9 10*3/MM3 (ref 0.7–3.1)
LYMPHOCYTES NFR BLD AUTO: 14.2 % (ref 19.6–45.3)
MCH RBC QN AUTO: 28.2 PG (ref 26.6–33)
MCHC RBC AUTO-ENTMCNC: 31 G/DL (ref 31.5–35.7)
MCV RBC AUTO: 91.1 FL (ref 79–97)
MONOCYTES # BLD AUTO: 0.4 10*3/MM3 (ref 0.1–0.9)
MONOCYTES NFR BLD AUTO: 6.3 % (ref 5–12)
NEUTROPHILS NFR BLD AUTO: 4.83 10*3/MM3 (ref 1.7–7)
NEUTROPHILS NFR BLD AUTO: 76.5 % (ref 42.7–76)
NRBC BLD AUTO-RTO: 0 /100 WBC (ref 0–0.2)
NT-PROBNP SERPL-MCNC: 1533 PG/ML (ref 0–900)
PLATELET # BLD AUTO: 198 10*3/MM3 (ref 140–450)
PMV BLD AUTO: 10.6 FL (ref 6–12)
POTASSIUM SERPL-SCNC: 4.1 MMOL/L (ref 3.5–5.2)
PROCALCITONIN SERPL-MCNC: 0.07 NG/ML (ref 0–0.25)
PROT SERPL-MCNC: 6.9 G/DL (ref 6–8.5)
RBC # BLD AUTO: 4.15 10*6/MM3 (ref 3.77–5.28)
SODIUM SERPL-SCNC: 140 MMOL/L (ref 136–145)
TROPONIN T SERPL-MCNC: <0.01 NG/ML (ref 0–0.03)
WBC NRBC COR # BLD: 6.32 10*3/MM3 (ref 3.4–10.8)
WHOLE BLOOD HOLD COAG: NORMAL
WHOLE BLOOD HOLD SPECIMEN: NORMAL

## 2022-08-17 PROCEDURE — 25010000002 FUROSEMIDE PER 20 MG: Performed by: EMERGENCY MEDICINE

## 2022-08-17 PROCEDURE — 96374 THER/PROPH/DIAG INJ IV PUSH: CPT

## 2022-08-17 RX ORDER — FUROSEMIDE 20 MG/1
20 TABLET ORAL DAILY
Qty: 7 TABLET | Refills: 0 | Status: SHIPPED | OUTPATIENT
Start: 2022-08-17 | End: 2022-09-07

## 2022-08-17 RX ORDER — FUROSEMIDE 10 MG/ML
40 INJECTION INTRAMUSCULAR; INTRAVENOUS ONCE
Status: COMPLETED | OUTPATIENT
Start: 2022-08-17 | End: 2022-08-17

## 2022-08-17 RX ADMIN — FUROSEMIDE 40 MG: 10 INJECTION, SOLUTION INTRAMUSCULAR; INTRAVENOUS at 01:25

## 2022-08-17 NOTE — ED PROVIDER NOTES
Subjective   72-year-old chronically ill female who presents to the ED with a chief complaint of chest pain, shortness of breath and edema.  Patient has severe COPD and wears 6 L nasal cannula at home at all times.  She is presenting today with worsening shortness of breath.  She states that her respiratory status is gotten worse for the last 3 months but it got much worse over the last 3 days.  She states that she has had increased dyspnea on exertion and is so fatigued with exertion that she has to sit down and rest.  She is having left lower chest pressure.  She states that it is not really pain but feels like a pressure on her left lower chest.  The pain does not radiate.  She also notes that over the last 3 days she is having increased edema in her bilateral lower extremities.  She denies history of congestive heart failure.  Patient denies increased cough.  Denies increased wheezing.  No nausea vomiting diarrhea abdominal pain.  No lightheadedness or dizziness.  No fever or chills.  No prior treatments or limiting factors.  No prior evaluations.  No other complaints at this time.          Review of Systems   Constitutional: Negative for fatigue and fever.   Respiratory: Positive for chest tightness and shortness of breath. Negative for cough and wheezing.    Cardiovascular: Positive for chest pain and leg swelling. Negative for palpitations.   All other systems reviewed and are negative.      Past Medical History:   Diagnosis Date   • Abnormal mammogram    • Acute UTI    • Arthritis     knee, right   • Back pain    • Candida vaginitis    • Cataracts, bilateral    • CHF (congestive heart failure) (HCC)     not diagnosed   • Chronic pain syndrome     disc disease, lumbar, neck   • Community acquired pneumonia    • COPD (chronic obstructive pulmonary disease) (HCC)     Emphysema with severe physiology on PFTs.  Patient is a former smoker.   • Cough    • Deafness    • Dependence on supplemental oxygen     PT  "REPORTS \"4L ALL TIMES\"   • Former smoker    • Gastric bleed    • GERD (gastroesophageal reflux disease)    • Glaucoma    • H/O chest x-ray 08/11/2015    Rase base opacity consistent with pneumonia or atelectasis   • H/O chest x-ray 06/24/2015    Right basilar airspace disease and effusion consistent with a pneumonia. F/U to radiographic reolution is recommended   • H/O chest x-ray 03/09/2010    Cardiac silhoutte is not enlarged. Lungs aare inflated. Mild nonspecifiec interstitial changes. No pleual effusions or dense consolidations. Scattered granulomatous changes. Spine with mild kyphosis but no osteopenia. No significant adenopathy   • H/O echocardiogram 03/16/2010    Normal study   • History of PFTs 12/13/2011    Goo pt cooperation and effort. Pt given 3 puffs of xopenex. PFT is acceptable and reproducible   • History of PFTs 08/11/2011    Pt unable to produce acceptabel and reproducible PFT. Pt was given 3 puffs of xopenex. Pt gave good effort Best pleth of two attempts   • History of PFTs 02/24/2011    PFT acceptable and reproducible. Pt gave good effort. Pt used bronchodilator less than 2 hours prior to testing   • Hyperlipidemia    • Hypertension    • Interstitial lung disease (HCC)     History of rheumatoid lung with bronchiolitis obliterans   • Kidney stones     x 1   • Nephrolithiasis     2007, passed   • Ocular migraine    • On home oxygen therapy     REPORTS USES AT 4L NC AT ALL TIMES   • Panic attack    • Pneumonia    • PRB (rectal bleeding)    • RA (rheumatoid arthritis) ([facility])    • Rheumatoid arthritis (HCC)     · A.  Diagnosed in 2001 with history of methotrexate and Enbrel therapy.   • Rheumatoid lung (HCC)     bronchiolitis obliterans   • Sinus problem    • Vertigo        Allergies   Allergen Reactions   • Brimonidine Tartrate Nausea And Vomiting   • Ciprofloxacin Other (See Comments)     \"Hurt all over\"   • Doxycycline Other (See Comments)     \"makes other medication stay in my system longer\"   • " "Lipitor [Atorvastatin] Other (See Comments)     Hurt all over     • Nsaids Other (See Comments)     Pt states she has stage three kidney disease   • Sulfa Antibiotics Other (See Comments)     \"cant remember\"   • Zoloft [Sertraline] Unknown - Low Severity     She states \"it causes glaucoma\"       Past Surgical History:   Procedure Laterality Date   • CATARACT EXTRACTION Right    • COLONOSCOPY     • COLONOSCOPY N/A 2018    Procedure: COLONOSCOPY WITH COLD SNARE POLYPECTOMY X 6; SUBMUCOSAL INJECTION OF SALINE; HOT SNARE POLYPECTOMY X 4; CLIP PLACEMENT X 1;  Surgeon: Kenney Pastrana MD;  Location: [facility] ENDOSCOPY;  Service:    • DENTAL PROCEDURE     • ENDOSCOPY N/A 12/15/2017    Procedure: ESOPHAGOGASTRODUODENOSCOPY with biopsies and esophageal balloon dilitation;  Surgeon: Kenney Pastrana MD;  Location: [facility] ENDOSCOPY;  Service:    • MOUTH SURGERY      REPORTS IMPLANT X2   • ORAL ANTRAL FISTULA CLOSURE      gums   • TONSILLECTOMY     • TUBAL ABDOMINAL LIGATION     • UPPER GASTROINTESTINAL ENDOSCOPY     • UPPER GASTROINTESTINAL ENDOSCOPY  12/15/2017       Family History   Problem Relation Age of Onset   • Glaucoma Mother    • Coronary artery disease Mother    • Liver cancer Mother         bile duct cancer   • Stroke Father    • Stomach cancer Father    • Diabetes Sister    • Colon cancer Neg Hx    • Ulcerative colitis Neg Hx        Social History     Socioeconomic History   • Marital status:    Tobacco Use   • Smoking status: Former Smoker     Packs/day: 1.00     Years: 25.00     Pack years: 25.00     Types: Cigarettes     Start date:      Quit date:      Years since quittin.6   • Smokeless tobacco: Never Used   Vaping Use   • Vaping Use: Never used   Substance and Sexual Activity   • Alcohol use: No   • Drug use: No   • Sexual activity: Defer     Comment:            Objective   Physical Exam  Vitals and nursing note reviewed.   Constitutional:       General: She is not in acute " distress.     Appearance: She is well-developed. She is not diaphoretic.      Comments: Chronically ill-appearing   HENT:      Head: Normocephalic and atraumatic.      Nose: Nose normal.   Eyes:      Conjunctiva/sclera: Conjunctivae normal.   Cardiovascular:      Rate and Rhythm: Normal rate and regular rhythm.   Pulmonary:      Effort: Pulmonary effort is normal. No respiratory distress.      Breath sounds: Normal breath sounds.   Abdominal:      General: There is no distension.      Palpations: Abdomen is soft.      Tenderness: There is no abdominal tenderness. There is no guarding.   Musculoskeletal:         General: No deformity.      Right lower leg: Edema present.      Left lower leg: Edema present.      Comments: 1+ edema bilateral lower extremities   Neurological:      Mental Status: She is alert and oriented to person, place, and time.      Cranial Nerves: No cranial nerve deficit.         Procedures           ED Course                                 EKG interpreted by me.  Sinus rhythm.  Rate of 77.  No obvious ST changes.  Nonspecific T wave abnormalities.  Abnormal EKG          MDM   PULSE OXIMETRY INTERPRETATION  Patient had a pulse ox of 100% on 6 L nasal cannula.  This is a normal pulse oximetry reading for this patient who wears 6 L nasal cannula at all times.        72-year-old female presented to the ED with left-sided chest discomfort shortness of breath and lower extremity edema.  Symptoms have been worsening over the last 3 days.  Pulse ox is appropriate on arrival.  Afebrile.  Heart rate normal.  EKG nonischemic.  Pain has been ongoing for 3 days.  Low suspicion for ACS given work-up today.  No cough or wheezing on evaluation.  Trace edema in the bilateral lower extremities.  Slightly elevated BNP.  Chest x-ray does not show focal pneumonia does show some possible mild pulmonary edema.  Patient is requesting to be discharged at this time.  Shared decision making with the patient.  She is  hemodynamically stable without increased oxygen requirements and has had a pretty relative reassuring work-up.  Suspect mild component of CHF.  Offered admission but again the patient is requesting to be discharged at this time.  Do feel that this is appropriate.  Will discharge on short course of diuretics.  Follow-up with primary care physician and cardiologist.  Patient agreeable to this plan.        Final diagnoses:   Dyspnea, unspecified type       ED Disposition  ED Disposition     ED Disposition   Discharge    Condition   Stable    Comment   --             Farhad Chowdary MD  [address]  [facility] [address] [phone]               Medication List      New Prescriptions    furosemide 20 MG tablet  Commonly known as: LASIX  Take 1 tablet by mouth Daily.           Where to Get Your Medications      [facility] [address] [place] [address] - [address] AT [facility] - [phone]  - [phone]   [address], [facility] [address]    Phone: [phone]   · furosemide 20 MG tablet          Elmer Menjivar,   08/17/22 0155

## 2022-08-18 ENCOUNTER — TELEPHONE (OUTPATIENT)
Dept: CARDIOLOGY | Facility: CLINIC | Age: 72
End: 2022-08-18

## 2022-08-19 ENCOUNTER — HOSPITAL ENCOUNTER (OUTPATIENT)
Dept: CARDIOLOGY | Facility: HOSPITAL | Age: 72
Discharge: HOME | End: 2022-08-19
Admitting: INTERNAL MEDICINE

## 2022-08-19 ENCOUNTER — OFFICE VISIT (OUTPATIENT)
Dept: CARDIOLOGY | Facility: CLINIC | Age: 72
End: 2022-08-19

## 2022-08-19 VITALS
SYSTOLIC BLOOD PRESSURE: 160 MMHG | WEIGHT: 140 LBS | OXYGEN SATURATION: 99 % | HEART RATE: 86 BPM | HEIGHT: 66 IN | BODY MASS INDEX: 22.5 KG/M2 | DIASTOLIC BLOOD PRESSURE: 64 MMHG

## 2022-08-19 DIAGNOSIS — E78.2 MIXED HYPERLIPIDEMIA: ICD-10-CM

## 2022-08-19 DIAGNOSIS — R09.89 SUSPECTED CARDIOMYOPATHY: Primary | ICD-10-CM

## 2022-08-19 DIAGNOSIS — R06.09 DOE (DYSPNEA ON EXERTION): ICD-10-CM

## 2022-08-19 DIAGNOSIS — R09.89 SUSPECTED CARDIOMYOPATHY: ICD-10-CM

## 2022-08-19 DIAGNOSIS — I10 HYPERTENSION, UNSPECIFIED TYPE: ICD-10-CM

## 2022-08-19 LAB
BH CV ECHO MEAS - AO MAX PG: 9.4 MMHG
BH CV ECHO MEAS - AO MEAN PG: 5 MMHG
BH CV ECHO MEAS - AO ROOT DIAM: 3.2 CM
BH CV ECHO MEAS - AO V2 MAX: 153 CM/SEC
BH CV ECHO MEAS - AO V2 VTI: 31.6 CM
BH CV ECHO MEAS - AVA(I,D): 2.6 CM2
BH CV ECHO MEAS - EDV(CUBED): 125 ML
BH CV ECHO MEAS - EDV(MOD-SP2): 81.6 ML
BH CV ECHO MEAS - EDV(MOD-SP4): 92.5 ML
BH CV ECHO MEAS - EF(MOD-BP): 60.6 %
BH CV ECHO MEAS - EF(MOD-SP2): 70.1 %
BH CV ECHO MEAS - EF(MOD-SP4): 55.9 %
BH CV ECHO MEAS - ESV(CUBED): 50.7 ML
BH CV ECHO MEAS - ESV(MOD-SP2): 24.4 ML
BH CV ECHO MEAS - ESV(MOD-SP4): 40.8 ML
BH CV ECHO MEAS - FS: 26 %
BH CV ECHO MEAS - IVS/LVPW: 1.11 CM
BH CV ECHO MEAS - IVSD: 1 CM
BH CV ECHO MEAS - LA DIMENSION: 4.4 CM
BH CV ECHO MEAS - LAT PEAK E' VEL: 8.2 CM/SEC
BH CV ECHO MEAS - LV MASS(C)D: 169.9 GRAMS
BH CV ECHO MEAS - LV MAX PG: 6.8 MMHG
BH CV ECHO MEAS - LV MEAN PG: 3 MMHG
BH CV ECHO MEAS - LV V1 MAX: 130 CM/SEC
BH CV ECHO MEAS - LV V1 VTI: 25.7 CM
BH CV ECHO MEAS - LVIDD: 5 CM
BH CV ECHO MEAS - LVIDS: 3.7 CM
BH CV ECHO MEAS - LVOT AREA: 3.1 CM2
BH CV ECHO MEAS - LVOT DIAM: 2 CM
BH CV ECHO MEAS - LVPWD: 0.9 CM
BH CV ECHO MEAS - MED PEAK E' VEL: 7.1 CM/SEC
BH CV ECHO MEAS - MV A MAX VEL: 132 CM/SEC
BH CV ECHO MEAS - MV DEC TIME: 0.22 MSEC
BH CV ECHO MEAS - MV E MAX VEL: 84 CM/SEC
BH CV ECHO MEAS - MV E/A: 0.64
BH CV ECHO MEAS - PA ACC TIME: 0.18 SEC
BH CV ECHO MEAS - PA PR(ACCEL): -3.3 MMHG
BH CV ECHO MEAS - PA V2 MAX: 115 CM/SEC
BH CV ECHO MEAS - SV(LVOT): 80.7 ML
BH CV ECHO MEAS - SV(MOD-SP2): 57.2 ML
BH CV ECHO MEAS - SV(MOD-SP4): 51.7 ML
BH CV ECHO MEAS - TAPSE (>1.6): 2.9 CM
BH CV ECHO MEASUREMENTS AVERAGE E/E' RATIO: 10.98
BH CV VAS BP LEFT ARM: NORMAL MMHG
BH CV XLRA - RV BASE: 3.8 CM
BH CV XLRA - RV LENGTH: 5.4 CM
BH CV XLRA - RV MID: 2.7 CM
BH CV XLRA - TDI S': 21.7 CM/SEC
LEFT ATRIUM VOLUME INDEX: 42.9 ML/M2
LV EF 2D ECHO EST: 70 %
MAXIMAL PREDICTED HEART RATE: 150 BPM
STRESS TARGET HR: 128 BPM

## 2022-08-19 PROCEDURE — 93306 TTE W/DOPPLER COMPLETE: CPT | Performed by: INTERNAL MEDICINE

## 2022-08-19 PROCEDURE — 99214 OFFICE O/P EST MOD 30 MIN: CPT | Performed by: INTERNAL MEDICINE

## 2022-08-19 PROCEDURE — 93306 TTE W/DOPPLER COMPLETE: CPT

## 2022-08-19 NOTE — PROGRESS NOTES
"[facility] Cardiology  Office Progress Note  Vanna Rincon  1950  [address] [place] [address]       Visit Date: 08/19/22    PCP: Farhad Chowdary MD  [address]  [place] [address]    IDENTIFICATION: A 72 y.o. female from [place], [place]    PROBLEM LIST:   1. 8/22 [facility] ER evaluation for shortness of breath, chest pain, with elevation in proBNP  2. HTN  3. HLD  1. 10/18/16 lipids:  TG 95 HDL 67 and   4. COPD, on chronic O2 4L  1. Follows with [facility]   2. 11/17 echo EF 60%, rvsp 13  5. Rheumatoid lung disease  1. Follows with Dr. Lan   6. Stage III CKD  7. Secondary hyperparathyroidism  8. GERD  9. Chronic pain  10. Former tobacco abuse, cessation 1991  11. Rheumatoid arthritis  12. Anxiety/depression  13. Hiatal hernia  14. Surgical Hx  1. Tubal ligation  2. Tonsillectomy      CC:   Chief Complaint   Patient presents with   • Shortness of Breath       Allergies  Allergies   Allergen Reactions   • Brimonidine Tartrate Nausea And Vomiting   • Ciprofloxacin Other (See Comments)     \"Hurt all over\"   • Doxycycline Other (See Comments)     \"makes other medication stay in my system longer\"   • Lipitor [Atorvastatin] Other (See Comments)     Hurt all over     • Nsaids Other (See Comments)     Pt states she has stage three kidney disease   • Sulfa Antibiotics Other (See Comments)     \"cant remember\"   • Zoloft [Sertraline] Unknown - Low Severity     She states \"it causes glaucoma\"       Current Medications    Current Outpatient Medications:   •  budesonide-formoterol (SYMBICORT) 160-4.5 MCG/ACT inhaler, Inhale 2 puffs 2 (Two) Times a Day., Disp: 3 each, Rfl: 3  •  epoetin chidi (Procrit) 2000 UNIT/ML injection, Inject  under the skin into the appropriate area as directed Every 30 (Thirty) Days., Disp: , Rfl:   •  Etanercept 50 MG/ML solution auto-injector, Inject  under the skin 1 (One) Time Per Week., Disp: , Rfl:   •  fentaNYL (DURAGESIC) 75 MCG/HR patch, Place " "1 patch on the skin Every 72 (Seventy-Two) Hours., Disp: , Rfl:   •  furosemide (LASIX) 20 MG tablet, Take 1 tablet by mouth Daily., Disp: 7 tablet, Rfl: 0  •  HYDROcodone-acetaminophen (NORCO) 5-325 MG per tablet, Take 1 tablet by mouth Every 6 (Six) Hours As Needed., Disp: , Rfl:   •  LORazepam (ATIVAN) 0.5 MG tablet, Take 1 tablet by mouth Daily As Needed for Anxiety., Disp: 30 tablet, Rfl: 1  •  Multiple Vitamins-Minerals (CENTRUM ADULTS PO), Take 1 tablet by mouth Daily., Disp: , Rfl:   •  O2 (OXYGEN), Inhale 6 L/min Continuous., Disp: , Rfl:   •  ondansetron (ZOFRAN) 8 MG tablet, Take  by mouth Every 8 (Eight) Hours As Needed for Nausea or Vomiting., Disp: , Rfl:   •  phenylephrine (FAVIO-SYNEPHRINE) 1 % nasal spray, 1 spray into the nostril(s) as directed by provider Every 4 (Four) Hours As Needed for Congestion., Disp: , Rfl:   •  simethicone (MYLICON) 125 MG chewable tablet, Chew 125 mg Every 6 (Six) Hours As Needed for Flatulence., Disp: , Rfl:   •  valsartan-hydrochlorothiazide (DIOVAN-HCT) 320-25 MG per tablet, Take 1 tablet by mouth Daily., Disp: , Rfl:   •  prednisoLONE acetate (PRED FORTE) 1 % ophthalmic suspension, Administer 1 drop to the right eye 2 (Two) Times a Day., Disp: , Rfl:   •  sertraline (ZOLOFT) 100 MG tablet, Take 1 tablet by mouth Daily., Disp: 90 tablet, Rfl: 3      History of Present Illness   Vanna Rincon is a 72 y.o. year old female here for follow up.    History today from the patient is very difficult to ascertain and most of the history is appreciated from the chart review.  However, some details were added by the patient today.    Upon history and chart review today, the patient presented to the [facility] ER for acute onset of worsening shortness of breath and intermittent atypical chest pains.  Of significance, the patient reported that on 8/16/2022, the patient developed a worsening \"body pressure\", worsening shortness of breath, and intermittent atypical lower " "left-sided chest aching/throbbing pains.  The patient reported that there was no inciting event nor eliciting factors contributing to this acute/evolving symptomatology.  However she stated that musculoskeletal and deep breathing  has at times elicited the atypical throbbing/left-sided chest pain.    Of significance, the patient reports that she is chronically short of breath due to the fact that she has rheumatoid lung.  However since the onset of this acute shortness of breath and atypical chest throbbing pain she has been in more dyspneic.    Upon ER presentation ACS was ruled out with acceptable EKGs and troponins.  Otherwise labs were normal with exception to an elevated proBNP appreciated at 1533.    Of significance, the patient also reports new onset orthopnea slight worsening of her lower extremity edema concomitantly with her acute onset of SOB.    Pt denies any palpitations, lower extremity edema, or claudication.      OBJECTIVE:  Vitals:    08/19/22 1351   BP: 160/64   BP Location: Right arm   Patient Position: Sitting   Cuff Size: Adult   Pulse: 86   SpO2: 99%   Weight: 63.5 kg (140 lb)   Height: 166.4 cm (65.5\")     Body mass index is 22.94 kg/m².    Vitals and nursing note reviewed.   Constitutional:       General: Awake. Not in acute distress.     Appearance: Well-developed and underweight. Chronically ill-appearing.   Eyes:      Conjunctiva/sclera: Conjunctivae normal.      Pupils: Pupils are equal, round, and reactive to light.   HENT:      Head: Normocephalic and atraumatic.      Nose: Nose normal.    Mouth/Throat:      Pharynx: Uvula midline.   Neck:      Vascular: No carotid bruit.      Trachea: No tracheal deviation.   Pulmonary:      Effort: Pulmonary effort is normal.      Breath sounds: Normal breath sounds. No wheezing. No rhonchi. No rales.   Cardiovascular:      Normal rate. Regular rhythm. Normal S1. Normal S2.      Murmurs: There is a diastolic murmur.      No gallop. No click. No rub. "   Edema:     Pretibial: bilateral trace edema of the pretibial area.     Ankle: bilateral trace edema of the ankle.  Abdominal:      General: Bowel sounds are normal.      Palpations: Abdomen is soft.   Musculoskeletal:      Cervical back: Normal range of motion and neck supple. Skin:     General: Skin is warm and dry.      Findings: No erythema.   Neurological:      Mental Status: Alert, oriented to person, place, and time and oriented to person, place and time.   Psychiatric:         Attention and Perception: Attention normal.         Mood and Affect: Mood normal.         Speech: Speech normal.         Behavior: Behavior normal. Behavior is cooperative.         Diagnostic Data:  Procedures      ASSESSMENT:   Diagnosis Plan   1. Suspected cardiomyopathy     2. Hypertension, unspecified type     3. BAKER (dyspnea on exertion)     4. Mixed hyperlipidemia         PLAN:  1.  Dyspnea we will proceed with 2D echocardiogram assess for heart structure and function.    2.  Hypertension - patient in clinic blood pressure appreciated at 160/64.  Displays multiple  blood pressures that are fluctuating between 160 to 110.  We instructed the patient to follow her blood pressures over the next 2 weeks and inform the clinic if her blood pressures are persistently above goal <130/80.  3.  BAKER - patient has longstanding history of BAKER secondary to rheumatoid lung.  .  4.  Mixed hyperlipidemia- patient needs updated lipid panel per our records.    Scribe: Stiven Weinstein PA-C for Dr. Kraig Marquez M.D. [facility]    Kraig Marquez MD, [facility]

## 2022-08-26 ENCOUNTER — HOSPITAL ENCOUNTER (OUTPATIENT)
Dept: INFUSION THERAPY | Facility: HOSPITAL | Age: 72
End: 2022-08-26

## 2022-08-26 ENCOUNTER — TELEPHONE (OUTPATIENT)
Dept: PULMONOLOGY | Facility: CLINIC | Age: 72
End: 2022-08-26

## 2022-08-26 DIAGNOSIS — N18.30 STAGE 3 CHRONIC KIDNEY DISEASE, UNSPECIFIED WHETHER STAGE 3A OR 3B CKD: Primary | ICD-10-CM

## 2022-08-26 DIAGNOSIS — D50.9 IRON DEFICIENCY ANEMIA, UNSPECIFIED IRON DEFICIENCY ANEMIA TYPE: ICD-10-CM

## 2022-08-26 NOTE — TELEPHONE ENCOUNTER
Pt called today requesting a call back @ [phone] due to having a lot of sob and not getting any better since leaving ED on 8/16/22. Pt is also having to use more oxygen and not knowing what else she needs to be doing. Pt declined to schedule an apt.

## 2022-08-29 ENCOUNTER — TELEPHONE (OUTPATIENT)
Dept: PULMONOLOGY | Facility: CLINIC | Age: 72
End: 2022-08-29

## 2022-08-29 ENCOUNTER — TELEMEDICINE (OUTPATIENT)
Dept: PULMONOLOGY | Facility: CLINIC | Age: 72
End: 2022-08-29

## 2022-08-29 DIAGNOSIS — J96.11 CHRONIC RESPIRATORY FAILURE WITH HYPOXIA: ICD-10-CM

## 2022-08-29 DIAGNOSIS — R09.89 AIR HUNGER: ICD-10-CM

## 2022-08-29 DIAGNOSIS — J44.9 CHRONIC OBSTRUCTIVE PULMONARY DISEASE, UNSPECIFIED COPD TYPE: Primary | ICD-10-CM

## 2022-08-29 DIAGNOSIS — J84.9 INTERSTITIAL LUNG DISEASE: ICD-10-CM

## 2022-08-29 DIAGNOSIS — F41.9 ANXIETY: ICD-10-CM

## 2022-08-29 PROCEDURE — 99214 OFFICE O/P EST MOD 30 MIN: CPT | Performed by: NURSE PRACTITIONER

## 2022-08-29 RX ORDER — LEVALBUTEROL TARTRATE 45 UG/1
2 AEROSOL, METERED ORAL 4 TIMES DAILY PRN
Qty: 15 G | Refills: 5 | Status: SHIPPED | OUTPATIENT
Start: 2022-08-29

## 2022-08-29 NOTE — PROGRESS NOTES
[facility] Pulmonary Video Visit    CHIEF COMPLAINT    Shortness     Consent for Video Visit was obtained via [employer]    Patient presents during the COVID-19 pandemic and federally declared state of public emergency.  The service was conducted via telehealth.  Patient is worried about exposure therefore telehealth services were necessary.      Subjective   HISTORY OF PRESENT ILLNESS    Vanna Rincon is a 72 y.o.female was evaluated today for worsening shortness of breath.  She says ever since she went to the emergency department on August 17 she has not been able to catch her breath.  She feels like she is starving for oxygen but her oxygen saturations are all in the 90s.  Currently she is using 6 L.  She states she has quite a bit of pressure in her chest making it very difficult for her to breathe.  She has been using her Symbicort twice daily, sometimes she uses an extra dose during the day.  She does not currently have any rescue inhalers or nebulizers.  She said the risk inhaler really has never helped much and the nebulizers make her chest hurt and make her jittery.    She denies a cough or sputum production.    During her ER visit she was noted have a little bit increased swelling and placed on few days of Lasix.  She did feel like that helped at first but no longer.  She denies any lower extremity edema at this time.    She also complains of not being able to eat or drink very much due to bloating and shortness of breath.    She is also been on steroids lately which she said did not help her symptoms.          MEDICATION LIST AND ALLERGIES REVIEWED.    FAMILY AND SOCIAL HISTORY REVIEWED.      Objective       Immunization History   Administered Date(s) Administered   • COVID-19 (PFIZER) PURPLE CAP 03/31/2021, 04/23/2021, 11/22/2021   • Covid-19 (Pfizer) Gray Cap 05/20/2022   • Fluzone High Dose =>65 Years ([employer] ONLY) 10/24/2017   • Fluzone High-Dose 65+yrs 11/04/2021   • Fluzone Quad >6mos (Multi-dose)  10/04/2016   • Influenza Quad Vaccine (Inpatient) 10/01/2021   • Pneumococcal Conjugate 13-Valent (PCV13) 10/01/2015   • Pneumococcal Polysaccharide (PPSV23) 10/01/2013         Physical exam unable to be completed secondary to nature visit  Appeared in no acute distress, no difficulty with conversation.    Oriented to person, place and time.   Normal respiratory effort.        RESULTS    PROCEDURE: XR CHEST 1 VW-     HISTORY: edema, shortness of breath     COMPARISON: 06/24/2015     FINDINGS: The lungs are hyperlucent hyperexpanded with flattening of the  hemidiaphragms, consistent with chronic obstructive pulmonary disease.  Lungs are otherwise clear.       There is no evidence of effusion or other pleural disease.  The  mediastinum has a normal appearance.      The cardiac silhouette is unremarkable.     IMPRESSION:  No acute findings. Underlying emphysema.           Assessment & Plan     PROBLEM LIST    Problem List Items Addressed This Visit        Mental Health    Anxiety       Pulmonary and Pneumonias    Chronic obstructive pulmonary disease (HCC) - Primary    Overview     Description: A.  Emphysema with severe physiology on PFTs.  Patient is a former smoker.         Relevant Medications    levalbuterol (XOPENEX HFA) 45 MCG/ACT inhaler    Chronic respiratory failure with hypoxia (HCC)    Relevant Orders    Ambulatory Referral to Palliative Care    Interstitial lung disease (HCC)    Overview      · A.  History of rheumatoid lung with bronchiolitis obliterans         Relevant Medications    levalbuterol (XOPENEX HFA) 45 MCG/ACT inhaler      Other Visit Diagnoses     Air hunger        Relevant Orders    Ambulatory Referral to Palliative Care            DISCUSSION    We reviewed the findings of her evaluation in the ER.  Her chest x-ray had no acute findings and her labs were rather negative other than a slightly elevated BNP for which she took some Lasix for.    We did discuss her symptoms and possible treatment  options.  Unfortunately she feels like she has tried multiple other inhalers and nebulizers without much effect.  I did ask her if she would try Xopenex HFA as needed to help with the chest tightness and shortness of breath.  She is agreeable to try that medicine that into her pharmacy today.  Continue on her Symbicort twice daily.    Unfortunately I think a lot of the problem is her chronic dyspnea and air hunger.  She does admit she has quite bit of anxiety and had recently stopped her antidepressants due to a potential interaction with her glaucoma.  She did not ask her eye doctor about the possible contraindications before stopping her medications, she had read it online.    I discussed that she may find some relief with her symptoms through palliative care.  We discussed that they provide symptom management with chronic diseases.  She is agreeable for palliative care at this time to help with her air hunger.  I sent the referral over today.              The patient was located at her home in [place], [place] and I, the provider was located in the [facility] pulmonary and critical care office.    I spent 35 minutes caring for Vanna on this date of service. This time includes time spent by me in the following activities:preparing for the visit, reviewing tests, obtaining and/or reviewing a separately obtained history, counseling and educating the patient/family/caregiver, ordering medications, tests, or procedures, referring and communicating with other health care professionals , documenting information in the medical record, independently interpreting results and communicating that information with the patient/family/caregiver and care coordination          Hope Patel, APRN  08/29/202211:01 EDT  Electronically signed     Please note that portions of this note were completed with a voice recognition program. Efforts were made to edit the dictations, but occasionally words are mistranscribed.      CC: Long,  Farhad BELLAMY MD

## 2022-09-07 ENCOUNTER — HOSPITAL ENCOUNTER (EMERGENCY)
Facility: HOSPITAL | Age: 72
Discharge: HOME | End: 2022-09-07
Attending: EMERGENCY MEDICINE | Admitting: EMERGENCY MEDICINE

## 2022-09-07 ENCOUNTER — APPOINTMENT (OUTPATIENT)
Dept: GENERAL RADIOLOGY | Facility: HOSPITAL | Age: 72
End: 2022-09-07

## 2022-09-07 VITALS
BODY MASS INDEX: 23.66 KG/M2 | SYSTOLIC BLOOD PRESSURE: 181 MMHG | TEMPERATURE: 98.7 F | HEART RATE: 81 BPM | DIASTOLIC BLOOD PRESSURE: 94 MMHG | OXYGEN SATURATION: 100 % | WEIGHT: 142 LBS | RESPIRATION RATE: 20 BRPM | HEIGHT: 65 IN

## 2022-09-07 DIAGNOSIS — R07.9 CHEST PAIN, UNSPECIFIED TYPE: Primary | ICD-10-CM

## 2022-09-07 DIAGNOSIS — M79.89 LEG SWELLING: ICD-10-CM

## 2022-09-07 LAB
ALBUMIN SERPL-MCNC: 4.5 G/DL (ref 3.5–5.2)
ALBUMIN/GLOB SERPL: 1.9 G/DL
ALP SERPL-CCNC: 81 U/L (ref 39–117)
ALT SERPL W P-5'-P-CCNC: 11 U/L (ref 1–33)
ANION GAP SERPL CALCULATED.3IONS-SCNC: 7.3 MMOL/L (ref 5–15)
AST SERPL-CCNC: 19 U/L (ref 1–32)
BASOPHILS # BLD AUTO: 0.02 10*3/MM3 (ref 0–0.2)
BASOPHILS NFR BLD AUTO: 0.3 % (ref 0–1.5)
BILIRUB SERPL-MCNC: 0.3 MG/DL (ref 0–1.2)
BUN SERPL-MCNC: 30 MG/DL (ref 8–23)
BUN/CREAT SERPL: 23.3 (ref 7–25)
CALCIUM SPEC-SCNC: 9.4 MG/DL (ref 8.6–10.5)
CHLORIDE SERPL-SCNC: 99 MMOL/L (ref 98–107)
CO2 SERPL-SCNC: 35.7 MMOL/L (ref 22–29)
CREAT SERPL-MCNC: 1.29 MG/DL (ref 0.57–1)
DEPRECATED RDW RBC AUTO: 42.4 FL (ref 37–54)
EGFRCR SERPLBLD CKD-EPI 2021: 44.7 ML/MIN/1.73
EOSINOPHIL # BLD AUTO: 0.23 10*3/MM3 (ref 0–0.4)
EOSINOPHIL NFR BLD AUTO: 3.2 % (ref 0.3–6.2)
ERYTHROCYTE [DISTWIDTH] IN BLOOD BY AUTOMATED COUNT: 13.2 % (ref 12.3–15.4)
GLOBULIN UR ELPH-MCNC: 2.4 GM/DL
GLUCOSE SERPL-MCNC: 105 MG/DL (ref 65–99)
HCT VFR BLD AUTO: 34 % (ref 34–46.6)
HGB BLD-MCNC: 11 G/DL (ref 12–15.9)
HOLD SPECIMEN: NORMAL
HOLD SPECIMEN: NORMAL
IMM GRANULOCYTES # BLD AUTO: 0.02 10*3/MM3 (ref 0–0.05)
IMM GRANULOCYTES NFR BLD AUTO: 0.3 % (ref 0–0.5)
LYMPHOCYTES # BLD AUTO: 2.1 10*3/MM3 (ref 0.7–3.1)
LYMPHOCYTES NFR BLD AUTO: 29.4 % (ref 19.6–45.3)
MCH RBC QN AUTO: 28.4 PG (ref 26.6–33)
MCHC RBC AUTO-ENTMCNC: 32.4 G/DL (ref 31.5–35.7)
MCV RBC AUTO: 87.9 FL (ref 79–97)
MONOCYTES # BLD AUTO: 0.51 10*3/MM3 (ref 0.1–0.9)
MONOCYTES NFR BLD AUTO: 7.1 % (ref 5–12)
NEUTROPHILS NFR BLD AUTO: 4.27 10*3/MM3 (ref 1.7–7)
NEUTROPHILS NFR BLD AUTO: 59.7 % (ref 42.7–76)
NRBC BLD AUTO-RTO: 0 /100 WBC (ref 0–0.2)
PLATELET # BLD AUTO: 208 10*3/MM3 (ref 140–450)
PMV BLD AUTO: 9.6 FL (ref 6–12)
POTASSIUM SERPL-SCNC: 4.1 MMOL/L (ref 3.5–5.2)
PROT SERPL-MCNC: 6.9 G/DL (ref 6–8.5)
RBC # BLD AUTO: 3.87 10*6/MM3 (ref 3.77–5.28)
SODIUM SERPL-SCNC: 142 MMOL/L (ref 136–145)
TROPONIN T SERPL-MCNC: <0.01 NG/ML (ref 0–0.03)
TROPONIN T SERPL-MCNC: <0.01 NG/ML (ref 0–0.03)
WBC NRBC COR # BLD: 7.15 10*3/MM3 (ref 3.4–10.8)
WHOLE BLOOD HOLD COAG: NORMAL
WHOLE BLOOD HOLD SPECIMEN: NORMAL

## 2022-09-07 PROCEDURE — 25010000002 MORPHINE PER 10 MG: Performed by: EMERGENCY MEDICINE

## 2022-09-07 PROCEDURE — 36415 COLL VENOUS BLD VENIPUNCTURE: CPT

## 2022-09-07 PROCEDURE — 85025 COMPLETE CBC W/AUTO DIFF WBC: CPT | Performed by: EMERGENCY MEDICINE

## 2022-09-07 PROCEDURE — 80053 COMPREHEN METABOLIC PANEL: CPT | Performed by: EMERGENCY MEDICINE

## 2022-09-07 PROCEDURE — 71045 X-RAY EXAM CHEST 1 VIEW: CPT

## 2022-09-07 PROCEDURE — 96374 THER/PROPH/DIAG INJ IV PUSH: CPT

## 2022-09-07 PROCEDURE — 99284 EMERGENCY DEPT VISIT MOD MDM: CPT

## 2022-09-07 PROCEDURE — 84484 ASSAY OF TROPONIN QUANT: CPT | Performed by: EMERGENCY MEDICINE

## 2022-09-07 PROCEDURE — 93005 ELECTROCARDIOGRAM TRACING: CPT | Performed by: EMERGENCY MEDICINE

## 2022-09-07 RX ORDER — BUMETANIDE 0.5 MG/1
0.5 TABLET ORAL DAILY
Qty: 30 TABLET | Refills: 0 | Status: SHIPPED | OUTPATIENT
Start: 2022-09-07 | End: 2022-09-19 | Stop reason: SINTOL

## 2022-09-07 RX ORDER — MORPHINE SULFATE 4 MG/ML
4 INJECTION, SOLUTION INTRAMUSCULAR; INTRAVENOUS ONCE
Status: COMPLETED | OUTPATIENT
Start: 2022-09-07 | End: 2022-09-07

## 2022-09-07 RX ORDER — SODIUM CHLORIDE 0.9 % (FLUSH) 0.9 %
10 SYRINGE (ML) INJECTION AS NEEDED
Status: DISCONTINUED | OUTPATIENT
Start: 2022-09-07 | End: 2022-09-07 | Stop reason: HOSPADM

## 2022-09-07 RX ADMIN — MORPHINE SULFATE 4 MG: 4 INJECTION, SOLUTION INTRAMUSCULAR; INTRAVENOUS at 07:58

## 2022-09-07 RX ADMIN — NITROGLYCERIN 1 INCH: 20 OINTMENT TOPICAL at 08:00

## 2022-09-07 NOTE — ED PROVIDER NOTES
"Subjective   PIT    72-year-old female presenting with chest pain.  She states that last night started having some lower sternal chest pain.  This radiates across her left chest into her left shoulder.  It has been constant.  It is described as a \"chiseling pain\".  There are no particular alleviating or aggravating factors.  She also notes several weeks of increasing shortness of breath and swelling.  She was seen here in the ER previously, had relatively unremarkable work-up, followed up with cardiology and had echo which revealed a normal EF and mild diastolic dysfunction.  She denies any fevers, chills, cough, nausea, vomiting.          Review of Systems   Constitutional: Negative.    HENT: Negative.    Eyes: Negative.    Respiratory: Positive for shortness of breath.    Cardiovascular: Positive for chest pain and leg swelling. Negative for palpitations.   Gastrointestinal: Negative.    Genitourinary: Negative.    Musculoskeletal: Negative.    Skin: Negative.    Neurological: Negative.    Psychiatric/Behavioral: Negative.        Past Medical History:   Diagnosis Date   • Abnormal mammogram    • Acute UTI    • Arthritis     knee, right   • Back pain    • Candida vaginitis    • Cataracts, bilateral    • CHF (congestive heart failure) (Formerly [facility])     not diagnosed   • Chronic pain syndrome     disc disease, lumbar, neck   • Community acquired pneumonia    • COPD (chronic obstructive pulmonary disease) (Formerly [facility])     Emphysema with severe physiology on PFTs.  Patient is a former smoker.   • Cough    • Deafness    • Dependence on supplemental oxygen     PT REPORTS \"4L ALL TIMES\"   • Former smoker    • Gastric bleed    • GERD (gastroesophageal reflux disease)    • Glaucoma    • H/O chest x-ray 08/11/2015    Rase base opacity consistent with pneumonia or atelectasis   • H/O chest x-ray 06/24/2015    Right basilar airspace disease and effusion consistent with a pneumonia. F/U to radiographic reolution is recommended   • H/O chest " "x-ray 03/09/2010    Cardiac silhoutte is not enlarged. Lungs aare inflated. Mild nonspecifiec interstitial changes. No pleual effusions or dense consolidations. Scattered granulomatous changes. Spine with mild kyphosis but no osteopenia. No significant adenopathy   • H/O echocardiogram 03/16/2010    Normal study   • History of PFTs 12/13/2011    Goo pt cooperation and effort. Pt given 3 puffs of xopenex. PFT is acceptable and reproducible   • History of PFTs 08/11/2011    Pt unable to produce acceptabel and reproducible PFT. Pt was given 3 puffs of xopenex. Pt gave good effort Best pleth of two attempts   • History of PFTs 02/24/2011    PFT acceptable and reproducible. Pt gave good effort. Pt used bronchodilator less than 2 hours prior to testing   • Hyperlipidemia    • Hypertension    • Interstitial lung disease (HCC)     History of rheumatoid lung with bronchiolitis obliterans   • Kidney stones     x 1   • Nephrolithiasis     2007, passed   • Ocular migraine    • On home oxygen therapy     REPORTS USES AT 4L NC AT ALL TIMES   • Panic attack    • Pneumonia    • PRB (rectal bleeding)    • RA (rheumatoid arthritis) (HCC)    • Rheumatoid arthritis (HCC)     · A.  Diagnosed in 2001 with history of methotrexate and Enbrel therapy.   • Rheumatoid lung (HCC)     bronchiolitis obliterans   • Sinus problem    • Vertigo        Allergies   Allergen Reactions   • Brimonidine Tartrate Nausea And Vomiting   • Ciprofloxacin Other (See Comments)     \"Hurt all over\"   • Doxycycline Other (See Comments)     \"makes other medication stay in my system longer\"   • Lipitor [Atorvastatin] Other (See Comments)     Hurt all over     • Nsaids Other (See Comments)     Pt states she has stage three kidney disease   • Sulfa Antibiotics Other (See Comments)     \"cant remember\"   • Zoloft [Sertraline] Unknown - Low Severity     She states \"it causes glaucoma\"       Past Surgical History:   Procedure Laterality Date   • CATARACT EXTRACTION Right  "   • COLONOSCOPY     • COLONOSCOPY N/A 2018    Procedure: COLONOSCOPY WITH COLD SNARE POLYPECTOMY X 6; SUBMUCOSAL INJECTION OF SALINE; HOT SNARE POLYPECTOMY X 4; CLIP PLACEMENT X 1;  Surgeon: Kenney Pastrana MD;  Location: [facility] ENDOSCOPY;  Service:    • DENTAL PROCEDURE     • ENDOSCOPY N/A 12/15/2017    Procedure: ESOPHAGOGASTRODUODENOSCOPY with biopsies and esophageal balloon dilitation;  Surgeon: Kenney Patsrana MD;  Location: [facility] ENDOSCOPY;  Service:    • MOUTH SURGERY      REPORTS IMPLANT X2   • ORAL ANTRAL FISTULA CLOSURE      gums   • TONSILLECTOMY     • TUBAL ABDOMINAL LIGATION     • UPPER GASTROINTESTINAL ENDOSCOPY     • UPPER GASTROINTESTINAL ENDOSCOPY  12/15/2017       Family History   Problem Relation Age of Onset   • Glaucoma Mother    • Coronary artery disease Mother    • Liver cancer Mother         bile duct cancer   • Stroke Father    • Stomach cancer Father    • Diabetes Sister    • Colon cancer Neg Hx    • Ulcerative colitis Neg Hx        Social History     Socioeconomic History   • Marital status:    Tobacco Use   • Smoking status: Former Smoker     Packs/day: 1.00     Years: 25.00     Pack years: 25.00     Types: Cigarettes     Start date:      Quit date:      Years since quittin.   • Smokeless tobacco: Never Used   Vaping Use   • Vaping Use: Never used   Substance and Sexual Activity   • Alcohol use: No   • Drug use: No   • Sexual activity: Defer     Comment:            Objective   Physical Exam  Constitutional:       General: She is not in acute distress.     Appearance: She is not toxic-appearing or diaphoretic.      Comments: Chronically ill-appearing, frail   HENT:      Head: Normocephalic and atraumatic.      Right Ear: External ear normal.      Left Ear: External ear normal.      Nose: Nose normal.      Mouth/Throat:      Mouth: Mucous membranes are moist.      Pharynx: Oropharynx is clear.   Eyes:      Extraocular Movements: Extraocular movements  intact.      Conjunctiva/sclera: Conjunctivae normal.      Pupils: Pupils are equal, round, and reactive to light.   Cardiovascular:      Rate and Rhythm: Normal rate and regular rhythm.      Pulses: Normal pulses.      Heart sounds: Normal heart sounds.   Pulmonary:      Effort: Pulmonary effort is normal. No respiratory distress.      Comments: Diminished throughout  Abdominal:      General: Bowel sounds are normal. There is no distension.      Tenderness: There is no abdominal tenderness.   Musculoskeletal:         General: No tenderness or deformity. Normal range of motion.      Cervical back: Normal range of motion.      Comments: Mild pitting edema to the knees bilaterally   Skin:     General: Skin is warm and dry.      Capillary Refill: Capillary refill takes less than 2 seconds.      Findings: No rash.   Neurological:      General: No focal deficit present.      Mental Status: She is alert and oriented to person, place, and time.   Psychiatric:         Mood and Affect: Mood normal.         Behavior: Behavior normal.         Procedures           ED Course                      HEART Score: 4                      MDM  Number of Diagnoses or Management Options  Chest pain, unspecified type  Leg swelling  Diagnosis management comments: 72-year-old female with chest pain and shortness of breath.  Chronically ill-appearing frail elderly lady in no distress with exam as above.  Her vital signs are normal including a normal oxygen saturation on her home O2.  Will obtain labs, EKG and chest x-ray.  Will provide symptomatic treatment.  Disposition pending.    DDx: ACS, CHF, chronic lung disease    EKG interpreted by me: Sinus rhythm, normal rate, no acute ST/T changes, this is a normal EKG    Patient took her home Ativan and now feels much better.  Her work-up is overall reassuring, serial enzymes are negative.  I do feel she is safe for discharge home at this time.  She has requested something for her edema.  She had  apparent adverse reaction to Lasix so we will try some Bumex.       Amount and/or Complexity of Data Reviewed  Decide to obtain previous medical records or to obtain history from someone other than the patient: yes        Final diagnoses:   Chest pain, unspecified type   Leg swelling          Armin Wellington MD  09/07/22 1127

## 2022-09-07 NOTE — CASE MANAGEMENT/SOCIAL WORK
"Case Management/Social Work    Patient Name:  Vanna Rincon  YOB: 1950  MRN: [id number]  Admit Date:  9/7/2022        12:25pm: KAPIL contacted DEBORA Garcia regarding consult. RN states pt has already been d/c'd home w/ spouse. RN reports pt has severe anxiety and issues with medications and needs resources. Pt has multiple doctors that she sees but states \"none of them work together.\" RN states to reach out to pt at home but to wait awhile for pt to get home and settled in. RN states to contact spouses phone number. SW will follow up.     14:29 EDT SW called and spoke with pt and spouse over telephone. Pt stated she was trying to rest and did not want to talk much. SW provided pt and spouse with phone number to [facility] for mental health resources to arrange an appointment to speak about her anxiety, medications and not being able to sleep. Spouse appreciative. Pt is going to follow up with PCP regarding side effects to medications and to ask about getting a hospital bed order. SW provided pt and spouse with office phone number if they need more resources or think of any questions/concerns.      Electronically signed by:  JAMAAL Shafer  09/07/22 12:31 EDT  "

## 2022-09-13 ENCOUNTER — APPOINTMENT (OUTPATIENT)
Dept: INFUSION THERAPY | Facility: HOSPITAL | Age: 72
End: 2022-09-13

## 2022-09-15 ENCOUNTER — TELEPHONE (OUTPATIENT)
Dept: CARDIOLOGY | Facility: CLINIC | Age: 72
End: 2022-09-15

## 2022-09-15 ENCOUNTER — HOSPITAL ENCOUNTER (OUTPATIENT)
Dept: INFUSION THERAPY | Facility: HOSPITAL | Age: 72
Discharge: HOME | End: 2022-09-15
Admitting: NURSE PRACTITIONER

## 2022-09-15 VITALS
HEART RATE: 80 BPM | DIASTOLIC BLOOD PRESSURE: 73 MMHG | SYSTOLIC BLOOD PRESSURE: 163 MMHG | TEMPERATURE: 98.1 F | RESPIRATION RATE: 18 BRPM | OXYGEN SATURATION: 100 %

## 2022-09-15 DIAGNOSIS — N18.30 STAGE 3 CHRONIC KIDNEY DISEASE, UNSPECIFIED WHETHER STAGE 3A OR 3B CKD: Primary | ICD-10-CM

## 2022-09-15 DIAGNOSIS — D50.9 IRON DEFICIENCY ANEMIA, UNSPECIFIED IRON DEFICIENCY ANEMIA TYPE: ICD-10-CM

## 2022-09-15 LAB
ALBUMIN SERPL-MCNC: 4.3 G/DL (ref 3.5–5.2)
ANION GAP SERPL CALCULATED.3IONS-SCNC: 10.4 MMOL/L (ref 5–15)
BASOPHILS # BLD AUTO: 0.02 10*3/MM3 (ref 0–0.2)
BASOPHILS NFR BLD AUTO: 0.3 % (ref 0–1.5)
BUN SERPL-MCNC: 32 MG/DL (ref 8–23)
BUN/CREAT SERPL: 21.2 (ref 7–25)
CALCIUM SPEC-SCNC: 9.7 MG/DL (ref 8.6–10.5)
CHLORIDE SERPL-SCNC: 98 MMOL/L (ref 98–107)
CO2 SERPL-SCNC: 32.6 MMOL/L (ref 22–29)
CREAT SERPL-MCNC: 1.51 MG/DL (ref 0.57–1)
DEPRECATED RDW RBC AUTO: 41.8 FL (ref 37–54)
EGFRCR SERPLBLD CKD-EPI 2021: 37 ML/MIN/1.73
EOSINOPHIL # BLD AUTO: 0.16 10*3/MM3 (ref 0–0.4)
EOSINOPHIL NFR BLD AUTO: 2.2 % (ref 0.3–6.2)
ERYTHROCYTE [DISTWIDTH] IN BLOOD BY AUTOMATED COUNT: 13 % (ref 12.3–15.4)
GLUCOSE SERPL-MCNC: 120 MG/DL (ref 65–99)
HCT VFR BLD AUTO: 34.4 % (ref 34–46.6)
HGB BLD-MCNC: 10.7 G/DL (ref 12–15.9)
IMM GRANULOCYTES # BLD AUTO: 0.01 10*3/MM3 (ref 0–0.05)
IMM GRANULOCYTES NFR BLD AUTO: 0.1 % (ref 0–0.5)
IRON 24H UR-MRATE: 44 MCG/DL (ref 37–145)
IRON SATN MFR SERPL: 16 % (ref 20–50)
LYMPHOCYTES # BLD AUTO: 1.83 10*3/MM3 (ref 0.7–3.1)
LYMPHOCYTES NFR BLD AUTO: 24.8 % (ref 19.6–45.3)
MCH RBC QN AUTO: 27.4 PG (ref 26.6–33)
MCHC RBC AUTO-ENTMCNC: 31.1 G/DL (ref 31.5–35.7)
MCV RBC AUTO: 88 FL (ref 79–97)
MONOCYTES # BLD AUTO: 0.5 10*3/MM3 (ref 0.1–0.9)
MONOCYTES NFR BLD AUTO: 6.8 % (ref 5–12)
NEUTROPHILS NFR BLD AUTO: 4.86 10*3/MM3 (ref 1.7–7)
NEUTROPHILS NFR BLD AUTO: 65.8 % (ref 42.7–76)
NRBC BLD AUTO-RTO: 0 /100 WBC (ref 0–0.2)
PHOSPHATE SERPL-MCNC: 3.2 MG/DL (ref 2.5–4.5)
PLATELET # BLD AUTO: 209 10*3/MM3 (ref 140–450)
PMV BLD AUTO: 10 FL (ref 6–12)
POTASSIUM SERPL-SCNC: 4 MMOL/L (ref 3.5–5.2)
RBC # BLD AUTO: 3.91 10*6/MM3 (ref 3.77–5.28)
SODIUM SERPL-SCNC: 141 MMOL/L (ref 136–145)
TIBC SERPL-MCNC: 277 MCG/DL (ref 298–536)
TRANSFERRIN SERPL-MCNC: 186 MG/DL (ref 200–360)
WBC NRBC COR # BLD: 7.38 10*3/MM3 (ref 3.4–10.8)

## 2022-09-15 PROCEDURE — 85025 COMPLETE CBC W/AUTO DIFF WBC: CPT | Performed by: NURSE PRACTITIONER

## 2022-09-15 PROCEDURE — 83540 ASSAY OF IRON: CPT | Performed by: NURSE PRACTITIONER

## 2022-09-15 PROCEDURE — 80069 RENAL FUNCTION PANEL: CPT | Performed by: INTERNAL MEDICINE

## 2022-09-15 PROCEDURE — 36415 COLL VENOUS BLD VENIPUNCTURE: CPT

## 2022-09-15 PROCEDURE — 25010000002 EPOETIN ALFA PER 1000 UNITS: Performed by: NURSE PRACTITIONER

## 2022-09-15 PROCEDURE — 96372 THER/PROPH/DIAG INJ SC/IM: CPT

## 2022-09-15 PROCEDURE — 84466 ASSAY OF TRANSFERRIN: CPT | Performed by: NURSE PRACTITIONER

## 2022-09-15 RX ADMIN — ERYTHROPOIETIN 40000 UNITS: 40000 INJECTION, SOLUTION INTRAVENOUS; SUBCUTANEOUS at 14:27

## 2022-09-15 NOTE — TELEPHONE ENCOUNTER
Patient said she is returning Yaneli's call to discuss decreasing blood pressure medication.  No note to review, she asks for Yaneli to call her tomorrow.

## 2022-09-19 NOTE — TELEPHONE ENCOUNTER
LM for pt to return call Friday and Monday. Given contact info to return call.     Spoke with pt, 9/19/2022 @420 PM  She reports SBPs often run below 100, highest reading 142/54. She has severe fatigue and pain all over with low BP readings. Denies CP/SOA.    Labs last week with nephrology, Cr elevated from baseline.  Lab Results   Component Value Date    GLUCOSE 120 (H) 09/15/2022    CALCIUM 9.7 09/15/2022     09/15/2022    K 4.0 09/15/2022    CO2 32.6 (H) 09/15/2022    CL 98 09/15/2022    BUN 32 (H) 09/15/2022    CREATININE 1.51 (H) 09/15/2022    EGFRIFNONA 37(L) 02/07/2022    BCR 21.2 09/15/2022    ANIONGAP 10.4 09/15/2022      Pt is not taking bumetanide.  Only on Valsartan/HCTZ 320/25 mg daily.    Weight at home has been stable. She received procrit shot last week based on lab results.   Will ask nephrology for BP parameters and/or suggestions regarding alteration in anti-hypertensive medication.

## 2022-09-20 NOTE — TELEPHONE ENCOUNTER
Patient's renal function is monitored monthly with procrit injections.  She notified our office on 09/12/22 of starting bumex from ED and was having low BP, fatigue, and chest pain.  Advised for chest pain she needs ED evaluation.  Patient does not drink water, only pepsi.  She was advised she needs to increase her water intake and hold her bumex for 1-2 days and then resume on an as needed basis.  I will be happy to see her sooner if she wishes to be evaluated.  Just have her contact the office for a sooner appointment.  Thanks.

## 2022-10-13 ENCOUNTER — HOSPITAL ENCOUNTER (OUTPATIENT)
Dept: INFUSION THERAPY | Facility: HOSPITAL | Age: 72
Discharge: HOME | End: 2022-10-13
Admitting: NURSE PRACTITIONER

## 2022-10-13 VITALS
HEART RATE: 81 BPM | TEMPERATURE: 99.5 F | SYSTOLIC BLOOD PRESSURE: 147 MMHG | DIASTOLIC BLOOD PRESSURE: 67 MMHG | OXYGEN SATURATION: 99 %

## 2022-10-13 DIAGNOSIS — D50.9 IRON DEFICIENCY ANEMIA, UNSPECIFIED IRON DEFICIENCY ANEMIA TYPE: ICD-10-CM

## 2022-10-13 DIAGNOSIS — D63.1 ANEMIA DUE TO STAGE 3 CHRONIC KIDNEY DISEASE TREATED WITH ERYTHROPOIETIN: Primary | ICD-10-CM

## 2022-10-13 DIAGNOSIS — N18.30 STAGE 3 CHRONIC KIDNEY DISEASE, UNSPECIFIED WHETHER STAGE 3A OR 3B CKD: ICD-10-CM

## 2022-10-13 DIAGNOSIS — N18.30 ANEMIA DUE TO STAGE 3 CHRONIC KIDNEY DISEASE TREATED WITH ERYTHROPOIETIN: Primary | ICD-10-CM

## 2022-10-13 LAB
ALBUMIN SERPL-MCNC: 4 G/DL (ref 3.5–5.2)
ALBUMIN SERPL-MCNC: 4.1 G/DL (ref 3.5–5.2)
ALBUMIN/GLOB SERPL: 1.5 G/DL
ALP SERPL-CCNC: 85 U/L (ref 39–117)
ALT SERPL W P-5'-P-CCNC: 9 U/L (ref 1–33)
ANION GAP SERPL CALCULATED.3IONS-SCNC: 7.6 MMOL/L (ref 5–15)
ANION GAP SERPL CALCULATED.3IONS-SCNC: 8.3 MMOL/L (ref 5–15)
AST SERPL-CCNC: 17 U/L (ref 1–32)
BASOPHILS # BLD AUTO: 0.02 10*3/MM3 (ref 0–0.2)
BASOPHILS NFR BLD AUTO: 0.3 % (ref 0–1.5)
BILIRUB SERPL-MCNC: 0.2 MG/DL (ref 0–1.2)
BUN SERPL-MCNC: 20 MG/DL (ref 8–23)
BUN SERPL-MCNC: 20 MG/DL (ref 8–23)
BUN/CREAT SERPL: 18.7 (ref 7–25)
BUN/CREAT SERPL: 18.9 (ref 7–25)
CALCIUM SPEC-SCNC: 9.3 MG/DL (ref 8.6–10.5)
CALCIUM SPEC-SCNC: 9.4 MG/DL (ref 8.6–10.5)
CHLORIDE SERPL-SCNC: 100 MMOL/L (ref 98–107)
CHLORIDE SERPL-SCNC: 98 MMOL/L (ref 98–107)
CO2 SERPL-SCNC: 31.4 MMOL/L (ref 22–29)
CO2 SERPL-SCNC: 31.7 MMOL/L (ref 22–29)
CREAT SERPL-MCNC: 1.06 MG/DL (ref 0.57–1)
CREAT SERPL-MCNC: 1.07 MG/DL (ref 0.57–1)
CRP SERPL-MCNC: <0.3 MG/DL (ref 0–0.5)
DEPRECATED RDW RBC AUTO: 42.1 FL (ref 37–54)
EGFRCR SERPLBLD CKD-EPI 2021: 56 ML/MIN/1.73
EGFRCR SERPLBLD CKD-EPI 2021: 56.6 ML/MIN/1.73
EOSINOPHIL # BLD AUTO: 0.07 10*3/MM3 (ref 0–0.4)
EOSINOPHIL NFR BLD AUTO: 1.2 % (ref 0.3–6.2)
ERYTHROCYTE [DISTWIDTH] IN BLOOD BY AUTOMATED COUNT: 13.2 % (ref 12.3–15.4)
ERYTHROCYTE [SEDIMENTATION RATE] IN BLOOD: 19 MM/HR (ref 0–20)
GLOBULIN UR ELPH-MCNC: 2.8 GM/DL
GLUCOSE SERPL-MCNC: 102 MG/DL (ref 65–99)
GLUCOSE SERPL-MCNC: 103 MG/DL (ref 65–99)
HCT VFR BLD AUTO: 36.4 % (ref 34–46.6)
HGB BLD-MCNC: 11.8 G/DL (ref 12–15.9)
IMM GRANULOCYTES # BLD AUTO: 0.03 10*3/MM3 (ref 0–0.05)
IMM GRANULOCYTES NFR BLD AUTO: 0.5 % (ref 0–0.5)
LYMPHOCYTES # BLD AUTO: 0.73 10*3/MM3 (ref 0.7–3.1)
LYMPHOCYTES NFR BLD AUTO: 12.5 % (ref 19.6–45.3)
MCH RBC QN AUTO: 28.5 PG (ref 26.6–33)
MCHC RBC AUTO-ENTMCNC: 32.4 G/DL (ref 31.5–35.7)
MCV RBC AUTO: 87.9 FL (ref 79–97)
MONOCYTES # BLD AUTO: 0.37 10*3/MM3 (ref 0.1–0.9)
MONOCYTES NFR BLD AUTO: 6.4 % (ref 5–12)
NEUTROPHILS NFR BLD AUTO: 4.6 10*3/MM3 (ref 1.7–7)
NEUTROPHILS NFR BLD AUTO: 79.1 % (ref 42.7–76)
NRBC BLD AUTO-RTO: 0 /100 WBC (ref 0–0.2)
PHOSPHATE SERPL-MCNC: 4 MG/DL (ref 2.5–4.5)
PLATELET # BLD AUTO: 213 10*3/MM3 (ref 140–450)
PMV BLD AUTO: 10.6 FL (ref 6–12)
POTASSIUM SERPL-SCNC: 4.3 MMOL/L (ref 3.5–5.2)
POTASSIUM SERPL-SCNC: 4.3 MMOL/L (ref 3.5–5.2)
PROT SERPL-MCNC: 6.9 G/DL (ref 6–8.5)
RBC # BLD AUTO: 4.14 10*6/MM3 (ref 3.77–5.28)
SODIUM SERPL-SCNC: 138 MMOL/L (ref 136–145)
SODIUM SERPL-SCNC: 139 MMOL/L (ref 136–145)
WBC NRBC COR # BLD: 5.82 10*3/MM3 (ref 3.4–10.8)

## 2022-10-13 PROCEDURE — 86140 C-REACTIVE PROTEIN: CPT | Performed by: INTERNAL MEDICINE

## 2022-10-13 PROCEDURE — 96374 THER/PROPH/DIAG INJ IV PUSH: CPT

## 2022-10-13 PROCEDURE — 85651 RBC SED RATE NONAUTOMATED: CPT | Performed by: INTERNAL MEDICINE

## 2022-10-13 PROCEDURE — 85025 COMPLETE CBC W/AUTO DIFF WBC: CPT | Performed by: NURSE PRACTITIONER

## 2022-10-13 PROCEDURE — 25010000002 FERUMOXYTOL 510 MG/17ML SOLUTION 17 ML VIAL: Performed by: NURSE PRACTITIONER

## 2022-10-13 PROCEDURE — 84100 ASSAY OF PHOSPHORUS: CPT | Performed by: NURSE PRACTITIONER

## 2022-10-13 PROCEDURE — 80053 COMPREHEN METABOLIC PANEL: CPT | Performed by: NURSE PRACTITIONER

## 2022-10-13 PROCEDURE — 86480 TB TEST CELL IMMUN MEASURE: CPT | Performed by: INTERNAL MEDICINE

## 2022-10-13 RX ORDER — SODIUM CHLORIDE 9 MG/ML
250 INJECTION, SOLUTION INTRAVENOUS ONCE
Status: CANCELLED | OUTPATIENT
Start: 2022-10-13

## 2022-10-13 RX ORDER — SODIUM CHLORIDE 9 MG/ML
250 INJECTION, SOLUTION INTRAVENOUS ONCE
Status: DISCONTINUED | OUTPATIENT
Start: 2022-10-13 | End: 2022-10-15 | Stop reason: HOSPADM

## 2022-10-13 RX ADMIN — FERUMOXYTOL 510 MG: 510 INJECTION INTRAVENOUS at 14:35

## 2022-10-15 LAB
GAMMA INTERFERON BACKGROUND BLD IA-ACNC: 0.02 IU/ML
M TB IFN-G BLD-IMP: NEGATIVE
M TB IFN-G CD4+ BCKGRND COR BLD-ACNC: 0.03 IU/ML
M TB IFN-G CD4+CD8+ BCKGRND COR BLD-ACNC: 0.04 IU/ML
MITOGEN IGNF BLD-ACNC: >10 IU/ML
QUANTIFERON INCUBATION: NORMAL
SERVICE CMNT-IMP: NORMAL

## 2022-11-10 ENCOUNTER — APPOINTMENT (OUTPATIENT)
Dept: INFUSION THERAPY | Facility: HOSPITAL | Age: 72
End: 2022-11-10

## 2022-11-15 ENCOUNTER — HOSPITAL ENCOUNTER (OUTPATIENT)
Dept: INFUSION THERAPY | Facility: HOSPITAL | Age: 72
Discharge: HOME | End: 2022-11-15
Admitting: INTERNAL MEDICINE

## 2022-11-15 VITALS
DIASTOLIC BLOOD PRESSURE: 60 MMHG | HEART RATE: 75 BPM | SYSTOLIC BLOOD PRESSURE: 121 MMHG | RESPIRATION RATE: 18 BRPM | TEMPERATURE: 98.2 F | OXYGEN SATURATION: 99 %

## 2022-11-15 DIAGNOSIS — D50.9 IRON DEFICIENCY ANEMIA, UNSPECIFIED IRON DEFICIENCY ANEMIA TYPE: ICD-10-CM

## 2022-11-15 DIAGNOSIS — N18.30 STAGE 3 CHRONIC KIDNEY DISEASE, UNSPECIFIED WHETHER STAGE 3A OR 3B CKD: Primary | ICD-10-CM

## 2022-11-15 LAB
ALBUMIN SERPL-MCNC: 4 G/DL (ref 3.5–5.2)
ALBUMIN/GLOB SERPL: 1.4 G/DL
ALP SERPL-CCNC: 81 U/L (ref 39–117)
ALT SERPL W P-5'-P-CCNC: 10 U/L (ref 1–33)
ANION GAP SERPL CALCULATED.3IONS-SCNC: 6.9 MMOL/L (ref 5–15)
AST SERPL-CCNC: 16 U/L (ref 1–32)
BASOPHILS # BLD AUTO: 0.03 10*3/MM3 (ref 0–0.2)
BASOPHILS NFR BLD AUTO: 0.4 % (ref 0–1.5)
BILIRUB SERPL-MCNC: 0.2 MG/DL (ref 0–1.2)
BUN SERPL-MCNC: 25 MG/DL (ref 8–23)
BUN/CREAT SERPL: 22.3 (ref 7–25)
CALCIUM SPEC-SCNC: 9.3 MG/DL (ref 8.6–10.5)
CHLORIDE SERPL-SCNC: 98 MMOL/L (ref 98–107)
CO2 SERPL-SCNC: 32.1 MMOL/L (ref 22–29)
CREAT SERPL-MCNC: 1.12 MG/DL (ref 0.57–1)
DEPRECATED RDW RBC AUTO: 41.5 FL (ref 37–54)
EGFRCR SERPLBLD CKD-EPI 2021: 53 ML/MIN/1.73
EOSINOPHIL # BLD AUTO: 0.19 10*3/MM3 (ref 0–0.4)
EOSINOPHIL NFR BLD AUTO: 2.5 % (ref 0.3–6.2)
ERYTHROCYTE [DISTWIDTH] IN BLOOD BY AUTOMATED COUNT: 13 % (ref 12.3–15.4)
GLOBULIN UR ELPH-MCNC: 2.8 GM/DL
GLUCOSE SERPL-MCNC: 121 MG/DL (ref 65–99)
HAV IGM SERPL QL IA: ABNORMAL
HBV CORE IGM SERPL QL IA: ABNORMAL
HBV SURFACE AG SERPL QL IA: ABNORMAL
HCT VFR BLD AUTO: 35 % (ref 34–46.6)
HCV AB SER DONR QL: REACTIVE
HGB BLD-MCNC: 11.2 G/DL (ref 12–15.9)
IMM GRANULOCYTES # BLD AUTO: 0.01 10*3/MM3 (ref 0–0.05)
IMM GRANULOCYTES NFR BLD AUTO: 0.1 % (ref 0–0.5)
IRON 24H UR-MRATE: 69 MCG/DL (ref 37–145)
IRON SATN MFR SERPL: 27 % (ref 20–50)
LYMPHOCYTES # BLD AUTO: 1.39 10*3/MM3 (ref 0.7–3.1)
LYMPHOCYTES NFR BLD AUTO: 18.2 % (ref 19.6–45.3)
MCH RBC QN AUTO: 28 PG (ref 26.6–33)
MCHC RBC AUTO-ENTMCNC: 32 G/DL (ref 31.5–35.7)
MCV RBC AUTO: 87.5 FL (ref 79–97)
MONOCYTES # BLD AUTO: 0.54 10*3/MM3 (ref 0.1–0.9)
MONOCYTES NFR BLD AUTO: 7.1 % (ref 5–12)
NEUTROPHILS NFR BLD AUTO: 5.47 10*3/MM3 (ref 1.7–7)
NEUTROPHILS NFR BLD AUTO: 71.7 % (ref 42.7–76)
NRBC BLD AUTO-RTO: 0 /100 WBC (ref 0–0.2)
PHOSPHATE SERPL-MCNC: 3.4 MG/DL (ref 2.5–4.5)
PLATELET # BLD AUTO: 194 10*3/MM3 (ref 140–450)
PMV BLD AUTO: 9.6 FL (ref 6–12)
POTASSIUM SERPL-SCNC: 3.9 MMOL/L (ref 3.5–5.2)
PROT SERPL-MCNC: 6.8 G/DL (ref 6–8.5)
RBC # BLD AUTO: 4 10*6/MM3 (ref 3.77–5.28)
SODIUM SERPL-SCNC: 137 MMOL/L (ref 136–145)
TIBC SERPL-MCNC: 256 MCG/DL (ref 298–536)
TRANSFERRIN SERPL-MCNC: 172 MG/DL (ref 200–360)
WBC NRBC COR # BLD: 7.63 10*3/MM3 (ref 3.4–10.8)

## 2022-11-15 PROCEDURE — 96372 THER/PROPH/DIAG INJ SC/IM: CPT

## 2022-11-15 PROCEDURE — 80053 COMPREHEN METABOLIC PANEL: CPT | Performed by: INTERNAL MEDICINE

## 2022-11-15 PROCEDURE — 84100 ASSAY OF PHOSPHORUS: CPT | Performed by: NURSE PRACTITIONER

## 2022-11-15 PROCEDURE — 36415 COLL VENOUS BLD VENIPUNCTURE: CPT

## 2022-11-15 PROCEDURE — 85025 COMPLETE CBC W/AUTO DIFF WBC: CPT | Performed by: INTERNAL MEDICINE

## 2022-11-15 PROCEDURE — 84466 ASSAY OF TRANSFERRIN: CPT | Performed by: NURSE PRACTITIONER

## 2022-11-15 PROCEDURE — G0463 HOSPITAL OUTPT CLINIC VISIT: HCPCS

## 2022-11-15 PROCEDURE — 80074 ACUTE HEPATITIS PANEL: CPT | Performed by: INTERNAL MEDICINE

## 2022-11-15 PROCEDURE — 83540 ASSAY OF IRON: CPT | Performed by: NURSE PRACTITIONER

## 2022-12-14 ENCOUNTER — HOSPITAL ENCOUNTER (OUTPATIENT)
Dept: INFUSION THERAPY | Facility: HOSPITAL | Age: 72
Discharge: HOME | End: 2022-12-14
Admitting: NURSE PRACTITIONER

## 2022-12-14 VITALS
DIASTOLIC BLOOD PRESSURE: 80 MMHG | SYSTOLIC BLOOD PRESSURE: 135 MMHG | OXYGEN SATURATION: 99 % | TEMPERATURE: 98.6 F | HEART RATE: 75 BPM | RESPIRATION RATE: 20 BRPM

## 2022-12-14 DIAGNOSIS — N18.30 STAGE 3 CHRONIC KIDNEY DISEASE, UNSPECIFIED WHETHER STAGE 3A OR 3B CKD: Primary | ICD-10-CM

## 2022-12-14 DIAGNOSIS — D50.9 IRON DEFICIENCY ANEMIA, UNSPECIFIED IRON DEFICIENCY ANEMIA TYPE: ICD-10-CM

## 2022-12-14 LAB
ALBUMIN SERPL-MCNC: 4.1 G/DL (ref 3.5–5.2)
ANION GAP SERPL CALCULATED.3IONS-SCNC: 7.7 MMOL/L (ref 5–15)
BASOPHILS # BLD AUTO: 0.02 10*3/MM3 (ref 0–0.2)
BASOPHILS NFR BLD AUTO: 0.3 % (ref 0–1.5)
BUN SERPL-MCNC: 20 MG/DL (ref 8–23)
BUN/CREAT SERPL: 21.7 (ref 7–25)
CALCIUM SPEC-SCNC: 8.9 MG/DL (ref 8.6–10.5)
CHLORIDE SERPL-SCNC: 99 MMOL/L (ref 98–107)
CO2 SERPL-SCNC: 32.3 MMOL/L (ref 22–29)
CREAT SERPL-MCNC: 0.92 MG/DL (ref 0.57–1)
DEPRECATED RDW RBC AUTO: 40.3 FL (ref 37–54)
EGFRCR SERPLBLD CKD-EPI 2021: 67.1 ML/MIN/1.73
EOSINOPHIL # BLD AUTO: 0.03 10*3/MM3 (ref 0–0.4)
EOSINOPHIL NFR BLD AUTO: 0.5 % (ref 0.3–6.2)
ERYTHROCYTE [DISTWIDTH] IN BLOOD BY AUTOMATED COUNT: 12.7 % (ref 12.3–15.4)
GLUCOSE SERPL-MCNC: 111 MG/DL (ref 65–99)
HCT VFR BLD AUTO: 32 % (ref 34–46.6)
HGB BLD-MCNC: 10.5 G/DL (ref 12–15.9)
IMM GRANULOCYTES # BLD AUTO: 0.01 10*3/MM3 (ref 0–0.05)
IMM GRANULOCYTES NFR BLD AUTO: 0.2 % (ref 0–0.5)
LYMPHOCYTES # BLD AUTO: 0.91 10*3/MM3 (ref 0.7–3.1)
LYMPHOCYTES NFR BLD AUTO: 14.9 % (ref 19.6–45.3)
MCH RBC QN AUTO: 28.7 PG (ref 26.6–33)
MCHC RBC AUTO-ENTMCNC: 32.8 G/DL (ref 31.5–35.7)
MCV RBC AUTO: 87.4 FL (ref 79–97)
MONOCYTES # BLD AUTO: 0.31 10*3/MM3 (ref 0.1–0.9)
MONOCYTES NFR BLD AUTO: 5.1 % (ref 5–12)
NEUTROPHILS NFR BLD AUTO: 4.81 10*3/MM3 (ref 1.7–7)
NEUTROPHILS NFR BLD AUTO: 79 % (ref 42.7–76)
NRBC BLD AUTO-RTO: 0 /100 WBC (ref 0–0.2)
PHOSPHATE SERPL-MCNC: 3.3 MG/DL (ref 2.5–4.5)
PLATELET # BLD AUTO: 194 10*3/MM3 (ref 140–450)
PMV BLD AUTO: 9.5 FL (ref 6–12)
POTASSIUM SERPL-SCNC: 4.2 MMOL/L (ref 3.5–5.2)
RBC # BLD AUTO: 3.66 10*6/MM3 (ref 3.77–5.28)
SODIUM SERPL-SCNC: 139 MMOL/L (ref 136–145)
WBC NRBC COR # BLD: 6.09 10*3/MM3 (ref 3.4–10.8)

## 2022-12-14 PROCEDURE — 36415 COLL VENOUS BLD VENIPUNCTURE: CPT

## 2022-12-14 PROCEDURE — 96372 THER/PROPH/DIAG INJ SC/IM: CPT

## 2022-12-14 PROCEDURE — 85025 COMPLETE CBC W/AUTO DIFF WBC: CPT | Performed by: NURSE PRACTITIONER

## 2022-12-14 PROCEDURE — 80069 RENAL FUNCTION PANEL: CPT | Performed by: NURSE PRACTITIONER

## 2022-12-14 PROCEDURE — 25010000002 EPOETIN ALFA PER 1000 UNITS: Performed by: NURSE PRACTITIONER

## 2022-12-14 RX ADMIN — ERYTHROPOIETIN 40000 UNITS: 40000 INJECTION, SOLUTION INTRAVENOUS; SUBCUTANEOUS at 13:49

## 2022-12-30 DIAGNOSIS — J44.9 CHRONIC OBSTRUCTIVE PULMONARY DISEASE, UNSPECIFIED COPD TYPE: ICD-10-CM

## 2022-12-30 RX ORDER — BUDESONIDE AND FORMOTEROL FUMARATE DIHYDRATE 160; 4.5 UG/1; UG/1
2 AEROSOL RESPIRATORY (INHALATION)
Qty: 3 EACH | Refills: 3 | Status: SHIPPED | OUTPATIENT
Start: 2022-12-30 | End: 2023-01-03

## 2023-01-02 DIAGNOSIS — J44.9 CHRONIC OBSTRUCTIVE PULMONARY DISEASE, UNSPECIFIED COPD TYPE: ICD-10-CM

## 2023-01-03 RX ORDER — BUDESONIDE AND FORMOTEROL FUMARATE DIHYDRATE 160; 4.5 UG/1; UG/1
AEROSOL RESPIRATORY (INHALATION)
Qty: 10.2 G | Refills: 0 | Status: SHIPPED | OUTPATIENT
Start: 2023-01-03

## 2023-01-05 ENCOUNTER — TELEPHONE (OUTPATIENT)
Dept: CARDIOLOGY | Facility: CLINIC | Age: 73
End: 2023-01-05
Payer: MEDICARE

## 2023-01-05 NOTE — TELEPHONE ENCOUNTER
Pt called to report she has labile BP readings at home. She has occasional CP, shortness of breath usually after iron infusions.     Home BP:  · 163/70 (took valsartan/HCTZ)  · 135/64  · 124/57  · 87/51  · 137/62  · 155/73  HR 70-80    She is on valsartan-HCTZ 320-25 mg but only takes as needed for SBP>160. She would like alternate medication.    She follows with nephrology but states they are not managing BP readings only iron infusions/Procrit.     Last Renal panel, 12/14/2022: BUN 20, Cr 0.9, K 4.2, , eGFR 67.1  CBC, 12/14/2022: H&H 10-32    Will review with [facility]  Spoke with pt, 1/9/2023  Pt would like alternate daily medication but not a loop diuretic (loop diuretics make her feel awful). She feels the HCTZ is helpful for fluid retention.

## 2023-01-10 NOTE — TELEPHONE ENCOUNTER
Left voice message informing patient of Dr. Marquez med recommendation to take 1/2 tablet of valsartan-HCTZ 320-25 mg for SBP >160.

## 2023-01-11 ENCOUNTER — HOSPITAL ENCOUNTER (OUTPATIENT)
Dept: INFUSION THERAPY | Facility: HOSPITAL | Age: 73
Discharge: HOME | End: 2023-01-11
Admitting: NURSE PRACTITIONER
Payer: MEDICARE

## 2023-01-11 VITALS
HEART RATE: 75 BPM | SYSTOLIC BLOOD PRESSURE: 130 MMHG | TEMPERATURE: 98.4 F | OXYGEN SATURATION: 98 % | RESPIRATION RATE: 18 BRPM | DIASTOLIC BLOOD PRESSURE: 78 MMHG

## 2023-01-11 DIAGNOSIS — D50.9 IRON DEFICIENCY ANEMIA, UNSPECIFIED IRON DEFICIENCY ANEMIA TYPE: Primary | ICD-10-CM

## 2023-01-11 DIAGNOSIS — N18.30 STAGE 3 CHRONIC KIDNEY DISEASE, UNSPECIFIED WHETHER STAGE 3A OR 3B CKD: ICD-10-CM

## 2023-01-11 LAB
ALBUMIN SERPL-MCNC: 4.2 G/DL (ref 3.5–5.2)
ANION GAP SERPL CALCULATED.3IONS-SCNC: 8.8 MMOL/L (ref 5–15)
BASOPHILS # BLD AUTO: 0.02 10*3/MM3 (ref 0–0.2)
BASOPHILS NFR BLD AUTO: 0.3 % (ref 0–1.5)
BUN SERPL-MCNC: 19 MG/DL (ref 8–23)
BUN/CREAT SERPL: 17.4 (ref 7–25)
CALCIUM SPEC-SCNC: 9.2 MG/DL (ref 8.6–10.5)
CHLORIDE SERPL-SCNC: 99 MMOL/L (ref 98–107)
CO2 SERPL-SCNC: 34.2 MMOL/L (ref 22–29)
CREAT SERPL-MCNC: 1.09 MG/DL (ref 0.57–1)
DEPRECATED RDW RBC AUTO: 41.1 FL (ref 37–54)
EGFRCR SERPLBLD CKD-EPI 2021: 54.8 ML/MIN/1.73
EOSINOPHIL # BLD AUTO: 0.16 10*3/MM3 (ref 0–0.4)
EOSINOPHIL NFR BLD AUTO: 2.6 % (ref 0.3–6.2)
ERYTHROCYTE [DISTWIDTH] IN BLOOD BY AUTOMATED COUNT: 12.7 % (ref 12.3–15.4)
GLUCOSE SERPL-MCNC: 106 MG/DL (ref 65–99)
HCT VFR BLD AUTO: 35 % (ref 34–46.6)
HGB BLD-MCNC: 10.9 G/DL (ref 12–15.9)
IMM GRANULOCYTES # BLD AUTO: 0.02 10*3/MM3 (ref 0–0.05)
IMM GRANULOCYTES NFR BLD AUTO: 0.3 % (ref 0–0.5)
LYMPHOCYTES # BLD AUTO: 1.33 10*3/MM3 (ref 0.7–3.1)
LYMPHOCYTES NFR BLD AUTO: 21.5 % (ref 19.6–45.3)
MCH RBC QN AUTO: 28.4 PG (ref 26.6–33)
MCHC RBC AUTO-ENTMCNC: 31.1 G/DL (ref 31.5–35.7)
MCV RBC AUTO: 91.1 FL (ref 79–97)
MONOCYTES # BLD AUTO: 0.5 10*3/MM3 (ref 0.1–0.9)
MONOCYTES NFR BLD AUTO: 8.1 % (ref 5–12)
NEUTROPHILS NFR BLD AUTO: 4.17 10*3/MM3 (ref 1.7–7)
NEUTROPHILS NFR BLD AUTO: 67.2 % (ref 42.7–76)
NRBC BLD AUTO-RTO: 0 /100 WBC (ref 0–0.2)
PHOSPHATE SERPL-MCNC: 4.8 MG/DL (ref 2.5–4.5)
PLATELET # BLD AUTO: 198 10*3/MM3 (ref 140–450)
PMV BLD AUTO: 10.5 FL (ref 6–12)
POTASSIUM SERPL-SCNC: 3.9 MMOL/L (ref 3.5–5.2)
RBC # BLD AUTO: 3.84 10*6/MM3 (ref 3.77–5.28)
SODIUM SERPL-SCNC: 142 MMOL/L (ref 136–145)
URATE SERPL-MCNC: 4.9 MG/DL (ref 2.4–5.7)
WBC NRBC COR # BLD: 6.2 10*3/MM3 (ref 3.4–10.8)

## 2023-01-11 PROCEDURE — 96372 THER/PROPH/DIAG INJ SC/IM: CPT

## 2023-01-11 PROCEDURE — 84550 ASSAY OF BLOOD/URIC ACID: CPT | Performed by: NURSE PRACTITIONER

## 2023-01-11 PROCEDURE — 83970 ASSAY OF PARATHORMONE: CPT | Performed by: NURSE PRACTITIONER

## 2023-01-11 PROCEDURE — 80069 RENAL FUNCTION PANEL: CPT | Performed by: NURSE PRACTITIONER

## 2023-01-11 PROCEDURE — 85025 COMPLETE CBC W/AUTO DIFF WBC: CPT | Performed by: NURSE PRACTITIONER

## 2023-01-11 PROCEDURE — 82306 VITAMIN D 25 HYDROXY: CPT | Performed by: NURSE PRACTITIONER

## 2023-01-11 PROCEDURE — 36415 COLL VENOUS BLD VENIPUNCTURE: CPT

## 2023-01-11 PROCEDURE — 25010000002 EPOETIN ALFA PER 1000 UNITS: Performed by: NURSE PRACTITIONER

## 2023-01-11 RX ORDER — VALSARTAN AND HYDROCHLOROTHIAZIDE 320; 25 MG/1; MG/1
0.5 TABLET, FILM COATED ORAL DAILY
Qty: 45 TABLET | Refills: 3 | Status: SHIPPED | OUTPATIENT
Start: 2023-01-11

## 2023-01-11 RX ADMIN — ERYTHROPOIETIN 40000 UNITS: 40000 INJECTION, SOLUTION INTRAVENOUS; SUBCUTANEOUS at 15:23

## 2023-01-12 LAB
25(OH)D3 SERPL-MCNC: 46.2 NG/ML (ref 30–100)
PTH-INTACT SERPL-MCNC: 33.4 PG/ML (ref 15–65)

## 2023-02-08 ENCOUNTER — HOSPITAL ENCOUNTER (OUTPATIENT)
Dept: INFUSION THERAPY | Facility: HOSPITAL | Age: 73
Discharge: HOME | End: 2023-02-08
Admitting: NURSE PRACTITIONER
Payer: MEDICARE

## 2023-02-08 ENCOUNTER — TRANSCRIBE ORDERS (OUTPATIENT)
Dept: ADMINISTRATIVE | Facility: HOSPITAL | Age: 73
End: 2023-02-08
Payer: MEDICARE

## 2023-02-08 VITALS — OXYGEN SATURATION: 100 % | HEART RATE: 87 BPM | TEMPERATURE: 98.7 F | RESPIRATION RATE: 18 BRPM

## 2023-02-08 DIAGNOSIS — D50.9 IRON DEFICIENCY ANEMIA, UNSPECIFIED IRON DEFICIENCY ANEMIA TYPE: ICD-10-CM

## 2023-02-08 DIAGNOSIS — N18.30 STAGE 3 CHRONIC KIDNEY DISEASE, UNSPECIFIED WHETHER STAGE 3A OR 3B CKD: Primary | ICD-10-CM

## 2023-02-08 LAB
ALBUMIN SERPL-MCNC: 4.1 G/DL (ref 3.5–5.2)
ANION GAP SERPL CALCULATED.3IONS-SCNC: 7.7 MMOL/L (ref 5–15)
BACTERIA UR QL AUTO: ABNORMAL /HPF
BILIRUB UR QL STRIP: NEGATIVE
BUN SERPL-MCNC: 27 MG/DL (ref 8–23)
BUN/CREAT SERPL: 27.3 (ref 7–25)
CALCIUM SPEC-SCNC: 9.4 MG/DL (ref 8.6–10.5)
CHLORIDE SERPL-SCNC: 99 MMOL/L (ref 98–107)
CLARITY UR: CLEAR
CO2 SERPL-SCNC: 33.3 MMOL/L (ref 22–29)
COLOR UR: YELLOW
CREAT SERPL-MCNC: 0.99 MG/DL (ref 0.57–1)
CREAT UR-MCNC: 74.3 MG/DL
DEPRECATED RDW RBC AUTO: 42.4 FL (ref 37–54)
EGFRCR SERPLBLD CKD-EPI 2021: 61.5 ML/MIN/1.73
ERYTHROCYTE [DISTWIDTH] IN BLOOD BY AUTOMATED COUNT: 13 % (ref 12.3–15.4)
GLUCOSE SERPL-MCNC: 115 MG/DL (ref 65–99)
GLUCOSE UR STRIP-MCNC: NEGATIVE MG/DL
HCT VFR BLD AUTO: 36.5 % (ref 34–46.6)
HGB BLD-MCNC: 11.5 G/DL (ref 12–15.9)
HGB UR QL STRIP.AUTO: ABNORMAL
HYALINE CASTS UR QL AUTO: ABNORMAL /LPF
IRON 24H UR-MRATE: 45 MCG/DL (ref 37–145)
IRON SATN MFR SERPL: 18 % (ref 20–50)
KETONES UR QL STRIP: NEGATIVE
LEUKOCYTE ESTERASE UR QL STRIP.AUTO: NEGATIVE
MCH RBC QN AUTO: 28 PG (ref 26.6–33)
MCHC RBC AUTO-ENTMCNC: 31.5 G/DL (ref 31.5–35.7)
MCV RBC AUTO: 89 FL (ref 79–97)
NITRITE UR QL STRIP: NEGATIVE
PH UR STRIP.AUTO: 5.5 [PH] (ref 5–8)
PHOSPHATE SERPL-MCNC: 3.6 MG/DL (ref 2.5–4.5)
PLATELET # BLD AUTO: 197 10*3/MM3 (ref 140–450)
PMV BLD AUTO: 10.1 FL (ref 6–12)
POTASSIUM SERPL-SCNC: 4.1 MMOL/L (ref 3.5–5.2)
PROT ?TM UR-MCNC: 10.5 MG/DL
PROT UR QL STRIP: NEGATIVE
PROT/CREAT UR: 141.3 MG/G CREA (ref 0–200)
RBC # BLD AUTO: 4.1 10*6/MM3 (ref 3.77–5.28)
RBC # UR STRIP: ABNORMAL /HPF
REF LAB TEST METHOD: ABNORMAL
SODIUM SERPL-SCNC: 140 MMOL/L (ref 136–145)
SP GR UR STRIP: 1.02 (ref 1–1.03)
SQUAMOUS #/AREA URNS HPF: ABNORMAL /HPF
TIBC SERPL-MCNC: 255 MCG/DL (ref 298–536)
TRANSFERRIN SERPL-MCNC: 171 MG/DL (ref 200–360)
UROBILINOGEN UR QL STRIP: ABNORMAL
WBC # UR STRIP: ABNORMAL /HPF
WBC NRBC COR # BLD: 5.72 10*3/MM3 (ref 3.4–10.8)

## 2023-02-08 PROCEDURE — 81001 URINALYSIS AUTO W/SCOPE: CPT | Performed by: NURSE PRACTITIONER

## 2023-02-08 PROCEDURE — 84466 ASSAY OF TRANSFERRIN: CPT | Performed by: NURSE PRACTITIONER

## 2023-02-08 PROCEDURE — G0463 HOSPITAL OUTPT CLINIC VISIT: HCPCS

## 2023-02-08 PROCEDURE — 80069 RENAL FUNCTION PANEL: CPT | Performed by: NURSE PRACTITIONER

## 2023-02-08 PROCEDURE — 83540 ASSAY OF IRON: CPT | Performed by: NURSE PRACTITIONER

## 2023-02-08 PROCEDURE — 36415 COLL VENOUS BLD VENIPUNCTURE: CPT

## 2023-02-08 PROCEDURE — 84156 ASSAY OF PROTEIN URINE: CPT | Performed by: NURSE PRACTITIONER

## 2023-02-08 PROCEDURE — 85027 COMPLETE CBC AUTOMATED: CPT | Performed by: NURSE PRACTITIONER

## 2023-02-08 PROCEDURE — 82570 ASSAY OF URINE CREATININE: CPT | Performed by: NURSE PRACTITIONER

## 2023-03-22 ENCOUNTER — HOSPITAL ENCOUNTER (OUTPATIENT)
Dept: INFUSION THERAPY | Facility: HOSPITAL | Age: 73
Discharge: HOME | End: 2023-03-22
Admitting: NURSE PRACTITIONER

## 2023-03-22 ENCOUNTER — TRANSCRIBE ORDERS (OUTPATIENT)
Dept: INFUSION THERAPY | Facility: HOSPITAL | Age: 73
End: 2023-03-22
Payer: MEDICARE

## 2023-03-22 VITALS
RESPIRATION RATE: 22 BRPM | OXYGEN SATURATION: 98 % | DIASTOLIC BLOOD PRESSURE: 79 MMHG | HEART RATE: 72 BPM | SYSTOLIC BLOOD PRESSURE: 163 MMHG | TEMPERATURE: 98.3 F

## 2023-03-22 DIAGNOSIS — N18.30 STAGE 3 CHRONIC KIDNEY DISEASE, UNSPECIFIED WHETHER STAGE 3A OR 3B CKD: Primary | ICD-10-CM

## 2023-03-22 DIAGNOSIS — D50.9 IRON DEFICIENCY ANEMIA, UNSPECIFIED IRON DEFICIENCY ANEMIA TYPE: ICD-10-CM

## 2023-03-22 DIAGNOSIS — D50.0 IRON DEFICIENCY ANEMIA DUE TO CHRONIC BLOOD LOSS: ICD-10-CM

## 2023-03-22 DIAGNOSIS — D50.0 IRON DEFICIENCY ANEMIA DUE TO CHRONIC BLOOD LOSS: Primary | ICD-10-CM

## 2023-03-22 LAB
ALBUMIN SERPL-MCNC: 3.8 G/DL (ref 3.5–5.2)
ALBUMIN SERPL-MCNC: 4.1 G/DL (ref 3.5–5.2)
ALBUMIN/GLOB SERPL: 1.5 G/DL
ALP SERPL-CCNC: 71 U/L (ref 39–117)
ALT SERPL W P-5'-P-CCNC: 10 U/L (ref 1–33)
ANION GAP SERPL CALCULATED.3IONS-SCNC: 8.3 MMOL/L (ref 5–15)
ANION GAP SERPL CALCULATED.3IONS-SCNC: 9.9 MMOL/L (ref 5–15)
AST SERPL-CCNC: 19 U/L (ref 1–32)
BASOPHILS # BLD AUTO: 0.02 10*3/MM3 (ref 0–0.2)
BASOPHILS NFR BLD AUTO: 0.3 % (ref 0–1.5)
BILIRUB SERPL-MCNC: 0.3 MG/DL (ref 0–1.2)
BUN SERPL-MCNC: 26 MG/DL (ref 8–23)
BUN SERPL-MCNC: 26 MG/DL (ref 8–23)
BUN/CREAT SERPL: 24.5 (ref 7–25)
BUN/CREAT SERPL: 26.3 (ref 7–25)
CALCIUM SPEC-SCNC: 9.2 MG/DL (ref 8.6–10.5)
CALCIUM SPEC-SCNC: 9.4 MG/DL (ref 8.6–10.5)
CHLORIDE SERPL-SCNC: 96 MMOL/L (ref 98–107)
CHLORIDE SERPL-SCNC: 97 MMOL/L (ref 98–107)
CO2 SERPL-SCNC: 34.1 MMOL/L (ref 22–29)
CO2 SERPL-SCNC: 34.7 MMOL/L (ref 22–29)
CREAT SERPL-MCNC: 0.99 MG/DL (ref 0.57–1)
CREAT SERPL-MCNC: 1.06 MG/DL (ref 0.57–1)
CRP SERPL-MCNC: <0.3 MG/DL (ref 0–0.5)
DEPRECATED RDW RBC AUTO: 42.2 FL (ref 37–54)
DEPRECATED RDW RBC AUTO: 42.6 FL (ref 37–54)
EGFRCR SERPLBLD CKD-EPI 2021: 56.6 ML/MIN/1.73
EGFRCR SERPLBLD CKD-EPI 2021: 61.5 ML/MIN/1.73
EOSINOPHIL # BLD AUTO: 0.16 10*3/MM3 (ref 0–0.4)
EOSINOPHIL NFR BLD AUTO: 2.4 % (ref 0.3–6.2)
ERYTHROCYTE [DISTWIDTH] IN BLOOD BY AUTOMATED COUNT: 13 % (ref 12.3–15.4)
ERYTHROCYTE [DISTWIDTH] IN BLOOD BY AUTOMATED COUNT: 13.1 % (ref 12.3–15.4)
ERYTHROCYTE [SEDIMENTATION RATE] IN BLOOD: 7 MM/HR (ref 0–30)
GLOBULIN UR ELPH-MCNC: 2.6 GM/DL
GLUCOSE SERPL-MCNC: 100 MG/DL (ref 65–99)
GLUCOSE SERPL-MCNC: 175 MG/DL (ref 65–99)
HCT VFR BLD AUTO: 30.7 % (ref 34–46.6)
HCT VFR BLD AUTO: 32.5 % (ref 34–46.6)
HGB BLD-MCNC: 10.1 G/DL (ref 12–15.9)
HGB BLD-MCNC: 9.7 G/DL (ref 12–15.9)
IMM GRANULOCYTES # BLD AUTO: 0.03 10*3/MM3 (ref 0–0.05)
IMM GRANULOCYTES NFR BLD AUTO: 0.5 % (ref 0–0.5)
LYMPHOCYTES # BLD AUTO: 1.62 10*3/MM3 (ref 0.7–3.1)
LYMPHOCYTES NFR BLD AUTO: 24.3 % (ref 19.6–45.3)
MCH RBC QN AUTO: 27.5 PG (ref 26.6–33)
MCH RBC QN AUTO: 28 PG (ref 26.6–33)
MCHC RBC AUTO-ENTMCNC: 31.1 G/DL (ref 31.5–35.7)
MCHC RBC AUTO-ENTMCNC: 31.6 G/DL (ref 31.5–35.7)
MCV RBC AUTO: 88.6 FL (ref 79–97)
MCV RBC AUTO: 88.7 FL (ref 79–97)
MONOCYTES # BLD AUTO: 0.47 10*3/MM3 (ref 0.1–0.9)
MONOCYTES NFR BLD AUTO: 7.1 % (ref 5–12)
NEUTROPHILS NFR BLD AUTO: 4.36 10*3/MM3 (ref 1.7–7)
NEUTROPHILS NFR BLD AUTO: 65.4 % (ref 42.7–76)
NRBC BLD AUTO-RTO: 0 /100 WBC (ref 0–0.2)
PHOSPHATE SERPL-MCNC: 3.3 MG/DL (ref 2.5–4.5)
PLATELET # BLD AUTO: 158 10*3/MM3 (ref 140–450)
PLATELET # BLD AUTO: 159 10*3/MM3 (ref 140–450)
PMV BLD AUTO: 10 FL (ref 6–12)
PMV BLD AUTO: 10.2 FL (ref 6–12)
POTASSIUM SERPL-SCNC: 4.2 MMOL/L (ref 3.5–5.2)
POTASSIUM SERPL-SCNC: 4.2 MMOL/L (ref 3.5–5.2)
PROT SERPL-MCNC: 6.4 G/DL (ref 6–8.5)
RBC # BLD AUTO: 3.46 10*6/MM3 (ref 3.77–5.28)
RBC # BLD AUTO: 3.67 10*6/MM3 (ref 3.77–5.28)
SODIUM SERPL-SCNC: 140 MMOL/L (ref 136–145)
SODIUM SERPL-SCNC: 140 MMOL/L (ref 136–145)
WBC NRBC COR # BLD: 6.26 10*3/MM3 (ref 3.4–10.8)
WBC NRBC COR # BLD: 6.66 10*3/MM3 (ref 3.4–10.8)

## 2023-03-22 PROCEDURE — 85652 RBC SED RATE AUTOMATED: CPT | Performed by: NURSE PRACTITIONER

## 2023-03-22 PROCEDURE — 80053 COMPREHEN METABOLIC PANEL: CPT | Performed by: NURSE PRACTITIONER

## 2023-03-22 PROCEDURE — 36415 COLL VENOUS BLD VENIPUNCTURE: CPT

## 2023-03-22 PROCEDURE — 86140 C-REACTIVE PROTEIN: CPT | Performed by: NURSE PRACTITIONER

## 2023-03-22 PROCEDURE — 84100 ASSAY OF PHOSPHORUS: CPT | Performed by: NURSE PRACTITIONER

## 2023-03-22 PROCEDURE — 85027 COMPLETE CBC AUTOMATED: CPT | Performed by: NURSE PRACTITIONER

## 2023-03-22 PROCEDURE — 96372 THER/PROPH/DIAG INJ SC/IM: CPT

## 2023-03-22 PROCEDURE — 25010000002 EPOETIN ALFA PER 1000 UNITS: Performed by: NURSE PRACTITIONER

## 2023-03-22 PROCEDURE — 85025 COMPLETE CBC W/AUTO DIFF WBC: CPT | Performed by: NURSE PRACTITIONER

## 2023-03-22 RX ADMIN — ERYTHROPOIETIN 40000 UNITS: 40000 INJECTION, SOLUTION INTRAVENOUS; SUBCUTANEOUS at 14:48

## 2023-03-23 ENCOUNTER — OFFICE VISIT (OUTPATIENT)
Dept: GASTROENTEROLOGY | Facility: CLINIC | Age: 73
End: 2023-03-23
Payer: MEDICARE

## 2023-03-23 VITALS — SYSTOLIC BLOOD PRESSURE: 140 MMHG | OXYGEN SATURATION: 99 % | HEART RATE: 80 BPM | DIASTOLIC BLOOD PRESSURE: 60 MMHG

## 2023-03-23 DIAGNOSIS — R76.8 HEPATITIS C ANTIBODY TEST POSITIVE: Chronic | ICD-10-CM

## 2023-03-23 DIAGNOSIS — R94.5 ABNORMAL RESULTS OF LIVER FUNCTION STUDIES: ICD-10-CM

## 2023-03-23 DIAGNOSIS — K62.5 RECTAL BLEEDING: Chronic | ICD-10-CM

## 2023-03-23 DIAGNOSIS — R11.0 NAUSEA: Chronic | ICD-10-CM

## 2023-03-23 DIAGNOSIS — F11.90 CHRONIC, CONTINUOUS USE OF OPIOIDS: Chronic | ICD-10-CM

## 2023-03-23 DIAGNOSIS — R14.0 BLOATING: Chronic | ICD-10-CM

## 2023-03-23 DIAGNOSIS — D64.9 ANEMIA, UNSPECIFIED TYPE: Primary | Chronic | ICD-10-CM

## 2023-03-23 DIAGNOSIS — R10.30 LOWER ABDOMINAL PAIN: Chronic | ICD-10-CM

## 2023-03-23 DIAGNOSIS — Z11.59 ENCOUNTER FOR SCREENING FOR OTHER VIRAL DISEASES: ICD-10-CM

## 2023-03-23 DIAGNOSIS — R14.1 GAS PAIN: Chronic | ICD-10-CM

## 2023-03-23 DIAGNOSIS — Z86.010 PERSONAL HISTORY OF COLONIC POLYPS: ICD-10-CM

## 2023-03-23 DIAGNOSIS — K59.00 CONSTIPATION, UNSPECIFIED CONSTIPATION TYPE: Chronic | ICD-10-CM

## 2023-03-23 PROCEDURE — 99214 OFFICE O/P EST MOD 30 MIN: CPT | Performed by: NURSE PRACTITIONER

## 2023-03-23 PROCEDURE — 1160F RVW MEDS BY RX/DR IN RCRD: CPT | Performed by: NURSE PRACTITIONER

## 2023-03-23 PROCEDURE — 3078F DIAST BP <80 MM HG: CPT | Performed by: NURSE PRACTITIONER

## 2023-03-23 PROCEDURE — 3077F SYST BP >= 140 MM HG: CPT | Performed by: NURSE PRACTITIONER

## 2023-03-23 PROCEDURE — 1159F MED LIST DOCD IN RCRD: CPT | Performed by: NURSE PRACTITIONER

## 2023-03-23 RX ORDER — PANTOPRAZOLE SODIUM 40 MG/1
TABLET, DELAYED RELEASE ORAL
Qty: 90 TABLET | Refills: 0 | Status: SHIPPED | OUTPATIENT
Start: 2023-03-23

## 2023-03-23 NOTE — PATIENT INSTRUCTIONS
Antireflux measures: Avoid fried, fatty foods, alcohol, chocolate, coffee, tea,  soft drinks, peppermint and spearmint, spicy foods, tomatoes and tomato based foods, onions, peppers, and smoking.   Other antireflux measures include weight reduction if overweight, avoiding tight clothing around the abdomen, elevating the head of the bed 6 inches with blocks under the head board, and don't drink or eat before going to bed and avoid lying down immediately after meals.  Pantoprazole 40 mg 1 by mouth in the am 30 minutes before breakfast x 3 months.  Continue Zofran 8 mg 1 po every 8 hours as needed for nausea.   The patient should eat 4-5 very small meals throughout the day. Avoid large meals.  It is recommended to eat a softer diet. Meats are best consumed ground. Fruits and vegetables are best consumed cooked or steamed and then mashed.    Labs  H. Pylori breath test.  CT Abdomen and pelvis without contrast  Linzess 290 mcg 1 po daily 30 minutes before breakfast.   Miralax 17 grams daily with liberal water intake.   Senna 1 po daily.   Patient declines colonoscopy and/or EGD at this time.   Follow up: 3 months or sooner if needed

## 2023-03-23 NOTE — PROGRESS NOTES
"     Follow Up Note     Date: 2023   Patient Name: Vanna Rincon  MRN: [id number]  : 1950     Primary Care Provider: Farhad Chowdary MD     Chief Complaint   Patient presents with   • Follow-up   • Constipation     History of present illness:   3/23/2023  Vanna Rincon is a 72 y.o. female who is here today for follow up for constipation.     She has a history of constipation, bloating and gas for many years that has not improved. She states she has taken \"every over the counter and prescription medication out there\" but nothing helps, most recently she took Linzess. She is not taking anything on a regular basis as nothing has helped. She occasionally has to manually remove stool if she can't have a bowel movement. She is unable to quantify how often she has a bowel movement. She has occasional lower abdominal pain that can improve with bowel movements. She does use fentanyl patches every 3 days and Norco 3 times a day for over 20 years for chronic pain. She admits she does not drink much fluids. She has a lot of nausea on a regular basis, but no vomiting unless she swallows air when she is eating, which can cause her to vomit her food back up. No history of reflux or difficulty swallowing.    She has a history of anemia for many years. She is followed by nephrology. She gets Procrit injections every month and iron infusions as needed. She has occasional bright red blood in the toilet after a bowel movement that occurs if she has to manually remove stool. No history of hematemesis or melena.  She has a history of COPD and wears 8L oxygen per NC. She occasionally has to turn it up higher when she is talking or doing anything active.     Upon review of records, she had a positive hepatitis C antibody. She is not aware of hepatitis C in the past. No history of IVDA, alcohol use, tattoos or blood transfusions.     Interval History:  2020 (per Dr. Raza Sneed, [facility])  Mrs." Sergey is a 70-year-old with a history of COPD oxygen dependent, rheumatoid arthritis and hypertension.  She has been evaluated by pulmonary and has complaint of issues with swallowing at times.  Ms. Rincon was previously evaluated by Dr. Pastrana in [place], [place].  She denies any progressive difficult or painful swallowing.  The patient has complaints that sometimes with meals the food will go down and is caught in an air pocket.  She will then regurgitate food back up.  This does not happen on a daily basis.  Mrs. Rincon may have this occur a couple times a week.  She has been on Protonix for several years and this was prescribed by Dr. Pastrana.  Mrs. Rincon denies any abdominal pain after meals.  There is no history of unexplained weight loss.  She denies any nausea or vomiting.  She has no change in bowel habits or signs of blood in the stool.    12/12/2017  The patient has a history of anemia for the past 3 months. The patient has not been aware of anemia in the past. The patient has had a few episodes of bright red blood per rectum in the past, and has had black stools for the past 1 week. The patient has 2-3 episodes of black stools per day. For the past 2-3 days, her symptoms have improved since she stopped her NSAIDs. The patient denies hematemesis. No history of vaginal bleeding.      The patient has a history of left lower quadrant pain for past 1-2 weeks. The patient may have pain intermittently throughout the day. The patient has had improvement with the pain since stopping her NSAIDs. The pain is described as moderate. It is a dull, aching pain. Nothing worsened the pain, but stopping NSAID use has improved the pain.     The patient has a long-standing history of nausea. The patient has nausea 4-5 times per week. The nausea has been unchanged. Eating does not affect the nausea. The patient is not taking anything for the nausea.     The patient has a long-standing history of difficulty swallowing.  "This may occur once per week. Solids typically cause difficulty swallowing, but liquids do not cause a problem.      There is a long-standing history of constipation. The patient has at least 1 bowel movement per day. The patient does not drink water. She does not eat high fiber as she does not drink enough water for the fiber to work. The patient does take laxatives 4-5 days per week to have a bowel movement. She has tried stool softeners, but it did not work. She has also tried Miralax, but she did not feel like it \"cleaned out\" her bowels.     The patient has not been aware of elevated liver enzymes, but recently she has had mildly elevated alkaline phosphatase. The patient does have rheumatoid arthritis.    Subjective      Past Medical History:   Diagnosis Date   • Abnormal mammogram    • Acute UTI    • Arthritis     knee, right   • Back pain    • Candida vaginitis    • Cataracts, bilateral    • CHF (congestive heart failure) ([facility])     not diagnosed   • Chronic pain syndrome     disc disease, lumbar, neck   • Community acquired pneumonia    • COPD (chronic obstructive pulmonary disease) ([facility])     Emphysema with severe physiology on PFTs.  Patient is a former smoker.   • Cough    • Deafness    • Dependence on supplemental oxygen     PT REPORTS \"4L ALL TIMES\"   • Former smoker    • Gastric bleed    • GERD (gastroesophageal reflux disease)    • Glaucoma    • H/O chest x-ray 08/11/2015    Rase base opacity consistent with pneumonia or atelectasis   • H/O chest x-ray 06/24/2015    Right basilar airspace disease and effusion consistent with a pneumonia. F/U to radiographic reolution is recommended   • H/O chest x-ray 03/09/2010    Cardiac silhoutte is not enlarged. Lungs aare inflated. Mild nonspecifiec interstitial changes. No pleual effusions or dense consolidations. Scattered granulomatous changes. Spine with mild kyphosis but no osteopenia. No significant adenopathy   • H/O echocardiogram 03/16/2010    Normal " study   • History of PFTs 12/13/2011    Goo pt cooperation and effort. Pt given 3 puffs of xopenex. PFT is acceptable and reproducible   • History of PFTs 08/11/2011    Pt unable to produce acceptabel and reproducible PFT. Pt was given 3 puffs of xopenex. Pt gave good effort Best pleth of two attempts   • History of PFTs 02/24/2011    PFT acceptable and reproducible. Pt gave good effort. Pt used bronchodilator less than 2 hours prior to testing   • Hyperlipidemia    • Hypertension    • Interstitial lung disease (HCC)     History of rheumatoid lung with bronchiolitis obliterans   • Kidney stones     x 1   • Nephrolithiasis     2007, passed   • Ocular migraine    • On home oxygen therapy     REPORTS USES AT 4L NC AT ALL TIMES   • Panic attack    • Pneumonia    • PRB (rectal bleeding)    • RA (rheumatoid arthritis) (HCC)    • Rheumatoid arthritis (HCC)     · A.  Diagnosed in 2001 with history of methotrexate and Enbrel therapy.   • Rheumatoid lung (HCC)     bronchiolitis obliterans   • Sinus problem    • Vertigo      Past Surgical History:   Procedure Laterality Date   • CATARACT EXTRACTION Right    • COLONOSCOPY  2012   • COLONOSCOPY N/A 01/29/2018    Procedure: COLONOSCOPY WITH COLD SNARE POLYPECTOMY X 6; SUBMUCOSAL INJECTION OF SALINE; HOT SNARE POLYPECTOMY X 4; CLIP PLACEMENT X 1;  Surgeon: Kenney Pastrana MD;  Location: [facility] ENDOSCOPY;  Service:    • DENTAL PROCEDURE     • ENDOSCOPY N/A 12/15/2017    Procedure: ESOPHAGOGASTRODUODENOSCOPY with biopsies and esophageal balloon dilitation;  Surgeon: Kenney Pastrana MD;  Location: [facility] ENDOSCOPY;  Service:    • MOUTH SURGERY      REPORTS IMPLANT X2   • ORAL ANTRAL FISTULA CLOSURE      gums   • TONSILLECTOMY     • TUBAL ABDOMINAL LIGATION     • UPPER GASTROINTESTINAL ENDOSCOPY  2012   • UPPER GASTROINTESTINAL ENDOSCOPY  12/15/2017     Family History   Problem Relation Age of Onset   • Glaucoma Mother    • Coronary artery disease Mother    • Liver cancer Mother          bile duct cancer   • Stroke Father    • Stomach cancer Father    • Diabetes Sister    • Colon cancer Neg Hx    • Ulcerative colitis Neg Hx      Social History     Socioeconomic History   • Marital status:    Tobacco Use   • Smoking status: Former     Packs/day: 1.00     Years: 25.00     Pack years: 25.00     Types: Cigarettes     Start date:      Quit date:      Years since quittin.2   • Smokeless tobacco: Never   Vaping Use   • Vaping Use: Never used   Substance and Sexual Activity   • Alcohol use: No   • Drug use: No   • Sexual activity: Defer     Comment:        Current Outpatient Medications:   •  epoetin chidi (Procrit) 2000 UNIT/ML injection, Inject  under the skin into the appropriate area as directed Every 30 (Thirty) Days., Disp: , Rfl:   •  Etanercept 50 MG/ML solution auto-injector, Inject  under the skin 1 (One) Time Per Week., Disp: , Rfl:   •  fentaNYL (DURAGESIC) 75 MCG/HR patch, Place 1 patch on the skin as directed by provider Every 72 (Seventy-Two) Hours., Disp: , Rfl:   •  HYDROcodone-acetaminophen (NORCO) 5-325 MG per tablet, Take 1 tablet by mouth Every 6 (Six) Hours As Needed., Disp: , Rfl:   •  levalbuterol (XOPENEX HFA) 45 MCG/ACT inhaler, Inhale 2 puffs 4 (Four) Times a Day As Needed for Wheezing., Disp: 15 g, Rfl: 5  •  LORazepam (ATIVAN) 0.5 MG tablet, Take 1 tablet by mouth Daily As Needed for Anxiety., Disp: 30 tablet, Rfl: 1  •  Multiple Vitamins-Minerals (CENTRUM ADULTS PO), Take 1 tablet by mouth Daily., Disp: , Rfl:   •  O2 (OXYGEN), Inhale 6 L/min Continuous., Disp: , Rfl:   •  ondansetron (ZOFRAN) 8 MG tablet, Take  by mouth Every 8 (Eight) Hours As Needed for Nausea or Vomiting., Disp: , Rfl:   •  phenylephrine (FAVIO-SYNEPHRINE) 1 % nasal spray, 1 spray into the nostril(s) as directed by provider Every 4 (Four) Hours As Needed for Congestion., Disp: , Rfl:   •  prednisoLONE acetate (PRED FORTE) 1 % ophthalmic suspension, Administer 1 drop to the right eye  "2 (Two) Times a Day., Disp: , Rfl:   •  sertraline (ZOLOFT) 100 MG tablet, Take 1 tablet by mouth Daily., Disp: 90 tablet, Rfl: 3  •  simethicone (MYLICON) 125 MG chewable tablet, Chew 1 tablet Every 6 (Six) Hours As Needed for Flatulence., Disp: , Rfl:   •  Symbicort 160-4.5 MCG/ACT inhaler, INHALE TWO PUFFS BY MOUTH TWICE A DAY, Disp: 10.2 g, Rfl: 0  •  valsartan-hydrochlorothiazide (DIOVAN-HCT) 320-25 MG per tablet, Take 0.5 tablets by mouth Daily., Disp: 45 tablet, Rfl: 3  •  pantoprazole (PROTONIX) 40 MG EC tablet, 1 po daily in the am 30 minutes before breakfast, Disp: 90 tablet, Rfl: 0     Allergies   Allergen Reactions   • Iodinated Contrast Media Other (See Comments)     Kidney disease   • Brimonidine Tartrate Nausea And Vomiting   • Ciprofloxacin Other (See Comments)     \"Hurt all over\"   • Doxycycline Other (See Comments)     \"makes other medication stay in my system longer\"   • Lipitor [Atorvastatin] Other (See Comments)     Hurt all over     • Nsaids Other (See Comments)     Pt states she has stage three kidney disease   • Sulfa Antibiotics Other (See Comments)     \"cant remember\"     The following portions of the patient's history were reviewed and updated as appropriate: allergies, current medications, past family history, past medical history, past social history, past surgical history and problem list.  Objective     Physical Exam  Vitals and nursing note reviewed.   Constitutional:       General: She is not in acute distress.     Appearance: Normal appearance. She is ill-appearing (chronic).   HENT:      Head: Normocephalic and atraumatic.      Mouth/Throat:      Mouth: Mucous membranes are not pale, not dry and not cyanotic.   Eyes:      General: Lids are normal.   Neck:      Trachea: Trachea normal.   Cardiovascular:      Rate and Rhythm: Normal rate.   Pulmonary:      Effort: Pulmonary effort is normal. No respiratory distress (patient on O2 per NC).      Breath sounds: Normal breath sounds. "   Abdominal:      General: Bowel sounds are normal.      Palpations: Abdomen is soft. There is no mass.      Tenderness: There is no abdominal tenderness.      Hernia: No hernia is present.   Skin:     General: Skin is warm and dry.   Neurological:      Mental Status: She is alert and oriented to person, place, and time.      Motor: Tremor present.      Gait: Gait abnormal (patient in wheelchair).   Psychiatric:         Mood and Affect: Mood normal.         Speech: Speech normal.         Behavior: Behavior normal. Behavior is cooperative.       Vitals:    03/23/23 1347   BP: 140/60   Pulse: 80   SpO2: 99%   Weight: Comment: unable to obtain; patient in wheelchair     There is no height or weight on file to calculate BMI.     Results Review:   I reviewed the patient's new clinical results.    Hospital Outpatient Visit on 03/22/2023   Component Date Value Ref Range Status   • WBC 03/22/2023 6.26  3.40 - 10.80 10*3/mm3 Final   • RBC 03/22/2023 3.67 (L)  3.77 - 5.28 10*6/mm3 Final   • Hemoglobin 03/22/2023 10.1 (L)  12.0 - 15.9 g/dL Final   • Hematocrit 03/22/2023 32.5 (L)  34.0 - 46.6 % Final   • MCV 03/22/2023 88.6  79.0 - 97.0 fL Final   • MCH 03/22/2023 27.5  26.6 - 33.0 pg Final   • MCHC 03/22/2023 31.1 (L)  31.5 - 35.7 g/dL Final   • RDW 03/22/2023 13.1  12.3 - 15.4 % Final   • RDW-SD 03/22/2023 42.6  37.0 - 54.0 fl Final   • MPV 03/22/2023 10.0  6.0 - 12.0 fL Final   • Platelets 03/22/2023 158  140 - 450 10*3/mm3 Final   • Glucose 03/22/2023 100 (H)  65 - 99 mg/dL Final   • BUN 03/22/2023 26 (H)  8 - 23 mg/dL Final   • Creatinine 03/22/2023 0.99  0.57 - 1.00 mg/dL Final   • Sodium 03/22/2023 140  136 - 145 mmol/L Final   • Potassium 03/22/2023 4.2  3.5 - 5.2 mmol/L Final   • Chloride 03/22/2023 96 (L)  98 - 107 mmol/L Final   • CO2 03/22/2023 34.1 (H)  22.0 - 29.0 mmol/L Final   • Calcium 03/22/2023 9.4  8.6 - 10.5 mg/dL Final   • Albumin 03/22/2023 4.1  3.5 - 5.2 g/dL Final   • Phosphorus 03/22/2023 3.3  2.5 -  4.5 mg/dL Final   • Anion Gap 03/22/2023 9.9  5.0 - 15.0 mmol/L Final   • BUN/Creatinine Ratio 03/22/2023 26.3 (H)  7.0 - 25.0 Final   • eGFR 03/22/2023 61.5  >60.0 mL/min/1.73 Final   • Glucose 03/22/2023 175 (H)  65 - 99 mg/dL Final   • BUN 03/22/2023 26 (H)  8 - 23 mg/dL Final   • Creatinine 03/22/2023 1.06 (H)  0.57 - 1.00 mg/dL Final   • Sodium 03/22/2023 140  136 - 145 mmol/L Final   • Potassium 03/22/2023 4.2  3.5 - 5.2 mmol/L Final   • Chloride 03/22/2023 97 (L)  98 - 107 mmol/L Final   • CO2 03/22/2023 34.7 (H)  22.0 - 29.0 mmol/L Final   • Calcium 03/22/2023 9.2  8.6 - 10.5 mg/dL Final   • Total Protein 03/22/2023 6.4  6.0 - 8.5 g/dL Final   • Albumin 03/22/2023 3.8  3.5 - 5.2 g/dL Final   • ALT (SGPT) 03/22/2023 10  1 - 33 U/L Final   • AST (SGOT) 03/22/2023 19  1 - 32 U/L Final   • Alkaline Phosphatase 03/22/2023 71  39 - 117 U/L Final   • Total Bilirubin 03/22/2023 0.3  0.0 - 1.2 mg/dL Final   • Globulin 03/22/2023 2.6  gm/dL Final   • A/G Ratio 03/22/2023 1.5  g/dL Final   • BUN/Creatinine Ratio 03/22/2023 24.5  7.0 - 25.0 Final   • Anion Gap 03/22/2023 8.3  5.0 - 15.0 mmol/L Final   • eGFR 03/22/2023 56.6 (L)  >60.0 mL/min/1.73 Final   • Sed Rate 03/22/2023 7  0 - 30 mm/hr Final   • C-Reactive Protein 03/22/2023 <0.30  0.00 - 0.50 mg/dL Final   • WBC 03/22/2023 6.66  3.40 - 10.80 10*3/mm3 Final   • RBC 03/22/2023 3.46 (L)  3.77 - 5.28 10*6/mm3 Final   • Hemoglobin 03/22/2023 9.7 (L)  12.0 - 15.9 g/dL Final   • Hematocrit 03/22/2023 30.7 (L)  34.0 - 46.6 % Final   • MCV 03/22/2023 88.7  79.0 - 97.0 fL Final   • MCH 03/22/2023 28.0  26.6 - 33.0 pg Final   • MCHC 03/22/2023 31.6  31.5 - 35.7 g/dL Final   • RDW 03/22/2023 13.0  12.3 - 15.4 % Final   • RDW-SD 03/22/2023 42.2  37.0 - 54.0 fl Final   • MPV 03/22/2023 10.2  6.0 - 12.0 fL Final   • Platelets 03/22/2023 159  140 - 450 10*3/mm3 Final   Hospital Outpatient Visit on 02/08/2023   Component Date Value Ref Range Status   • WBC 02/08/2023 5.72  3.40 -  10.80 10*3/mm3 Final   • RBC 02/08/2023 4.10  3.77 - 5.28 10*6/mm3 Final   • Hemoglobin 02/08/2023 11.5 (L)  12.0 - 15.9 g/dL Final   • Hematocrit 02/08/2023 36.5  34.0 - 46.6 % Final   • MCV 02/08/2023 89.0  79.0 - 97.0 fL Final   • MCH 02/08/2023 28.0  26.6 - 33.0 pg Final   • MCHC 02/08/2023 31.5  31.5 - 35.7 g/dL Final   • RDW 02/08/2023 13.0  12.3 - 15.4 % Final   • RDW-SD 02/08/2023 42.4  37.0 - 54.0 fl Final   • MPV 02/08/2023 10.1  6.0 - 12.0 fL Final   • Platelets 02/08/2023 197  140 - 450 10*3/mm3 Final   • Glucose 02/08/2023 115 (H)  65 - 99 mg/dL Final   • BUN 02/08/2023 27 (H)  8 - 23 mg/dL Final   • Creatinine 02/08/2023 0.99  0.57 - 1.00 mg/dL Final   • Sodium 02/08/2023 140  136 - 145 mmol/L Final   • Potassium 02/08/2023 4.1  3.5 - 5.2 mmol/L Final   • Chloride 02/08/2023 99  98 - 107 mmol/L Final   • CO2 02/08/2023 33.3 (H)  22.0 - 29.0 mmol/L Final   • Calcium 02/08/2023 9.4  8.6 - 10.5 mg/dL Final   • Albumin 02/08/2023 4.1  3.5 - 5.2 g/dL Final   • Phosphorus 02/08/2023 3.6  2.5 - 4.5 mg/dL Final   • Anion Gap 02/08/2023 7.7  5.0 - 15.0 mmol/L Final   • BUN/Creatinine Ratio 02/08/2023 27.3 (H)  7.0 - 25.0 Final   • eGFR 02/08/2023 61.5  >60.0 mL/min/1.73 Final   • Iron 02/08/2023 45  37 - 145 mcg/dL Final   • Iron Saturation 02/08/2023 18 (L)  20 - 50 % Final   • Transferrin 02/08/2023 171 (L)  200 - 360 mg/dL Final   • TIBC 02/08/2023 255 (L)  298 - 536 mcg/dL Final   Hospital Outpatient Visit on 01/11/2023   Component Date Value Ref Range Status   • Glucose 01/11/2023 106 (H)  65 - 99 mg/dL Final   • BUN 01/11/2023 19  8 - 23 mg/dL Final   • Creatinine 01/11/2023 1.09 (H)  0.57 - 1.00 mg/dL Final   • Sodium 01/11/2023 142  136 - 145 mmol/L Final   • Potassium 01/11/2023 3.9  3.5 - 5.2 mmol/L Final   • Chloride 01/11/2023 99  98 - 107 mmol/L Final   • CO2 01/11/2023 34.2 (H)  22.0 - 29.0 mmol/L Final   • Calcium 01/11/2023 9.2  8.6 - 10.5 mg/dL Final   • Albumin 01/11/2023 4.2  3.5 - 5.2 g/dL  Final   • Phosphorus 01/11/2023 4.8 (H)  2.5 - 4.5 mg/dL Final   • Anion Gap 01/11/2023 8.8  5.0 - 15.0 mmol/L Final   • BUN/Creatinine Ratio 01/11/2023 17.4  7.0 - 25.0 Final   • eGFR 01/11/2023 54.8 (L)  >60.0 mL/min/1.73 Final    National Kidney Foundation and American Society of Nephrology (ASN) Task Force recommended calculation based on the Chronic Kidney Disease Epidemiology Collaboration (CKD-EPI) equation refit without adjustment for race.   • PTH, Intact 01/11/2023 33.4  15.0 - 65.0 pg/mL Final   • 25 Hydroxy, Vitamin D 01/11/2023 46.2  30.0 - 100.0 ng/ml Final   • Uric Acid 01/11/2023 4.9  2.4 - 5.7 mg/dL Final   • WBC 01/11/2023 6.20  3.40 - 10.80 10*3/mm3 Final   • RBC 01/11/2023 3.84  3.77 - 5.28 10*6/mm3 Final   • Hemoglobin 01/11/2023 10.9 (L)  12.0 - 15.9 g/dL Final   • Hematocrit 01/11/2023 35.0  34.0 - 46.6 % Final   • MCV 01/11/2023 91.1  79.0 - 97.0 fL Final   • MCH 01/11/2023 28.4  26.6 - 33.0 pg Final   • MCHC 01/11/2023 31.1 (L)  31.5 - 35.7 g/dL Final   • RDW 01/11/2023 12.7  12.3 - 15.4 % Final   • RDW-SD 01/11/2023 41.1  37.0 - 54.0 fl Final   • MPV 01/11/2023 10.5  6.0 - 12.0 fL Final   • Platelets 01/11/2023 198  140 - 450 10*3/mm3 Final      Dated 11/15/2022 hepatitis A IgM: Negative, hepatitis B core IgM: Nonreactive, hepatitis B surface antigen: Nonreactive, hepatitis C antibody: Reactive    EGD dated 12/15/2017 per Dr. Pastrana  - Distal esophageal ring, Schatzki's type.    - Sliding hiatal hernia around 4 centimeter.    - Erythematous erosive gastritis.   - Erythematous bulbar duodenitis.    - Second portion of duodenum biopsy reveals unremarkable small bowel mucosa, no increased inflammation or adenomatous changes identified.  Antrum body angulus biopsy reveals mild chronic reactive gastritis.  No organisms identified on stain.     Colonoscopy dated January 29, 2018 per Dr. Pastrana  - Diverticulosis involving the left colon, and transverse colon.    - Colon polyps.    - Internal  hemorrhoids.    - Transverse colon, polyps, biopsies revealed tubular adenomas.  Ascending colon, polyp, biopsy revealed tubular adenoma.  Colon, Hepatic Flexure, polyps, biopsies revealed tubular adenoma.  Rectal polyps, biopsies revealed hyperplastic polyp.  Lymphoid aggregates.    Assessment / Plan      1. Anemia, unspecified type  He has a history of anemia for several years. She has some rectal bleeding when she does manual removal of stool, but denies rectal bleeding at other times, no history of melena or hematemesis. She has CKD stage III and gets Procrit injections every month and iron infusions as needed per nephrology. Recent labs with Hgb 10.1/Hct 32.5/MCV 88.6. Her last colonoscopy was in 2018 per Dr. Pastrana with polyps removed, adenomas without dysplasia.  Her last EGD was in 2017 with erosive gastritis.  Biopsies negative for H. pylori.  Coloonoscopy/EGD to rule out GI cause for anemia - patient does not want to schedule at this time.  CTAP to rule out solid organ pathology.   Pantoprazole 40 mg 1 p.o. daily    - IgA; Future  - Tissue Transglutaminase, IgA; Future  - CT Abdomen Pelvis Without Contrast; Future    2. Constipation, unspecified constipation type  3. Lower abdominal pain  4. Rectal bleeding  5. Bloating  6. Gas pain  7. Nausea  8. Chronic, continuous use of opioids  She has a history of constipation, lower abdominal pain, gas, bloating and nausea for several years that is unchanged. She has taken multiple medications over the years but nothing has helped per patient, including Linzess, Amitiza, Movantik, Motegrity and Miralax . She has not tried combinations of medications. She has rectal bleeding if she manually removes stool, but not at other times. She has been on Fentanyl patches and Norco pills 3 times per day for over 20 years for chronic pain. Suspect chronic opioid use is causing/contributing to her symptoms, may have some overlapping IBS.  Labs  CTAP to rule out solid organ  pathology.  Pantoprazole 40 mg 1 po daily.  Zofran 4 mg 1 po every 8 hours as needed for nausea.  Advised to eat a softer diet with 4-5 very small meals throughout the day.   Linzess 290 mcg 1 po daily  Miralax 17 grams daily.  Senna 1 po daily  If no improvement, will add Movantik. (She wants to wait due to cost of co-pay)  Patient declines colonoscopy and/or EGD at this time.    - TSH; Future  - Lipase; Future  - IgA; Future  - Tissue Transglutaminase, IgA; Future  - H. Pylori Breath Test - Breath, Lung; Future  - CT Abdomen Pelvis Without Contrast; Future  - pantoprazole (PROTONIX) 40 MG EC tablet; 1 po daily in the am 30 minutes before breakfast  Dispense: 90 tablet; Refill: 0    9. Personal history of colonic polyps  Colonoscopy in 2018 per Dr. Pastrana with polyps removed, adenomas without dysplasia. He was recommended colonoscopy for surveillance in 5 years at that that time.  Colonoscopy for surveillance - patient declines at this time.    10. Hepatitis C antibody test positive  11. Abnormal results of liver function studies  Upon review of records, she had a positive hepatitis C antibody. Hepatitis B surface antigen nonreactive. She is not aware of hepatitis C in the past and has not been. No history of IVDA, alcohol use, tattoos or blood transfusions. Recent labs with normal liver enzymes. She had borderline elevation of alk phos in 2017, but none since. No history of elevated AST/ALT/total bilirubin.   Labs    - Protime-INR; Future  - Hepatitis A Antibody, Total; Future  - Hepatitis B Surf Antibody Quant; Future  - Hepatitis B Core Antibody, Total; Future  - Hepatitis C RNA, Quantitative, PCR (graph); Future  - Hepatitis C Genotype; Future  - CT Abdomen Pelvis Without Contrast; Future    Patient Instructions   1. Antireflux measures: Avoid fried, fatty foods, alcohol, chocolate, coffee, tea,  soft drinks, peppermint and spearmint, spicy foods, tomatoes and tomato based foods, onions, peppers, and smoking.    2. Other antireflux measures include weight reduction if overweight, avoiding tight clothing around the abdomen, elevating the head of the bed 6 inches with blocks under the head board, and don't drink or eat before going to bed and avoid lying down immediately after meals.  3. Pantoprazole 40 mg 1 by mouth in the am 30 minutes before breakfast x 3 months.  4. Continue Zofran 8 mg 1 po every 8 hours as needed for nausea.   5. The patient should eat 4-5 very small meals throughout the day. Avoid large meals.  6. It is recommended to eat a softer diet. Meats are best consumed ground. Fruits and vegetables are best consumed cooked or steamed and then mashed.    7. Labs  8. H. Pylori breath test.  9. CT Abdomen and pelvis without contrast  10. Linzess 290 mcg 1 po daily 30 minutes before breakfast.   11. Miralax 17 grams daily with liberal water intake.   12. Senna 1 po daily.   13. Patient declines colonoscopy and/or EGD at this time.   14. Follow up: 3 months or sooner if needed    Vj Weathers, APRN  3/23/2023    Please note that portions of this note were completed with a voice recognition program.

## 2023-04-04 ENCOUNTER — HOSPITAL ENCOUNTER (OUTPATIENT)
Dept: CT IMAGING | Facility: HOSPITAL | Age: 73
Discharge: HOME | End: 2023-04-04
Admitting: NURSE PRACTITIONER
Payer: MEDICARE

## 2023-04-04 DIAGNOSIS — R76.8 HEPATITIS C ANTIBODY TEST POSITIVE: Chronic | ICD-10-CM

## 2023-04-04 DIAGNOSIS — R10.30 LOWER ABDOMINAL PAIN: Chronic | ICD-10-CM

## 2023-04-04 DIAGNOSIS — R11.0 NAUSEA: Chronic | ICD-10-CM

## 2023-04-04 DIAGNOSIS — K59.00 CONSTIPATION, UNSPECIFIED CONSTIPATION TYPE: Chronic | ICD-10-CM

## 2023-04-04 DIAGNOSIS — D64.9 ANEMIA, UNSPECIFIED TYPE: Chronic | ICD-10-CM

## 2023-04-04 PROCEDURE — 74176 CT ABD & PELVIS W/O CONTRAST: CPT

## 2023-04-06 DIAGNOSIS — R59.1 LYMPHADENOPATHY: ICD-10-CM

## 2023-04-06 DIAGNOSIS — R19.09 ABDOMINAL MASS OF OTHER SITE: Primary | ICD-10-CM

## 2023-04-06 DIAGNOSIS — D64.9 ANEMIA, UNSPECIFIED TYPE: ICD-10-CM

## 2023-04-06 NOTE — PROGRESS NOTES
Called patient and discussed results of CT scan. Advised of possible mass and possible differentials including lymphoma, carcinoid, or metastatic adenopathy. Advised patient it is recommended to do colonoscopy and EGD for further evaluation as well as refer to hematology/oncology for evaluation. Patient states she does not want to do anything at all. After discussing, she has agreed to be referred to hematology/oncology to see what they have to say. She still does not want to go through with colonoscopy or EGD. Advised patient to call our office back if she changes her mind and wants to be scheduled. She verbalizes understanding.

## 2023-04-11 ENCOUNTER — TELEPHONE (OUTPATIENT)
Dept: ONCOLOGY | Facility: CLINIC | Age: 73
End: 2023-04-11
Payer: MEDICARE

## 2023-04-11 ENCOUNTER — TELEPHONE (OUTPATIENT)
Dept: GASTROENTEROLOGY | Facility: CLINIC | Age: 73
End: 2023-04-11
Payer: MEDICARE

## 2023-04-11 NOTE — TELEPHONE ENCOUNTER
Caller: SABINO    Relationship: [facility] REFERRING OFFICE    Best call back number: [phone] OPTION 3    What is the best time to reach you: ANYTIME    Who are you requesting to speak with (clinical staff, provider,  specific staff member): CLINICAL    What was the call regarding: HAS QUESTIONS ABOUT REFERRAL AND BIOPSY.    Do you require a callback: YES

## 2023-04-11 NOTE — TELEPHONE ENCOUNTER
I called and spoke to patient about the referral to Hematology/Oncology. H/O is requesting biopsy results then will schedule office visit after. Patient had previously declined EGD/Colon and she still states her health is not good enough to go through procedures and does not want EGD/Colon. Patient declines referral to General Surgery and does not want to do anything at this time. She states again her health is not good enough to have any procedures. I told her if she changes her mind to call our office. She does have a follow up office appointment in June with GI.  ( I reviewed this phone conversation with JENNY Jacob)    I called patients PCP (Farhad Chowdary MD) and spoke to Katia/Referral Coordinator.  I told Katia about CT on 4/4/23.  I told Katia that patient has declined EGD/Colon and any other referrals at this time. Patient states her health is not good enough to go through procedures.  Katia stated she would review the CT with the provider and decide how to proceed.

## 2023-04-11 NOTE — TELEPHONE ENCOUNTER
Returned call to referral cordinator. Stating that they wanted to know how to handle patient mass. Informing referral coordinator that patient needs biopsy of mass to get diagnosis if mass is cancer or not. Referral coordinator stating she will let office know what is needed.

## 2023-05-03 ENCOUNTER — LAB (OUTPATIENT)
Dept: LAB | Facility: HOSPITAL | Age: 73
End: 2023-05-03
Payer: MEDICARE

## 2023-05-03 ENCOUNTER — HOSPITAL ENCOUNTER (OUTPATIENT)
Dept: INFUSION THERAPY | Facility: HOSPITAL | Age: 73
Discharge: HOME | End: 2023-05-03
Payer: MEDICARE

## 2023-05-03 VITALS
HEART RATE: 79 BPM | RESPIRATION RATE: 20 BRPM | DIASTOLIC BLOOD PRESSURE: 51 MMHG | OXYGEN SATURATION: 100 % | SYSTOLIC BLOOD PRESSURE: 156 MMHG | TEMPERATURE: 98.9 F

## 2023-05-03 DIAGNOSIS — R11.0 NAUSEA: Chronic | ICD-10-CM

## 2023-05-03 DIAGNOSIS — Z11.59 ENCOUNTER FOR SCREENING FOR OTHER VIRAL DISEASES: ICD-10-CM

## 2023-05-03 DIAGNOSIS — R14.0 BLOATING: Chronic | ICD-10-CM

## 2023-05-03 DIAGNOSIS — R94.5 ABNORMAL RESULTS OF LIVER FUNCTION STUDIES: ICD-10-CM

## 2023-05-03 DIAGNOSIS — R76.8 HEPATITIS C ANTIBODY TEST POSITIVE: Chronic | ICD-10-CM

## 2023-05-03 DIAGNOSIS — D64.9 ANEMIA, UNSPECIFIED TYPE: Chronic | ICD-10-CM

## 2023-05-03 DIAGNOSIS — K59.00 CONSTIPATION, UNSPECIFIED CONSTIPATION TYPE: Chronic | ICD-10-CM

## 2023-05-03 DIAGNOSIS — N18.30 STAGE 3 CHRONIC KIDNEY DISEASE, UNSPECIFIED WHETHER STAGE 3A OR 3B CKD: Primary | ICD-10-CM

## 2023-05-03 DIAGNOSIS — D50.9 IRON DEFICIENCY ANEMIA, UNSPECIFIED IRON DEFICIENCY ANEMIA TYPE: ICD-10-CM

## 2023-05-03 LAB
ALBUMIN SERPL-MCNC: 4.3 G/DL (ref 3.5–5.2)
ANION GAP SERPL CALCULATED.3IONS-SCNC: 8.4 MMOL/L (ref 5–15)
BUN SERPL-MCNC: 24 MG/DL (ref 8–23)
BUN/CREAT SERPL: 22 (ref 7–25)
CALCIUM SPEC-SCNC: 9.7 MG/DL (ref 8.6–10.5)
CHLORIDE SERPL-SCNC: 97 MMOL/L (ref 98–107)
CO2 SERPL-SCNC: 33.6 MMOL/L (ref 22–29)
CREAT SERPL-MCNC: 1.09 MG/DL (ref 0.57–1)
DEPRECATED RDW RBC AUTO: 42.2 FL (ref 37–54)
EGFRCR SERPLBLD CKD-EPI 2021: 54.8 ML/MIN/1.73
ERYTHROCYTE [DISTWIDTH] IN BLOOD BY AUTOMATED COUNT: 12.9 % (ref 12.3–15.4)
GLUCOSE SERPL-MCNC: 116 MG/DL (ref 65–99)
HCT VFR BLD AUTO: 35 % (ref 34–46.6)
HGB BLD-MCNC: 10.8 G/DL (ref 12–15.9)
INR PPP: 0.94 (ref 0.9–1.1)
IRON 24H UR-MRATE: 85 MCG/DL (ref 37–145)
IRON SATN MFR SERPL: 29 % (ref 20–50)
LIPASE SERPL-CCNC: 70 U/L (ref 13–60)
MCH RBC QN AUTO: 27.6 PG (ref 26.6–33)
MCHC RBC AUTO-ENTMCNC: 30.9 G/DL (ref 31.5–35.7)
MCV RBC AUTO: 89.3 FL (ref 79–97)
PHOSPHATE SERPL-MCNC: 4.2 MG/DL (ref 2.5–4.5)
PLATELET # BLD AUTO: 193 10*3/MM3 (ref 140–450)
PMV BLD AUTO: 9.9 FL (ref 6–12)
POTASSIUM SERPL-SCNC: 4 MMOL/L (ref 3.5–5.2)
PROTHROMBIN TIME: 13 SECONDS (ref 12.3–15.1)
RBC # BLD AUTO: 3.92 10*6/MM3 (ref 3.77–5.28)
SODIUM SERPL-SCNC: 139 MMOL/L (ref 136–145)
TIBC SERPL-MCNC: 294 MCG/DL (ref 298–536)
TRANSFERRIN SERPL-MCNC: 197 MG/DL (ref 200–360)
TSH SERPL DL<=0.05 MIU/L-ACNC: 1.98 UIU/ML (ref 0.27–4.2)
URATE SERPL-MCNC: 5.4 MG/DL (ref 2.4–5.7)
WBC NRBC COR # BLD: 7.9 10*3/MM3 (ref 3.4–10.8)

## 2023-05-03 PROCEDURE — 87522 HEPATITIS C REVRS TRNSCRPJ: CPT | Performed by: NURSE PRACTITIONER

## 2023-05-03 PROCEDURE — 85027 COMPLETE CBC AUTOMATED: CPT | Performed by: NURSE PRACTITIONER

## 2023-05-03 PROCEDURE — 36415 COLL VENOUS BLD VENIPUNCTURE: CPT

## 2023-05-03 PROCEDURE — 86317 IMMUNOASSAY INFECTIOUS AGENT: CPT | Performed by: NURSE PRACTITIONER

## 2023-05-03 PROCEDURE — 86708 HEPATITIS A ANTIBODY: CPT | Performed by: NURSE PRACTITIONER

## 2023-05-03 PROCEDURE — 83970 ASSAY OF PARATHORMONE: CPT | Performed by: NURSE PRACTITIONER

## 2023-05-03 PROCEDURE — 25010000002 EPOETIN ALFA PER 1000 UNITS: Performed by: NURSE PRACTITIONER

## 2023-05-03 PROCEDURE — 83013 H PYLORI (C-13) BREATH: CPT

## 2023-05-03 PROCEDURE — 84550 ASSAY OF BLOOD/URIC ACID: CPT | Performed by: NURSE PRACTITIONER

## 2023-05-03 PROCEDURE — 84443 ASSAY THYROID STIM HORMONE: CPT | Performed by: NURSE PRACTITIONER

## 2023-05-03 PROCEDURE — 86364 TISS TRNSGLTMNASE EA IG CLAS: CPT | Performed by: NURSE PRACTITIONER

## 2023-05-03 PROCEDURE — 82784 ASSAY IGA/IGD/IGG/IGM EACH: CPT | Performed by: NURSE PRACTITIONER

## 2023-05-03 PROCEDURE — 86704 HEP B CORE ANTIBODY TOTAL: CPT | Performed by: NURSE PRACTITIONER

## 2023-05-03 PROCEDURE — 80069 RENAL FUNCTION PANEL: CPT | Performed by: NURSE PRACTITIONER

## 2023-05-03 PROCEDURE — 84466 ASSAY OF TRANSFERRIN: CPT | Performed by: NURSE PRACTITIONER

## 2023-05-03 PROCEDURE — 87902 NFCT AGT GNTYP ALYS HEP C: CPT | Performed by: NURSE PRACTITIONER

## 2023-05-03 PROCEDURE — 83690 ASSAY OF LIPASE: CPT | Performed by: NURSE PRACTITIONER

## 2023-05-03 PROCEDURE — 96372 THER/PROPH/DIAG INJ SC/IM: CPT

## 2023-05-03 PROCEDURE — 85610 PROTHROMBIN TIME: CPT | Performed by: NURSE PRACTITIONER

## 2023-05-03 PROCEDURE — 83540 ASSAY OF IRON: CPT | Performed by: NURSE PRACTITIONER

## 2023-05-03 RX ADMIN — ERYTHROPOIETIN 40000 UNITS: 40000 INJECTION, SOLUTION INTRAVENOUS; SUBCUTANEOUS at 14:30

## 2023-05-04 LAB
HAV AB SER QL IA: POSITIVE
HBV CORE AB SERPL QL IA: NEGATIVE
HBV SURFACE AB SER-ACNC: <3.1 MIU/ML
IGA1 MFR SER: 346 MG/DL (ref 70–400)
PTH-INTACT SERPL-MCNC: 32.6 PG/ML (ref 15–65)
TTG IGA SER-ACNC: <2 U/ML (ref 0–3)
UREA BREATH TEST QL: NEGATIVE

## 2023-05-05 LAB
HCV RNA SERPL NAA+PROBE-ACNC: NORMAL IU/ML
TEST INFORMATION: NORMAL

## 2023-05-06 LAB
HCV GENTYP SERPL NAA+PROBE: NORMAL
LABORATORY COMMENT REPORT: NORMAL

## 2023-06-14 ENCOUNTER — HOSPITAL ENCOUNTER (OUTPATIENT)
Dept: INFUSION THERAPY | Facility: HOSPITAL | Age: 73
Discharge: HOME | End: 2023-06-14
Payer: MEDICARE

## 2023-06-14 VITALS
DIASTOLIC BLOOD PRESSURE: 80 MMHG | HEART RATE: 72 BPM | RESPIRATION RATE: 22 BRPM | TEMPERATURE: 98 F | SYSTOLIC BLOOD PRESSURE: 155 MMHG | OXYGEN SATURATION: 99 %

## 2023-06-14 DIAGNOSIS — D50.9 IRON DEFICIENCY ANEMIA, UNSPECIFIED IRON DEFICIENCY ANEMIA TYPE: Primary | ICD-10-CM

## 2023-06-14 DIAGNOSIS — N18.30 STAGE 3 CHRONIC KIDNEY DISEASE, UNSPECIFIED WHETHER STAGE 3A OR 3B CKD: ICD-10-CM

## 2023-06-14 LAB
ALBUMIN SERPL-MCNC: 4.2 G/DL (ref 3.5–5.2)
ANION GAP SERPL CALCULATED.3IONS-SCNC: 10.9 MMOL/L (ref 5–15)
BUN SERPL-MCNC: 34 MG/DL (ref 8–23)
BUN/CREAT SERPL: 23 (ref 7–25)
CALCIUM SPEC-SCNC: 8.7 MG/DL (ref 8.6–10.5)
CHLORIDE SERPL-SCNC: 96 MMOL/L (ref 98–107)
CO2 SERPL-SCNC: 30.1 MMOL/L (ref 22–29)
CREAT SERPL-MCNC: 1.48 MG/DL (ref 0.57–1)
DEPRECATED RDW RBC AUTO: 43.1 FL (ref 37–54)
EGFRCR SERPLBLD CKD-EPI 2021: 37.9 ML/MIN/1.73
ERYTHROCYTE [DISTWIDTH] IN BLOOD BY AUTOMATED COUNT: 13.2 % (ref 12.3–15.4)
GLUCOSE SERPL-MCNC: 102 MG/DL (ref 65–99)
HCT VFR BLD AUTO: 31.9 % (ref 34–46.6)
HGB BLD-MCNC: 10.1 G/DL (ref 12–15.9)
MCH RBC QN AUTO: 28.8 PG (ref 26.6–33)
MCHC RBC AUTO-ENTMCNC: 31.7 G/DL (ref 31.5–35.7)
MCV RBC AUTO: 90.9 FL (ref 79–97)
PHOSPHATE SERPL-MCNC: 5.7 MG/DL (ref 2.5–4.5)
PLATELET # BLD AUTO: 185 10*3/MM3 (ref 140–450)
PMV BLD AUTO: 9.9 FL (ref 6–12)
POTASSIUM SERPL-SCNC: 4.2 MMOL/L (ref 3.5–5.2)
RBC # BLD AUTO: 3.51 10*6/MM3 (ref 3.77–5.28)
SODIUM SERPL-SCNC: 137 MMOL/L (ref 136–145)
WBC NRBC COR # BLD: 7.45 10*3/MM3 (ref 3.4–10.8)

## 2023-06-14 PROCEDURE — 25010000002 EPOETIN ALFA PER 1000 UNITS: Performed by: NURSE PRACTITIONER

## 2023-06-14 PROCEDURE — 80069 RENAL FUNCTION PANEL: CPT | Performed by: NURSE PRACTITIONER

## 2023-06-14 PROCEDURE — 96372 THER/PROPH/DIAG INJ SC/IM: CPT

## 2023-06-14 PROCEDURE — 85027 COMPLETE CBC AUTOMATED: CPT | Performed by: NURSE PRACTITIONER

## 2023-06-14 PROCEDURE — 36415 COLL VENOUS BLD VENIPUNCTURE: CPT

## 2023-06-14 RX ADMIN — ERYTHROPOIETIN 40000 UNITS: 40000 INJECTION, SOLUTION INTRAVENOUS; SUBCUTANEOUS at 14:53

## 2023-07-26 ENCOUNTER — HOSPITAL ENCOUNTER (OUTPATIENT)
Dept: INFUSION THERAPY | Facility: HOSPITAL | Age: 73
Discharge: HOME | End: 2023-07-26
Admitting: NURSE PRACTITIONER
Payer: MEDICARE

## 2023-07-26 VITALS
OXYGEN SATURATION: 100 % | HEART RATE: 72 BPM | SYSTOLIC BLOOD PRESSURE: 167 MMHG | DIASTOLIC BLOOD PRESSURE: 70 MMHG | TEMPERATURE: 98.4 F | RESPIRATION RATE: 18 BRPM

## 2023-07-26 DIAGNOSIS — D50.9 IRON DEFICIENCY ANEMIA, UNSPECIFIED IRON DEFICIENCY ANEMIA TYPE: Primary | ICD-10-CM

## 2023-07-26 DIAGNOSIS — N18.30 STAGE 3 CHRONIC KIDNEY DISEASE, UNSPECIFIED WHETHER STAGE 3A OR 3B CKD: ICD-10-CM

## 2023-07-26 LAB
ALBUMIN SERPL-MCNC: 4.2 G/DL (ref 3.5–5.2)
ANION GAP SERPL CALCULATED.3IONS-SCNC: 9.9 MMOL/L (ref 5–15)
BUN SERPL-MCNC: 23 MG/DL (ref 8–23)
BUN/CREAT SERPL: 24 (ref 7–25)
CALCIUM SPEC-SCNC: 9.2 MG/DL (ref 8.6–10.5)
CHLORIDE SERPL-SCNC: 98 MMOL/L (ref 98–107)
CO2 SERPL-SCNC: 32.1 MMOL/L (ref 22–29)
CREAT SERPL-MCNC: 0.96 MG/DL (ref 0.57–1)
DEPRECATED RDW RBC AUTO: 43.7 FL (ref 37–54)
EGFRCR SERPLBLD CKD-EPI 2021: 63.4 ML/MIN/1.73
ERYTHROCYTE [DISTWIDTH] IN BLOOD BY AUTOMATED COUNT: 13.2 % (ref 12.3–15.4)
GLUCOSE SERPL-MCNC: 115 MG/DL (ref 65–99)
HCT VFR BLD AUTO: 33.4 % (ref 34–46.6)
HGB BLD-MCNC: 10.2 G/DL (ref 12–15.9)
MCH RBC QN AUTO: 27.8 PG (ref 26.6–33)
MCHC RBC AUTO-ENTMCNC: 30.5 G/DL (ref 31.5–35.7)
MCV RBC AUTO: 91 FL (ref 79–97)
PHOSPHATE SERPL-MCNC: 3.9 MG/DL (ref 2.5–4.5)
PLATELET # BLD AUTO: 185 10*3/MM3 (ref 140–450)
PMV BLD AUTO: 9.9 FL (ref 6–12)
POTASSIUM SERPL-SCNC: 4 MMOL/L (ref 3.5–5.2)
RBC # BLD AUTO: 3.67 10*6/MM3 (ref 3.77–5.28)
SODIUM SERPL-SCNC: 140 MMOL/L (ref 136–145)
WBC NRBC COR # BLD: 7.41 10*3/MM3 (ref 3.4–10.8)

## 2023-07-26 PROCEDURE — 25010000002 EPOETIN ALFA PER 1000 UNITS: Performed by: NURSE PRACTITIONER

## 2023-07-26 PROCEDURE — 96372 THER/PROPH/DIAG INJ SC/IM: CPT

## 2023-07-26 PROCEDURE — 80069 RENAL FUNCTION PANEL: CPT | Performed by: NURSE PRACTITIONER

## 2023-07-26 PROCEDURE — 36415 COLL VENOUS BLD VENIPUNCTURE: CPT

## 2023-07-26 PROCEDURE — 85027 COMPLETE CBC AUTOMATED: CPT | Performed by: NURSE PRACTITIONER

## 2023-07-26 RX ADMIN — ERYTHROPOIETIN 40000 UNITS: 40000 INJECTION, SOLUTION INTRAVENOUS; SUBCUTANEOUS at 14:42

## 2023-09-06 ENCOUNTER — HOSPITAL ENCOUNTER (OUTPATIENT)
Dept: INFUSION THERAPY | Facility: HOSPITAL | Age: 73
Discharge: HOME | End: 2023-09-06
Admitting: NURSE PRACTITIONER
Payer: MEDICARE

## 2023-09-06 VITALS
SYSTOLIC BLOOD PRESSURE: 97 MMHG | HEART RATE: 70 BPM | DIASTOLIC BLOOD PRESSURE: 50 MMHG | RESPIRATION RATE: 20 BRPM | OXYGEN SATURATION: 99 % | TEMPERATURE: 98.1 F

## 2023-09-06 DIAGNOSIS — N18.30 STAGE 3 CHRONIC KIDNEY DISEASE, UNSPECIFIED WHETHER STAGE 3A OR 3B CKD: ICD-10-CM

## 2023-09-06 DIAGNOSIS — D50.9 IRON DEFICIENCY ANEMIA, UNSPECIFIED IRON DEFICIENCY ANEMIA TYPE: Primary | ICD-10-CM

## 2023-09-06 LAB
ALBUMIN SERPL-MCNC: 4.2 G/DL (ref 3.5–5.2)
ANION GAP SERPL CALCULATED.3IONS-SCNC: 11 MMOL/L (ref 5–15)
BUN SERPL-MCNC: 29 MG/DL (ref 8–23)
BUN/CREAT SERPL: 18.5 (ref 7–25)
CALCIUM SPEC-SCNC: 9.5 MG/DL (ref 8.6–10.5)
CHLORIDE SERPL-SCNC: 96 MMOL/L (ref 98–107)
CO2 SERPL-SCNC: 31 MMOL/L (ref 22–29)
CREAT SERPL-MCNC: 1.57 MG/DL (ref 0.57–1)
DEPRECATED RDW RBC AUTO: 43.4 FL (ref 37–54)
EGFRCR SERPLBLD CKD-EPI 2021: 35.1 ML/MIN/1.73
ERYTHROCYTE [DISTWIDTH] IN BLOOD BY AUTOMATED COUNT: 13.2 % (ref 12.3–15.4)
GLUCOSE SERPL-MCNC: 157 MG/DL (ref 65–99)
HCT VFR BLD AUTO: 35.5 % (ref 34–46.6)
HGB BLD-MCNC: 11.2 G/DL (ref 12–15.9)
IRON 24H UR-MRATE: 54 MCG/DL (ref 37–145)
IRON SATN MFR SERPL: 20 % (ref 20–50)
MCH RBC QN AUTO: 28.1 PG (ref 26.6–33)
MCHC RBC AUTO-ENTMCNC: 31.5 G/DL (ref 31.5–35.7)
MCV RBC AUTO: 89.2 FL (ref 79–97)
PHOSPHATE SERPL-MCNC: 4.7 MG/DL (ref 2.5–4.5)
PLATELET # BLD AUTO: 187 10*3/MM3 (ref 140–450)
PMV BLD AUTO: 10 FL (ref 6–12)
POTASSIUM SERPL-SCNC: 4.1 MMOL/L (ref 3.5–5.2)
RBC # BLD AUTO: 3.98 10*6/MM3 (ref 3.77–5.28)
SODIUM SERPL-SCNC: 138 MMOL/L (ref 136–145)
TIBC SERPL-MCNC: 276 MCG/DL (ref 298–536)
TRANSFERRIN SERPL-MCNC: 185 MG/DL (ref 200–360)
WBC NRBC COR # BLD: 9.55 10*3/MM3 (ref 3.4–10.8)

## 2023-09-06 PROCEDURE — 36415 COLL VENOUS BLD VENIPUNCTURE: CPT

## 2023-09-06 PROCEDURE — 84466 ASSAY OF TRANSFERRIN: CPT | Performed by: NURSE PRACTITIONER

## 2023-09-06 PROCEDURE — 80069 RENAL FUNCTION PANEL: CPT | Performed by: NURSE PRACTITIONER

## 2023-09-06 PROCEDURE — 85027 COMPLETE CBC AUTOMATED: CPT | Performed by: NURSE PRACTITIONER

## 2023-09-06 PROCEDURE — 83540 ASSAY OF IRON: CPT | Performed by: NURSE PRACTITIONER

## 2023-09-06 PROCEDURE — G0463 HOSPITAL OUTPT CLINIC VISIT: HCPCS

## 2023-09-28 ENCOUNTER — OFFICE VISIT (OUTPATIENT)
Dept: PULMONOLOGY | Facility: CLINIC | Age: 73
End: 2023-09-28
Payer: MEDICARE

## 2023-09-28 VITALS
BODY MASS INDEX: 25.46 KG/M2 | WEIGHT: 153 LBS | DIASTOLIC BLOOD PRESSURE: 74 MMHG | OXYGEN SATURATION: 100 % | TEMPERATURE: 98 F | HEART RATE: 89 BPM | SYSTOLIC BLOOD PRESSURE: 116 MMHG

## 2023-09-28 DIAGNOSIS — J44.9 CHRONIC OBSTRUCTIVE PULMONARY DISEASE, UNSPECIFIED COPD TYPE: Primary | ICD-10-CM

## 2023-09-28 DIAGNOSIS — J84.9 INTERSTITIAL LUNG DISEASE: ICD-10-CM

## 2023-09-28 DIAGNOSIS — J43.1 PANLOBULAR EMPHYSEMA: ICD-10-CM

## 2023-09-28 DIAGNOSIS — J96.11 CHRONIC RESPIRATORY FAILURE WITH HYPOXIA: ICD-10-CM

## 2023-09-28 RX ORDER — FLUCONAZOLE 150 MG/1
150 TABLET ORAL ONCE
Qty: 1 TABLET | Refills: 0 | Status: SHIPPED | OUTPATIENT
Start: 2023-09-28 | End: 2023-09-28

## 2023-09-28 NOTE — PROGRESS NOTES
[facility] Pulmonary Follow up      Chief Complaint  Shortness of Breath and COPD (Follow up)    Subjective          Vanna Rincon presents to [facility] for follow-up on her chronic respiratory failure with severe COPD.    I last saw her here in the office in June 2022, and via telehealth in August 2022.  Unfortunately due to her respiratory failure in end-stage lung disease she has significant air hunger.  She also has underlying anxiety.  She does not routinely take her medications for her anxiety.  I did try to refer her over to palliative care for symptom relief.      Today she comes in for follow-up.  She has been using 8 L of oxygen at home continuously.  Today she has 2 portable tanks on at 4 L.  She says she desaturates and becomes dyspneic quite easily.    She has been using her Symbicort twice daily and her Xopenex as needed.  She does not use nebulizers, she said those make her hurt.              Objective     Vital Signs:   /74 (BP Location: Left arm, Patient Position: Sitting, Cuff Size: Adult)   Pulse 89   Temp 98 °F (36.7 °C) (Infrared)   Wt 69.4 kg (153 lb)   SpO2 100% Comment: continuous  PF (!) 8 L/min   BMI 25.46 kg/m²       Immunization History   Administered Date(s) Administered    COVID-19 (PFIZER) Purple Cap Monovalent 03/31/2021, 04/23/2021    Fluzone High Dose =>65 Years (Vaxcare ONLY) 10/24/2017    Fluzone High-Dose 65+yrs 11/04/2021    Fluzone Quad >6mos (Multi-dose) 10/04/2016    Influenza Quad Vaccine (Inpatient) 10/01/2021    Pneumococcal Conjugate 13-Valent (PCV13) 10/01/2015    Pneumococcal Polysaccharide (PPSV23) 10/01/2013       Physical Exam  Vitals reviewed.   Constitutional:       General: She is not in acute distress.     Appearance: She is well-developed.   HENT:      Head: Normocephalic and atraumatic.   Eyes:      Pupils: Pupils are equal, round, and reactive to light.   Cardiovascular:      Rate and Rhythm:  Normal rate and regular rhythm.      Heart sounds: No murmur heard.  Pulmonary:      Effort: Pulmonary effort is normal. No respiratory distress.      Breath sounds: No wheezing or rales.      Comments: Very decreased bilaterally with expiratory wheezing  Abdominal:      General: Bowel sounds are normal. There is no distension.      Palpations: Abdomen is soft.   Musculoskeletal:         General: Normal range of motion.      Cervical back: Normal range of motion and neck supple.      Right lower leg: No edema.      Left lower leg: No edema.   Skin:     General: Skin is warm and dry.      Findings: No erythema.   Neurological:      Mental Status: She is alert and oriented to person, place, and time.   Psychiatric:         Behavior: Behavior normal.        Result Review :                         Assessment and Plan    Problem List Items Addressed This Visit          Pulmonary and Pneumonias    Chronic obstructive pulmonary disease - Primary    Overview     Description: A.  Emphysema with severe physiology on PFTs.  Patient is a former smoker.         Chronic respiratory failure with hypoxia    Interstitial lung disease    Overview      · A.  History of rheumatoid lung with bronchiolitis obliterans         Emphysema lung    Overview     Underlying Emphysema          We had a very long detailed discussion today about her oxygen use.  She has significant air hunger which she is treating by increasing her oxygen.  At rest she actually does quite well on 4 L she was satting 97%, she does have significant exertional hypoxemia however.  When she got up to try to do a 6-minute walk test she quickly desaturated to 77% and required quite a while to recover.    I would like for her to be on 4 L at rest and at night.  I will follow-up on an overnight pulse oximetry study on the 4 L to assure that is enough and I will let her know.  She can increase her oxygen up to 8 L as needed with activity.  She likely will need 8 L with  activity when she is using her pulse dose canisters.  She did desaturate quickly today in the office with minimal activity.    We did discuss her air hunger and her anxiety.  She likely needs to resume her medications for her anxiety, she has also been taking Ativan as needed in the past but not recently.    It is very difficult for her to get out of her home but I would like to follow-up with her more often given her underlying lung disease, she will do a video visit in 3 months.    Follow Up     Return in about 3 months (around 12/28/2023).  Patient was given instructions and counseling regarding her condition or for health maintenance advice. Please see specific information pulled into the AVS if appropriate.     I spent 36 minutes caring for Vanna on this date of service. This time includes time spent by me in the following activities:preparing for the visit, reviewing tests, obtaining and/or reviewing a separately obtained history, performing a medically appropriate examination and/or evaluation , counseling and educating the patient/family/caregiver, ordering medications, tests, or procedures, referring and communicating with other health care professionals , documenting information in the medical record, and independently interpreting results and communicating that information with the patient/family/caregiver    Excluding time spent on other separate services such as performing procedures or test interpretation, if applicable    Moderate level of Medical Decision Making complexity based on 2 or more chronic stable illnesses and prescription drug management.    JENNY Aguilar, ACNP  [facility]  Electronically signed

## 2023-09-29 DIAGNOSIS — J96.11 CHRONIC RESPIRATORY FAILURE WITH HYPOXIA: Primary | ICD-10-CM

## 2023-10-02 DIAGNOSIS — J96.11 CHRONIC RESPIRATORY FAILURE WITH HYPOXIA: Primary | ICD-10-CM

## 2023-10-10 DIAGNOSIS — J84.9 INTERSTITIAL LUNG DISEASE: Primary | ICD-10-CM

## 2023-10-16 ENCOUNTER — OFFICE VISIT (OUTPATIENT)
Dept: CARDIOLOGY | Facility: CLINIC | Age: 73
End: 2023-10-16
Payer: MEDICARE

## 2023-10-16 ENCOUNTER — HOSPITAL ENCOUNTER (OUTPATIENT)
Dept: CARDIOLOGY | Facility: HOSPITAL | Age: 73
Discharge: HOME | End: 2023-10-16
Admitting: INTERNAL MEDICINE
Payer: MEDICARE

## 2023-10-16 VITALS
HEIGHT: 65 IN | SYSTOLIC BLOOD PRESSURE: 118 MMHG | WEIGHT: 146.6 LBS | DIASTOLIC BLOOD PRESSURE: 50 MMHG | BODY MASS INDEX: 24.43 KG/M2 | HEART RATE: 78 BPM

## 2023-10-16 VITALS — WEIGHT: 146.61 LBS | BODY MASS INDEX: 24.43 KG/M2 | HEIGHT: 65 IN

## 2023-10-16 DIAGNOSIS — R60.0 EDEMA LEG: Primary | ICD-10-CM

## 2023-10-16 DIAGNOSIS — E78.2 MIXED HYPERLIPIDEMIA: ICD-10-CM

## 2023-10-16 DIAGNOSIS — R60.0 EDEMA LEG: ICD-10-CM

## 2023-10-16 DIAGNOSIS — I10 PRIMARY HYPERTENSION: ICD-10-CM

## 2023-10-16 DIAGNOSIS — R06.09 DOE (DYSPNEA ON EXERTION): ICD-10-CM

## 2023-10-16 DIAGNOSIS — K59.04 CHRONIC IDIOPATHIC CONSTIPATION: ICD-10-CM

## 2023-10-16 LAB
BH CV LOWER VASCULAR RIGHT COMMON FEMORAL AUGMENT: NORMAL
BH CV LOWER VASCULAR RIGHT COMMON FEMORAL COMPRESS: NORMAL
BH CV LOWER VASCULAR RIGHT COMMON FEMORAL PHASIC: NORMAL
BH CV LOWER VASCULAR RIGHT COMMON FEMORAL SPONT: NORMAL
BH CV LOWER VASCULAR RIGHT DISTAL FEMORAL AUGMENT: NORMAL
BH CV LOWER VASCULAR RIGHT DISTAL FEMORAL COMPRESS: NORMAL
BH CV LOWER VASCULAR RIGHT DISTAL FEMORAL PHASIC: NORMAL
BH CV LOWER VASCULAR RIGHT DISTAL FEMORAL SPONT: NORMAL
BH CV LOWER VASCULAR RIGHT GASTRONEMIUS COMPRESS: NORMAL
BH CV LOWER VASCULAR RIGHT GREATER SAPH AK COMPRESS: NORMAL
BH CV LOWER VASCULAR RIGHT GREATER SAPH BK COMPRESS: NORMAL
BH CV LOWER VASCULAR RIGHT LESSER SAPH COMPRESS: NORMAL
BH CV LOWER VASCULAR RIGHT MID FEMORAL AUGMENT: NORMAL
BH CV LOWER VASCULAR RIGHT MID FEMORAL COMPRESS: NORMAL
BH CV LOWER VASCULAR RIGHT MID FEMORAL PHASIC: NORMAL
BH CV LOWER VASCULAR RIGHT MID FEMORAL SPONT: NORMAL
BH CV LOWER VASCULAR RIGHT PERONEAL COMPRESS: NORMAL
BH CV LOWER VASCULAR RIGHT POPLITEAL AUGMENT: NORMAL
BH CV LOWER VASCULAR RIGHT POPLITEAL COMPRESS: NORMAL
BH CV LOWER VASCULAR RIGHT POPLITEAL PHASIC: NORMAL
BH CV LOWER VASCULAR RIGHT POPLITEAL SPONT: NORMAL
BH CV LOWER VASCULAR RIGHT POSTERIOR TIBIAL COMPRESS: NORMAL
BH CV LOWER VASCULAR RIGHT PROFUNDA FEMORAL AUGMENT: NORMAL
BH CV LOWER VASCULAR RIGHT PROFUNDA FEMORAL COMPRESS: NORMAL
BH CV LOWER VASCULAR RIGHT PROFUNDA FEMORAL PHASIC: NORMAL
BH CV LOWER VASCULAR RIGHT PROFUNDA FEMORAL SPONT: NORMAL
BH CV LOWER VASCULAR RIGHT PROXIMAL FEMORAL AUGMENT: NORMAL
BH CV LOWER VASCULAR RIGHT PROXIMAL FEMORAL COMPRESS: NORMAL
BH CV LOWER VASCULAR RIGHT PROXIMAL FEMORAL PHASIC: NORMAL
BH CV LOWER VASCULAR RIGHT PROXIMAL FEMORAL SPONT: NORMAL
BH CV LOWER VASCULAR RIGHT SAPHENOFEMORAL JUNCTION AUGMENT: NORMAL
BH CV LOWER VASCULAR RIGHT SAPHENOFEMORAL JUNCTION COMPRESS: NORMAL
BH CV LOWER VASCULAR RIGHT SAPHENOFEMORAL JUNCTION PHASIC: NORMAL
BH CV LOWER VASCULAR RIGHT SAPHENOFEMORAL JUNCTION SPONT: NORMAL
BH CV VAS PRELIMINARY FINDINGS SCRIPTING: 1

## 2023-10-16 PROCEDURE — 3078F DIAST BP <80 MM HG: CPT | Performed by: INTERNAL MEDICINE

## 2023-10-16 PROCEDURE — 99214 OFFICE O/P EST MOD 30 MIN: CPT | Performed by: INTERNAL MEDICINE

## 2023-10-16 PROCEDURE — 93971 EXTREMITY STUDY: CPT | Performed by: INTERNAL MEDICINE

## 2023-10-16 PROCEDURE — 93971 EXTREMITY STUDY: CPT

## 2023-10-16 PROCEDURE — 3074F SYST BP LT 130 MM HG: CPT | Performed by: INTERNAL MEDICINE

## 2023-10-16 RX ORDER — VALSARTAN AND HYDROCHLOROTHIAZIDE 320; 25 MG/1; MG/1
0.5 TABLET, FILM COATED ORAL DAILY
Qty: 45 TABLET | Refills: 3 | Status: SHIPPED | OUTPATIENT
Start: 2023-10-16

## 2023-10-16 RX ORDER — AMLODIPINE BESYLATE 5 MG/1
5 TABLET ORAL AS NEEDED
COMMUNITY

## 2023-10-16 NOTE — PROGRESS NOTES
"[facility] Cardiology  Office Progress Note  Vanna Rincon  1950  [address] [place] [address]       Visit Date: 10/16/23    PCP: Farhad Chowdary MD  [address]  [place] [address]    IDENTIFICATION: A 73 y.o. female from [place], [place]    PROBLEM LIST:   8/22 [facility] ER evaluation for shortness of breath, chest pain, with elevation in proBNP    8/22 echo EF .65% IR AV scler  HTN  HLD  10/18/16 lipids:  TG 95 HDL 67 and   COPD, on chronic O2 4L DO NOT INTUBATE  Follows with Harjeet   11/17 echo EF 60%, rvsp 13  Rheumatoid lung disease  Follows with Dr. Lan   Stage III CKD 1.57 9/23  Secondary hyperparathyroidism  GERD  Chronic pain  Former tobacco abuse, cessation 1991  Rheumatoid arthritis  Hiatal hernia  Surgical Hx  Tubal ligation  Tonsillectomy      CC:   Chief Complaint   Patient presents with    Suspected cardiomyopathy       Allergies  Allergies   Allergen Reactions    Iodinated Contrast Media Other (See Comments)     Kidney disease    Brimonidine Tartrate Nausea And Vomiting    Ciprofloxacin Other (See Comments)     \"Hurt all over\"    Doxycycline Other (See Comments)     \"makes other medication stay in my system longer\"    Lipitor [Atorvastatin] Other (See Comments)     Hurt all over      Nsaids Other (See Comments)     Pt states she has stage three kidney disease    Sulfa Antibiotics Other (See Comments)     \"cant remember\"       Current Medications    Current Outpatient Medications:     amLODIPine (NORVASC) 5 MG tablet, Take 1 tablet by mouth As Needed., Disp: , Rfl:     epoetin chidi (Procrit) 2000 UNIT/ML injection, Inject  under the skin into the appropriate area as directed Every 6 (Six) Weeks., Disp: , Rfl:     Etanercept 50 MG/ML solution auto-injector, Inject  under the skin 1 (One) Time Per Week., Disp: , Rfl:     fentaNYL (DURAGESIC) 75 MCG/HR patch, Place 1 patch on the skin as directed by provider Every 72 (Seventy-Two) Hours., Disp: , " "Rfl:     HYDROcodone-acetaminophen (NORCO) 5-325 MG per tablet, Take 1 tablet by mouth Every 6 (Six) Hours As Needed., Disp: , Rfl:     levalbuterol (XOPENEX HFA) 45 MCG/ACT inhaler, Inhale 2 puffs 4 (Four) Times a Day As Needed for Wheezing., Disp: 15 g, Rfl: 5    LORazepam (ATIVAN) 0.5 MG tablet, Take 1 tablet by mouth Daily As Needed for Anxiety., Disp: 30 tablet, Rfl: 1    Multiple Vitamins-Minerals (CENTRUM ADULTS PO), Take 1 tablet by mouth Daily., Disp: , Rfl:     O2 (OXYGEN), Inhale 6 L/min Continuous., Disp: , Rfl:     ondansetron (ZOFRAN) 8 MG tablet, Take  by mouth Every 8 (Eight) Hours As Needed for Nausea or Vomiting., Disp: , Rfl:     phenylephrine (FAVIO-SYNEPHRINE) 1 % nasal spray, 1 spray into the nostril(s) as directed by provider Every 4 (Four) Hours As Needed for Congestion., Disp: , Rfl:     sertraline (ZOLOFT) 100 MG tablet, Take 1 tablet by mouth Daily., Disp: 90 tablet, Rfl: 3    simethicone (MYLICON) 125 MG chewable tablet, Chew 1 tablet Every 6 (Six) Hours As Needed for Flatulence., Disp: , Rfl:     Symbicort 160-4.5 MCG/ACT inhaler, INHALE TWO PUFFS BY MOUTH TWICE A DAY, Disp: 10.2 g, Rfl: 0    valsartan-hydrochlorothiazide (DIOVAN-HCT) 320-25 MG per tablet, Take 0.5 tablets by mouth Daily., Disp: 45 tablet, Rfl: 3      History of Present Illness   Vanna Rincon is a 73 y.o. year old female here for follow up.    She is accompanied by her son in the office today.  She is on escalating amounts of oxygen up to 8 L she states at times.  She has occasional low blood pressure.  She notes discrepant edema right greater than left    OBJECTIVE:  Vitals:    10/16/23 1147   BP: 118/50   BP Location: Right arm   Patient Position: Sitting   Pulse: 78   Weight: 66.5 kg (146 lb 9.6 oz)   Height: 165.1 cm (65\")     Body mass index is 24.4 kg/m².    Vitals and nursing note reviewed.   Constitutional:       General: Awake. Not in acute distress.     Appearance: Well-developed and underweight. Chronically " ill-appearing.   Eyes:      Conjunctiva/sclera: Conjunctivae normal.      Pupils: Pupils are equal, round, and reactive to light.   HENT:      Head: Normocephalic and atraumatic.      Nose: Nose normal.    Mouth/Throat:      Pharynx: Uvula midline.   Neck:      Vascular: No carotid bruit.      Trachea: No tracheal deviation.   Pulmonary:      Effort: Pulmonary effort is normal.      Breath sounds: Normal breath sounds. No wheezing. No rhonchi. No rales.   Cardiovascular:      Normal rate. Regular rhythm. Normal S1. Normal S2.       Murmurs: There is a diastolic murmur.      No gallop.  No click. No rub.   Edema:     Peripheral edema present.     Pretibial: 3+ edema of the left pretibial area and trace edema of the right pretibial area.     Ankle: 3+ edema of the left ankle and trace edema of the right ankle.  Abdominal:      General: Bowel sounds are normal.      Palpations: Abdomen is soft.   Musculoskeletal:      Cervical back: Normal range of motion and neck supple. Skin:     General: Skin is warm and dry.      Findings: No erythema.   Neurological:      Mental Status: Alert, oriented to person, place, and time and oriented to person, place and time.   Psychiatric:         Attention and Perception: Attention normal.         Mood and Affect: Mood normal.         Speech: Speech normal.         Behavior: Behavior normal. Behavior is cooperative.         Diagnostic Data:  Procedures      ASSESSMENT:   Diagnosis Plan   1. Edema leg  Duplex Venous Lower Extremity - Right CAR      2. Chronic idiopathic constipation        3. BAKER (dyspnea on exertion)        4. Primary hypertension        5. Mixed hyperlipidemia              PLAN:  1.  Right lower extremity edema we will document venous duplex  2.  Hypertension - patient in clinic blood pressure appreciated at 160/64.  Displays multiple  blood pressures that are fluctuating between 160 to 110.  We instructed the patient to follow her blood pressures over the next 2 weeks  and inform the clinic if her blood pressures are persistently above goal <130/80.  3.  BAKER - patient has longstanding history of BAKER secondary to rheumatoid lung.  End-stage with escalating amounts of oxygen patient states that she has a living will and is a DO NOT INTUBATE  4.  Mixed hyperlipidemia- patient needs updated lipid panel per our records.        Kraig Marquez MD, FACC

## 2023-10-18 ENCOUNTER — HOSPITAL ENCOUNTER (OUTPATIENT)
Dept: INFUSION THERAPY | Facility: HOSPITAL | Age: 73
Discharge: HOME | End: 2023-10-18
Admitting: NURSE PRACTITIONER
Payer: MEDICARE

## 2023-10-18 VITALS
OXYGEN SATURATION: 98 % | RESPIRATION RATE: 18 BRPM | HEART RATE: 82 BPM | DIASTOLIC BLOOD PRESSURE: 64 MMHG | SYSTOLIC BLOOD PRESSURE: 154 MMHG | TEMPERATURE: 98.2 F

## 2023-10-18 DIAGNOSIS — D50.9 IRON DEFICIENCY ANEMIA, UNSPECIFIED IRON DEFICIENCY ANEMIA TYPE: Primary | ICD-10-CM

## 2023-10-18 DIAGNOSIS — N18.30 STAGE 3 CHRONIC KIDNEY DISEASE, UNSPECIFIED WHETHER STAGE 3A OR 3B CKD: ICD-10-CM

## 2023-10-18 LAB
ALBUMIN SERPL-MCNC: 4.3 G/DL (ref 3.5–5.2)
ANION GAP SERPL CALCULATED.3IONS-SCNC: 9.8 MMOL/L (ref 5–15)
BUN SERPL-MCNC: 18 MG/DL (ref 8–23)
BUN/CREAT SERPL: 18.2 (ref 7–25)
CALCIUM SPEC-SCNC: 9.8 MG/DL (ref 8.6–10.5)
CHLORIDE SERPL-SCNC: 100 MMOL/L (ref 98–107)
CO2 SERPL-SCNC: 31.2 MMOL/L (ref 22–29)
CREAT SERPL-MCNC: 0.99 MG/DL (ref 0.57–1)
DEPRECATED RDW RBC AUTO: 42 FL (ref 37–54)
EGFRCR SERPLBLD CKD-EPI 2021: 61.1 ML/MIN/1.73
ERYTHROCYTE [DISTWIDTH] IN BLOOD BY AUTOMATED COUNT: 13 % (ref 12.3–15.4)
GLUCOSE SERPL-MCNC: 126 MG/DL (ref 65–99)
HCT VFR BLD AUTO: 31.3 % (ref 34–46.6)
HGB BLD-MCNC: 9.6 G/DL (ref 12–15.9)
MCH RBC QN AUTO: 27.3 PG (ref 26.6–33)
MCHC RBC AUTO-ENTMCNC: 30.7 G/DL (ref 31.5–35.7)
MCV RBC AUTO: 88.9 FL (ref 79–97)
PHOSPHATE SERPL-MCNC: 2.9 MG/DL (ref 2.5–4.5)
PLATELET # BLD AUTO: 173 10*3/MM3 (ref 140–450)
PMV BLD AUTO: 10 FL (ref 6–12)
POTASSIUM SERPL-SCNC: 4 MMOL/L (ref 3.5–5.2)
RBC # BLD AUTO: 3.52 10*6/MM3 (ref 3.77–5.28)
SODIUM SERPL-SCNC: 141 MMOL/L (ref 136–145)
WBC NRBC COR # BLD: 6.55 10*3/MM3 (ref 3.4–10.8)

## 2023-10-18 PROCEDURE — 85027 COMPLETE CBC AUTOMATED: CPT | Performed by: NURSE PRACTITIONER

## 2023-10-18 PROCEDURE — 80069 RENAL FUNCTION PANEL: CPT | Performed by: NURSE PRACTITIONER

## 2023-10-18 PROCEDURE — 96372 THER/PROPH/DIAG INJ SC/IM: CPT

## 2023-10-18 PROCEDURE — 36415 COLL VENOUS BLD VENIPUNCTURE: CPT

## 2023-10-18 PROCEDURE — 25010000002 EPOETIN ALFA PER 1000 UNITS: Performed by: NURSE PRACTITIONER

## 2023-10-18 RX ADMIN — ERYTHROPOIETIN 40000 UNITS: 40000 INJECTION, SOLUTION INTRAVENOUS; SUBCUTANEOUS at 14:40

## 2023-10-25 DIAGNOSIS — J96.11 CHRONIC RESPIRATORY FAILURE WITH HYPOXIA: Primary | ICD-10-CM

## 2023-10-25 DIAGNOSIS — J44.9 CHRONIC OBSTRUCTIVE PULMONARY DISEASE, UNSPECIFIED COPD TYPE: ICD-10-CM

## 2023-10-25 RX ORDER — BUDESONIDE AND FORMOTEROL FUMARATE DIHYDRATE 160; 4.5 UG/1; UG/1
AEROSOL RESPIRATORY (INHALATION)
Qty: 30.6 G | Refills: 3 | Status: SHIPPED | OUTPATIENT
Start: 2023-10-25

## 2023-10-30 DIAGNOSIS — J96.11 CHRONIC RESPIRATORY FAILURE WITH HYPOXIA: Primary | ICD-10-CM

## 2023-11-29 ENCOUNTER — HOSPITAL ENCOUNTER (OUTPATIENT)
Dept: INFUSION THERAPY | Facility: HOSPITAL | Age: 73
Discharge: HOME | End: 2023-11-29
Payer: MEDICARE

## 2023-11-29 VITALS
BODY MASS INDEX: 24.3 KG/M2 | RESPIRATION RATE: 18 BRPM | SYSTOLIC BLOOD PRESSURE: 169 MMHG | HEART RATE: 78 BPM | OXYGEN SATURATION: 100 % | DIASTOLIC BLOOD PRESSURE: 72 MMHG | TEMPERATURE: 99.2 F | WEIGHT: 146 LBS

## 2023-11-29 DIAGNOSIS — R11.0 NAUSEA: ICD-10-CM

## 2023-11-29 DIAGNOSIS — D50.9 IRON DEFICIENCY ANEMIA, UNSPECIFIED IRON DEFICIENCY ANEMIA TYPE: Primary | ICD-10-CM

## 2023-11-29 DIAGNOSIS — R14.0 BLOATING: ICD-10-CM

## 2023-11-29 DIAGNOSIS — R19.5 HEME POSITIVE STOOL: ICD-10-CM

## 2023-11-29 DIAGNOSIS — D64.9 ANEMIA, UNSPECIFIED TYPE: ICD-10-CM

## 2023-11-29 DIAGNOSIS — M05.10 RHEUMATOID LUNG DISEASE: ICD-10-CM

## 2023-11-29 DIAGNOSIS — N18.30 ANEMIA DUE TO STAGE 3 CHRONIC KIDNEY DISEASE TREATED WITH ERYTHROPOIETIN: ICD-10-CM

## 2023-11-29 DIAGNOSIS — I10 ESSENTIAL HYPERTENSION: ICD-10-CM

## 2023-11-29 DIAGNOSIS — K21.9 GASTROESOPHAGEAL REFLUX DISEASE WITHOUT ESOPHAGITIS: ICD-10-CM

## 2023-11-29 DIAGNOSIS — R14.1 GAS PAIN: ICD-10-CM

## 2023-11-29 DIAGNOSIS — M05.79 RHEUMATOID ARTHRITIS INVOLVING MULTIPLE SITES WITH POSITIVE RHEUMATOID FACTOR: ICD-10-CM

## 2023-11-29 DIAGNOSIS — K92.1 MELENA: ICD-10-CM

## 2023-11-29 DIAGNOSIS — R71.8 ABNORMAL RBC INDICES: ICD-10-CM

## 2023-11-29 DIAGNOSIS — K64.8 INTERNAL HEMORRHOIDS: ICD-10-CM

## 2023-11-29 DIAGNOSIS — F11.90 CHRONIC, CONTINUOUS USE OF OPIOIDS: ICD-10-CM

## 2023-11-29 DIAGNOSIS — R74.8 ELEVATED ALKALINE PHOSPHATASE LEVEL: ICD-10-CM

## 2023-11-29 DIAGNOSIS — N18.30 STAGE 3 CHRONIC KIDNEY DISEASE, UNSPECIFIED WHETHER STAGE 3A OR 3B CKD: ICD-10-CM

## 2023-11-29 DIAGNOSIS — G89.4 CHRONIC PAIN SYNDROME: ICD-10-CM

## 2023-11-29 DIAGNOSIS — K59.00 CONSTIPATION, UNSPECIFIED CONSTIPATION TYPE: ICD-10-CM

## 2023-11-29 DIAGNOSIS — E78.01 FAMILIAL HYPERCHOLESTEROLEMIA: ICD-10-CM

## 2023-11-29 DIAGNOSIS — R76.8 HEPATITIS C ANTIBODY TEST POSITIVE: ICD-10-CM

## 2023-11-29 DIAGNOSIS — R92.8 ABNORMAL MAMMOGRAM: ICD-10-CM

## 2023-11-29 DIAGNOSIS — K57.30 DIVERTICULOSIS OF COLON: ICD-10-CM

## 2023-11-29 DIAGNOSIS — R10.30 LOWER ABDOMINAL PAIN: ICD-10-CM

## 2023-11-29 DIAGNOSIS — R76.8 OTHER SPECIFIED ABNORMAL IMMUNOLOGICAL FINDINGS IN SERUM: ICD-10-CM

## 2023-11-29 DIAGNOSIS — D63.1 ANEMIA DUE TO STAGE 3 CHRONIC KIDNEY DISEASE TREATED WITH ERYTHROPOIETIN: ICD-10-CM

## 2023-11-29 DIAGNOSIS — J43.1 PANLOBULAR EMPHYSEMA: ICD-10-CM

## 2023-11-29 DIAGNOSIS — K22.2 SCHATZKI'S RING: ICD-10-CM

## 2023-11-29 DIAGNOSIS — R94.5 ABNORMAL RESULTS OF LIVER FUNCTION STUDIES: ICD-10-CM

## 2023-11-29 DIAGNOSIS — I10 BENIGN ESSENTIAL HYPERTENSION: ICD-10-CM

## 2023-11-29 DIAGNOSIS — K62.5 RECTAL BLEEDING: ICD-10-CM

## 2023-11-29 DIAGNOSIS — R13.10 DYSPHAGIA, UNSPECIFIED TYPE: ICD-10-CM

## 2023-11-29 DIAGNOSIS — K44.9 HIATAL HERNIA: ICD-10-CM

## 2023-11-29 DIAGNOSIS — F41.9 ANXIETY: ICD-10-CM

## 2023-11-29 DIAGNOSIS — K64.8 INTERNAL HEMORRHOIDS WITH COMPLICATION: ICD-10-CM

## 2023-11-29 DIAGNOSIS — K29.80 DUODENITIS: ICD-10-CM

## 2023-11-29 DIAGNOSIS — J96.11 CHRONIC RESPIRATORY FAILURE WITH HYPOXIA: ICD-10-CM

## 2023-11-29 DIAGNOSIS — J44.9 CHRONIC OBSTRUCTIVE PULMONARY DISEASE, UNSPECIFIED COPD TYPE: ICD-10-CM

## 2023-11-29 DIAGNOSIS — J84.9 INTERSTITIAL LUNG DISEASE: ICD-10-CM

## 2023-11-29 LAB
ALBUMIN SERPL-MCNC: 4.2 G/DL (ref 3.5–5.2)
ANION GAP SERPL CALCULATED.3IONS-SCNC: 6.7 MMOL/L (ref 5–15)
BUN SERPL-MCNC: 22 MG/DL (ref 8–23)
BUN/CREAT SERPL: 20 (ref 7–25)
CALCIUM SPEC-SCNC: 9.8 MG/DL (ref 8.6–10.5)
CHLORIDE SERPL-SCNC: 98 MMOL/L (ref 98–107)
CO2 SERPL-SCNC: 35.3 MMOL/L (ref 22–29)
CREAT SERPL-MCNC: 1.1 MG/DL (ref 0.57–1)
DEPRECATED RDW RBC AUTO: 41.3 FL (ref 37–54)
EGFRCR SERPLBLD CKD-EPI 2021: 53.8 ML/MIN/1.73
ERYTHROCYTE [DISTWIDTH] IN BLOOD BY AUTOMATED COUNT: 12.7 % (ref 12.3–15.4)
GLUCOSE SERPL-MCNC: 128 MG/DL (ref 65–99)
HCT VFR BLD AUTO: 29.4 % (ref 34–46.6)
HGB BLD-MCNC: 9.1 G/DL (ref 12–15.9)
IRON 24H UR-MRATE: 38 MCG/DL (ref 37–145)
IRON SATN MFR SERPL: 14 % (ref 20–50)
MCH RBC QN AUTO: 27.8 PG (ref 26.6–33)
MCHC RBC AUTO-ENTMCNC: 31 G/DL (ref 31.5–35.7)
MCV RBC AUTO: 89.9 FL (ref 79–97)
PHOSPHATE SERPL-MCNC: 3.2 MG/DL (ref 2.5–4.5)
PLATELET # BLD AUTO: 157 10*3/MM3 (ref 140–450)
PMV BLD AUTO: 10.1 FL (ref 6–12)
POTASSIUM SERPL-SCNC: 4.3 MMOL/L (ref 3.5–5.2)
RBC # BLD AUTO: 3.27 10*6/MM3 (ref 3.77–5.28)
SODIUM SERPL-SCNC: 140 MMOL/L (ref 136–145)
TIBC SERPL-MCNC: 273 MCG/DL (ref 298–536)
TRANSFERRIN SERPL-MCNC: 183 MG/DL (ref 200–360)
WBC NRBC COR # BLD AUTO: 5.9 10*3/MM3 (ref 3.4–10.8)

## 2023-11-29 PROCEDURE — 96372 THER/PROPH/DIAG INJ SC/IM: CPT

## 2023-11-29 PROCEDURE — 84466 ASSAY OF TRANSFERRIN: CPT | Performed by: NURSE PRACTITIONER

## 2023-11-29 PROCEDURE — 83540 ASSAY OF IRON: CPT | Performed by: NURSE PRACTITIONER

## 2023-11-29 PROCEDURE — 36415 COLL VENOUS BLD VENIPUNCTURE: CPT

## 2023-11-29 PROCEDURE — 85027 COMPLETE CBC AUTOMATED: CPT | Performed by: NURSE PRACTITIONER

## 2023-11-29 PROCEDURE — 25010000002 EPOETIN ALFA PER 1000 UNITS: Performed by: NURSE PRACTITIONER

## 2023-11-29 PROCEDURE — 86480 TB TEST CELL IMMUN MEASURE: CPT

## 2023-11-29 PROCEDURE — 80069 RENAL FUNCTION PANEL: CPT | Performed by: NURSE PRACTITIONER

## 2023-11-29 RX ADMIN — ERYTHROPOIETIN 40000 UNITS: 40000 INJECTION, SOLUTION INTRAVENOUS; SUBCUTANEOUS at 14:56

## 2023-11-29 NOTE — CODE DOCUMENTATION
Venipuncture to left antecubital for lab work x1 attempt; hemostasis via gauze and coban; bloodwork taken to inpatient lab for processing; patient tolerated well.

## 2023-11-29 NOTE — ADDENDUM NOTE
Encounter addended by: Eryn Barth on: 11/29/2023 3:27 PM   Actions taken: Visit diagnoses modified, Order list changed, Diagnosis association updated

## 2023-12-01 LAB
GAMMA INTERFERON BACKGROUND BLD IA-ACNC: 0.01 IU/ML
M TB IFN-G BLD-IMP: NEGATIVE
M TB IFN-G CD4+ T-CELLS BLD-ACNC: 0.01 IU/ML
M TBIFN-G CD4+ CD8+T-CELLS BLD-ACNC: 0.01 IU/ML
MITOGEN IGNF BLD-ACNC: >10 IU/ML
QUANTIFERON INCUBATION: NORMAL
SERVICE CMNT-IMP: NORMAL

## 2023-12-28 ENCOUNTER — TELEMEDICINE (OUTPATIENT)
Dept: PULMONOLOGY | Facility: CLINIC | Age: 73
End: 2023-12-28
Payer: MEDICARE

## 2023-12-28 DIAGNOSIS — J96.11 CHRONIC RESPIRATORY FAILURE WITH HYPOXIA: Primary | ICD-10-CM

## 2023-12-28 DIAGNOSIS — J44.9 CHRONIC OBSTRUCTIVE PULMONARY DISEASE, UNSPECIFIED COPD TYPE: ICD-10-CM

## 2023-12-28 PROCEDURE — 99213 OFFICE O/P EST LOW 20 MIN: CPT | Performed by: NURSE PRACTITIONER

## 2023-12-28 NOTE — PROGRESS NOTES
[facility] Pulmonary Video Visit    CHIEF COMPLAINT    Respiratory failure follow-up    Consent for Video Visit was obtained via [employer]    Patient presents during the COVID-19 pandemic and federally declared state of public emergency.  The service was conducted via [employer].  Patient is worried about exposure therefore telehealth services were necessary.    Subjective   HISTORY OF PRESENT ILLNESS    Vanna Rincon is a 73 y.o.female was seen via Video Visit today for follow-up after her recent visit to review her oxygen needs.  She did try to turn her oxygen but was unable to do so she is using 8 L.    Recently she has had a bit of a sinus infection with some cough and congestion.  She saw her primary care provider.    She does continue on her inhalers and nebulizers.        MEDICATION LIST AND ALLERGIES REVIEWED.    FAMILY AND SOCIAL HISTORY REVIEWED.      Objective       Immunization History   Administered Date(s) Administered    COVID-19 (PFIZER) Purple Cap Monovalent 03/31/2021, 04/23/2021    Fluzone High Dose =>65 Years ([employer] ONLY) 10/24/2017    Fluzone High-Dose 65+yrs 11/04/2021    Fluzone Quad >6mos (Multi-dose) 10/04/2016    Influenza Quad Vaccine (Inpatient) 10/01/2021    Pneumococcal Conjugate 13-Valent (PCV13) 10/01/2015    Pneumococcal Polysaccharide (PPSV23) 10/01/2013         Physical exam unable to be completed secondary to nature visit  Appeared in no acute distress, no difficulty with conversation.    Oriented to person, place and time.   Normal respiratory effort.        RESULTS        Assessment & Plan     PROBLEM LIST    Problem List Items Addressed This Visit          Pulmonary and Pneumonias    Chronic obstructive pulmonary disease    Overview     Description: A.  Emphysema with severe physiology on PFTs.  Patient is a former smoker.         Chronic respiratory failure with hypoxia - Primary         DISCUSSION    Continue on her nebulizers and inhalers.  She has not had a recent acute  exacerbation or illness.  Continue on her oxygen.    Routine follow-up in 3 months, sooner if needed.    The patient was located at her home in [place], [place] and I, the provider was located in the [facility] pulmonary and critical care office.              Hope Patel, APRN  12/28/202313:05 EST  Electronically signed     Please note that portions of this note were completed with a voice recognition program. Efforts were made to edit the dictations, but occasionally words are mistranscribed.      CC: Farhad Chowdary MD

## 2024-01-03 DIAGNOSIS — J44.9 CHRONIC OBSTRUCTIVE PULMONARY DISEASE, UNSPECIFIED COPD TYPE: ICD-10-CM

## 2024-01-03 RX ORDER — BUDESONIDE AND FORMOTEROL FUMARATE DIHYDRATE 160; 4.5 UG/1; UG/1
2 AEROSOL RESPIRATORY (INHALATION)
Qty: 3 EACH | Refills: 3 | Status: SHIPPED | OUTPATIENT
Start: 2024-01-03 | End: 2024-01-05 | Stop reason: SDUPTHER

## 2024-01-05 DIAGNOSIS — J44.9 CHRONIC OBSTRUCTIVE PULMONARY DISEASE, UNSPECIFIED COPD TYPE: ICD-10-CM

## 2024-01-05 RX ORDER — FLUCONAZOLE 100 MG/1
100 TABLET ORAL DAILY
Qty: 3 TABLET | Refills: 0 | Status: SHIPPED | OUTPATIENT
Start: 2024-01-05

## 2024-01-05 RX ORDER — BUDESONIDE AND FORMOTEROL FUMARATE DIHYDRATE 160; 4.5 UG/1; UG/1
2 AEROSOL RESPIRATORY (INHALATION)
Qty: 3 EACH | Refills: 3 | Status: SHIPPED | OUTPATIENT
Start: 2024-01-05

## 2024-01-15 RX ORDER — FLUCONAZOLE 150 MG/1
150 TABLET ORAL ONCE
Qty: 1 TABLET | Refills: 0 | Status: SHIPPED | OUTPATIENT
Start: 2024-01-15 | End: 2024-01-15

## 2024-01-22 ENCOUNTER — TRANSCRIBE ORDERS (OUTPATIENT)
Dept: INFUSION THERAPY | Facility: HOSPITAL | Age: 74
End: 2024-01-22
Payer: MEDICARE

## 2024-01-22 ENCOUNTER — HOSPITAL ENCOUNTER (OUTPATIENT)
Dept: INFUSION THERAPY | Facility: HOSPITAL | Age: 74
Discharge: HOME | End: 2024-01-22
Admitting: INTERNAL MEDICINE
Payer: MEDICARE

## 2024-01-22 VITALS
DIASTOLIC BLOOD PRESSURE: 71 MMHG | WEIGHT: 144 LBS | HEART RATE: 68 BPM | HEIGHT: 65 IN | SYSTOLIC BLOOD PRESSURE: 168 MMHG | RESPIRATION RATE: 20 BRPM | BODY MASS INDEX: 23.99 KG/M2 | OXYGEN SATURATION: 100 % | TEMPERATURE: 97.9 F

## 2024-01-22 DIAGNOSIS — N18.30 STAGE 3 CHRONIC KIDNEY DISEASE, UNSPECIFIED WHETHER STAGE 3A OR 3B CKD: ICD-10-CM

## 2024-01-22 DIAGNOSIS — D50.9 IRON DEFICIENCY ANEMIA, UNSPECIFIED IRON DEFICIENCY ANEMIA TYPE: ICD-10-CM

## 2024-01-22 DIAGNOSIS — D63.1 ANEMIA DUE TO STAGE 3 CHRONIC KIDNEY DISEASE TREATED WITH ERYTHROPOIETIN: Primary | ICD-10-CM

## 2024-01-22 DIAGNOSIS — N18.30 ANEMIA DUE TO STAGE 3 CHRONIC KIDNEY DISEASE TREATED WITH ERYTHROPOIETIN: Primary | ICD-10-CM

## 2024-01-22 LAB
ALBUMIN SERPL-MCNC: 4.1 G/DL (ref 3.5–5.2)
ANION GAP SERPL CALCULATED.3IONS-SCNC: 7.4 MMOL/L (ref 5–15)
BACTERIA UR QL AUTO: ABNORMAL /HPF
BILIRUB UR QL STRIP: NEGATIVE
BUN SERPL-MCNC: 24 MG/DL (ref 8–23)
BUN/CREAT SERPL: 24.2 (ref 7–25)
CALCIUM SPEC-SCNC: 9.2 MG/DL (ref 8.6–10.5)
CHLORIDE SERPL-SCNC: 97 MMOL/L (ref 98–107)
CLARITY UR: CLEAR
CO2 SERPL-SCNC: 34.6 MMOL/L (ref 22–29)
COLOR UR: YELLOW
CREAT SERPL-MCNC: 0.99 MG/DL (ref 0.57–1)
CREAT UR-MCNC: 108.8 MG/DL
DEPRECATED RDW RBC AUTO: 42.6 FL (ref 37–54)
EGFRCR SERPLBLD CKD-EPI 2021: 61.1 ML/MIN/1.73
ERYTHROCYTE [DISTWIDTH] IN BLOOD BY AUTOMATED COUNT: 13 % (ref 12.3–15.4)
GLUCOSE SERPL-MCNC: 137 MG/DL (ref 65–99)
GLUCOSE UR STRIP-MCNC: NEGATIVE MG/DL
HCT VFR BLD AUTO: 32 % (ref 34–46.6)
HGB BLD-MCNC: 9.9 G/DL (ref 12–15.9)
HGB UR QL STRIP.AUTO: ABNORMAL
HYALINE CASTS UR QL AUTO: ABNORMAL /LPF
IRON 24H UR-MRATE: 34 MCG/DL (ref 37–145)
IRON SATN MFR SERPL: 12 % (ref 20–50)
KETONES UR QL STRIP: NEGATIVE
LEUKOCYTE ESTERASE UR QL STRIP.AUTO: NEGATIVE
MCH RBC QN AUTO: 27.7 PG (ref 26.6–33)
MCHC RBC AUTO-ENTMCNC: 30.9 G/DL (ref 31.5–35.7)
MCV RBC AUTO: 89.4 FL (ref 79–97)
NITRITE UR QL STRIP: NEGATIVE
PH UR STRIP.AUTO: 5.5 [PH] (ref 5–8)
PHOSPHATE SERPL-MCNC: 3.5 MG/DL (ref 2.5–4.5)
PLATELET # BLD AUTO: 154 10*3/MM3 (ref 140–450)
PMV BLD AUTO: 9.9 FL (ref 6–12)
POTASSIUM SERPL-SCNC: 4 MMOL/L (ref 3.5–5.2)
PROT ?TM UR-MCNC: 10 MG/DL
PROT ?TM UR-MCNC: 16.5 MG/DL
PROT UR QL STRIP: NEGATIVE
PROT/CREAT UR: 151.7 MG/G CREA (ref 0–200)
RBC # BLD AUTO: 3.58 10*6/MM3 (ref 3.77–5.28)
RBC # UR STRIP: ABNORMAL /HPF
REF LAB TEST METHOD: ABNORMAL
SODIUM SERPL-SCNC: 139 MMOL/L (ref 136–145)
SP GR UR STRIP: 1.02 (ref 1–1.03)
SQUAMOUS #/AREA URNS HPF: ABNORMAL /HPF
TIBC SERPL-MCNC: 291 MCG/DL (ref 298–536)
TRANSFERRIN SERPL-MCNC: 195 MG/DL (ref 200–360)
UROBILINOGEN UR QL STRIP: ABNORMAL
WBC # UR STRIP: ABNORMAL /HPF
WBC NRBC COR # BLD AUTO: 5.36 10*3/MM3 (ref 3.4–10.8)
YEAST URNS QL MICRO: ABNORMAL /HPF

## 2024-01-22 PROCEDURE — 36415 COLL VENOUS BLD VENIPUNCTURE: CPT

## 2024-01-22 PROCEDURE — 81001 URINALYSIS AUTO W/SCOPE: CPT | Performed by: NURSE PRACTITIONER

## 2024-01-22 PROCEDURE — 82570 ASSAY OF URINE CREATININE: CPT | Performed by: NURSE PRACTITIONER

## 2024-01-22 PROCEDURE — 80069 RENAL FUNCTION PANEL: CPT | Performed by: INTERNAL MEDICINE

## 2024-01-22 PROCEDURE — 25010000002 EPOETIN ALFA PER 1000 UNITS: Performed by: INTERNAL MEDICINE

## 2024-01-22 PROCEDURE — 85027 COMPLETE CBC AUTOMATED: CPT | Performed by: INTERNAL MEDICINE

## 2024-01-22 PROCEDURE — 96372 THER/PROPH/DIAG INJ SC/IM: CPT

## 2024-01-22 PROCEDURE — 83540 ASSAY OF IRON: CPT | Performed by: INTERNAL MEDICINE

## 2024-01-22 PROCEDURE — 84466 ASSAY OF TRANSFERRIN: CPT | Performed by: INTERNAL MEDICINE

## 2024-01-22 PROCEDURE — 84156 ASSAY OF PROTEIN URINE: CPT | Performed by: NURSE PRACTITIONER

## 2024-01-22 RX ADMIN — ERYTHROPOIETIN 40000 UNITS: 40000 INJECTION, SOLUTION INTRAVENOUS; SUBCUTANEOUS at 15:09

## 2024-01-22 NOTE — CODE DOCUMENTATION
Venipuncture to left antecubital x2 attempts; successful stick with second attempt; blood specimen collected and taken to inpatient lab for processing.  Patient tolerated well.  Site clean, dry with minimal bleeding; guaze and coban tape for hemostasis.

## 2024-01-22 NOTE — CODE DOCUMENTATION
Clean catch urine obtained for lab order.  Patient tolerated well.  Specimen to inpatient lab for processing.

## 2024-02-01 ENCOUNTER — TRANSCRIBE ORDERS (OUTPATIENT)
Dept: INFUSION THERAPY | Facility: HOSPITAL | Age: 74
End: 2024-02-01
Payer: MEDICARE

## 2024-02-01 ENCOUNTER — LAB (OUTPATIENT)
Dept: LAB | Facility: HOSPITAL | Age: 74
End: 2024-02-01
Payer: MEDICARE

## 2024-02-01 DIAGNOSIS — N18.31 CHRONIC KIDNEY DISEASE, STAGE 3A: Primary | ICD-10-CM

## 2024-02-01 DIAGNOSIS — N18.31 CHRONIC KIDNEY DISEASE, STAGE 3A: ICD-10-CM

## 2024-02-01 LAB — PTH-INTACT SERPL-MCNC: 41.4 PG/ML (ref 15–65)

## 2024-02-01 PROCEDURE — 83970 ASSAY OF PARATHORMONE: CPT

## 2024-03-11 ENCOUNTER — HOSPITAL ENCOUNTER (OUTPATIENT)
Dept: INFUSION THERAPY | Facility: HOSPITAL | Age: 74
Discharge: HOME | End: 2024-03-11
Admitting: INTERNAL MEDICINE
Payer: MEDICARE

## 2024-03-11 DIAGNOSIS — D50.9 IRON DEFICIENCY ANEMIA, UNSPECIFIED IRON DEFICIENCY ANEMIA TYPE: Primary | ICD-10-CM

## 2024-03-11 DIAGNOSIS — N18.30 STAGE 3 CHRONIC KIDNEY DISEASE, UNSPECIFIED WHETHER STAGE 3A OR 3B CKD: ICD-10-CM

## 2024-03-11 LAB
ALBUMIN SERPL-MCNC: 4.4 G/DL (ref 3.5–5.2)
ANION GAP SERPL CALCULATED.3IONS-SCNC: 10.7 MMOL/L (ref 5–15)
BUN SERPL-MCNC: 23 MG/DL (ref 8–23)
BUN/CREAT SERPL: 21.1 (ref 7–25)
CALCIUM SPEC-SCNC: 8.6 MG/DL (ref 8.6–10.5)
CHLORIDE SERPL-SCNC: 99 MMOL/L (ref 98–107)
CO2 SERPL-SCNC: 30.3 MMOL/L (ref 22–29)
CREAT SERPL-MCNC: 1.09 MG/DL (ref 0.57–1)
DEPRECATED RDW RBC AUTO: 43 FL (ref 37–54)
EGFRCR SERPLBLD CKD-EPI 2021: 54.4 ML/MIN/1.73
ERYTHROCYTE [DISTWIDTH] IN BLOOD BY AUTOMATED COUNT: 13.1 % (ref 12.3–15.4)
GLUCOSE SERPL-MCNC: 99 MG/DL (ref 65–99)
HCT VFR BLD AUTO: 31.9 % (ref 34–46.6)
HGB BLD-MCNC: 9.6 G/DL (ref 12–15.9)
MCH RBC QN AUTO: 26.9 PG (ref 26.6–33)
MCHC RBC AUTO-ENTMCNC: 30.1 G/DL (ref 31.5–35.7)
MCV RBC AUTO: 89.4 FL (ref 79–97)
PHOSPHATE SERPL-MCNC: 4.8 MG/DL (ref 2.5–4.5)
PLATELET # BLD AUTO: 183 10*3/MM3 (ref 140–450)
PMV BLD AUTO: 9.8 FL (ref 6–12)
POTASSIUM SERPL-SCNC: 4.2 MMOL/L (ref 3.5–5.2)
RBC # BLD AUTO: 3.57 10*6/MM3 (ref 3.77–5.28)
SODIUM SERPL-SCNC: 140 MMOL/L (ref 136–145)
WBC NRBC COR # BLD AUTO: 6.66 10*3/MM3 (ref 3.4–10.8)

## 2024-03-11 PROCEDURE — 25010000002 EPOETIN ALFA PER 1000 UNITS: Performed by: INTERNAL MEDICINE

## 2024-03-11 PROCEDURE — 80069 RENAL FUNCTION PANEL: CPT | Performed by: INTERNAL MEDICINE

## 2024-03-11 PROCEDURE — 85027 COMPLETE CBC AUTOMATED: CPT | Performed by: INTERNAL MEDICINE

## 2024-03-11 PROCEDURE — 96372 THER/PROPH/DIAG INJ SC/IM: CPT

## 2024-03-11 RX ADMIN — ERYTHROPOIETIN 40000 UNITS: 40000 INJECTION, SOLUTION INTRAVENOUS; SUBCUTANEOUS at 15:29

## 2024-03-14 ENCOUNTER — TRANSCRIBE ORDERS (OUTPATIENT)
Dept: INFUSION THERAPY | Facility: HOSPITAL | Age: 74
End: 2024-03-14
Payer: MEDICARE

## 2024-03-14 DIAGNOSIS — N18.30 STAGE 3 CHRONIC KIDNEY DISEASE, UNSPECIFIED WHETHER STAGE 3A OR 3B CKD: Primary | ICD-10-CM

## 2024-04-19 RX ORDER — SODIUM CHLORIDE 9 MG/ML
20 INJECTION, SOLUTION INTRAVENOUS ONCE
Status: CANCELLED | OUTPATIENT
Start: 2024-04-22

## 2024-04-22 ENCOUNTER — HOSPITAL ENCOUNTER (OUTPATIENT)
Dept: INFUSION THERAPY | Facility: HOSPITAL | Age: 74
Discharge: HOME | End: 2024-04-22
Payer: MEDICARE

## 2024-04-22 VITALS
SYSTOLIC BLOOD PRESSURE: 163 MMHG | DIASTOLIC BLOOD PRESSURE: 74 MMHG | OXYGEN SATURATION: 99 % | RESPIRATION RATE: 20 BRPM | HEART RATE: 80 BPM

## 2024-04-22 DIAGNOSIS — D63.1 ANEMIA DUE TO STAGE 3 CHRONIC KIDNEY DISEASE TREATED WITH ERYTHROPOIETIN: Primary | ICD-10-CM

## 2024-04-22 DIAGNOSIS — N18.30 STAGE 3 CHRONIC KIDNEY DISEASE, UNSPECIFIED WHETHER STAGE 3A OR 3B CKD: ICD-10-CM

## 2024-04-22 DIAGNOSIS — D50.9 IRON DEFICIENCY ANEMIA, UNSPECIFIED IRON DEFICIENCY ANEMIA TYPE: ICD-10-CM

## 2024-04-22 DIAGNOSIS — N18.30 ANEMIA DUE TO STAGE 3 CHRONIC KIDNEY DISEASE TREATED WITH ERYTHROPOIETIN: Primary | ICD-10-CM

## 2024-04-22 LAB
ALBUMIN SERPL-MCNC: 4 G/DL (ref 3.5–5.2)
ANION GAP SERPL CALCULATED.3IONS-SCNC: 7.8 MMOL/L (ref 5–15)
BUN SERPL-MCNC: 26 MG/DL (ref 8–23)
BUN/CREAT SERPL: 23.2 (ref 7–25)
CALCIUM SPEC-SCNC: 9.2 MG/DL (ref 8.6–10.5)
CHLORIDE SERPL-SCNC: 101 MMOL/L (ref 98–107)
CO2 SERPL-SCNC: 33.2 MMOL/L (ref 22–29)
CREAT SERPL-MCNC: 1.12 MG/DL (ref 0.57–1)
DEPRECATED RDW RBC AUTO: 40.3 FL (ref 37–54)
EGFRCR SERPLBLD CKD-EPI 2021: 52.7 ML/MIN/1.73
ERYTHROCYTE [DISTWIDTH] IN BLOOD BY AUTOMATED COUNT: 12.7 % (ref 12.3–15.4)
GLUCOSE SERPL-MCNC: 138 MG/DL (ref 65–99)
HCT VFR BLD AUTO: 30.5 % (ref 34–46.6)
HGB BLD-MCNC: 9.5 G/DL (ref 12–15.9)
IRON 24H UR-MRATE: 28 MCG/DL (ref 37–145)
IRON SATN MFR SERPL: 9 % (ref 20–50)
MCH RBC QN AUTO: 27.1 PG (ref 26.6–33)
MCHC RBC AUTO-ENTMCNC: 31.1 G/DL (ref 31.5–35.7)
MCV RBC AUTO: 87.1 FL (ref 79–97)
PHOSPHATE SERPL-MCNC: 4.1 MG/DL (ref 2.5–4.5)
PLATELET # BLD AUTO: 154 10*3/MM3 (ref 140–450)
PMV BLD AUTO: 11 FL (ref 6–12)
POTASSIUM SERPL-SCNC: 3.5 MMOL/L (ref 3.5–5.2)
RBC # BLD AUTO: 3.5 10*6/MM3 (ref 3.77–5.28)
SODIUM SERPL-SCNC: 142 MMOL/L (ref 136–145)
TIBC SERPL-MCNC: 295 MCG/DL (ref 298–536)
TRANSFERRIN SERPL-MCNC: 198 MG/DL (ref 200–360)
WBC NRBC COR # BLD AUTO: 7.25 10*3/MM3 (ref 3.4–10.8)

## 2024-04-22 PROCEDURE — 96374 THER/PROPH/DIAG INJ IV PUSH: CPT

## 2024-04-22 PROCEDURE — 25810000003 SODIUM CHLORIDE 0.9 % SOLUTION

## 2024-04-22 PROCEDURE — 96365 THER/PROPH/DIAG IV INF INIT: CPT

## 2024-04-22 PROCEDURE — 25010000002 FERUMOXYTOL 510 MG/17ML SOLUTION 17 ML VIAL

## 2024-04-22 PROCEDURE — 85027 COMPLETE CBC AUTOMATED: CPT | Performed by: INTERNAL MEDICINE

## 2024-04-22 PROCEDURE — 84466 ASSAY OF TRANSFERRIN: CPT | Performed by: INTERNAL MEDICINE

## 2024-04-22 PROCEDURE — 96372 THER/PROPH/DIAG INJ SC/IM: CPT

## 2024-04-22 PROCEDURE — 80069 RENAL FUNCTION PANEL: CPT | Performed by: INTERNAL MEDICINE

## 2024-04-22 PROCEDURE — 25010000002 EPOETIN ALFA PER 1000 UNITS: Performed by: INTERNAL MEDICINE

## 2024-04-22 PROCEDURE — 83540 ASSAY OF IRON: CPT | Performed by: INTERNAL MEDICINE

## 2024-04-22 RX ORDER — SODIUM CHLORIDE 9 MG/ML
20 INJECTION, SOLUTION INTRAVENOUS ONCE
OUTPATIENT
Start: 2024-04-29

## 2024-04-22 RX ORDER — SODIUM CHLORIDE 9 MG/ML
20 INJECTION, SOLUTION INTRAVENOUS ONCE
Status: COMPLETED | OUTPATIENT
Start: 2024-04-22 | End: 2024-04-22

## 2024-04-22 RX ADMIN — ERYTHROPOIETIN 40000 UNITS: 40000 INJECTION, SOLUTION INTRAVENOUS; SUBCUTANEOUS at 14:07

## 2024-04-22 RX ADMIN — FERUMOXYTOL 510 MG: 510 INJECTION INTRAVENOUS at 13:52

## 2024-04-22 RX ADMIN — SODIUM CHLORIDE 20 ML/HR: 9 INJECTION, SOLUTION INTRAVENOUS at 13:51

## 2024-06-05 ENCOUNTER — HOSPITAL ENCOUNTER (OUTPATIENT)
Dept: INFUSION THERAPY | Facility: HOSPITAL | Age: 74
Discharge: HOME | End: 2024-06-05
Admitting: INTERNAL MEDICINE
Payer: MEDICARE

## 2024-06-05 VITALS
OXYGEN SATURATION: 98 % | SYSTOLIC BLOOD PRESSURE: 159 MMHG | HEART RATE: 88 BPM | DIASTOLIC BLOOD PRESSURE: 73 MMHG | RESPIRATION RATE: 22 BRPM

## 2024-06-05 DIAGNOSIS — N18.30 STAGE 3 CHRONIC KIDNEY DISEASE, UNSPECIFIED WHETHER STAGE 3A OR 3B CKD: ICD-10-CM

## 2024-06-05 DIAGNOSIS — D50.9 IRON DEFICIENCY ANEMIA, UNSPECIFIED IRON DEFICIENCY ANEMIA TYPE: Primary | ICD-10-CM

## 2024-06-05 LAB
ALBUMIN SERPL-MCNC: 4.1 G/DL (ref 3.5–5.2)
ANION GAP SERPL CALCULATED.3IONS-SCNC: 7.9 MMOL/L (ref 5–15)
BUN SERPL-MCNC: 21 MG/DL (ref 8–23)
BUN/CREAT SERPL: 18.9 (ref 7–25)
CALCIUM SPEC-SCNC: 9.3 MG/DL (ref 8.6–10.5)
CHLORIDE SERPL-SCNC: 98 MMOL/L (ref 98–107)
CO2 SERPL-SCNC: 33.1 MMOL/L (ref 22–29)
CREAT SERPL-MCNC: 1.11 MG/DL (ref 0.57–1)
DEPRECATED RDW RBC AUTO: 42.5 FL (ref 37–54)
EGFRCR SERPLBLD CKD-EPI 2021: 53.3 ML/MIN/1.73
ERYTHROCYTE [DISTWIDTH] IN BLOOD BY AUTOMATED COUNT: 13.2 % (ref 12.3–15.4)
GLUCOSE SERPL-MCNC: 132 MG/DL (ref 65–99)
HCT VFR BLD AUTO: 36.3 % (ref 34–46.6)
HGB BLD-MCNC: 11.3 G/DL (ref 12–15.9)
MCH RBC QN AUTO: 27.2 PG (ref 26.6–33)
MCHC RBC AUTO-ENTMCNC: 31.1 G/DL (ref 31.5–35.7)
MCV RBC AUTO: 87.5 FL (ref 79–97)
PHOSPHATE SERPL-MCNC: 4.4 MG/DL (ref 2.5–4.5)
PLATELET # BLD AUTO: 185 10*3/MM3 (ref 140–450)
PMV BLD AUTO: 10.8 FL (ref 6–12)
POTASSIUM SERPL-SCNC: 4 MMOL/L (ref 3.5–5.2)
RBC # BLD AUTO: 4.15 10*6/MM3 (ref 3.77–5.28)
SODIUM SERPL-SCNC: 139 MMOL/L (ref 136–145)
WBC NRBC COR # BLD AUTO: 8.08 10*3/MM3 (ref 3.4–10.8)

## 2024-06-05 PROCEDURE — G0463 HOSPITAL OUTPT CLINIC VISIT: HCPCS

## 2024-06-05 PROCEDURE — 85027 COMPLETE CBC AUTOMATED: CPT | Performed by: INTERNAL MEDICINE

## 2024-06-05 PROCEDURE — 36415 COLL VENOUS BLD VENIPUNCTURE: CPT

## 2024-06-05 PROCEDURE — 80069 RENAL FUNCTION PANEL: CPT | Performed by: INTERNAL MEDICINE

## 2024-06-05 NOTE — CODE DOCUMENTATION
Labs drawn via Venipuncture to left AC.     Patient's Hgb today was 11.3 which is outside of order parameters. Patient did not receive treatment today.

## 2024-07-31 ENCOUNTER — HOSPITAL ENCOUNTER (OUTPATIENT)
Dept: INFUSION THERAPY | Facility: HOSPITAL | Age: 74
Discharge: HOME | End: 2024-07-31
Admitting: INTERNAL MEDICINE
Payer: MEDICARE

## 2024-07-31 DIAGNOSIS — N18.30 STAGE 3 CHRONIC KIDNEY DISEASE, UNSPECIFIED WHETHER STAGE 3A OR 3B CKD: ICD-10-CM

## 2024-07-31 DIAGNOSIS — D50.9 IRON DEFICIENCY ANEMIA, UNSPECIFIED IRON DEFICIENCY ANEMIA TYPE: Primary | ICD-10-CM

## 2024-07-31 LAB
ALBUMIN SERPL-MCNC: 4.1 G/DL (ref 3.5–5.2)
ANION GAP SERPL CALCULATED.3IONS-SCNC: 10 MMOL/L (ref 5–15)
BUN SERPL-MCNC: 29 MG/DL (ref 8–23)
BUN/CREAT SERPL: 21.8 (ref 7–25)
CALCIUM SPEC-SCNC: 9.2 MG/DL (ref 8.6–10.5)
CHLORIDE SERPL-SCNC: 97 MMOL/L (ref 98–107)
CO2 SERPL-SCNC: 33 MMOL/L (ref 22–29)
CREAT SERPL-MCNC: 1.33 MG/DL (ref 0.57–1)
DEPRECATED RDW RBC AUTO: 41.5 FL (ref 37–54)
EGFRCR SERPLBLD CKD-EPI 2021: 42.6 ML/MIN/1.73
ERYTHROCYTE [DISTWIDTH] IN BLOOD BY AUTOMATED COUNT: 12.8 % (ref 12.3–15.4)
GLUCOSE SERPL-MCNC: 117 MG/DL (ref 65–99)
HCT VFR BLD AUTO: 29.8 % (ref 34–46.6)
HGB BLD-MCNC: 9.4 G/DL (ref 12–15.9)
IRON 24H UR-MRATE: 66 MCG/DL (ref 37–145)
IRON SATN MFR SERPL: 24 % (ref 20–50)
MCH RBC QN AUTO: 27.9 PG (ref 26.6–33)
MCHC RBC AUTO-ENTMCNC: 31.5 G/DL (ref 31.5–35.7)
MCV RBC AUTO: 88.4 FL (ref 79–97)
PHOSPHATE SERPL-MCNC: 4.2 MG/DL (ref 2.5–4.5)
PLATELET # BLD AUTO: 191 10*3/MM3 (ref 140–450)
PMV BLD AUTO: 9.5 FL (ref 6–12)
POTASSIUM SERPL-SCNC: 4.2 MMOL/L (ref 3.5–5.2)
RBC # BLD AUTO: 3.37 10*6/MM3 (ref 3.77–5.28)
SODIUM SERPL-SCNC: 140 MMOL/L (ref 136–145)
TIBC SERPL-MCNC: 271 MCG/DL (ref 298–536)
TRANSFERRIN SERPL-MCNC: 182 MG/DL (ref 200–360)
WBC NRBC COR # BLD AUTO: 6.25 10*3/MM3 (ref 3.4–10.8)

## 2024-07-31 PROCEDURE — 83540 ASSAY OF IRON: CPT | Performed by: INTERNAL MEDICINE

## 2024-07-31 PROCEDURE — 96372 THER/PROPH/DIAG INJ SC/IM: CPT

## 2024-07-31 PROCEDURE — 84466 ASSAY OF TRANSFERRIN: CPT | Performed by: INTERNAL MEDICINE

## 2024-07-31 PROCEDURE — 25010000002 EPOETIN ALFA PER 1000 UNITS: Performed by: INTERNAL MEDICINE

## 2024-07-31 PROCEDURE — 80069 RENAL FUNCTION PANEL: CPT | Performed by: INTERNAL MEDICINE

## 2024-07-31 PROCEDURE — 85027 COMPLETE CBC AUTOMATED: CPT | Performed by: INTERNAL MEDICINE

## 2024-07-31 PROCEDURE — 36415 COLL VENOUS BLD VENIPUNCTURE: CPT

## 2024-07-31 RX ADMIN — ERYTHROPOIETIN 40000 UNITS: 40000 INJECTION, SOLUTION INTRAVENOUS; SUBCUTANEOUS at 15:40

## 2024-09-11 ENCOUNTER — HOSPITAL ENCOUNTER (OUTPATIENT)
Facility: HOSPITAL | Age: 74
Discharge: HOME | End: 2024-09-11
Admitting: INTERNAL MEDICINE
Payer: MEDICARE

## 2024-09-11 DIAGNOSIS — D50.9 IRON DEFICIENCY ANEMIA, UNSPECIFIED IRON DEFICIENCY ANEMIA TYPE: Primary | ICD-10-CM

## 2024-09-11 DIAGNOSIS — N18.30 STAGE 3 CHRONIC KIDNEY DISEASE, UNSPECIFIED WHETHER STAGE 3A OR 3B CKD: ICD-10-CM

## 2024-09-11 LAB
ALBUMIN SERPL-MCNC: 3.9 G/DL (ref 3.5–5.2)
ANION GAP SERPL CALCULATED.3IONS-SCNC: 8.5 MMOL/L (ref 5–15)
BUN SERPL-MCNC: 28 MG/DL (ref 8–23)
BUN/CREAT SERPL: 25.7 (ref 7–25)
CALCIUM SPEC-SCNC: 9.2 MG/DL (ref 8.6–10.5)
CHLORIDE SERPL-SCNC: 99 MMOL/L (ref 98–107)
CO2 SERPL-SCNC: 31.5 MMOL/L (ref 22–29)
CREAT SERPL-MCNC: 1.09 MG/DL (ref 0.57–1)
DEPRECATED RDW RBC AUTO: 41 FL (ref 37–54)
EGFRCR SERPLBLD CKD-EPI 2021: 54.1 ML/MIN/1.73
ERYTHROCYTE [DISTWIDTH] IN BLOOD BY AUTOMATED COUNT: 12.4 % (ref 12.3–15.4)
GLUCOSE SERPL-MCNC: 173 MG/DL (ref 65–99)
HCT VFR BLD AUTO: 30.4 % (ref 34–46.6)
HGB BLD-MCNC: 9.4 G/DL (ref 12–15.9)
MCH RBC QN AUTO: 27.7 PG (ref 26.6–33)
MCHC RBC AUTO-ENTMCNC: 30.9 G/DL (ref 31.5–35.7)
MCV RBC AUTO: 89.7 FL (ref 79–97)
PHOSPHATE SERPL-MCNC: 3.6 MG/DL (ref 2.5–4.5)
PLATELET # BLD AUTO: 169 10*3/MM3 (ref 140–450)
PMV BLD AUTO: 9.9 FL (ref 6–12)
POTASSIUM SERPL-SCNC: 4.4 MMOL/L (ref 3.5–5.2)
RBC # BLD AUTO: 3.39 10*6/MM3 (ref 3.77–5.28)
SODIUM SERPL-SCNC: 139 MMOL/L (ref 136–145)
WBC NRBC COR # BLD AUTO: 6.1 10*3/MM3 (ref 3.4–10.8)

## 2024-09-11 PROCEDURE — 85027 COMPLETE CBC AUTOMATED: CPT | Performed by: INTERNAL MEDICINE

## 2024-09-11 PROCEDURE — 25010000002 EPOETIN ALFA PER 1000 UNITS: Performed by: INTERNAL MEDICINE

## 2024-09-11 PROCEDURE — 96372 THER/PROPH/DIAG INJ SC/IM: CPT

## 2024-09-11 PROCEDURE — 80069 RENAL FUNCTION PANEL: CPT | Performed by: INTERNAL MEDICINE

## 2024-09-11 RX ADMIN — ERYTHROPOIETIN 40000 UNITS: 40000 INJECTION, SOLUTION INTRAVENOUS; SUBCUTANEOUS at 15:37

## 2024-10-02 ENCOUNTER — TELEPHONE (OUTPATIENT)
Age: 74
End: 2024-10-02
Payer: MEDICARE

## 2024-10-07 ENCOUNTER — SPECIALTY PHARMACY (OUTPATIENT)
Age: 74
End: 2024-10-07
Payer: MEDICARE

## 2024-10-08 ENCOUNTER — TELEPHONE (OUTPATIENT)
Age: 74
End: 2024-10-08
Payer: MEDICARE

## 2024-10-17 ENCOUNTER — TELEPHONE (OUTPATIENT)
Age: 74
End: 2024-10-17
Payer: MEDICARE

## 2024-10-17 NOTE — TELEPHONE ENCOUNTER
Pt. Called and was checking on her Enbrel RX. She said they are suppose to send us a request for a refill/

## 2024-10-18 NOTE — TELEPHONE ENCOUNTER
Pt called regarding refill. Advised pt that it was sent in this morning by the pharmacy. We were disconnected but everything should be good to go. RX was sent to the preferred Pharmacy. OKAY TO RELAY

## 2024-10-18 NOTE — TELEPHONE ENCOUNTER
Specialty Pharmacy Patient Management Program  Per Protocol Prescription Order/Refill     Patient currently fills medications at Other and is not enrolled in an Rheumatology Patient Management Program.     Requested Prescriptions     Signed Prescriptions Disp Refills    etanercept (ENBREL) 50 MG/ML solution prefilled syringe injection 4 mL 0     Sig: Inject 1 mL under the skin into the appropriate area as directed Every 7 (Seven) Days.     Authorizing Provider: JOE PALMER     Ordering User: CASSIUS COLEMAN     Prescription orders above were sent to the pharmacy per Collaborative Care Agreement Protocol.

## 2024-10-19 PROBLEM — D84.9 IMMUNOSUPPRESSION: Status: ACTIVE | Noted: 2024-10-19

## 2024-10-21 ENCOUNTER — TELEPHONE (OUTPATIENT)
Age: 74
End: 2024-10-21
Payer: MEDICARE

## 2024-10-21 NOTE — TELEPHONE ENCOUNTER
Caller: Sergey Vanna KIRAN    Relationship: Self    Best call back number: [phone]    What is the best time to reach you: ANYTIME    Who are you requesting to speak with (clinical staff, provider,  specific staff member): DERRICK WOODS MD        What was the call regarding: PT IS CALLING AGAIN CONCERNING HER ENBREL RX. PT STATES SHE HAS BEEN CALLING SINCE JULY TO GET THIS PRESCRIPTION FILLED. SHE IS OUT OF HER MEDICINE.  PT STATES THAT THE OFFICE HAS BEEN TRYING TO GET HER RX FILLED THROUGH [facility]  AND THIS IS NOT CORRECT. PT STATES HER RX SHOULD GO THROUGH [facility]. PLEASE CONTACT PT TO ASSIST. URGENT

## 2024-10-21 NOTE — TELEPHONE ENCOUNTER
Patient called because Enbrel rx was sent to incorrect pharmacy. Patient gets through [employer] patient assistance. I discussed with Ulises and assured patient that rx would be sent to the correct place, which is [facility]. Patient expressed understanding. Ul[facility] sent rx.

## 2024-10-21 NOTE — TELEPHONE ENCOUNTER
Specialty Pharmacy Patient Management Program  Per Protocol Prescription Order/Refill     Patient currently fills medications at Other and is not enrolled in an Rheumatology Patient Management Program.     Requested Prescriptions     Signed Prescriptions Disp Refills    etanercept (ENBREL) 50 MG/ML solution prefilled syringe injection 12 mL 0     Sig: Inject 1 mL under the skin into the appropriate area as directed Every 7 (Seven) Days.     Authorizing Provider: DERRICK WOODS     Ordering User: CASSIUS COLEMAN     Prescription orders above were sent to the pharmacy per Collaborative Care Agreement Protocol.            Complex Repair And Double M Plasty Text: The defect edges were debeveled with a #15 scalpel blade.  The primary defect was closed partially with a complex linear closure.  Given the location of the remaining defect, shape of the defect and the proximity to free margins a double M plasty was deemed most appropriate for complete closure of the defect.  Using a sterile surgical marker, an appropriate advancement flap was drawn incorporating the defect and placing the expected incisions within the relaxed skin tension lines where possible.    The area thus outlined was incised deep to adipose tissue with a #15 scalpel blade.  The skin margins were undermined to an appropriate distance in all directions utilizing iris scissors.

## 2024-10-28 ENCOUNTER — HOSPITAL ENCOUNTER (OUTPATIENT)
Facility: HOSPITAL | Age: 74
Discharge: HOME | End: 2024-10-28
Admitting: INTERNAL MEDICINE
Payer: MEDICARE

## 2024-10-28 VITALS
RESPIRATION RATE: 20 BRPM | TEMPERATURE: 98.4 F | DIASTOLIC BLOOD PRESSURE: 76 MMHG | SYSTOLIC BLOOD PRESSURE: 138 MMHG | HEART RATE: 80 BPM | OXYGEN SATURATION: 99 %

## 2024-10-28 DIAGNOSIS — N18.30 STAGE 3 CHRONIC KIDNEY DISEASE, UNSPECIFIED WHETHER STAGE 3A OR 3B CKD: ICD-10-CM

## 2024-10-28 DIAGNOSIS — D50.9 IRON DEFICIENCY ANEMIA, UNSPECIFIED IRON DEFICIENCY ANEMIA TYPE: Primary | ICD-10-CM

## 2024-10-28 LAB
ALBUMIN SERPL-MCNC: 4.1 G/DL (ref 3.5–5.2)
ANION GAP SERPL CALCULATED.3IONS-SCNC: 11.8 MMOL/L (ref 5–15)
BUN SERPL-MCNC: 28 MG/DL (ref 8–23)
BUN/CREAT SERPL: 18.5 (ref 7–25)
CALCIUM SPEC-SCNC: 8.7 MG/DL (ref 8.6–10.5)
CHLORIDE SERPL-SCNC: 97 MMOL/L (ref 98–107)
CO2 SERPL-SCNC: 31.2 MMOL/L (ref 22–29)
CREAT SERPL-MCNC: 1.51 MG/DL (ref 0.57–1)
DEPRECATED RDW RBC AUTO: 40.7 FL (ref 37–54)
EGFRCR SERPLBLD CKD-EPI 2021: 36.6 ML/MIN/1.73
ERYTHROCYTE [DISTWIDTH] IN BLOOD BY AUTOMATED COUNT: 12.8 % (ref 12.3–15.4)
GLUCOSE SERPL-MCNC: 100 MG/DL (ref 65–99)
HCT VFR BLD AUTO: 34.3 % (ref 34–46.6)
HGB BLD-MCNC: 10.8 G/DL (ref 12–15.9)
IRON 24H UR-MRATE: 51 MCG/DL (ref 37–145)
IRON SATN MFR SERPL: 17 % (ref 20–50)
MCH RBC QN AUTO: 27.3 PG (ref 26.6–33)
MCHC RBC AUTO-ENTMCNC: 31.5 G/DL (ref 31.5–35.7)
MCV RBC AUTO: 86.6 FL (ref 79–97)
PHOSPHATE SERPL-MCNC: 4.4 MG/DL (ref 2.5–4.5)
PLATELET # BLD AUTO: 185 10*3/MM3 (ref 140–450)
PMV BLD AUTO: 10.5 FL (ref 6–12)
POTASSIUM SERPL-SCNC: 3.7 MMOL/L (ref 3.5–5.2)
RBC # BLD AUTO: 3.96 10*6/MM3 (ref 3.77–5.28)
SODIUM SERPL-SCNC: 140 MMOL/L (ref 136–145)
TIBC SERPL-MCNC: 298 MCG/DL (ref 298–536)
TRANSFERRIN SERPL-MCNC: 200 MG/DL (ref 200–360)
WBC NRBC COR # BLD AUTO: 6.02 10*3/MM3 (ref 3.4–10.8)

## 2024-10-28 PROCEDURE — 36415 COLL VENOUS BLD VENIPUNCTURE: CPT

## 2024-10-28 PROCEDURE — 83540 ASSAY OF IRON: CPT | Performed by: INTERNAL MEDICINE

## 2024-10-28 PROCEDURE — 96372 THER/PROPH/DIAG INJ SC/IM: CPT

## 2024-10-28 PROCEDURE — 80069 RENAL FUNCTION PANEL: CPT | Performed by: INTERNAL MEDICINE

## 2024-10-28 PROCEDURE — 85027 COMPLETE CBC AUTOMATED: CPT | Performed by: INTERNAL MEDICINE

## 2024-10-28 PROCEDURE — 84466 ASSAY OF TRANSFERRIN: CPT | Performed by: INTERNAL MEDICINE

## 2024-10-28 PROCEDURE — 25010000002 EPOETIN ALFA PER 1000 UNITS: Performed by: INTERNAL MEDICINE

## 2024-10-28 RX ADMIN — ERYTHROPOIETIN 40000 UNITS: 40000 INJECTION, SOLUTION INTRAVENOUS; SUBCUTANEOUS at 14:23

## 2024-10-28 NOTE — CODE DOCUMENTATION
Venipuncture to LAC x1 attempt for ordered lab work.  Specimens taken to inpatient lab for processing.  Patient tolerated well.

## 2024-11-08 ENCOUNTER — SPECIALTY PHARMACY (OUTPATIENT)
Age: 74
End: 2024-11-08
Payer: MEDICARE

## 2024-11-08 NOTE — PROGRESS NOTES
PAP letter mailed to patient on 11/08     Rocio Campuzano, Pharmacy Technician  Specialty Pharmacy Technician

## 2024-11-15 PROBLEM — R21 RASH: Status: ACTIVE | Noted: 2024-11-15

## 2024-11-15 PROBLEM — M79.7 FIBROMYALGIA: Status: ACTIVE | Noted: 2024-11-15

## 2024-11-15 PROBLEM — R53.83 FATIGUE: Status: ACTIVE | Noted: 2024-11-15

## 2024-11-15 PROBLEM — Z51.81 ENCOUNTER FOR MEDICATION MONITORING: Status: ACTIVE | Noted: 2024-11-15

## 2024-11-15 PROBLEM — R89.9 ABNORMAL LABORATORY TEST: Status: ACTIVE | Noted: 2024-11-15

## 2024-11-15 PROBLEM — R76.8 POSITIVE ANA (ANTINUCLEAR ANTIBODY): Status: ACTIVE | Noted: 2024-11-15

## 2024-11-15 PROBLEM — M85.80 OSTEOPENIA: Status: ACTIVE | Noted: 2024-11-15

## 2024-11-15 PROBLEM — Z79.899 ENCOUNTER FOR LONG-TERM (CURRENT) USE OF HIGH-RISK MEDICATION: Status: ACTIVE | Noted: 2024-11-15

## 2024-11-15 NOTE — ASSESSMENT & PLAN NOTE
Hep A IGM +, Hep C + in 5/21  She did have covid-19 vaccine 3/31/21 and her 2nd vaccine was 4/23/21 ; Booster pending.  Hep C negative on recheck.  Hep A total + , IGM +    Per ID

## 2024-11-15 NOTE — ASSESSMENT & PLAN NOTE
Picture of rash shown to her cardiologist who thought it was plaque psoriasis.  She has changes in toenails. ? Psoriatic versus fungal.    Rec she see derm to eval lesions and nails. She has not had time to go. Transportation is an issue for her.

## 2024-11-15 NOTE — ASSESSMENT & PLAN NOTE
5/21 -Creatinine 1.10/52  Cr 1.17/48 in 2/22  Cr 1.06/56.6 in 10/22  3/23 HGB 9.7/ HCT 30.7, Cr 1.06/ GFR 56.6, chloride 97  10/28/24 Cr 1.51/36.6    Per nephrology  Anemia managed by Dr. Trujillo. On Procrit.

## 2024-11-15 NOTE — ASSESSMENT & PLAN NOTE
Sleeps poorly secondary to pain.  CK normal 5/21  Tender neck muscles.  Has flexeril at home but does not use secondary to dry mouth.    Heat , Ice, Biofreeze.  Massage.  Non addictive mm relaxer or PT is an option- Baclofen 5mg , Tizanidine 2mg. Can discuss with pcp.   She is currently on Pain med with relief.

## 2024-11-15 NOTE — ASSESSMENT & PLAN NOTE
Diagnosed many years ago.   Has had methotrexate in the past .  Has been treated with Enbrel since 2001 with improvement in sx.   in 9598-3364.  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  ESR normal 5/21 .  5/21 CCP 89.7 , RF / IGG 34, IGA 85.    Pt reports low dz activity   Continue Enbrel weekly  No nsaids secondary to renal.  Labs 10/28/24 stable  Order placed for hep panel and QTB today  RTC 4 months

## 2024-11-15 NOTE — PROGRESS NOTES
Telehealth E-Visit      Date: 2024   Patient Name: Vanna Rincon  : 1950   MRN: [id number]     Chief Complaint:    Chief Complaint   Patient presents with    Rheumatoid Arthritis       I have reviewed the E-Visit questionnaire and the patient's answers, my assessment and plan are listed below.     This provider is located at the [facility] (through [facility]), [address], [address] [place] [address] using a secure [email or web address] Video Visit through [employer]). Patient is being seen remotely via telehealth at their home address in [place], and stated they are in a secure environment for this session. The patient's condition being diagnosed/treated is appropriate for telemedicine. The provider identified herself as well as her credentials. The patient, and/or patients guardian, consent to be seen remotely, and when consent is given they understand that the consent allows for patient identifiable information to be sent to a third party as needed. They may refuse to be seen remotely at any time. The electronic data is encrypted and password protected, and the patient and/or guardian has been advised of the potential risks to privacy not withstanding such measures.    You have chosen to receive care through a telehealth visit. Do you consent to use a video/audio connection for your medical care today? Yes    History of Present Illness: Vanna Rincon is a 74 y.o. female who is being seen today to follow up with JENNY Freeman for RA/ILD.    She has not been seen since 10/25/23.  Continues on Enbrel weekly.  Hands are doing well currently without swelling.  Has difficulty doing her ADL's. She uses a walker at home.   She is on Oxygen .  She feels that her breathing is what limits her the most.  She continues to follow with pulmonology.  She is getting labs q 6 weeks for anemia. She is receiving procrit as needed with  nephrology.    Subjective      I have reviewed and updated the patient's chief complaint, history of present illness, review of systems, past medical history, surgical history, family history, social history, medications and allergy list as appropriate.     Medications:     Current Outpatient Medications:     amLODIPine (NORVASC) 5 MG tablet, Take 1 tablet by mouth As Needed., Disp: , Rfl:     budesonide (RINOCORT AQUA) 32 MCG/ACT nasal spray, Instill 2 sprays into each nostril once daily, Disp: 8.43 mL, Rfl: 5    budesonide-formoterol (Symbicort) 160-4.5 MCG/ACT inhaler, Inhale 2 puffs 2 (Two) Times a Day., Disp: 3 each, Rfl: 3    epoetin chidi (Procrit) 2000 UNIT/ML injection, Inject  under the skin into the appropriate area as directed Every 6 (Six) Weeks., Disp: , Rfl:     etanercept (ENBREL) 50 MG/ML solution prefilled syringe injection, Inject 1 mL under the skin into the appropriate area as directed Every 7 (Seven) Days., Disp: 12 mL, Rfl: 0    fentaNYL (DURAGESIC) 75 MCG/HR patch, Place 1 patch on the skin as directed by provider Every 72 (Seventy-Two) Hours., Disp: , Rfl:     fluconazole (Diflucan) 100 MG tablet, Take 1 tablet by mouth Daily., Disp: 3 tablet, Rfl: 0    HYDROcodone-acetaminophen (NORCO) 5-325 MG per tablet, Take 1 tablet by mouth Every 6 (Six) Hours As Needed., Disp: , Rfl:     levalbuterol (XOPENEX HFA) 45 MCG/ACT inhaler, Inhale 2 puffs 4 (Four) Times a Day As Needed for Wheezing., Disp: 15 g, Rfl: 5    levalbuterol (XOPENEX) 0.31 MG/3ML nebulizer solution, , Disp: , Rfl:     LORazepam (ATIVAN) 0.5 MG tablet, Take 1 tablet by mouth Daily As Needed for Anxiety., Disp: 30 tablet, Rfl: 1    Multiple Vitamins-Minerals (CENTRUM ADULTS PO), Take 1 tablet by mouth Daily., Disp: , Rfl:     O2 (OXYGEN), Inhale 6 L/min Continuous., Disp: , Rfl:     ondansetron (ZOFRAN) 8 MG tablet, Take  by mouth Every 8 (Eight) Hours As Needed for Nausea or Vomiting., Disp: , Rfl:     promethazine-codeine (PHENERGAN  "with CODEINE) 6.25-10 MG/5ML syrup, Take 5 mL by mouth every 6 hours as needed. Max daily amount of 20 ML, Disp: 473 mL, Rfl: 0    sertraline (ZOLOFT) 100 MG tablet, Take 1 tablet by mouth Daily., Disp: 90 tablet, Rfl: 3    simethicone (MYLICON) 125 MG chewable tablet, Chew 1 tablet Every 6 (Six) Hours As Needed for Flatulence., Disp: , Rfl:     valsartan-hydrochlorothiazide (DIOVAN-HCT) 320-25 MG per tablet, Take 0.5 tablets by mouth Daily., Disp: 45 tablet, Rfl: 3    Allergies:   Allergies   Allergen Reactions    Iodinated Contrast Media Other (See Comments)     Kidney disease    Feraheme [Ferumoxytol] Palpitations and Other (See Comments)     hypertension    Brimonidine Tartrate Nausea And Vomiting    Ciprofloxacin Other (See Comments)     \"Hurt all over\"    Doxycycline Other (See Comments)     \"makes other medication stay in my system longer\"    Lipitor [Atorvastatin] Other (See Comments)     Hurt all over      Nsaids Other (See Comments)     Pt states she has stage three kidney disease    Sulfa Antibiotics Other (See Comments)     \"cant remember\"       Objective     Physical Exam:  Vital Signs:   Vitals:    11/19/24 1404   PainSc: 0-No pain       Physical Exam  Patient appears chronically ill.  She is wearing supplemental oxygen.  She is in no acute distress.  She is alert and oriented.    Assessment / Plan      Assessment/Plan:   Diagnoses and all orders for this visit:    1. Rheumatoid arthritis involving multiple sites with positive rheumatoid factor (Primary)  Assessment & Plan:  Diagnosed many years ago.   Has had methotrexate in the past .  Has been treated with Enbrel since 2001 with improvement in sx.   in 2154-3080.  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  ESR normal 5/21 .  5/21 CCP 89.7 , RF / IGG 34, IGA 85.    Pt reports low dz activity "   Continue Enbrel weekly  No nsaids secondary to renal.  Labs 10/28/24 stable  Order placed for hep panel and QTB today  RTC 4 months      2. Fibromyalgia  Assessment & Plan:  Sleeps poorly secondary to pain.  CK normal 5/21  Tender neck muscles.  Has flexeril at home but does not use secondary to dry mouth.    Heat , Ice, Biofreeze.  Massage.  Non addictive mm relaxer or PT is an option- Baclofen 5mg , Tizanidine 2mg. Can discuss with pcp.   She is currently on Pain med with relief.      3. Stage 3 chronic kidney disease, unspecified whether stage 3a or 3b CKD  Assessment & Plan:  5/21 -Creatinine 1.10/52  Cr 1.17/48 in 2/22  Cr 1.06/56.6 in 10/22  3/23 HGB 9.7/ HCT 30.7, Cr 1.06/ GFR 56.6, chloride 97  10/28/24 Cr 1.51/36.6    Per nephrology  Anemia managed by Dr. Trujillo. On Procrit.      4. Rash  Assessment & Plan:  Picture of rash shown to her cardiologist who thought it was plaque psoriasis.  She has changes in toenails. ? Psoriatic versus fungal.    Rec she see derm to eval lesions and nails. She has not had time to go. Transportation is an issue for her.      5. Positive ADRIANA (antinuclear antibody)  Assessment & Plan:  1:80 (homogenous) May 2021.  Do not see lupus at this level.    SPF 50+      6. Osteopenia, unspecified location  Assessment & Plan:  Dexa scan 3/10/22 - Mid thoracic wedging. L femoral neck -1.7, L total hip -1.9, R total hip -1.3, R femoral neck -2.3.    She states she is not taking calcium or vitamin d.  1000 IU of vitamin D 3 gel caps per day.  800-1000MG of Calcium citrate per day.  Repeat dexa in future. Currently transportation is an issue.      7. Encounter for long-term (current) use of high-risk medication  Assessment & Plan:  Enbrel- Well tolerated and effective.  S/p Covid vaccine + 2 boosters.   Update QTB with next labs    Cbc, Cmp Q 3-4 months on Enbrel- 10/28/24 Cr 1.51/36.6, Hgb 10.8    Would hold Enbrel for any infection or illness, Seek treatment and when well and illness is  resolved you may restart medication.      8. Fatigue, unspecified type  Assessment & Plan:  Update QTB today    Orders:  -     Hepatitis Panel, Acute; Future  -     QuantiFERON-TB Gold Plus; Future    9. Encounter for medication monitoring  Assessment & Plan:  Update hepatitis panel    Orders:  -     Hepatitis Panel, Acute; Future  -     QuantiFERON-TB Gold Plus; Future    10. Interstitial lung disease  Assessment & Plan:  Following with [facility]  She feels that her breathing is what limits her the most. She wears continuous supplemental O2.           Follow Up:   Return in about 4 months (around 3/19/2025) for Dr. Garzon.    Any medications prescribed have been sent electronically to   [id number] - [place], [place] - [address] AT [facility] - [phone] PH - [phone] FX  [address] [address]  Phone: [phone] Fax: [phone]    [facility] - [place], [place] - [address] - [phone] PH - [phone] FX  [address]  [address]  [facility] [address]  Phone: [phone] Fax: [phone]    [facility] - [place], [place] - [address] [place] [address] - [phone] PH - [phone] FX  [address] [place] [address]  [address]  [facility] [address]  Phone: [phone] Fax: [phone]    [facility] - [place], [place] - [address] - [phone] PH - [phone] FX  [address]  [place] [address]  Phone: [phone] Fax: [phone]      16 minutes were spent reviewing the patient's questionnaire, formulating a treatment plan, and relaying information to the patient via [employer].    JENNY Freeman   Rheumatology [facility]  11/19/24  12:29 EST

## 2024-11-15 NOTE — ASSESSMENT & PLAN NOTE
Enbrel- Well tolerated and effective.  S/p Covid vaccine + 2 boosters.   Update QTB with next labs    Cbc, Cmp Q 3-4 months on Enbrel- 10/28/24 Cr 1.51/36.6, Hgb 10.8    Would hold Enbrel for any infection or illness, Seek treatment and when well and illness is resolved you may restart medication.

## 2024-11-15 NOTE — ASSESSMENT & PLAN NOTE
Dexa scan 3/10/22 - Mid thoracic wedging. L femoral neck -1.7, L total hip -1.9, R total hip -1.3, R femoral neck -2.3.    She states she is not taking calcium or vitamin d.  1000 IU of vitamin D 3 gel caps per day.  800-1000MG of Calcium citrate per day.  Repeat dexa in future. Currently transportation is an issue.

## 2024-11-19 ENCOUNTER — TELEMEDICINE (OUTPATIENT)
Age: 74
End: 2024-11-19
Payer: MEDICARE

## 2024-11-19 DIAGNOSIS — R21 RASH: ICD-10-CM

## 2024-11-19 DIAGNOSIS — J84.9 INTERSTITIAL LUNG DISEASE: ICD-10-CM

## 2024-11-19 DIAGNOSIS — M85.80 OSTEOPENIA, UNSPECIFIED LOCATION: ICD-10-CM

## 2024-11-19 DIAGNOSIS — M79.7 FIBROMYALGIA: ICD-10-CM

## 2024-11-19 DIAGNOSIS — M05.79 RHEUMATOID ARTHRITIS INVOLVING MULTIPLE SITES WITH POSITIVE RHEUMATOID FACTOR: Primary | ICD-10-CM

## 2024-11-19 DIAGNOSIS — R76.8 POSITIVE ANA (ANTINUCLEAR ANTIBODY): ICD-10-CM

## 2024-11-19 DIAGNOSIS — Z51.81 ENCOUNTER FOR MEDICATION MONITORING: ICD-10-CM

## 2024-11-19 DIAGNOSIS — R53.83 FATIGUE, UNSPECIFIED TYPE: ICD-10-CM

## 2024-11-19 DIAGNOSIS — Z79.899 ENCOUNTER FOR LONG-TERM (CURRENT) USE OF HIGH-RISK MEDICATION: ICD-10-CM

## 2024-11-19 DIAGNOSIS — N18.30 STAGE 3 CHRONIC KIDNEY DISEASE, UNSPECIFIED WHETHER STAGE 3A OR 3B CKD: ICD-10-CM

## 2024-11-19 PROBLEM — F41.9 ANXIETY AND DEPRESSION: Status: ACTIVE | Noted: 2024-03-12

## 2024-11-19 PROBLEM — N25.81 SECONDARY HYPERPARATHYROIDISM: Status: ACTIVE | Noted: 2020-05-11

## 2024-11-19 PROBLEM — Z87.891 HX OF SMOKING: Status: ACTIVE | Noted: 2022-07-12

## 2024-11-19 PROBLEM — I25.10 CORONARY ARTERIOSCLEROSIS: Status: ACTIVE | Noted: 2021-12-06

## 2024-11-19 PROBLEM — F32.A ANXIETY AND DEPRESSION: Status: ACTIVE | Noted: 2024-03-12

## 2024-11-19 PROBLEM — E55.9 VITAMIN D DEFICIENCY: Status: ACTIVE | Noted: 2020-05-11

## 2024-11-19 PROCEDURE — 99214 OFFICE O/P EST MOD 30 MIN: CPT | Performed by: NURSE PRACTITIONER

## 2024-11-19 PROCEDURE — G2211 COMPLEX E/M VISIT ADD ON: HCPCS | Performed by: NURSE PRACTITIONER

## 2024-11-19 PROCEDURE — 1160F RVW MEDS BY RX/DR IN RCRD: CPT | Performed by: NURSE PRACTITIONER

## 2024-11-19 PROCEDURE — 1159F MED LIST DOCD IN RCRD: CPT | Performed by: NURSE PRACTITIONER

## 2024-11-19 NOTE — ASSESSMENT & PLAN NOTE
Following with [facility]  She feels that her breathing is what limits her the most. She wears continuous supplemental O2.

## 2024-12-12 ENCOUNTER — TRANSCRIBE ORDERS (OUTPATIENT)
Dept: ADMINISTRATIVE | Facility: HOSPITAL | Age: 74
End: 2024-12-12
Payer: MEDICARE

## 2024-12-12 DIAGNOSIS — N18.31 CHRONIC KIDNEY DISEASE (CKD) STAGE G3A/A3, MODERATELY DECREASED GLOMERULAR FILTRATION RATE (GFR) BETWEEN 45-59 ML/MIN/1.73 SQUARE METER AND ALBUMINURIA CREATININE RATIO GREATER THAN 300 MG/G (C*: Primary | ICD-10-CM

## 2024-12-27 ENCOUNTER — HOSPITAL ENCOUNTER (OUTPATIENT)
Dept: ULTRASOUND IMAGING | Facility: HOSPITAL | Age: 74
Discharge: HOME | End: 2024-12-27
Payer: MEDICARE

## 2024-12-27 ENCOUNTER — HOSPITAL ENCOUNTER (OUTPATIENT)
Facility: HOSPITAL | Age: 74
Discharge: HOME | End: 2024-12-27
Payer: MEDICARE

## 2024-12-27 VITALS
OXYGEN SATURATION: 100 % | SYSTOLIC BLOOD PRESSURE: 131 MMHG | DIASTOLIC BLOOD PRESSURE: 64 MMHG | RESPIRATION RATE: 22 BRPM | HEART RATE: 101 BPM

## 2024-12-27 DIAGNOSIS — N18.31 CHRONIC KIDNEY DISEASE (CKD) STAGE G3A/A3, MODERATELY DECREASED GLOMERULAR FILTRATION RATE (GFR) BETWEEN 45-59 ML/MIN/1.73 SQUARE METER AND ALBUMINURIA CREATININE RATIO GREATER THAN 300 MG/G (C*: ICD-10-CM

## 2024-12-27 DIAGNOSIS — D50.9 IRON DEFICIENCY ANEMIA, UNSPECIFIED IRON DEFICIENCY ANEMIA TYPE: Primary | ICD-10-CM

## 2024-12-27 DIAGNOSIS — N18.30 STAGE 3 CHRONIC KIDNEY DISEASE, UNSPECIFIED WHETHER STAGE 3A OR 3B CKD: ICD-10-CM

## 2024-12-27 LAB
ALBUMIN SERPL-MCNC: 4.1 G/DL (ref 3.5–5.2)
ANION GAP SERPL CALCULATED.3IONS-SCNC: 8.7 MMOL/L (ref 5–15)
BACTERIA UR QL AUTO: NORMAL /HPF
BILIRUB UR QL STRIP: NEGATIVE
BUN SERPL-MCNC: 46 MG/DL (ref 8–23)
BUN/CREAT SERPL: 32.6 (ref 7–25)
CALCIUM SPEC-SCNC: 9.4 MG/DL (ref 8.6–10.5)
CHLORIDE SERPL-SCNC: 97 MMOL/L (ref 98–107)
CLARITY UR: CLEAR
CO2 SERPL-SCNC: 33.3 MMOL/L (ref 22–29)
COLOR UR: YELLOW
CREAT SERPL-MCNC: 1.41 MG/DL (ref 0.57–1)
CREAT UR-MCNC: 64.4 MG/DL
DEPRECATED RDW RBC AUTO: 43.6 FL (ref 37–54)
EGFRCR SERPLBLD CKD-EPI 2021: 39.7 ML/MIN/1.73
ERYTHROCYTE [DISTWIDTH] IN BLOOD BY AUTOMATED COUNT: 13.5 % (ref 12.3–15.4)
GLUCOSE SERPL-MCNC: 117 MG/DL (ref 65–99)
GLUCOSE UR STRIP-MCNC: NEGATIVE MG/DL
HCT VFR BLD AUTO: 31 % (ref 34–46.6)
HGB BLD-MCNC: 9.2 G/DL (ref 12–15.9)
HGB UR QL STRIP.AUTO: ABNORMAL
HYALINE CASTS UR QL AUTO: NORMAL /LPF
KETONES UR QL STRIP: NEGATIVE
LEUKOCYTE ESTERASE UR QL STRIP.AUTO: NEGATIVE
MCH RBC QN AUTO: 25.9 PG (ref 26.6–33)
MCHC RBC AUTO-ENTMCNC: 29.7 G/DL (ref 31.5–35.7)
MCV RBC AUTO: 87.3 FL (ref 79–97)
NITRITE UR QL STRIP: NEGATIVE
PH UR STRIP.AUTO: 6 [PH] (ref 5–8)
PHOSPHATE SERPL-MCNC: 4.3 MG/DL (ref 2.5–4.5)
PLATELET # BLD AUTO: 183 10*3/MM3 (ref 140–450)
PMV BLD AUTO: 10.4 FL (ref 6–12)
POTASSIUM SERPL-SCNC: 4.3 MMOL/L (ref 3.5–5.2)
PROT ?TM UR-MCNC: 15.3 MG/DL
PROT UR QL STRIP: NEGATIVE
PROT/CREAT UR: 237.6 MG/G CREA (ref 0–200)
RBC # BLD AUTO: 3.55 10*6/MM3 (ref 3.77–5.28)
RBC # UR STRIP: NORMAL /HPF
REF LAB TEST METHOD: NORMAL
SODIUM SERPL-SCNC: 139 MMOL/L (ref 136–145)
SP GR UR STRIP: 1.01 (ref 1–1.03)
SQUAMOUS #/AREA URNS HPF: NORMAL /HPF
UROBILINOGEN UR QL STRIP: ABNORMAL
WBC # UR STRIP: NORMAL /HPF
WBC NRBC COR # BLD AUTO: 5.62 10*3/MM3 (ref 3.4–10.8)

## 2024-12-27 PROCEDURE — 82570 ASSAY OF URINE CREATININE: CPT

## 2024-12-27 PROCEDURE — 76775 US EXAM ABDO BACK WALL LIM: CPT

## 2024-12-27 PROCEDURE — 96372 THER/PROPH/DIAG INJ SC/IM: CPT

## 2024-12-27 PROCEDURE — 25010000002 EPOETIN ALFA PER 1000 UNITS: Performed by: INTERNAL MEDICINE

## 2024-12-27 PROCEDURE — 85027 COMPLETE CBC AUTOMATED: CPT | Performed by: INTERNAL MEDICINE

## 2024-12-27 PROCEDURE — 81001 URINALYSIS AUTO W/SCOPE: CPT

## 2024-12-27 PROCEDURE — 84156 ASSAY OF PROTEIN URINE: CPT

## 2024-12-27 PROCEDURE — 80069 RENAL FUNCTION PANEL: CPT | Performed by: INTERNAL MEDICINE

## 2024-12-27 RX ADMIN — ERYTHROPOIETIN 40000 UNITS: 40000 INJECTION, SOLUTION INTRAVENOUS; SUBCUTANEOUS at 10:55

## 2025-01-16 ENCOUNTER — SPECIALTY PHARMACY (OUTPATIENT)
Age: 75
End: 2025-01-16
Payer: MEDICARE

## 2025-01-27 ENCOUNTER — TELEPHONE (OUTPATIENT)
Dept: CARDIOLOGY | Facility: CLINIC | Age: 75
End: 2025-01-27
Payer: MEDICARE

## 2025-01-27 NOTE — TELEPHONE ENCOUNTER
Caller: Vanna Rincon    Relationship to patient: Self    Best call back number: [phone]     Chief complaint: FLUCTUATING BLOOD PRESSURE    Type of visit: F/U APPT    Requested date: NEXT AVAILABLE IN [place]     If rescheduling, when is the original appointment: 1-27-25     Additional notes:PLEASE CONTACT PATIENT WITH A SUITABLE DATE.

## 2025-02-06 ENCOUNTER — TELEPHONE (OUTPATIENT)
Age: 75
End: 2025-02-06
Payer: MEDICARE

## 2025-02-06 NOTE — TELEPHONE ENCOUNTER
Provider: SPENCER    Caller: Vanna Rincon    Relationship to Patient: Self    Phone Number: [phone]     Reason for Call: PATIENT IS WANTING TO KNOW IF PAPERWORK FOR [facility] FOR [facility] WAS COMPLETED AND SENT IN. SHE IS WANTING SOMEONE TO CALL HER AND GIVE HER AN UPDATE ON THIS SINCE DR. WOODS IS NO LONGER IN OFFICE. [employer] GAVE HER A FAX NUNMBER -[phone] TO HAVE PRIOR AUTHORIZATION SENT IN. PATIENT IS WORRIED ABOUT IT SINCE SHE HAS HEARD ANYTHING BACK ABOUT IT AND SHE CAN NOT AFFORD THE MEDICATION WITHOUT THE ASSISTANCE. PLEASE CALL PATIENT TO ADVISE

## 2025-02-07 ENCOUNTER — HOSPITAL ENCOUNTER (OUTPATIENT)
Facility: HOSPITAL | Age: 75
Discharge: HOME | End: 2025-02-07
Payer: MEDICARE

## 2025-02-07 VITALS
DIASTOLIC BLOOD PRESSURE: 57 MMHG | SYSTOLIC BLOOD PRESSURE: 131 MMHG | TEMPERATURE: 97.9 F | OXYGEN SATURATION: 100 % | RESPIRATION RATE: 18 BRPM | HEART RATE: 75 BPM

## 2025-02-07 DIAGNOSIS — N18.30 STAGE 3 CHRONIC KIDNEY DISEASE, UNSPECIFIED WHETHER STAGE 3A OR 3B CKD: ICD-10-CM

## 2025-02-07 DIAGNOSIS — D50.9 IRON DEFICIENCY ANEMIA, UNSPECIFIED IRON DEFICIENCY ANEMIA TYPE: Primary | ICD-10-CM

## 2025-02-07 LAB
ALBUMIN SERPL-MCNC: 4.1 G/DL (ref 3.5–5.2)
ALBUMIN/GLOB SERPL: 1.4 G/DL
ALP SERPL-CCNC: 88 U/L (ref 39–117)
ALT SERPL W P-5'-P-CCNC: 6 U/L (ref 1–33)
ANION GAP SERPL CALCULATED.3IONS-SCNC: 9.7 MMOL/L (ref 5–15)
AST SERPL-CCNC: 16 U/L (ref 1–32)
BILIRUB SERPL-MCNC: 0.3 MG/DL (ref 0–1.2)
BUN SERPL-MCNC: 25 MG/DL (ref 8–23)
BUN/CREAT SERPL: 24 (ref 7–25)
CALCIUM SPEC-SCNC: 9.4 MG/DL (ref 8.6–10.5)
CHLORIDE SERPL-SCNC: 96 MMOL/L (ref 98–107)
CO2 SERPL-SCNC: 34.3 MMOL/L (ref 22–29)
CREAT SERPL-MCNC: 1.04 MG/DL (ref 0.57–1)
DEPRECATED RDW RBC AUTO: 41.5 FL (ref 37–54)
EGFRCR SERPLBLD CKD-EPI 2021: 57.2 ML/MIN/1.73
ERYTHROCYTE [DISTWIDTH] IN BLOOD BY AUTOMATED COUNT: 13.2 % (ref 12.3–15.4)
GLOBULIN UR ELPH-MCNC: 2.9 GM/DL
GLUCOSE SERPL-MCNC: 142 MG/DL (ref 65–99)
HCT VFR BLD AUTO: 30.9 % (ref 34–46.6)
HGB BLD-MCNC: 9.4 G/DL (ref 12–15.9)
IRON 24H UR-MRATE: 73 MCG/DL (ref 37–145)
IRON SATN MFR SERPL: 22 % (ref 20–50)
MCH RBC QN AUTO: 26.3 PG (ref 26.6–33)
MCHC RBC AUTO-ENTMCNC: 30.4 G/DL (ref 31.5–35.7)
MCV RBC AUTO: 86.6 FL (ref 79–97)
PHOSPHATE SERPL-MCNC: 3.9 MG/DL (ref 2.5–4.5)
PLATELET # BLD AUTO: 183 10*3/MM3 (ref 140–450)
PMV BLD AUTO: 10.4 FL (ref 6–12)
POTASSIUM SERPL-SCNC: 4.1 MMOL/L (ref 3.5–5.2)
PROT SERPL-MCNC: 7 G/DL (ref 6–8.5)
RBC # BLD AUTO: 3.57 10*6/MM3 (ref 3.77–5.28)
SODIUM SERPL-SCNC: 140 MMOL/L (ref 136–145)
TIBC SERPL-MCNC: 335 MCG/DL (ref 298–536)
TRANSFERRIN SERPL-MCNC: 225 MG/DL (ref 200–360)
WBC NRBC COR # BLD AUTO: 5.31 10*3/MM3 (ref 3.4–10.8)

## 2025-02-07 PROCEDURE — 36415 COLL VENOUS BLD VENIPUNCTURE: CPT

## 2025-02-07 PROCEDURE — 84100 ASSAY OF PHOSPHORUS: CPT

## 2025-02-07 PROCEDURE — 83540 ASSAY OF IRON: CPT

## 2025-02-07 PROCEDURE — 96372 THER/PROPH/DIAG INJ SC/IM: CPT

## 2025-02-07 PROCEDURE — 25010000002 EPOETIN ALFA PER 1000 UNITS

## 2025-02-07 PROCEDURE — 85027 COMPLETE CBC AUTOMATED: CPT

## 2025-02-07 PROCEDURE — 80053 COMPREHEN METABOLIC PANEL: CPT

## 2025-02-07 PROCEDURE — 84466 ASSAY OF TRANSFERRIN: CPT

## 2025-02-07 RX ADMIN — ERYTHROPOIETIN 40000 UNITS: 40000 INJECTION, SOLUTION INTRAVENOUS; SUBCUTANEOUS at 15:49

## 2025-02-10 ENCOUNTER — TELEPHONE (OUTPATIENT)
Age: 75
End: 2025-02-10
Payer: MEDICARE

## 2025-02-10 NOTE — TELEPHONE ENCOUNTER
Caller: Vanna Rincon    Relationship: Self    Best call back number: [phone]     What is the best time to reach you: ANY    Who are you requesting to speak with (clinical staff, provider,  specific staff member): FRONT OFFICE       What was the call regarding: PATIENT WOULD LIKE TO HAVE APPOINTMENT ON 6/10 WITH [facility] AP[facility]MENT PLEASE CONTACT PATIENT AND ADVISE. PATIENT ALSO REQUESTS DR. NOLAN BE DR MOVING FORWARD AS DR WOODS HAS LEFT.

## 2025-03-06 ENCOUNTER — TELEPHONE (OUTPATIENT)
Dept: UROLOGY | Facility: CLINIC | Age: 75
End: 2025-03-06

## 2025-03-06 NOTE — TELEPHONE ENCOUNTER
Caller: Vanna Rincon    Relationship:  Self    Best call back number: [phone]    PATIENT CALLED REQUESTING TO CANCEL SAME DAY APPT.    Did the patient call AFTER the start time of their scheduled appointment?  []YES  [x]NO    Was the patient's appointment rescheduled? [x]YES  []NO    Any additional information: NOT FEELING WELL.

## 2025-03-13 ENCOUNTER — TRANSCRIBE ORDERS (OUTPATIENT)
Dept: LAB | Facility: HOSPITAL | Age: 75
End: 2025-03-13
Payer: MEDICARE

## 2025-03-13 DIAGNOSIS — N18.9 CHRONIC KIDNEY DISEASE, UNSPECIFIED CKD STAGE: ICD-10-CM

## 2025-03-13 DIAGNOSIS — I12.9 HYPERTENSIVE NEPHROPATHY: ICD-10-CM

## 2025-03-13 DIAGNOSIS — K21.9 CHALASIA OF LOWER ESOPHAGEAL SPHINCTER: ICD-10-CM

## 2025-03-13 DIAGNOSIS — N18.31 CHRONIC KIDNEY DISEASE (CKD) STAGE G3A/A1, MODERATELY DECREASED GLOMERULAR FILTRATION RATE (GFR) BETWEEN 45-59 ML/MIN/1.73 SQUARE METER AND ALBUMINURIA CREATININE RATIO LESS THAN 30 MG/G (CMS/H*: Primary | ICD-10-CM

## 2025-03-13 DIAGNOSIS — J96.11 CHRONIC HYPOXEMIC RESPIRATORY FAILURE: ICD-10-CM

## 2025-03-13 DIAGNOSIS — D50.9 IRON DEFICIENCY ANEMIA, UNSPECIFIED IRON DEFICIENCY ANEMIA TYPE: ICD-10-CM

## 2025-03-13 DIAGNOSIS — Z99.81 DEPENDENCE ON SUPPLEMENTAL OXYGEN: ICD-10-CM

## 2025-03-13 DIAGNOSIS — I25.10 DISEASE OF CARDIOVASCULAR SYSTEM: ICD-10-CM

## 2025-03-13 DIAGNOSIS — M06.89 OTHER SPECIFIED RHEUMATOID ARTHRITIS, MULTIPLE SITES: ICD-10-CM

## 2025-03-13 DIAGNOSIS — E21.1 SECONDARY HYPERPARATHYROIDISM, NON-RENAL: ICD-10-CM

## 2025-03-13 DIAGNOSIS — J44.9 VANISHING LUNG: ICD-10-CM

## 2025-03-13 DIAGNOSIS — D63.1 ERYTHROPOIETIN DEFICIENCY ANEMIA: ICD-10-CM

## 2025-03-13 DIAGNOSIS — E55.9 AVITAMINOSIS D: ICD-10-CM

## 2025-03-13 DIAGNOSIS — G89.4 CHRONIC PAIN SYNDROME: ICD-10-CM

## 2025-03-14 ENCOUNTER — LAB (OUTPATIENT)
Dept: LAB | Facility: HOSPITAL | Age: 75
End: 2025-03-14
Payer: MEDICARE

## 2025-03-14 ENCOUNTER — OFFICE VISIT (OUTPATIENT)
Dept: UROLOGY | Facility: CLINIC | Age: 75
End: 2025-03-14
Payer: MEDICARE

## 2025-03-14 VITALS
HEIGHT: 66 IN | SYSTOLIC BLOOD PRESSURE: 130 MMHG | BODY MASS INDEX: 21.86 KG/M2 | HEART RATE: 91 BPM | WEIGHT: 136 LBS | OXYGEN SATURATION: 100 % | TEMPERATURE: 98.7 F | DIASTOLIC BLOOD PRESSURE: 50 MMHG

## 2025-03-14 DIAGNOSIS — D50.9 IRON DEFICIENCY ANEMIA, UNSPECIFIED IRON DEFICIENCY ANEMIA TYPE: ICD-10-CM

## 2025-03-14 DIAGNOSIS — E21.1 SECONDARY HYPERPARATHYROIDISM, NON-RENAL: ICD-10-CM

## 2025-03-14 DIAGNOSIS — J96.11 CHRONIC HYPOXEMIC RESPIRATORY FAILURE: ICD-10-CM

## 2025-03-14 DIAGNOSIS — D63.1 ERYTHROPOIETIN DEFICIENCY ANEMIA: ICD-10-CM

## 2025-03-14 DIAGNOSIS — J44.9 VANISHING LUNG: ICD-10-CM

## 2025-03-14 DIAGNOSIS — N32.81 OVERACTIVE BLADDER: ICD-10-CM

## 2025-03-14 DIAGNOSIS — I12.9 HYPERTENSIVE NEPHROPATHY: ICD-10-CM

## 2025-03-14 DIAGNOSIS — M06.89 OTHER SPECIFIED RHEUMATOID ARTHRITIS, MULTIPLE SITES: ICD-10-CM

## 2025-03-14 DIAGNOSIS — N18.9 CHRONIC KIDNEY DISEASE, UNSPECIFIED CKD STAGE: ICD-10-CM

## 2025-03-14 DIAGNOSIS — Z99.81 DEPENDENCE ON SUPPLEMENTAL OXYGEN: ICD-10-CM

## 2025-03-14 DIAGNOSIS — N95.8 GENITOURINARY SYNDROME OF MENOPAUSE: ICD-10-CM

## 2025-03-14 DIAGNOSIS — N18.31 CHRONIC KIDNEY DISEASE (CKD) STAGE G3A/A1, MODERATELY DECREASED GLOMERULAR FILTRATION RATE (GFR) BETWEEN 45-59 ML/MIN/1.73 SQUARE METER AND ALBUMINURIA CREATININE RATIO LESS THAN 30 MG/G (CMS/H*: ICD-10-CM

## 2025-03-14 DIAGNOSIS — G89.4 CHRONIC PAIN SYNDROME: ICD-10-CM

## 2025-03-14 DIAGNOSIS — K21.9 CHALASIA OF LOWER ESOPHAGEAL SPHINCTER: ICD-10-CM

## 2025-03-14 DIAGNOSIS — I25.10 DISEASE OF CARDIOVASCULAR SYSTEM: ICD-10-CM

## 2025-03-14 DIAGNOSIS — E55.9 AVITAMINOSIS D: ICD-10-CM

## 2025-03-14 DIAGNOSIS — N39.41 URGE INCONTINENCE OF URINE: Primary | ICD-10-CM

## 2025-03-14 LAB
BILIRUB BLD-MCNC: NEGATIVE MG/DL
CLARITY, POC: CLEAR
COLOR UR: YELLOW
DEPRECATED RDW RBC AUTO: 38.7 FL (ref 37–54)
ERYTHROCYTE [DISTWIDTH] IN BLOOD BY AUTOMATED COUNT: 12.9 % (ref 12.3–15.4)
EXPIRATION DATE: ABNORMAL
GLUCOSE UR STRIP-MCNC: NEGATIVE MG/DL
HCT VFR BLD AUTO: 27.6 % (ref 34–46.6)
HGB BLD-MCNC: 8.5 G/DL (ref 12–15.9)
KETONES UR QL: NEGATIVE
LEUKOCYTE EST, POC: NEGATIVE
Lab: ABNORMAL
MCH RBC QN AUTO: 25.5 PG (ref 26.6–33)
MCHC RBC AUTO-ENTMCNC: 30.8 G/DL (ref 31.5–35.7)
MCV RBC AUTO: 82.9 FL (ref 79–97)
NITRITE UR-MCNC: NEGATIVE MG/ML
PH UR: 5.5 [PH] (ref 5–8)
PLATELET # BLD AUTO: 182 10*3/MM3 (ref 140–450)
PMV BLD AUTO: 11 FL (ref 6–12)
PROT UR STRIP-MCNC: ABNORMAL MG/DL
RBC # BLD AUTO: 3.33 10*6/MM3 (ref 3.77–5.28)
RBC # UR STRIP: ABNORMAL /UL
SP GR UR: 1.02 (ref 1–1.03)
UROBILINOGEN UR QL: ABNORMAL
WBC NRBC COR # BLD AUTO: 5.91 10*3/MM3 (ref 3.4–10.8)

## 2025-03-14 PROCEDURE — 85027 COMPLETE CBC AUTOMATED: CPT

## 2025-03-14 PROCEDURE — 36415 COLL VENOUS BLD VENIPUNCTURE: CPT

## 2025-03-14 PROCEDURE — 80069 RENAL FUNCTION PANEL: CPT

## 2025-03-14 RX ORDER — ESTRADIOL 0.1 MG/G
1 CREAM VAGINAL 3 TIMES WEEKLY
Qty: 42.5 G | Refills: 12 | Status: SHIPPED | OUTPATIENT
Start: 2025-03-14

## 2025-03-14 RX ORDER — LACTULOSE 10 G/15ML
30 SOLUTION ORAL; RECTAL
COMMUNITY
Start: 2025-03-05

## 2025-03-14 RX ORDER — VIBEGRON 75 MG/1
75 TABLET, FILM COATED ORAL DAILY
Qty: 30 TABLET | Refills: 11 | Status: SHIPPED | OUTPATIENT
Start: 2025-03-14

## 2025-03-14 NOTE — PROGRESS NOTES
Office Visit     Patient: Vanna Rincon 74 y.o. female   : 1950   MRN: [id number]      Patient or patient representative verbalized consent for the use of Ambient Listening during the visit with  JENNY Asencio for chart documentation. 3/16/2025  14:49 EDT     Chief Complaint   Patient presents with    Establish Care    Urinary Frequency     Around 12 times per day    Urinary Incontinence     Urge incontinence     Referring Provider: Jose Daniel Marquez, *    Primary Care Provider: Farhad Chowdary MD     History of Present Illness  The patient presents for evaluation of urinary incontinence accompanied by her .    Urinary Incontinence  - Reports urinary urgency, necessitating immediate urination, and subsequent leakage.  - Uses one pad daily and occasionally changes clothes.  - Experiences nocturia (1-2 times/night) and daytime frequency (every hour).  - Fluid restriction has been ineffective.  - No straining or sensation of incomplete bladder emptying.  - Her gastroenterologist informed her that her bladder does not fully empty.  - No pharmacological treatment or surgical interventions (e.g., vaginal sling, bladder sling, bladder tuck).  - Stress incontinence occurs with laughter, coughing, or sneezing only if she has an urge to urinate.  - No recurrent UTIs.  - Consumes Pepsi and water equally, with daily intake varying between half to a full bottle.  - Reports large urine volumes.  - Occasionally relieves urgency by remaining still, but reports leakage when standing and walking to the bathroom.  - No symptoms of vaginal prolapse or pressure.  - Agreeable to using vaginal cream.    Constipation  - Reports constipation despite lactulose, which she finds unpalatable and ineffective.  - No fecal incontinence.          Subjective   Review of System:   As noted in HPI.    Past Medical History:   Diagnosis Date    Abnormal mammogram     Acute UTI     Allergic rhinitis     Anemia      "Anxiety     Arthritis     knee, right    Asthmatic bronchitis     Back pain     Candida vaginitis     Cataracts, bilateral     CHF (congestive heart failure)     not diagnosed    Chronic lung disease     bronchiolitis obliterans. - Ct scan stable with pulmonary intolerant to pft    Chronic pain syndrome     disc disease, lumbar, neck    Colon polyps     Community acquired pneumonia     Conjunctivitis     COPD (chronic obstructive pulmonary disease)     Emphysema with severe physiology on PFTs.  Patient is a former smoker.    COPD (chronic obstructive pulmonary disease)     4L of oxygen    Cough     DDD (degenerative disc disease), lumbar     Deafness     Dependence on supplemental oxygen     PT REPORTS \"4L ALL TIMES\"    Disc disease, degenerative, cervical     Dry eye     Former smoker     Gastric bleed     Genitourinary syndrome of menopause     GERD (gastroesophageal reflux disease)     Glaucoma     H/O chest x-ray 08/11/2015    Rase base opacity consistent with pneumonia or atelectasis    H/O chest x-ray 06/24/2015    Right basilar airspace disease and effusion consistent with a pneumonia. F/U to radiographic reolution is recommended    H/O chest x-ray 03/09/2010    Cardiac silhoutte is not enlarged. Lungs aare inflated. Mild nonspecifiec interstitial changes. No pleual effusions or dense consolidations. Scattered granulomatous changes. Spine with mild kyphosis but no osteopenia. No significant adenopathy    H/O echocardiogram 03/16/2010    Normal study    Herpes zoster     History of PFTs 12/13/2011    Goo pt cooperation and effort. Pt given 3 puffs of xopenex. PFT is acceptable and reproducible    History of PFTs 08/11/2011    Pt unable to produce acceptabel and reproducible PFT. Pt was given 3 puffs of xopenex. Pt gave good effort Best pleth of two attempts    History of PFTs 02/24/2011    PFT acceptable and reproducible. Pt gave good effort. Pt used bronchodilator less than 2 hours prior to testing    " Hyperlipidemia     Hypertension     IBS (irritable bowel syndrome)     Insomnia     Interstitial lung disease     History of rheumatoid lung with bronchiolitis obliterans    Kidney stones     x 1    Lumbar herniated disc     Nephrolithiasis     2007, passed    Ocular migraine     On home oxygen therapy     REPORTS USES AT 4L NC AT ALL TIMES    Overactive bladder     Panic attack     Peripheral edema     Pneumonia     PRB (rectal bleeding)     RA (rheumatoid arthritis)     Renal insufficiency     Rheumatoid arthritis     · A.  Diagnosed in 2001 with history of methotrexate and Enbrel therapy.    Rheumatoid lung     bronchiolitis obliterans    Sciatica     Sinus problem     Thrombophlebitis     Tinnitus     Urge incontinence     Vertigo      Past Surgical History:   Procedure Laterality Date    CATARACT EXTRACTION Right     COLONOSCOPY  2012    COLONOSCOPY N/A 01/29/2018    Procedure: COLONOSCOPY WITH COLD SNARE POLYPECTOMY X 6; SUBMUCOSAL INJECTION OF SALINE; HOT SNARE POLYPECTOMY X 4; CLIP PLACEMENT X 1;  Surgeon: Kenney Pastrana MD;  Location: [facility] ENDOSCOPY;  Service:     DENTAL PROCEDURE      ENDOSCOPY N/A 12/15/2017    Procedure: ESOPHAGOGASTRODUODENOSCOPY with biopsies and esophageal balloon dilitation;  Surgeon: Kenney Pastrana MD;  Location: [facility] ENDOSCOPY;  Service:     MOUTH SURGERY      REPORTS IMPLANT X2    ORAL ANTRAL FISTULA CLOSURE      gums    TONSILLECTOMY      TUBAL ABDOMINAL LIGATION      UPPER GASTROINTESTINAL ENDOSCOPY  2012    UPPER GASTROINTESTINAL ENDOSCOPY  12/15/2017     Family History   Problem Relation Age of Onset    Glaucoma Mother     Coronary artery disease Mother     Liver cancer Mother         bile duct cancer    Heart disease Mother     Cancer Mother     Hypertension Mother     Stroke Father     Stomach cancer Father     Cancer Father     Diabetes Sister     Other (systemic lupus erythematosus) Sister     Heart disease Maternal Grandmother     Diabetes Maternal Grandmother      Diabetes Paternal Grandmother     Colon cancer Neg Hx     Ulcerative colitis Neg Hx      Social History     Socioeconomic History    Marital status:     Number of children: 0   Tobacco Use    Smoking status: Former     Current packs/day: 0.00     Average packs/day: 1 pack/day for 28.0 years (28.0 ttl pk-yrs)     Types: Cigarettes     Start date:      Quit date:      Years since quittin.2    Smokeless tobacco: Never   Vaping Use    Vaping status: Never Used   Substance and Sexual Activity    Alcohol use: No    Drug use: No    Sexual activity: Defer     Comment:        Current Outpatient Medications:     amLODIPine (NORVASC) 5 MG tablet, Take 1 tablet by mouth As Needed., Disp: , Rfl:     budesonide (RINOCORT AQUA) 32 MCG/ACT nasal spray, Instill 2 sprays into each nostril once daily, Disp: 8.43 mL, Rfl: 5    budesonide-formoterol (Symbicort) 160-4.5 MCG/ACT inhaler, Inhale 2 puffs 2 (Two) Times a Day., Disp: 3 each, Rfl: 3    epoetin chidi (Procrit) 2000 UNIT/ML injection, Inject  under the skin into the appropriate area as directed Every 6 (Six) Weeks., Disp: , Rfl:     etanercept (ENBREL) 50 MG/ML solution prefilled syringe injection, Inject 1 mL under the skin into the appropriate area as directed Every 7 (Seven) Days., Disp: 12 mL, Rfl: 0    fentaNYL (DURAGESIC) 75 MCG/HR patch, Place 1 patch on the skin as directed by provider Every 72 (Seventy-Two) Hours., Disp: , Rfl:     fluconazole (Diflucan) 100 MG tablet, Take 1 tablet by mouth Daily., Disp: 3 tablet, Rfl: 0    HYDROcodone-acetaminophen (NORCO) 5-325 MG per tablet, Take 1 tablet by mouth Every 6 (Six) Hours As Needed., Disp: , Rfl:     lactulose (CHRONULAC) 10 GM/15ML solution solution (encephalopathy), Take 45 mL by mouth., Disp: , Rfl:     levalbuterol (XOPENEX HFA) 45 MCG/ACT inhaler, Inhale 2 puffs 4 (Four) Times a Day As Needed for Wheezing., Disp: 15 g, Rfl: 5    levalbuterol (XOPENEX) 0.31 MG/3ML nebulizer solution, , Disp:  ", Rfl:     LORazepam (ATIVAN) 0.5 MG tablet, Take 1 tablet by mouth Daily As Needed for Anxiety., Disp: 30 tablet, Rfl: 1    Multiple Vitamins-Minerals (CENTRUM ADULTS PO), Take 1 tablet by mouth Daily., Disp: , Rfl:     O2 (OXYGEN), Inhale 6 L/min Continuous., Disp: , Rfl:     ondansetron (ZOFRAN) 8 MG tablet, Take  by mouth Every 8 (Eight) Hours As Needed for Nausea or Vomiting., Disp: , Rfl:     promethazine-codeine (PHENERGAN with CODEINE) 6.25-10 MG/5ML syrup, Take 5 mL by mouth every 6 hours as needed. Max daily amount of 20 ML, Disp: 473 mL, Rfl: 0    sertraline (ZOLOFT) 100 MG tablet, Take 1 tablet by mouth Daily., Disp: 90 tablet, Rfl: 3    simethicone (MYLICON) 125 MG chewable tablet, Chew 1 tablet Every 6 (Six) Hours As Needed for Flatulence., Disp: , Rfl:     valsartan-hydrochlorothiazide (DIOVAN-HCT) 320-25 MG per tablet, Take 0.5 tablets by mouth Daily., Disp: 45 tablet, Rfl: 3    estradiol (ESTRACE) 0.1 MG/GM vaginal cream, Insert 1 g into the vagina 3 (Three) Times a Week. Apply pea sized amount periurethral and perivaginally 3 times a week at bedtime., Disp: 42.5 g, Rfl: 12    Vibegron (Gemtesa) 75 MG tablet, Take 1 tablet by mouth Daily., Disp: 30 tablet, Rfl: 11    Allergies   Allergen Reactions    Iodinated Contrast Media Other (See Comments)     Kidney disease    Feraheme [Ferumoxytol] Palpitations and Other (See Comments)     hypertension    Brimonidine Tartrate Nausea And Vomiting    Ciprofloxacin Other (See Comments)     \"Hurt all over\"    Doxycycline Other (See Comments)     \"makes other medication stay in my system longer\"    Lipitor [Atorvastatin] Other (See Comments)     Hurt all over      Nsaids Other (See Comments)     Pt states she has stage three kidney disease    Sulfa Antibiotics Other (See Comments)     \"cant remember\"     Objective   Visit Vitals  /50 (BP Location: Right arm, Patient Position: Sitting, Cuff Size: Adult)   Pulse 91   Temp 98.7 °F (37.1 °C) (Infrared)   Ht " "166.4 cm (65.5\") Comment: pt reported   Wt 61.7 kg (136 lb) Comment: pt reported   LMP  (LMP Unknown)   SpO2 100%   BMI 22.29 kg/m²        Body mass index is 22.29 kg/m².     Physical Exam  Vitals and nursing note reviewed.   Constitutional:       General: She is awake. She is not in acute distress.     Comments: Looks unwell and frail, chronically ill  Accompanied by spouse   Pulmonary:      Effort: Pulmonary effort is normal.      Comments: Patient wearing 2 nasal cannulas  Neurological:      Mental Status: She is alert and oriented to person, place, and time. Mental status is at baseline.   Psychiatric:         Mood and Affect: Mood normal.         Behavior: Behavior is cooperative.          Labs  Lab Results   Component Value Date    COLORU Yellow 03/14/2025    CLARITYU Clear 03/14/2025    SPECGRAV 1.020 03/14/2025    PHUR 5.5 03/14/2025    LEUKOCYTESUR Negative 03/14/2025    NITRITE Negative 03/14/2025    PROTEINPOCUA Trace (A) 03/14/2025    GLUCOSEUR Negative 03/14/2025    KETONESU Negative 03/14/2025    UROBILINOGEN 0.2 E.U./dL 03/14/2025    BILIRUBINUR Negative 03/14/2025    RBCUR Moderate (A) 03/14/2025      Lab Results   Component Value Date    WBCUA None Seen 12/27/2024    WBCUA 0-2 01/22/2024    RBCUA 0-2 12/27/2024    RBCUA 3-5 (A) 01/22/2024    BACTERIA None Seen 12/27/2024    BACTERIA None Seen 01/22/2024    HYALCASTU None Seen 12/27/2024    HYALCASTU 3-6 01/22/2024    SQUAMEPIUA None Seen 12/27/2024    SQUAMEPIUA 3-6 (A) 01/22/2024        Lab Results   Component Value Date    WBC 5.91 03/14/2025    HGB 8.5 (L) 03/14/2025    HCT 27.6 (L) 03/14/2025    MCV 82.9 03/14/2025     03/14/2025     Lab Results   Component Value Date    GLUCOSE 153 (H) 03/14/2025    CALCIUM 8.8 03/14/2025     03/14/2025    K 4.3 03/14/2025    CO2 33.0 (H) 03/14/2025    CL 97 (L) 03/14/2025    BUN 32 (H) 03/14/2025    BUN 25 (H) 02/07/2025    CREATININE 1.39 (H) 03/14/2025    CREATININE 1.04 (H) 02/07/2025    EGFR " "40.4 (L) 03/14/2025    EGFR 57.2 (L) 02/07/2025    BCR 23.0 03/14/2025    ANIONGAP 7.0 03/14/2025    ALT 6 02/07/2025    AST 16 02/07/2025       Lab Results   Component Value Date    HGBA1C 5.6 10/02/2015        No results found for: \"URICACIDSTN\", \"KAFB0VEOSFQ\", \"PKYX3KJGUU\", \"LABMAGN\", \"CAPHOSSTONE\", \"HYDROXYAPATI\"  Lab Results   Component Value Date    KDVX10MO 46.2 01/11/2023    PTH 41.4 02/01/2024    URICACID 5.4 05/03/2023     Lab Results   Component Value Date    LABPH 5.5 10/18/2016       No results found for: \"ATOPOBIUMV\", \"BVAB2\", \"MEGASPHAER\", \"CALBICANSN\", \"CGLABRATAN\", \"CPARAPSILOS\", \"CLUSITANIAE\", \"CKRUSEI\", \"TRICHVAG\", \"CHLAMNAA\", \"NGONORRHON\", \"UREAPLASMA\", \"MYCOPLASMAG\"    Lab Results   Component Value Date    FERRITIN 207.40 (H) 11/25/2020    TSH 1.980 05/03/2023    FREET4 1.51 10/18/2016    OJYSFRAN67 831 02/20/2018    GSGR28OH 46.2 01/11/2023         Radiographic Studies  US Renal Bilateral  Result Date: 12/27/2024  Unremarkable renal ultrasound.   This report was signed and finalized on 12/27/2024 1:23 PM by Angelito Sahu MD.        I have reviewed the above imaging.   I have reviewed the above labs.      PVR  Post-void residual performed with ultrasound by staff and interpreted by me - 0 mL    Assessment / Plan      Diagnoses and all orders for this visit:    1. Urge incontinence of urine (Primary)  -     Vibegron (Gemtesa) 75 MG tablet; Take 1 tablet by mouth Daily.  Dispense: 30 tablet; Refill: 11    2. Genitourinary syndrome of menopause  -     POC Urinalysis Dipstick, Automated  -     estradiol (ESTRACE) 0.1 MG/GM vaginal cream; Insert 1 g into the vagina 3 (Three) Times a Week. Apply pea sized amount periurethral and perivaginally 3 times a week at bedtime.  Dispense: 42.5 g; Refill: 12    3. Overactive bladder  -     Vibegron (Gemtesa) 75 MG tablet; Take 1 tablet by mouth Daily.  Dispense: 30 tablet; Refill: 11         Assessment & Plan  1. Urge incontinence: Frequent urination, nocturia, " and occasional leakage consistent with urge incontinence.  - - Urine sample showed hematuria, common post-menopause.  States she is currently being treated for vaginal yeast.  Will repeat UA upon follow up.   - PVR test: 0 mL.  - Increase water intake  - Reduce Pepsi, especially 3 hours before bedtime  - Prescribed Gemtesa 75 mg daily in the morning  - Informed about potential side effects  - Prescribed estrogen cream, apply nightly for 2 weeks, then 2-3 times per week  - Bring urine sample to next appointment.  Obtain prior to arrival as patient has issues with mobility.    2. Overactive bladder: Frequent urination and urgency consistent with overactive bladder.   - Myrbetriq ruled out due to high blood pressure with black box warning.   - Anticholinergics contraindicated due to constipation and hx of glaucoma  - Prescribed Gemtesa 75 mg  - Discussed bladder Botox injections every 6 months (6% risk of urinary retention requiring catheterization)  - InterStim implantable device deemed unsuitable due to general anesthesia requirement    3. Constipation: Severe constipation likely exacerbated by medications.  - Increase water intake    4. Genitourinary syndrome of menopause: Symptoms consistent with genitourinary syndrome of menopause, including urinary urgency and dryness.  - Prescribed estrogen cream, apply as instructed    Follow-up in 4 to 6 weeks.  Patient will bring urine specimen from home and was given supplies to obtain clean catch.       Return for f/u with Chastity, PVR, UA.    Yvonne Regalado, MSN, APRN, FNP-C  [facility]

## 2025-03-15 LAB
ALBUMIN SERPL-MCNC: 3.8 G/DL (ref 3.5–5.2)
ANION GAP SERPL CALCULATED.3IONS-SCNC: 7 MMOL/L (ref 5–15)
BUN SERPL-MCNC: 32 MG/DL (ref 8–23)
BUN/CREAT SERPL: 23 (ref 7–25)
CALCIUM SPEC-SCNC: 8.8 MG/DL (ref 8.6–10.5)
CHLORIDE SERPL-SCNC: 97 MMOL/L (ref 98–107)
CO2 SERPL-SCNC: 33 MMOL/L (ref 22–29)
CREAT SERPL-MCNC: 1.39 MG/DL (ref 0.57–1)
EGFRCR SERPLBLD CKD-EPI 2021: 40.4 ML/MIN/1.73
GLUCOSE SERPL-MCNC: 153 MG/DL (ref 65–99)
PHOSPHATE SERPL-MCNC: 3.1 MG/DL (ref 2.5–4.5)
POTASSIUM SERPL-SCNC: 4.3 MMOL/L (ref 3.5–5.2)
SODIUM SERPL-SCNC: 137 MMOL/L (ref 136–145)

## 2025-03-16 PROBLEM — N95.8 GENITOURINARY SYNDROME OF MENOPAUSE: Status: ACTIVE | Noted: 2025-03-16

## 2025-03-16 PROBLEM — N39.41 URGE INCONTINENCE OF URINE: Status: ACTIVE | Noted: 2025-03-16

## 2025-03-16 PROBLEM — H40.211 ACUTE ANGLE-CLOSURE GLAUCOMA OF RIGHT EYE: Status: ACTIVE | Noted: 2022-04-07

## 2025-03-16 PROBLEM — H40.2213: Status: ACTIVE | Noted: 2022-04-29

## 2025-03-16 PROBLEM — N32.81 OVERACTIVE BLADDER: Status: ACTIVE | Noted: 2025-03-16

## 2025-03-24 ENCOUNTER — HOSPITAL ENCOUNTER (OUTPATIENT)
Facility: HOSPITAL | Age: 75
Discharge: HOME | End: 2025-03-24
Payer: MEDICARE

## 2025-03-24 VITALS — DIASTOLIC BLOOD PRESSURE: 66 MMHG | RESPIRATION RATE: 20 BRPM | SYSTOLIC BLOOD PRESSURE: 158 MMHG | HEART RATE: 83 BPM

## 2025-03-24 DIAGNOSIS — D50.9 IRON DEFICIENCY ANEMIA, UNSPECIFIED IRON DEFICIENCY ANEMIA TYPE: Primary | ICD-10-CM

## 2025-03-24 DIAGNOSIS — N18.30 STAGE 3 CHRONIC KIDNEY DISEASE, UNSPECIFIED WHETHER STAGE 3A OR 3B CKD: ICD-10-CM

## 2025-03-24 LAB
ALBUMIN SERPL-MCNC: 4.1 G/DL (ref 3.5–5.2)
ALBUMIN/GLOB SERPL: 1.6 G/DL
ALP SERPL-CCNC: 109 U/L (ref 39–117)
ALT SERPL W P-5'-P-CCNC: 6 U/L (ref 1–33)
ANION GAP SERPL CALCULATED.3IONS-SCNC: 8.4 MMOL/L (ref 5–15)
AST SERPL-CCNC: 18 U/L (ref 1–32)
BILIRUB SERPL-MCNC: 0.2 MG/DL (ref 0–1.2)
BUN SERPL-MCNC: 18 MG/DL (ref 8–23)
BUN/CREAT SERPL: 13.7 (ref 7–25)
CALCIUM SPEC-SCNC: 9.1 MG/DL (ref 8.6–10.5)
CHLORIDE SERPL-SCNC: 97 MMOL/L (ref 98–107)
CO2 SERPL-SCNC: 33.6 MMOL/L (ref 22–29)
CREAT SERPL-MCNC: 1.31 MG/DL (ref 0.57–1)
DEPRECATED RDW RBC AUTO: 42.9 FL (ref 37–54)
EGFRCR SERPLBLD CKD-EPI 2021: 43.4 ML/MIN/1.73
ERYTHROCYTE [DISTWIDTH] IN BLOOD BY AUTOMATED COUNT: 13.8 % (ref 12.3–15.4)
GLOBULIN UR ELPH-MCNC: 2.5 GM/DL
GLUCOSE SERPL-MCNC: 130 MG/DL (ref 65–99)
HCT VFR BLD AUTO: 29.9 % (ref 34–46.6)
HGB BLD-MCNC: 8.9 G/DL (ref 12–15.9)
MCH RBC QN AUTO: 25.6 PG (ref 26.6–33)
MCHC RBC AUTO-ENTMCNC: 29.8 G/DL (ref 31.5–35.7)
MCV RBC AUTO: 86.2 FL (ref 79–97)
PHOSPHATE SERPL-MCNC: 3.7 MG/DL (ref 2.5–4.5)
PLATELET # BLD AUTO: 151 10*3/MM3 (ref 140–450)
PMV BLD AUTO: 9.2 FL (ref 6–12)
POTASSIUM SERPL-SCNC: 4 MMOL/L (ref 3.5–5.2)
PROT SERPL-MCNC: 6.6 G/DL (ref 6–8.5)
RBC # BLD AUTO: 3.47 10*6/MM3 (ref 3.77–5.28)
SODIUM SERPL-SCNC: 139 MMOL/L (ref 136–145)
WBC NRBC COR # BLD AUTO: 5.18 10*3/MM3 (ref 3.4–10.8)

## 2025-03-24 PROCEDURE — 25010000002 EPOETIN ALFA PER 1000 UNITS

## 2025-03-24 PROCEDURE — 84100 ASSAY OF PHOSPHORUS: CPT

## 2025-03-24 PROCEDURE — 80053 COMPREHEN METABOLIC PANEL: CPT

## 2025-03-24 PROCEDURE — 96372 THER/PROPH/DIAG INJ SC/IM: CPT

## 2025-03-24 PROCEDURE — 85027 COMPLETE CBC AUTOMATED: CPT

## 2025-03-24 RX ADMIN — ERYTHROPOIETIN 40000 UNITS: 40000 INJECTION, SOLUTION INTRAVENOUS; SUBCUTANEOUS at 15:26

## 2025-03-31 ENCOUNTER — OFFICE VISIT (OUTPATIENT)
Dept: CARDIOLOGY | Facility: CLINIC | Age: 75
End: 2025-03-31
Payer: MEDICARE

## 2025-03-31 VITALS
BODY MASS INDEX: 21.86 KG/M2 | OXYGEN SATURATION: 99 % | HEART RATE: 75 BPM | WEIGHT: 136 LBS | HEIGHT: 66 IN | DIASTOLIC BLOOD PRESSURE: 80 MMHG | SYSTOLIC BLOOD PRESSURE: 154 MMHG

## 2025-03-31 DIAGNOSIS — E78.2 MIXED HYPERLIPIDEMIA: ICD-10-CM

## 2025-03-31 DIAGNOSIS — I10 PRIMARY HYPERTENSION: ICD-10-CM

## 2025-03-31 DIAGNOSIS — R07.1 CHEST PAIN ON BREATHING: Primary | ICD-10-CM

## 2025-03-31 PROCEDURE — 99214 OFFICE O/P EST MOD 30 MIN: CPT | Performed by: INTERNAL MEDICINE

## 2025-03-31 PROCEDURE — 3077F SYST BP >= 140 MM HG: CPT | Performed by: INTERNAL MEDICINE

## 2025-03-31 PROCEDURE — 3079F DIAST BP 80-89 MM HG: CPT | Performed by: INTERNAL MEDICINE

## 2025-03-31 RX ORDER — NITROGLYCERIN 0.4 MG/1
TABLET SUBLINGUAL
Qty: 100 TABLET | Refills: 11 | Status: SHIPPED | OUTPATIENT
Start: 2025-03-31

## 2025-03-31 NOTE — PROGRESS NOTES
"[facility] Cardiology  Office Progress Note  Vanna Rincon  1950  [address] [place] [address]       Visit Date: 03/31/25    PCP: Farhad Chowdary MD  [address]  [place] [address]    IDENTIFICATION: A 74 y.o. female from [place], [place]    PROBLEM LIST:   8/22 [facility] ER evaluation for shortness of breath, chest pain, with elevation in proBNP    8/22 echo EF .65% IR AV scler  HTN  HLD  10/18/16 lipids:  TG 95 HDL 67 and   COPD, on chronic O2 >4L DO NOT INTUBATE  Follows with Harjeet   11/17 echo EF 60%, rvsp 13  Rheumatoid lung disease  Follows with Dr. Lan   Stage III CKD 1.57 9/23  Secondary hyperparathyroidism  GERD  Chronic pain  Former tobacco abuse, cessation 1991  Rheumatoid arthritis  Hiatal hernia  Surgical Hx  Tubal ligation  Tonsillectomy      CC:   Chief Complaint   Patient presents with    Leg Swelling       Allergies  Allergies   Allergen Reactions    Iodinated Contrast Media Other (See Comments)     Kidney disease    Feraheme [Ferumoxytol] Palpitations and Other (See Comments)     hypertension    Brimonidine Tartrate Nausea And Vomiting    Ciprofloxacin Other (See Comments)     \"Hurt all over\"    Doxycycline Other (See Comments)     \"makes other medication stay in my system longer\"    Lipitor [Atorvastatin] Other (See Comments)     Hurt all over      Nsaids Other (See Comments)     Pt states she has stage three kidney disease    Sulfa Antibiotics Other (See Comments)     \"cant remember\"       Current Medications    Current Outpatient Medications:     amLODIPine (NORVASC) 5 MG tablet, Take 1 tablet by mouth As Needed., Disp: , Rfl:     budesonide (RINOCORT AQUA) 32 MCG/ACT nasal spray, Instill 2 sprays into each nostril once daily, Disp: 8.43 mL, Rfl: 5    budesonide-formoterol (Symbicort) 160-4.5 MCG/ACT inhaler, Inhale 2 puffs 2 (Two) Times a Day., Disp: 3 each, Rfl: 3    epoetin chidi (Procrit) 2000 UNIT/ML injection, Inject  under the skin " into the appropriate area as directed Every 6 (Six) Weeks., Disp: , Rfl:     estradiol (ESTRACE) 0.1 MG/GM vaginal cream, Insert 1 g into the vagina 3 (Three) Times a Week. Apply pea sized amount periurethral and perivaginally 3 times a week at bedtime., Disp: 42.5 g, Rfl: 12    etanercept (ENBREL) 50 MG/ML solution prefilled syringe injection, Inject 1 mL under the skin into the appropriate area as directed Every 7 (Seven) Days., Disp: 12 mL, Rfl: 0    fentaNYL (DURAGESIC) 75 MCG/HR patch, Place 1 patch on the skin as directed by provider Every 72 (Seventy-Two) Hours., Disp: , Rfl:     fluconazole (Diflucan) 100 MG tablet, Take 1 tablet by mouth Daily., Disp: 3 tablet, Rfl: 0    HYDROcodone-acetaminophen (NORCO) 5-325 MG per tablet, Take 1 tablet by mouth Every 6 (Six) Hours As Needed., Disp: , Rfl:     lactulose (CHRONULAC) 10 GM/15ML solution solution (encephalopathy), Take 45 mL by mouth., Disp: , Rfl:     levalbuterol (XOPENEX HFA) 45 MCG/ACT inhaler, Inhale 2 puffs 4 (Four) Times a Day As Needed for Wheezing., Disp: 15 g, Rfl: 5    levalbuterol (XOPENEX) 0.31 MG/3ML nebulizer solution, , Disp: , Rfl:     LORazepam (ATIVAN) 0.5 MG tablet, Take 1 tablet by mouth Daily As Needed for Anxiety., Disp: 30 tablet, Rfl: 1    Multiple Vitamins-Minerals (CENTRUM ADULTS PO), Take 1 tablet by mouth Daily., Disp: , Rfl:     O2 (OXYGEN), Inhale 6 L/min Continuous., Disp: , Rfl:     ondansetron (ZOFRAN) 8 MG tablet, Take  by mouth Every 8 (Eight) Hours As Needed for Nausea or Vomiting., Disp: , Rfl:     promethazine-codeine (PHENERGAN with CODEINE) 6.25-10 MG/5ML syrup, Take 5 mL by mouth every 6 hours as needed. Max daily amount of 20 ML, Disp: 473 mL, Rfl: 0    sertraline (ZOLOFT) 100 MG tablet, Take 1 tablet by mouth Daily., Disp: 90 tablet, Rfl: 3    simethicone (MYLICON) 125 MG chewable tablet, Chew 1 tablet Every 6 (Six) Hours As Needed for Flatulence., Disp: , Rfl:     valsartan-hydrochlorothiazide (DIOVAN-HCT) 320-25  "MG per tablet, Take 0.5 tablets by mouth Daily., Disp: 45 tablet, Rfl: 3    Vibegron (Gemtesa) 75 MG tablet, Take 1 tablet by mouth Daily., Disp: 30 tablet, Rfl: 11      History of Present Illness   Vanna Rincon is a 74 y.o. year old female here for follow up.  Patient has noted some left-sided chest discomfort.  She states there is no alleviating factor.  She has found that she has some gas symptoms with such.      OBJECTIVE:  Vitals:    03/31/25 0957   BP: 154/80   BP Location: Right arm   Patient Position: Sitting   Pulse: 75   SpO2: 99%   Weight: 61.7 kg (136 lb)   Height: 166.4 cm (65.5\")       Body mass index is 22.29 kg/m².    Vitals and nursing note reviewed.   Constitutional:       General: Awake. Not in acute distress.     Appearance: Well-developed and underweight. Chronically ill-appearing.   Eyes:      Conjunctiva/sclera: Conjunctivae normal.      Pupils: Pupils are equal, round, and reactive to light.   HENT:      Head: Normocephalic and atraumatic.      Nose: Nose normal.    Mouth/Throat:      Pharynx: Uvula midline.   Neck:      Vascular: No carotid bruit.      Trachea: No tracheal deviation.   Pulmonary:      Effort: Pulmonary effort is normal.      Breath sounds: Normal breath sounds. No wheezing. No rhonchi. No rales.      Comments: To nasal cannula as with high flow currently  Cardiovascular:      Normal rate. Regular rhythm. Normal S1. Normal S2.       Murmurs: There is a diastolic murmur.      No gallop.  No click. No rub.   Edema:     Peripheral edema present.     Pretibial: 3+ edema of the left pretibial area and trace edema of the right pretibial area.     Ankle: 3+ edema of the left ankle and trace edema of the right ankle.  Abdominal:      General: Bowel sounds are normal.      Palpations: Abdomen is soft.   Musculoskeletal:      Cervical back: Normal range of motion and neck supple. Skin:     General: Skin is warm and dry.      Findings: No erythema.   Neurological:      Mental " Status: Alert, oriented to person, place, and time and oriented to person, place and time.   Psychiatric:         Attention and Perception: Attention normal.         Mood and Affect: Mood normal.         Speech: Speech normal.         Behavior: Behavior normal. Behavior is cooperative.         Diagnostic Data:  Procedures      ASSESSMENT:   Diagnosis Plan   1. Chest pain on breathing        2. Primary hypertension        3. Mixed hyperlipidemia                PLAN:  1.  Atypical chest pain trial of nitrates  2.  Hypertension - patient in clinic blood pressure appreciated at 160/64.  Displays multiple  blood pressures that are fluctuating between 160 to 110.  We instructed the patient to follow her blood pressures over the next 2 weeks and inform the clinic if her blood pressures are persistently above goal <130/80.  3.  BAKER - patient has longstanding history of BAKER secondary to rheumatoid lung.  End-stage with escalating amounts of oxygen patient states that she has a living will and is a DO NOT INTUBATE  4.  Mixed hyperlipidemia- patient needs updated lipid panel per our records.        Kraig Marquez MD, FACC

## 2025-05-07 ENCOUNTER — HOSPITAL ENCOUNTER (OUTPATIENT)
Facility: HOSPITAL | Age: 75
Discharge: HOME | End: 2025-05-07
Payer: MEDICARE

## 2025-05-07 ENCOUNTER — OFFICE VISIT (OUTPATIENT)
Dept: UROLOGY | Facility: CLINIC | Age: 75
End: 2025-05-07
Payer: MEDICARE

## 2025-05-07 VITALS
OXYGEN SATURATION: 99 % | SYSTOLIC BLOOD PRESSURE: 124 MMHG | TEMPERATURE: 98.2 F | WEIGHT: 136 LBS | HEART RATE: 89 BPM | BODY MASS INDEX: 21.86 KG/M2 | DIASTOLIC BLOOD PRESSURE: 70 MMHG | HEIGHT: 66 IN | RESPIRATION RATE: 14 BRPM

## 2025-05-07 VITALS
SYSTOLIC BLOOD PRESSURE: 157 MMHG | RESPIRATION RATE: 16 BRPM | TEMPERATURE: 98.5 F | HEART RATE: 76 BPM | OXYGEN SATURATION: 100 % | DIASTOLIC BLOOD PRESSURE: 76 MMHG

## 2025-05-07 DIAGNOSIS — N39.41 URGE INCONTINENCE OF URINE: Primary | ICD-10-CM

## 2025-05-07 DIAGNOSIS — N18.30 STAGE 3 CHRONIC KIDNEY DISEASE, UNSPECIFIED WHETHER STAGE 3A OR 3B CKD: ICD-10-CM

## 2025-05-07 DIAGNOSIS — N95.8 GENITOURINARY SYNDROME OF MENOPAUSE: ICD-10-CM

## 2025-05-07 DIAGNOSIS — R31.9 HEMATURIA, UNSPECIFIED TYPE: ICD-10-CM

## 2025-05-07 DIAGNOSIS — D50.9 IRON DEFICIENCY ANEMIA, UNSPECIFIED IRON DEFICIENCY ANEMIA TYPE: Primary | ICD-10-CM

## 2025-05-07 LAB
ALBUMIN SERPL-MCNC: 4 G/DL (ref 3.5–5.2)
ALBUMIN/GLOB SERPL: 1.4 G/DL
ALP SERPL-CCNC: 94 U/L (ref 39–117)
ALT SERPL W P-5'-P-CCNC: 6 U/L (ref 1–33)
ANION GAP SERPL CALCULATED.3IONS-SCNC: 10.4 MMOL/L (ref 5–15)
AST SERPL-CCNC: 14 U/L (ref 1–32)
BILIRUB BLD-MCNC: NEGATIVE MG/DL
BILIRUB SERPL-MCNC: 0.2 MG/DL (ref 0–1.2)
BUN SERPL-MCNC: 28 MG/DL (ref 8–23)
BUN/CREAT SERPL: 13.7 (ref 7–25)
CALCIUM SPEC-SCNC: 8.9 MG/DL (ref 8.6–10.5)
CHLORIDE SERPL-SCNC: 96 MMOL/L (ref 98–107)
CLARITY, POC: CLEAR
CO2 SERPL-SCNC: 31.6 MMOL/L (ref 22–29)
COLOR UR: YELLOW
CREAT SERPL-MCNC: 2.05 MG/DL (ref 0.57–1)
DEPRECATED RDW RBC AUTO: 47 FL (ref 37–54)
EGFRCR SERPLBLD CKD-EPI 2021: 25.3 ML/MIN/1.73
ERYTHROCYTE [DISTWIDTH] IN BLOOD BY AUTOMATED COUNT: 15.4 % (ref 12.3–15.4)
EXPIRATION DATE: ABNORMAL
GLOBULIN UR ELPH-MCNC: 2.8 GM/DL
GLUCOSE SERPL-MCNC: 118 MG/DL (ref 65–99)
GLUCOSE UR STRIP-MCNC: NEGATIVE MG/DL
HCT VFR BLD AUTO: 28.6 % (ref 34–46.6)
HGB BLD-MCNC: 8.3 G/DL (ref 12–15.9)
IRON 24H UR-MRATE: 25 MCG/DL (ref 37–145)
IRON SATN MFR SERPL: 7 % (ref 20–50)
KETONES UR QL: NEGATIVE
LEUKOCYTE EST, POC: ABNORMAL
Lab: ABNORMAL
MCH RBC QN AUTO: 24.2 PG (ref 26.6–33)
MCHC RBC AUTO-ENTMCNC: 29 G/DL (ref 31.5–35.7)
MCV RBC AUTO: 83.4 FL (ref 79–97)
NITRITE UR-MCNC: NEGATIVE MG/ML
PH UR: 5.5 [PH] (ref 5–8)
PHOSPHATE SERPL-MCNC: 4.5 MG/DL (ref 2.5–4.5)
PLATELET # BLD AUTO: 173 10*3/MM3 (ref 140–450)
PMV BLD AUTO: 10.2 FL (ref 6–12)
POTASSIUM SERPL-SCNC: 4.4 MMOL/L (ref 3.5–5.2)
PROT SERPL-MCNC: 6.8 G/DL (ref 6–8.5)
PROT UR STRIP-MCNC: NEGATIVE MG/DL
RBC # BLD AUTO: 3.43 10*6/MM3 (ref 3.77–5.28)
RBC # UR STRIP: ABNORMAL /UL
SODIUM SERPL-SCNC: 138 MMOL/L (ref 136–145)
SP GR UR: 1.02 (ref 1–1.03)
TIBC SERPL-MCNC: 368 MCG/DL (ref 298–536)
TRANSFERRIN SERPL-MCNC: 247 MG/DL (ref 200–360)
UROBILINOGEN UR QL: NORMAL
WBC NRBC COR # BLD AUTO: 5.97 10*3/MM3 (ref 3.4–10.8)

## 2025-05-07 PROCEDURE — 96372 THER/PROPH/DIAG INJ SC/IM: CPT

## 2025-05-07 PROCEDURE — 84100 ASSAY OF PHOSPHORUS: CPT

## 2025-05-07 PROCEDURE — 80053 COMPREHEN METABOLIC PANEL: CPT

## 2025-05-07 PROCEDURE — 85027 COMPLETE CBC AUTOMATED: CPT

## 2025-05-07 PROCEDURE — 84466 ASSAY OF TRANSFERRIN: CPT

## 2025-05-07 PROCEDURE — 83540 ASSAY OF IRON: CPT

## 2025-05-07 PROCEDURE — 25010000002 EPOETIN ALFA PER 1000 UNITS

## 2025-05-07 RX ADMIN — ERYTHROPOIETIN 40000 UNITS: 40000 INJECTION, SOLUTION INTRAVENOUS; SUBCUTANEOUS at 15:51

## 2025-05-07 NOTE — PROGRESS NOTES
"       Office Visit     Patient: Vanna Rincon 74 y.o. female   : 1950   MRN: [id number]      Patient or patient representative verbalized consent for the use of Ambient Listening during the visit with  JENNY Asencio for chart documentation. 2025  16:50 EDT     Chief Complaint   Patient presents with    Urinary Incontinence     Referring Provider: No ref. provider found    Primary Care Provider: Farhad Chowdary MD     History of Present Illness  The patient presents for evaluation of urinary incontinence.    Urinary Incontinence  - She reports initial relief from Gemtesa, but it has become ineffective.  - She cannot afford Gemtesa and declines bladder Botox due to potential urinary retention.  - She has refills for her cream and considers using it and discontinuing her gemtesa  - No hematuria reported.  - History of a single kidney stone.    SOCIAL HISTORY  Smoked for 25 years, quit around  or .    MEDICATIONS  Current: Gemtesa      Subjective   Review of System:   As noted in HPI.    Past Medical History:   Diagnosis Date    Abnormal mammogram     Acute UTI     Allergic rhinitis     Anemia     Anxiety     Arthritis     knee, right    Asthmatic bronchitis     Back pain     Candida vaginitis     Cataracts, bilateral     CHF (congestive heart failure)     not diagnosed    Chronic lung disease     bronchiolitis obliterans. - Ct scan stable with pulmonary intolerant to pft    Chronic pain syndrome     disc disease, lumbar, neck    Colon polyps     Community acquired pneumonia     Conjunctivitis     COPD (chronic obstructive pulmonary disease)     Emphysema with severe physiology on PFTs.  Patient is a former smoker.    COPD (chronic obstructive pulmonary disease)     4L of oxygen    Cough     DDD (degenerative disc disease), lumbar     Deafness     Dependence on supplemental oxygen     PT REPORTS \"4L ALL TIMES\"    Disc disease, degenerative, cervical     Dry eye     Former smoker     " Gastric bleed     Genitourinary syndrome of menopause     GERD (gastroesophageal reflux disease)     Glaucoma     H/O chest x-ray 08/11/2015    Rase base opacity consistent with pneumonia or atelectasis    H/O chest x-ray 06/24/2015    Right basilar airspace disease and effusion consistent with a pneumonia. F/U to radiographic reolution is recommended    H/O chest x-ray 03/09/2010    Cardiac silhoutte is not enlarged. Lungs aare inflated. Mild nonspecifiec interstitial changes. No pleual effusions or dense consolidations. Scattered granulomatous changes. Spine with mild kyphosis but no osteopenia. No significant adenopathy    H/O echocardiogram 03/16/2010    Normal study    Herpes zoster     History of PFTs 12/13/2011    Goo pt cooperation and effort. Pt given 3 puffs of xopenex. PFT is acceptable and reproducible    History of PFTs 08/11/2011    Pt unable to produce acceptabel and reproducible PFT. Pt was given 3 puffs of xopenex. Pt gave good effort Best pleth of two attempts    History of PFTs 02/24/2011    PFT acceptable and reproducible. Pt gave good effort. Pt used bronchodilator less than 2 hours prior to testing    Hyperlipidemia     Hypertension     IBS (irritable bowel syndrome)     Insomnia     Interstitial lung disease     History of rheumatoid lung with bronchiolitis obliterans    Kidney stones     x 1    Lumbar herniated disc     Nephrolithiasis     2007, passed    Ocular migraine     On home oxygen therapy     REPORTS USES AT 4L NC AT ALL TIMES    Overactive bladder     Panic attack     Peripheral edema     Pneumonia     PRB (rectal bleeding)     RA (rheumatoid arthritis)     Renal insufficiency     Rheumatoid arthritis     · A.  Diagnosed in 2001 with history of methotrexate and Enbrel therapy.    Rheumatoid lung     bronchiolitis obliterans    Sciatica     Sinus problem     Thrombophlebitis     Tinnitus     Urge incontinence     Vertigo      Past Surgical History:   Procedure Laterality Date     CATARACT EXTRACTION Right     COLONOSCOPY      COLONOSCOPY N/A 2018    Procedure: COLONOSCOPY WITH COLD SNARE POLYPECTOMY X 6; SUBMUCOSAL INJECTION OF SALINE; HOT SNARE POLYPECTOMY X 4; CLIP PLACEMENT X 1;  Surgeon: Kenney Pastrana MD;  Location: [facility] ENDOSCOPY;  Service:     DENTAL PROCEDURE      ENDOSCOPY N/A 12/15/2017    Procedure: ESOPHAGOGASTRODUODENOSCOPY with biopsies and esophageal balloon dilitation;  Surgeon: Kenney Pastrana MD;  Location: [facility] ENDOSCOPY;  Service:     MOUTH SURGERY      REPORTS IMPLANT X2    ORAL ANTRAL FISTULA CLOSURE      gums    TONSILLECTOMY      TUBAL ABDOMINAL LIGATION      UPPER GASTROINTESTINAL ENDOSCOPY      UPPER GASTROINTESTINAL ENDOSCOPY  12/15/2017     Family History   Problem Relation Age of Onset    Glaucoma Mother     Coronary artery disease Mother     Liver cancer Mother         bile duct cancer    Heart disease Mother     Cancer Mother     Hypertension Mother     Stroke Father     Stomach cancer Father     Cancer Father     Diabetes Sister     Other (systemic lupus erythematosus) Sister     Heart disease Maternal Grandmother     Diabetes Maternal Grandmother     Diabetes Paternal Grandmother     Colon cancer Neg Hx     Ulcerative colitis Neg Hx      Social History     Socioeconomic History    Marital status:     Number of children: 0   Tobacco Use    Smoking status: Former     Current packs/day: 0.00     Average packs/day: 1 pack/day for 28.0 years (28.0 ttl pk-yrs)     Types: Cigarettes     Start date:      Quit date:      Years since quittin.3    Smokeless tobacco: Never   Vaping Use    Vaping status: Never Used   Substance and Sexual Activity    Alcohol use: No    Drug use: No    Sexual activity: Defer     Comment:        Current Outpatient Medications:     amLODIPine (NORVASC) 5 MG tablet, Take 1 tablet by mouth As Needed., Disp: , Rfl:     budesonide (RINOCORT AQUA) 32 MCG/ACT nasal spray, Instill 2 sprays into each  nostril once daily, Disp: 8.43 mL, Rfl: 5    budesonide-formoterol (Symbicort) 160-4.5 MCG/ACT inhaler, Inhale 2 puffs 2 (Two) Times a Day., Disp: 3 each, Rfl: 3    epoetin chidi (Procrit) 2000 UNIT/ML injection, Inject  under the skin into the appropriate area as directed Every 6 (Six) Weeks., Disp: , Rfl:     estradiol (ESTRACE) 0.1 MG/GM vaginal cream, Insert 1 g into the vagina 3 (Three) Times a Week. Apply pea sized amount periurethral and perivaginally 3 times a week at bedtime., Disp: 42.5 g, Rfl: 12    etanercept (ENBREL) 50 MG/ML solution prefilled syringe injection, Inject 1 mL under the skin into the appropriate area as directed Every 7 (Seven) Days., Disp: 12 mL, Rfl: 0    fentaNYL (DURAGESIC) 75 MCG/HR patch, Place 1 patch on the skin as directed by provider Every 72 (Seventy-Two) Hours., Disp: , Rfl:     fluconazole (Diflucan) 100 MG tablet, Take 1 tablet by mouth Daily., Disp: 3 tablet, Rfl: 0    HYDROcodone-acetaminophen (NORCO) 5-325 MG per tablet, Take 1 tablet by mouth Every 6 (Six) Hours As Needed., Disp: , Rfl:     lactulose (CHRONULAC) 10 GM/15ML solution solution (encephalopathy), Take 45 mL by mouth., Disp: , Rfl:     levalbuterol (XOPENEX HFA) 45 MCG/ACT inhaler, Inhale 2 puffs 4 (Four) Times a Day As Needed for Wheezing., Disp: 15 g, Rfl: 5    levalbuterol (XOPENEX) 0.31 MG/3ML nebulizer solution, , Disp: , Rfl:     LORazepam (ATIVAN) 0.5 MG tablet, Take 1 tablet by mouth Daily As Needed for Anxiety., Disp: 30 tablet, Rfl: 1    Multiple Vitamins-Minerals (CENTRUM ADULTS PO), Take 1 tablet by mouth Daily., Disp: , Rfl:     O2 (OXYGEN), Inhale 6 L/min Continuous., Disp: , Rfl:     sertraline (ZOLOFT) 100 MG tablet, Take 1 tablet by mouth Daily., Disp: 90 tablet, Rfl: 3    simethicone (MYLICON) 125 MG chewable tablet, Chew 1 tablet Every 6 (Six) Hours As Needed for Flatulence., Disp: , Rfl:     valsartan-hydrochlorothiazide (DIOVAN-HCT) 320-25 MG per tablet, Take 0.5 tablets by mouth Daily.,  "Disp: 45 tablet, Rfl: 3    Vibegron (Gemtesa) 75 MG tablet, Take 1 tablet by mouth Daily., Disp: 30 tablet, Rfl: 11  No current facility-administered medications for this visit.    Allergies   Allergen Reactions    Iodinated Contrast Media Other (See Comments)     Kidney disease    Feraheme [Ferumoxytol] Palpitations and Other (See Comments)     hypertension    Brimonidine Tartrate Nausea And Vomiting    Ciprofloxacin Other (See Comments)     \"Hurt all over\"    Doxycycline Other (See Comments)     \"makes other medication stay in my system longer\"    Lipitor [Atorvastatin] Other (See Comments)     Hurt all over      Nsaids Other (See Comments)     Pt states she has stage three kidney disease    Sulfa Antibiotics Other (See Comments)     \"cant remember\"     Objective   Visit Vitals  /70 (Cuff Size: Small Adult)   Pulse 89   Temp 98.2 °F (36.8 °C)   Resp 14   Ht 166.4 cm (65.5\")   Wt 61.7 kg (136 lb)   LMP  (LMP Unknown)   SpO2 99%   BMI 22.29 kg/m²        Body mass index is 22.29 kg/m².     Physical Exam  Vitals and nursing note reviewed.   Constitutional:       General: She is awake. She is not in acute distress.     Comments: Looks unwell and frail, chronically ill  Accompanied by spouse   Pulmonary:      Effort: Pulmonary effort is normal.      Comments: Patient wearing nasal cannula  Neurological:      Mental Status: She is alert and oriented to person, place, and time. Mental status is at baseline.   Psychiatric:         Mood and Affect: Mood normal.         Behavior: Behavior is cooperative.          Labs  Lab Results   Component Value Date    COLORU Yellow 05/07/2025    CLARITYU Clear 05/07/2025    SPECGRAV 1.025 05/07/2025    PHUR 5.5 05/07/2025    LEUKOCYTESUR Trace (A) 05/07/2025    NITRITE Negative 05/07/2025    PROTEINPOCUA Negative 05/07/2025    GLUCOSEUR Negative 05/07/2025    KETONESU Negative 05/07/2025    UROBILINOGEN Normal 05/07/2025    BILIRUBINUR Negative 05/07/2025    RBCUR Moderate (A) " "05/07/2025      Lab Results   Component Value Date    WBCUA None Seen 12/27/2024    WBCUA 0-2 01/22/2024    RBCUA 0-2 12/27/2024    RBCUA 3-5 (A) 01/22/2024    BACTERIA None Seen 12/27/2024    BACTERIA None Seen 01/22/2024    HYALCASTU None Seen 12/27/2024    HYALCASTU 3-6 01/22/2024    SQUAMEPIUA None Seen 12/27/2024    SQUAMEPIUA 3-6 (A) 01/22/2024        Lab Results   Component Value Date    WBC 5.97 05/07/2025    HGB 8.3 (L) 05/07/2025    HCT 28.6 (L) 05/07/2025    MCV 83.4 05/07/2025     05/07/2025     Lab Results   Component Value Date    GLUCOSE 118 (H) 05/07/2025    CALCIUM 8.9 05/07/2025     05/07/2025    K 4.4 05/07/2025    CO2 31.6 (H) 05/07/2025    CL 96 (L) 05/07/2025    BUN 28 (H) 05/07/2025    BUN 18 03/24/2025    CREATININE 2.05 (H) 05/07/2025    CREATININE 1.31 (H) 03/24/2025    EGFR 25.3 (L) 05/07/2025    EGFR 43.4 (L) 03/24/2025    BCR 13.7 05/07/2025    ANIONGAP 10.4 05/07/2025    ALT 6 05/07/2025    AST 14 05/07/2025       Lab Results   Component Value Date    HGBA1C 5.6 10/02/2015        No results found for: \"URICACIDSTN\", \"EQBB5AVPXAF\", \"NWBP8YQWFT\", \"LABMAGN\", \"CAPHOSSTONE\", \"HYDROXYAPATI\"  Lab Results   Component Value Date    ZBYI61QK 46.2 01/11/2023    PTH 41.4 02/01/2024    URICACID 5.4 05/03/2023     Lab Results   Component Value Date    LABPH 5.5 10/18/2016       No results found for: \"ATOPOBIUMV\", \"BVAB2\", \"MEGASPHAER\", \"CALBICANSN\", \"CGLABRATAN\", \"CPARAPSILOS\", \"CLUSITANIAE\", \"CKRUSEI\", \"TRICHVAG\", \"CHLAMNAA\", \"NGONORRHON\", \"UREAPLASMA\", \"MYCOPLASMAG\"    Lab Results   Component Value Date    FERRITIN 207.40 (H) 11/25/2020    TSH 1.980 05/03/2023    FREET4 1.51 10/18/2016    VEOPQHXL52 831 02/20/2018    GKBK91VQ 46.2 01/11/2023         I have reviewed the above labs.      PVR  Post-void residual performed with ultrasound by staff and interpreted by me - 0 mL    Assessment / Plan      Diagnoses and all orders for this visit:    1. Urge incontinence of urine (Primary)  -    "  POC Urinalysis Dipstick, Automated    2. Hematuria, unspecified type  -     Urinalysis With Microscopic - Urine, Clean Catch    3. Genitourinary syndrome of menopause         Assessment & Plan  1. Urinary incontinence  - Gemtesa is ineffective and expensive  - Declined bladder Botox due to urinary retention risk  - Not a surgical candidate for InterStim  - Advised to decrease pepsi intake and increase water intake    2. GSM  - Reports estradiol was working initially but once she stopped taking it nightly she is unsure if it is effective  - Continue using prescribed estradiol for vaginal tissue health    3. Hematuria  - UA today shows moderate blood  - Urinalysis micro to investigate trace blood  - Denies gross hematuria    Follow-up  - Follow-up in 1 year or sooner if necessary       Return for f/u with Chastity, PVR, UA.    Yvonne Regalado, MSN, APRN, FNP-C  [facility]

## 2025-05-09 LAB
APPEARANCE UR: CLEAR
BACTERIA #/AREA URNS HPF: NORMAL /HPF
BILIRUB UR QL STRIP: NEGATIVE
CASTS URNS MICRO: NORMAL
COLOR UR: YELLOW
EPI CELLS #/AREA URNS HPF: NORMAL /HPF
GLUCOSE UR QL STRIP: NEGATIVE
HGB UR QL STRIP: NEGATIVE
KETONES UR QL STRIP: NEGATIVE
LEUKOCYTE ESTERASE UR QL STRIP: ABNORMAL
NITRITE UR QL STRIP: NEGATIVE
PH UR STRIP: 6 [PH] (ref 5–8)
PROT UR QL STRIP: NEGATIVE
RBC #/AREA URNS HPF: NORMAL /HPF
SP GR UR STRIP: ABNORMAL (ref 1–1.03)
UROBILINOGEN UR STRIP-MCNC: ABNORMAL MG/DL
WBC #/AREA URNS HPF: NORMAL /HPF

## 2025-05-16 ENCOUNTER — TELEPHONE (OUTPATIENT)
Dept: UROLOGY | Facility: CLINIC | Age: 75
End: 2025-05-16
Payer: MEDICARE

## 2025-05-16 NOTE — TELEPHONE ENCOUNTER
Caller: Vanna Rincon    Relationship to patient: Self    Best call back number: [phone] (home)       Patient is needing: I WOULD LIKE TO SPEAK WITH CHASTITY ABOUT STARTING ESTROGEN THERAPY AGAIN. I BELIEVE WHEN I TRIED IT THE FIRST TIME I WAS DOING IT WRONG.

## 2025-05-19 ENCOUNTER — TELEPHONE (OUTPATIENT)
Dept: UROLOGY | Facility: CLINIC | Age: 75
End: 2025-05-19
Payer: MEDICARE

## 2025-05-19 NOTE — TELEPHONE ENCOUNTER
Patient called on nurse hotline and stated she wants to start estradiol cream back and I explained that Yvonne Regalado has a years supply of refills at [facility] and to contact them.  She stated she had an allergic reaction to OTC medication (unknown name or dosage) and I told patient to contact PCP and to seek [facility] or ER if symptoms return.

## 2025-05-28 ENCOUNTER — TELEPHONE (OUTPATIENT)
Dept: CARDIOLOGY | Facility: CLINIC | Age: 75
End: 2025-05-28
Payer: MEDICARE

## 2025-05-28 ENCOUNTER — OFFICE VISIT (OUTPATIENT)
Dept: CARDIOLOGY | Facility: CLINIC | Age: 75
End: 2025-05-28
Payer: MEDICARE

## 2025-05-28 ENCOUNTER — HOSPITAL ENCOUNTER (OUTPATIENT)
Facility: HOSPITAL | Age: 75
Setting detail: OBSERVATION
Discharge: HOME | End: 2025-05-29
Attending: EMERGENCY MEDICINE | Admitting: STUDENT IN AN ORGANIZED HEALTH CARE EDUCATION/TRAINING PROGRAM
Payer: MEDICARE

## 2025-05-28 ENCOUNTER — APPOINTMENT (OUTPATIENT)
Dept: GENERAL RADIOLOGY | Facility: HOSPITAL | Age: 75
End: 2025-05-28
Payer: MEDICARE

## 2025-05-28 VITALS — HEART RATE: 84 BPM | DIASTOLIC BLOOD PRESSURE: 80 MMHG | SYSTOLIC BLOOD PRESSURE: 160 MMHG

## 2025-05-28 DIAGNOSIS — I50.9 CONGESTIVE HEART FAILURE, UNSPECIFIED HF CHRONICITY, UNSPECIFIED HEART FAILURE TYPE: ICD-10-CM

## 2025-05-28 DIAGNOSIS — R07.9 CHEST PAIN, UNSPECIFIED TYPE: Primary | ICD-10-CM

## 2025-05-28 DIAGNOSIS — R00.0 HEART RATE FAST: Primary | ICD-10-CM

## 2025-05-28 LAB
ALBUMIN SERPL-MCNC: 4 G/DL (ref 3.5–5.2)
ALBUMIN/GLOB SERPL: 1.4 G/DL
ALP SERPL-CCNC: 85 U/L (ref 39–117)
ALT SERPL W P-5'-P-CCNC: 7 U/L (ref 1–33)
ANION GAP SERPL CALCULATED.3IONS-SCNC: 10.2 MMOL/L (ref 5–15)
AST SERPL-CCNC: 15 U/L (ref 1–32)
BASOPHILS # BLD AUTO: 0.01 10*3/MM3 (ref 0–0.2)
BASOPHILS NFR BLD AUTO: 0.2 % (ref 0–1.5)
BILIRUB SERPL-MCNC: 0.2 MG/DL (ref 0–1.2)
BUN SERPL-MCNC: 15 MG/DL (ref 8–23)
BUN/CREAT SERPL: 11.4 (ref 7–25)
CALCIUM SPEC-SCNC: 9.2 MG/DL (ref 8.6–10.5)
CHLORIDE SERPL-SCNC: 97 MMOL/L (ref 98–107)
CO2 SERPL-SCNC: 33.8 MMOL/L (ref 22–29)
CREAT SERPL-MCNC: 1.32 MG/DL (ref 0.57–1)
DEPRECATED RDW RBC AUTO: 47.7 FL (ref 37–54)
EGFRCR SERPLBLD CKD-EPI 2021: 43 ML/MIN/1.73
EOSINOPHIL # BLD AUTO: 0.01 10*3/MM3 (ref 0–0.4)
EOSINOPHIL NFR BLD AUTO: 0.2 % (ref 0.3–6.2)
ERYTHROCYTE [DISTWIDTH] IN BLOOD BY AUTOMATED COUNT: 15.9 % (ref 12.3–15.4)
GEN 5 1HR TROPONIN T REFLEX: 32 NG/L
GLOBULIN UR ELPH-MCNC: 2.8 GM/DL
GLUCOSE SERPL-MCNC: 117 MG/DL (ref 65–99)
HCT VFR BLD AUTO: 31.9 % (ref 34–46.6)
HGB BLD-MCNC: 9 G/DL (ref 12–15.9)
HOLD SPECIMEN: NORMAL
HOLD SPECIMEN: NORMAL
IMM GRANULOCYTES # BLD AUTO: 0.01 10*3/MM3 (ref 0–0.05)
IMM GRANULOCYTES NFR BLD AUTO: 0.2 % (ref 0–0.5)
LYMPHOCYTES # BLD AUTO: 0.72 10*3/MM3 (ref 0.7–3.1)
LYMPHOCYTES NFR BLD AUTO: 14.3 % (ref 19.6–45.3)
MCH RBC QN AUTO: 23.2 PG (ref 26.6–33)
MCHC RBC AUTO-ENTMCNC: 28.2 G/DL (ref 31.5–35.7)
MCV RBC AUTO: 82.2 FL (ref 79–97)
MONOCYTES # BLD AUTO: 0.38 10*3/MM3 (ref 0.1–0.9)
MONOCYTES NFR BLD AUTO: 7.6 % (ref 5–12)
NEUTROPHILS NFR BLD AUTO: 3.9 10*3/MM3 (ref 1.7–7)
NEUTROPHILS NFR BLD AUTO: 77.5 % (ref 42.7–76)
NRBC BLD AUTO-RTO: 0 /100 WBC (ref 0–0.2)
NT-PROBNP SERPL-MCNC: ABNORMAL PG/ML (ref 0–900)
PLATELET # BLD AUTO: 124 10*3/MM3 (ref 140–450)
PMV BLD AUTO: 10.5 FL (ref 6–12)
POTASSIUM SERPL-SCNC: 4.3 MMOL/L (ref 3.5–5.2)
PROT SERPL-MCNC: 6.8 G/DL (ref 6–8.5)
RBC # BLD AUTO: 3.88 10*6/MM3 (ref 3.77–5.28)
RBC MORPH BLD: NORMAL
SMALL PLATELETS BLD QL SMEAR: NORMAL
SODIUM SERPL-SCNC: 141 MMOL/L (ref 136–145)
TROPONIN T % DELTA: -9
TROPONIN T NUMERIC DELTA: -3 NG/L
TROPONIN T SERPL HS-MCNC: 35 NG/L
WBC MORPH BLD: NORMAL
WBC NRBC COR # BLD AUTO: 5.03 10*3/MM3 (ref 3.4–10.8)
WHOLE BLOOD HOLD COAG: NORMAL
WHOLE BLOOD HOLD SPECIMEN: NORMAL

## 2025-05-28 PROCEDURE — 93005 ELECTROCARDIOGRAM TRACING: CPT | Performed by: STUDENT IN AN ORGANIZED HEALTH CARE EDUCATION/TRAINING PROGRAM

## 2025-05-28 PROCEDURE — 85025 COMPLETE CBC W/AUTO DIFF WBC: CPT | Performed by: EMERGENCY MEDICINE

## 2025-05-28 PROCEDURE — 85007 BL SMEAR W/DIFF WBC COUNT: CPT | Performed by: EMERGENCY MEDICINE

## 2025-05-28 PROCEDURE — G0378 HOSPITAL OBSERVATION PER HR: HCPCS

## 2025-05-28 PROCEDURE — 96374 THER/PROPH/DIAG INJ IV PUSH: CPT

## 2025-05-28 PROCEDURE — 99285 EMERGENCY DEPT VISIT HI MDM: CPT | Performed by: EMERGENCY MEDICINE

## 2025-05-28 PROCEDURE — 93010 ELECTROCARDIOGRAM REPORT: CPT | Performed by: INTERNAL MEDICINE

## 2025-05-28 PROCEDURE — 71045 X-RAY EXAM CHEST 1 VIEW: CPT

## 2025-05-28 PROCEDURE — 84466 ASSAY OF TRANSFERRIN: CPT | Performed by: INTERNAL MEDICINE

## 2025-05-28 PROCEDURE — 80053 COMPREHEN METABOLIC PANEL: CPT | Performed by: EMERGENCY MEDICINE

## 2025-05-28 PROCEDURE — 36415 COLL VENOUS BLD VENIPUNCTURE: CPT

## 2025-05-28 PROCEDURE — 83880 ASSAY OF NATRIURETIC PEPTIDE: CPT | Performed by: EMERGENCY MEDICINE

## 2025-05-28 PROCEDURE — 96376 TX/PRO/DX INJ SAME DRUG ADON: CPT

## 2025-05-28 PROCEDURE — 83540 ASSAY OF IRON: CPT | Performed by: INTERNAL MEDICINE

## 2025-05-28 PROCEDURE — 93005 ELECTROCARDIOGRAM TRACING: CPT | Performed by: EMERGENCY MEDICINE

## 2025-05-28 PROCEDURE — 84484 ASSAY OF TROPONIN QUANT: CPT | Performed by: EMERGENCY MEDICINE

## 2025-05-28 PROCEDURE — 99223 1ST HOSP IP/OBS HIGH 75: CPT | Performed by: STUDENT IN AN ORGANIZED HEALTH CARE EDUCATION/TRAINING PROGRAM

## 2025-05-28 RX ORDER — FUROSEMIDE 10 MG/ML
40 INJECTION INTRAMUSCULAR; INTRAVENOUS ONCE
Qty: 4 ML | Refills: 0 | Status: SHIPPED | OUTPATIENT
Start: 2025-05-28 | End: 2025-05-29 | Stop reason: HOSPADM

## 2025-05-28 RX ORDER — SODIUM CHLORIDE 0.9 % (FLUSH) 0.9 %
10 SYRINGE (ML) INJECTION AS NEEDED
Status: DISCONTINUED | OUTPATIENT
Start: 2025-05-28 | End: 2025-05-29 | Stop reason: HOSPADM

## 2025-05-28 RX ORDER — BISACODYL 5 MG/1
5 TABLET, DELAYED RELEASE ORAL DAILY PRN
Status: DISCONTINUED | OUTPATIENT
Start: 2025-05-28 | End: 2025-05-29 | Stop reason: HOSPADM

## 2025-05-28 RX ORDER — SODIUM CHLORIDE 9 MG/ML
40 INJECTION, SOLUTION INTRAVENOUS AS NEEDED
Status: DISCONTINUED | OUTPATIENT
Start: 2025-05-28 | End: 2025-05-29 | Stop reason: HOSPADM

## 2025-05-28 RX ORDER — POLYETHYLENE GLYCOL 3350 17 G/17G
17 POWDER, FOR SOLUTION ORAL DAILY PRN
Status: DISCONTINUED | OUTPATIENT
Start: 2025-05-28 | End: 2025-05-29 | Stop reason: HOSPADM

## 2025-05-28 RX ORDER — BUDESONIDE AND FORMOTEROL FUMARATE DIHYDRATE 160; 4.5 UG/1; UG/1
2 AEROSOL RESPIRATORY (INHALATION)
Status: DISCONTINUED | OUTPATIENT
Start: 2025-05-28 | End: 2025-05-29 | Stop reason: HOSPADM

## 2025-05-28 RX ORDER — FUROSEMIDE 10 MG/ML
40 INJECTION INTRAMUSCULAR; INTRAVENOUS
Status: DISCONTINUED | OUTPATIENT
Start: 2025-05-28 | End: 2025-05-29 | Stop reason: HOSPADM

## 2025-05-28 RX ORDER — HYDROCHLOROTHIAZIDE 25 MG/1
25 TABLET ORAL
Status: DISCONTINUED | OUTPATIENT
Start: 2025-05-29 | End: 2025-05-29 | Stop reason: HOSPADM

## 2025-05-28 RX ORDER — NITROGLYCERIN 0.4 MG/1
0.4 TABLET SUBLINGUAL
Status: DISCONTINUED | OUTPATIENT
Start: 2025-05-28 | End: 2025-05-29 | Stop reason: HOSPADM

## 2025-05-28 RX ORDER — FENTANYL 75 UG/1
1 PATCH TRANSDERMAL
Status: DISCONTINUED | OUTPATIENT
Start: 2025-05-28 | End: 2025-05-29 | Stop reason: HOSPADM

## 2025-05-28 RX ORDER — METOPROLOL TARTRATE 1 MG/ML
5 INJECTION, SOLUTION INTRAVENOUS
Status: DISCONTINUED | OUTPATIENT
Start: 2025-05-28 | End: 2025-05-29 | Stop reason: HOSPADM

## 2025-05-28 RX ORDER — VALSARTAN 80 MG/1
320 TABLET ORAL
Status: DISCONTINUED | OUTPATIENT
Start: 2025-05-29 | End: 2025-05-29 | Stop reason: HOSPADM

## 2025-05-28 RX ORDER — BISACODYL 10 MG
10 SUPPOSITORY, RECTAL RECTAL DAILY PRN
Status: DISCONTINUED | OUTPATIENT
Start: 2025-05-28 | End: 2025-05-29 | Stop reason: HOSPADM

## 2025-05-28 RX ORDER — SODIUM CHLORIDE 0.9 % (FLUSH) 0.9 %
10 SYRINGE (ML) INJECTION EVERY 12 HOURS SCHEDULED
Status: DISCONTINUED | OUTPATIENT
Start: 2025-05-28 | End: 2025-05-29 | Stop reason: HOSPADM

## 2025-05-28 RX ORDER — AMOXICILLIN 250 MG
2 CAPSULE ORAL 2 TIMES DAILY PRN
Status: DISCONTINUED | OUTPATIENT
Start: 2025-05-28 | End: 2025-05-29 | Stop reason: HOSPADM

## 2025-05-28 RX ORDER — HEPARIN SODIUM 5000 [USP'U]/ML
5000 INJECTION, SOLUTION INTRAVENOUS; SUBCUTANEOUS EVERY 12 HOURS SCHEDULED
Status: DISCONTINUED | OUTPATIENT
Start: 2025-05-28 | End: 2025-05-29

## 2025-05-28 RX ORDER — ONDANSETRON 2 MG/ML
4 INJECTION INTRAMUSCULAR; INTRAVENOUS EVERY 6 HOURS PRN
Status: DISCONTINUED | OUTPATIENT
Start: 2025-05-28 | End: 2025-05-29 | Stop reason: HOSPADM

## 2025-05-28 RX ADMIN — METOPROLOL TARTRATE 5 MG: 1 INJECTION, SOLUTION INTRAVENOUS at 23:51

## 2025-05-28 RX ADMIN — METOPROLOL TARTRATE 5 MG: 1 INJECTION, SOLUTION INTRAVENOUS at 23:24

## 2025-05-28 NOTE — TELEPHONE ENCOUNTER
Pt called. She reported at her doctors appointment yesterday her HR was in the 130s. Pt sounds out of breath on the phone but reports she has COPD and this is her baseline. She denies chest pain or palpitations. Her doctor did not do an EKG yesterday. I recommended patient go get an EKG and vitals at [facility]. Pt concerned about transportation. If patient unable to get transportation, I recommended pt go to hospital with sustained high heart rates. Pt agreeable.     Pt got EKG in [place]. Pt BP was 160/80 and her HR was 85. EKG placed on Dr. Marquez's desk. Please advise any further recommendations.

## 2025-05-28 NOTE — TELEPHONE ENCOUNTER
Had Dr. Marin review the EKG. Dr. Marin noted some EKG changes concerning for ischemia. He advised pt go to ER. I called pt back and she was agreeable to go be evaluated in the ER. Pt has no further questions at this time.

## 2025-05-28 NOTE — TELEPHONE ENCOUNTER
Patient LVM stating her HR was 130s and needing a refill on her metoprolol, I do not see this on her medication list or that she has ever been on it. I tried to call patient and SUSANM for her to return our call.

## 2025-05-29 ENCOUNTER — READMISSION MANAGEMENT (OUTPATIENT)
Dept: CALL CENTER | Facility: HOSPITAL | Age: 75
End: 2025-05-29
Payer: MEDICARE

## 2025-05-29 ENCOUNTER — TELEPHONE (OUTPATIENT)
Dept: CARDIOLOGY | Facility: CLINIC | Age: 75
End: 2025-05-29
Payer: MEDICARE

## 2025-05-29 ENCOUNTER — APPOINTMENT (OUTPATIENT)
Dept: CARDIOLOGY | Facility: HOSPITAL | Age: 75
End: 2025-05-29
Payer: MEDICARE

## 2025-05-29 VITALS
SYSTOLIC BLOOD PRESSURE: 161 MMHG | RESPIRATION RATE: 22 BRPM | WEIGHT: 136.02 LBS | DIASTOLIC BLOOD PRESSURE: 93 MMHG | HEIGHT: 66 IN | OXYGEN SATURATION: 97 % | BODY MASS INDEX: 21.86 KG/M2 | HEART RATE: 71 BPM | TEMPERATURE: 98.4 F

## 2025-05-29 LAB
ANION GAP SERPL CALCULATED.3IONS-SCNC: 6.5 MMOL/L (ref 5–15)
ANISOCYTOSIS BLD QL: NORMAL
AORTIC DIMENSIONLESS INDEX: 0.62 (DI)
AV MEAN PRESS GRAD SYS DOP V1V2: 4 MMHG
AV VMAX SYS DOP: 145 CM/SEC
B PARAPERT DNA SPEC QL NAA+PROBE: NOT DETECTED
B PERT DNA SPEC QL NAA+PROBE: NOT DETECTED
BASOPHILS # BLD AUTO: 0.01 10*3/MM3 (ref 0–0.2)
BASOPHILS NFR BLD AUTO: 0.2 % (ref 0–1.5)
BH CV ECHO MEAS - AO MAX PG: 8.4 MMHG
BH CV ECHO MEAS - AO ROOT DIAM: 3.3 CM
BH CV ECHO MEAS - AO V2 VTI: 30.6 CM
BH CV ECHO MEAS - AVA(I,D): 1.78 CM2
BH CV ECHO MEAS - EDV(CUBED): 108.5 ML
BH CV ECHO MEAS - EDV(MOD-SP2): 116 ML
BH CV ECHO MEAS - EDV(MOD-SP4): 107 ML
BH CV ECHO MEAS - EF(MOD-SP2): 36.6 %
BH CV ECHO MEAS - EF(MOD-SP4): 37.9 %
BH CV ECHO MEAS - ESV(CUBED): 72.5 ML
BH CV ECHO MEAS - ESV(MOD-SP2): 73.6 ML
BH CV ECHO MEAS - ESV(MOD-SP4): 66.5 ML
BH CV ECHO MEAS - FS: 12.6 %
BH CV ECHO MEAS - IVS/LVPW: 0.74 CM
BH CV ECHO MEAS - IVSD: 0.84 CM
BH CV ECHO MEAS - LA DIMENSION: 3.8 CM
BH CV ECHO MEAS - LAT PEAK E' VEL: 4 CM/SEC
BH CV ECHO MEAS - LV DIASTOLIC VOL/BSA (35-75): 63 CM2
BH CV ECHO MEAS - LV MASS(C)D: 166.2 GRAMS
BH CV ECHO MEAS - LV MAX PG: 3.4 MMHG
BH CV ECHO MEAS - LV MEAN PG: 2 MMHG
BH CV ECHO MEAS - LV SYSTOLIC VOL/BSA (12-30): 39.2 CM2
BH CV ECHO MEAS - LV V1 MAX: 92.2 CM/SEC
BH CV ECHO MEAS - LV V1 VTI: 19 CM
BH CV ECHO MEAS - LVIDD: 4.8 CM
BH CV ECHO MEAS - LVIDS: 4.2 CM
BH CV ECHO MEAS - LVOT AREA: 2.9 CM2
BH CV ECHO MEAS - LVOT DIAM: 1.91 CM
BH CV ECHO MEAS - LVPWD: 1.14 CM
BH CV ECHO MEAS - MED PEAK E' VEL: 4.4 CM/SEC
BH CV ECHO MEAS - MV A MAX VEL: 131 CM/SEC
BH CV ECHO MEAS - MV DEC TIME: 0.21 SEC
BH CV ECHO MEAS - MV E MAX VEL: 95.4 CM/SEC
BH CV ECHO MEAS - MV E/A: 0.73
BH CV ECHO MEAS - MV MAX PG: 8.3 MMHG
BH CV ECHO MEAS - MV MEAN PG: 5 MMHG
BH CV ECHO MEAS - MV V2 VTI: 40.3 CM
BH CV ECHO MEAS - MVA(VTI): 1.35 CM2
BH CV ECHO MEAS - PA V2 MAX: 92.7 CM/SEC
BH CV ECHO MEAS - RAP SYSTOLE: 8 MMHG
BH CV ECHO MEAS - RV MAX PG: 2.5 MMHG
BH CV ECHO MEAS - RV V1 MAX: 79 CM/SEC
BH CV ECHO MEAS - RV V1 VTI: 17.5 CM
BH CV ECHO MEAS - SV(LVOT): 54.4 ML
BH CV ECHO MEAS - SV(MOD-SP2): 42.4 ML
BH CV ECHO MEAS - SV(MOD-SP4): 40.5 ML
BH CV ECHO MEAS - SVI(LVOT): 32.1 ML/M2
BH CV ECHO MEAS - SVI(MOD-SP2): 25 ML/M2
BH CV ECHO MEAS - SVI(MOD-SP4): 23.9 ML/M2
BH CV ECHO MEAS - TAPSE (>1.6): 1.84 CM
BH CV ECHO MEASUREMENTS AVERAGE E/E' RATIO: 22.71
BH CV XLRA - RV BASE: 3.6 CM
BH CV XLRA - RV LENGTH: 7.8 CM
BH CV XLRA - RV MID: 2.9 CM
BH CV XLRA - TDI S': 9.5 CM/SEC
BUN SERPL-MCNC: 15 MG/DL (ref 8–23)
BUN/CREAT SERPL: 13.4 (ref 7–25)
C PNEUM DNA NPH QL NAA+NON-PROBE: NOT DETECTED
CALCIUM SPEC-SCNC: 9.4 MG/DL (ref 8.6–10.5)
CHLORIDE SERPL-SCNC: 97 MMOL/L (ref 98–107)
CO2 SERPL-SCNC: 37.5 MMOL/L (ref 22–29)
CREAT SERPL-MCNC: 1.12 MG/DL (ref 0.57–1)
DEPRECATED RDW RBC AUTO: 48 FL (ref 37–54)
EGFRCR SERPLBLD CKD-EPI 2021: 52.4 ML/MIN/1.73
EOSINOPHIL # BLD AUTO: 0.08 10*3/MM3 (ref 0–0.4)
EOSINOPHIL NFR BLD AUTO: 1.5 % (ref 0.3–6.2)
ERYTHROCYTE [DISTWIDTH] IN BLOOD BY AUTOMATED COUNT: 15.9 % (ref 12.3–15.4)
FLUAV SUBTYP SPEC NAA+PROBE: NOT DETECTED
FLUBV RNA ISLT QL NAA+PROBE: NOT DETECTED
GLUCOSE SERPL-MCNC: 99 MG/DL (ref 65–99)
HADV DNA SPEC NAA+PROBE: NOT DETECTED
HCOV 229E RNA SPEC QL NAA+PROBE: NOT DETECTED
HCOV HKU1 RNA SPEC QL NAA+PROBE: NOT DETECTED
HCOV NL63 RNA SPEC QL NAA+PROBE: NOT DETECTED
HCOV OC43 RNA SPEC QL NAA+PROBE: NOT DETECTED
HCT VFR BLD AUTO: 31.1 % (ref 34–46.6)
HGB BLD-MCNC: 8.8 G/DL (ref 12–15.9)
HMPV RNA NPH QL NAA+NON-PROBE: NOT DETECTED
HPIV1 RNA ISLT QL NAA+PROBE: NOT DETECTED
HPIV2 RNA SPEC QL NAA+PROBE: NOT DETECTED
HPIV3 RNA NPH QL NAA+PROBE: NOT DETECTED
HPIV4 P GENE NPH QL NAA+PROBE: NOT DETECTED
IMM GRANULOCYTES # BLD AUTO: 0.01 10*3/MM3 (ref 0–0.05)
IMM GRANULOCYTES NFR BLD AUTO: 0.2 % (ref 0–0.5)
IRON 24H UR-MRATE: 27 MCG/DL (ref 37–145)
IRON SATN MFR SERPL: 8 % (ref 20–50)
IVRT: 141 MS
LEFT ATRIUM VOLUME INDEX: 50.3 ML/M2
LV EF BIPLANE MOD: 37.2 %
LYMPHOCYTES # BLD AUTO: 1.66 10*3/MM3 (ref 0.7–3.1)
LYMPHOCYTES NFR BLD AUTO: 31.8 % (ref 19.6–45.3)
M PNEUMO IGG SER IA-ACNC: NOT DETECTED
MCH RBC QN AUTO: 23.3 PG (ref 26.6–33)
MCHC RBC AUTO-ENTMCNC: 28.3 G/DL (ref 31.5–35.7)
MCV RBC AUTO: 82.5 FL (ref 79–97)
MONOCYTES # BLD AUTO: 0.49 10*3/MM3 (ref 0.1–0.9)
MONOCYTES NFR BLD AUTO: 9.4 % (ref 5–12)
NEUTROPHILS NFR BLD AUTO: 2.97 10*3/MM3 (ref 1.7–7)
NEUTROPHILS NFR BLD AUTO: 56.9 % (ref 42.7–76)
NRBC BLD AUTO-RTO: 0 /100 WBC (ref 0–0.2)
PLATELET # BLD AUTO: 146 10*3/MM3 (ref 140–450)
PMV BLD AUTO: 12.3 FL (ref 6–12)
POIKILOCYTOSIS BLD QL SMEAR: NORMAL
POTASSIUM SERPL-SCNC: 3.7 MMOL/L (ref 3.5–5.2)
QT INTERVAL: 300 MS
QTC INTERVAL: 466 MS
RBC # BLD AUTO: 3.77 10*6/MM3 (ref 3.77–5.28)
RHINOVIRUS RNA SPEC NAA+PROBE: NOT DETECTED
RSV RNA NPH QL NAA+NON-PROBE: NOT DETECTED
SARS-COV-2 RNA RESP QL NAA+PROBE: NOT DETECTED
SMALL PLATELETS BLD QL SMEAR: ADEQUATE
SODIUM SERPL-SCNC: 141 MMOL/L (ref 136–145)
TIBC SERPL-MCNC: 325 MCG/DL (ref 298–536)
TRANSFERRIN SERPL-MCNC: 218 MG/DL (ref 200–360)
WBC MORPH BLD: NORMAL
WBC NRBC COR # BLD AUTO: 5.22 10*3/MM3 (ref 3.4–10.8)

## 2025-05-29 PROCEDURE — 80048 BASIC METABOLIC PNL TOTAL CA: CPT | Performed by: STUDENT IN AN ORGANIZED HEALTH CARE EDUCATION/TRAINING PROGRAM

## 2025-05-29 PROCEDURE — 94761 N-INVAS EAR/PLS OXIMETRY MLT: CPT

## 2025-05-29 PROCEDURE — 99204 OFFICE O/P NEW MOD 45 MIN: CPT | Performed by: INTERNAL MEDICINE

## 2025-05-29 PROCEDURE — 25010000002 LORAZEPAM PER 2 MG: Performed by: STUDENT IN AN ORGANIZED HEALTH CARE EDUCATION/TRAINING PROGRAM

## 2025-05-29 PROCEDURE — 25010000002 MORPHINE PER 10 MG: Performed by: STUDENT IN AN ORGANIZED HEALTH CARE EDUCATION/TRAINING PROGRAM

## 2025-05-29 PROCEDURE — 94799 UNLISTED PULMONARY SVC/PX: CPT

## 2025-05-29 PROCEDURE — 25010000002 FUROSEMIDE PER 20 MG: Performed by: STUDENT IN AN ORGANIZED HEALTH CARE EDUCATION/TRAINING PROGRAM

## 2025-05-29 PROCEDURE — 96375 TX/PRO/DX INJ NEW DRUG ADDON: CPT

## 2025-05-29 PROCEDURE — 94664 DEMO&/EVAL PT USE INHALER: CPT

## 2025-05-29 PROCEDURE — G0378 HOSPITAL OBSERVATION PER HR: HCPCS

## 2025-05-29 PROCEDURE — 96376 TX/PRO/DX INJ SAME DRUG ADON: CPT

## 2025-05-29 PROCEDURE — 0202U NFCT DS 22 TRGT SARS-COV-2: CPT | Performed by: STUDENT IN AN ORGANIZED HEALTH CARE EDUCATION/TRAINING PROGRAM

## 2025-05-29 PROCEDURE — 93306 TTE W/DOPPLER COMPLETE: CPT | Performed by: INTERNAL MEDICINE

## 2025-05-29 PROCEDURE — 99239 HOSP IP/OBS DSCHRG MGMT >30: CPT | Performed by: INTERNAL MEDICINE

## 2025-05-29 PROCEDURE — 85007 BL SMEAR W/DIFF WBC COUNT: CPT | Performed by: STUDENT IN AN ORGANIZED HEALTH CARE EDUCATION/TRAINING PROGRAM

## 2025-05-29 PROCEDURE — 85025 COMPLETE CBC W/AUTO DIFF WBC: CPT | Performed by: STUDENT IN AN ORGANIZED HEALTH CARE EDUCATION/TRAINING PROGRAM

## 2025-05-29 PROCEDURE — 93306 TTE W/DOPPLER COMPLETE: CPT

## 2025-05-29 RX ORDER — METOPROLOL TARTRATE 25 MG/1
25 TABLET, FILM COATED ORAL 2 TIMES DAILY
Qty: 60 TABLET | Refills: 0 | Status: SHIPPED | OUTPATIENT
Start: 2025-05-29 | End: 2025-06-28

## 2025-05-29 RX ORDER — HYDROCODONE BITARTRATE AND ACETAMINOPHEN 5; 325 MG/1; MG/1
1 TABLET ORAL EVERY 6 HOURS PRN
Refills: 0 | Status: DISCONTINUED | OUTPATIENT
Start: 2025-05-29 | End: 2025-05-29 | Stop reason: HOSPADM

## 2025-05-29 RX ORDER — FUROSEMIDE 20 MG/1
20 TABLET ORAL DAILY
Qty: 30 TABLET | Refills: 0 | Status: SHIPPED | OUTPATIENT
Start: 2025-05-29 | End: 2025-06-28

## 2025-05-29 RX ORDER — LORAZEPAM 2 MG/ML
1 INJECTION INTRAMUSCULAR EVERY 8 HOURS PRN
Status: DISCONTINUED | OUTPATIENT
Start: 2025-05-29 | End: 2025-05-29 | Stop reason: HOSPADM

## 2025-05-29 RX ORDER — MORPHINE SULFATE 2 MG/ML
1 INJECTION, SOLUTION INTRAMUSCULAR; INTRAVENOUS EVERY 4 HOURS PRN
Status: DISCONTINUED | OUTPATIENT
Start: 2025-05-29 | End: 2025-05-29 | Stop reason: HOSPADM

## 2025-05-29 RX ADMIN — FUROSEMIDE 40 MG: 10 INJECTION, SOLUTION INTRAMUSCULAR; INTRAVENOUS at 08:21

## 2025-05-29 RX ADMIN — MORPHINE SULFATE 1 MG: 2 INJECTION, SOLUTION INTRAMUSCULAR; INTRAVENOUS at 02:30

## 2025-05-29 RX ADMIN — Medication 10 ML: at 08:23

## 2025-05-29 RX ADMIN — LORAZEPAM 1 MG: 2 INJECTION INTRAMUSCULAR; INTRAVENOUS at 04:21

## 2025-05-29 RX ADMIN — SERTRALINE 100 MG: 50 TABLET, FILM COATED ORAL at 08:21

## 2025-05-29 RX ADMIN — BUDESONIDE AND FORMOTEROL FUMARATE DIHYDRATE 2 PUFF: 160; 4.5 AEROSOL RESPIRATORY (INHALATION) at 08:39

## 2025-05-29 RX ADMIN — APIXABAN 5 MG: 5 TABLET, FILM COATED ORAL at 08:21

## 2025-05-29 RX ADMIN — VALSARTAN 320 MG: 80 TABLET, FILM COATED ORAL at 08:22

## 2025-05-29 RX ADMIN — MORPHINE SULFATE 1 MG: 2 INJECTION, SOLUTION INTRAMUSCULAR; INTRAVENOUS at 08:22

## 2025-05-29 RX ADMIN — HYDROCHLOROTHIAZIDE 25 MG: 25 TABLET ORAL at 08:22

## 2025-05-29 NOTE — CASE MANAGEMENT/SOCIAL WORK
Discharge Planning Assessment   [facility]     Patient Name: Vanna Rincon  MRN: [id number]  Today's Date: 5/29/2025    Admit Date: 5/28/2025    Plan: DCP Home with , Denies any needs,   Discharge Needs Assessment       Row Name 05/29/25 1240       Living Environment    People in Home spouse    Name(s) of People in Home Michelle/    Current Living Arrangements home    Duration at Residence Over 25 years.    Potentially Unsafe Housing Conditions unable to assess    In the past 12 months has the electric, gas, oil, or water company threatened to shut off services in your home? No    Primary Care Provided by self    Provides Primary Care For no one    Family Caregiver if Needed spouse    Family Caregiver Names Michelle/    Quality of Family Relationships supportive    Able to Return to Prior Arrangements yes       Resource/Environmental Concerns    Resource/Environmental Concerns none    Transportation Concerns none       Transportation Needs    In the past 12 months, has lack of transportation kept you from medical appointments or from getting medications? no    In the past 12 months, has lack of transportation kept you from meetings, work, or from getting things needed for daily living? No       Food Insecurity    Within the past 12 months, you worried that your food would run out before you got the money to buy more. Never true    Within the past 12 months, the food you bought just didn't last and you didn't have money to get more. Never true       Transition Planning    Patient/Family Anticipates Transition to home with family    Patient/Family Anticipated Services at Transition none    Transportation Anticipated family or friend will provide       Discharge Needs Assessment    Readmission Within the Last 30 Days no previous admission in last 30 days    Equipment Currently Used at Home rollator;wheelchair;bp cuff;scales;oxygen;nebulizer;hospital bed;pulse ox    Concerns to be Addressed  "discharge planning    Do you want help finding or keeping work or a job? I do not need or want help    Do you want help with school or training? For example, starting or completing job training or getting a high school diploma, GED or equivalent No    Anticipated Changes Related to Illness none    Equipment Needed After Discharge none    Provided Post Acute Provider List? N/A                   Discharge Plan       Row Name 25 3533       Plan    Plan DCP Home with , Denies any needs,    Plan Comments CM spoke with pt at bedside. Permission given to discuss DCP. Pt confirmed name,,address and insurance. Yes to LW-Papers in chart. No POA, and No  service. PCP confirmed as JENELLE martinez, last seen \"few months ago\". Pharmacy used [facility]. Agreed to meds to bed. DME listed above. Has home 02 but can not recall DME used. Denies any additional DME needs. Pt lives with  and states (I) with ADL's, Uses Rollator with ambulation. Denies any recent falls. Pt denies any finanical, food or transportation issues. DCP-Return home with family to transport.                  Selected Continued Care - Episodes Includes continued care and service providers with selected services from the active episodes listed below          Expected Discharge Date and Time       Expected Discharge Date Expected Discharge Time    May 29, 2025            Demographic Summary       Row Name 25 1236       General Information    Admission Type observation    Arrived From emergency department    Required Notices Provided Observation Status Notice    Referral Source admission list    Reason for Consult discharge planning    Preferred Language English       Contact Information    Permission Granted to Share Info With                    Functional Status       Row Name 25 1239       Functional Status    Usual Activity Tolerance fair    Current Activity Tolerance poor       Physical Activity    On average, " how many days per week do you engage in moderate to strenuous exercise (like a brisk walk)? 0 days    On average, how many minutes do you engage in exercise at this level? 0 min    Number of minutes of exercise per week 0       Assessment of Health Literacy    How often do you have someone help you read hospital materials? Never    How often do you have problems learning about your medical condition because of difficulty understanding written information? Never    How often do you have a problem understanding what is told to you about your medical condition? Never    How confident are you filling out medical forms by yourself? Extremely    Health Literacy Excellent       Functional Status, IADL    Meal Preparation independent    Housekeeping independent    Laundry independent    Shopping assistive person    If for any reason you need help with day-to-day activities such as bathing, preparing meals, shopping, managing finances, etc., do you get the help you need? I don't need any help       Mental Status    General Appearance WDL WDL       Mental Status Summary    Recent Changes in Mental Status/Cognitive Functioning no changes       Employment/    Employment Status retired                   Psychosocial       Row Name 05/29/25 3428       Developmental Stage (Eriksson's Stages of Development)    Developmental Stage Stage 8 (67 years-death/Late Adulthood) Integrity vs. Despair                   Abuse/Neglect    No documentation.                  Legal    No documentation.                  Substance Abuse    No documentation.                  Patient Forms    No documentation.                     Chasidy Sanders RN

## 2025-05-29 NOTE — ED PROVIDER NOTES
Pt Name: Vanna Rincon  MRN: [id number]  : 1950  Date of Encounter: 2025    PCP: Farhad Chowdary MD      Subjective    History of Present Illness:    Chief Complaint: Increased weakness, shortness of breath, palpitation    History of Present Illness: Vanna Rincon is a 74 y.o. female who presents to the ER complaining of increased weakness, shortness of breath, palpitations that started 3 days ago and is progressively worsened over the interval.  Patient states she wears oxygen all the time has a history of heart disease, COPD and wears approximately 6 to 8 L of oxygen at home.  Patient states that over the last 3 days she has felt much more short of breath, increased weakness, some chest discomfort that she describes as pressure in her substernal does not radiate.        Triage Vitals:    ED Triage Vitals   Temp Heart Rate Resp BP SpO2   25   98.4 °F (36.9 °C) 82 18 158/91 100 %      Temp src Heart Rate Source Patient Position BP Location FiO2 (%)   25 --   Oral Monitor Lying Right arm        Nurses Notes reviewed and agree, including vitals, allergies, social history and prior medical history.     Iodinated contrast media, Feraheme [ferumoxytol], Brimonidine tartrate, Ciprofloxacin, Doxycycline, Lipitor [atorvastatin], Nsaids, and Sulfa antibiotics    Past Medical History:   Diagnosis Date    Abnormal mammogram     Acute UTI     Allergic rhinitis     Anemia     Anxiety     Arthritis     knee, right    Asthmatic bronchitis     Back pain     Candida vaginitis     Cataracts, bilateral     CHF (congestive heart failure)     not diagnosed    Chronic lung disease     bronchiolitis obliterans. - Ct scan stable with pulmonary intolerant to pft    Chronic pain syndrome     disc disease, lumbar, neck    Colon polyps     Community acquired pneumonia     Conjunctivitis     COPD (chronic  "obstructive pulmonary disease)     Emphysema with severe physiology on PFTs.  Patient is a former smoker.    COPD (chronic obstructive pulmonary disease)     4L of oxygen    Cough     DDD (degenerative disc disease), lumbar     Deafness     Dependence on supplemental oxygen     PT REPORTS \"4L ALL TIMES\"    Disc disease, degenerative, cervical     Dry eye     Former smoker     Gastric bleed     Genitourinary syndrome of menopause     GERD (gastroesophageal reflux disease)     Glaucoma     H/O chest x-ray 08/11/2015    Rase base opacity consistent with pneumonia or atelectasis    H/O chest x-ray 06/24/2015    Right basilar airspace disease and effusion consistent with a pneumonia. F/U to radiographic reolution is recommended    H/O chest x-ray 03/09/2010    Cardiac silhoutte is not enlarged. Lungs aare inflated. Mild nonspecifiec interstitial changes. No pleual effusions or dense consolidations. Scattered granulomatous changes. Spine with mild kyphosis but no osteopenia. No significant adenopathy    H/O echocardiogram 03/16/2010    Normal study    Herpes zoster     History of PFTs 12/13/2011    Goo pt cooperation and effort. Pt given 3 puffs of xopenex. PFT is acceptable and reproducible    History of PFTs 08/11/2011    Pt unable to produce acceptabel and reproducible PFT. Pt was given 3 puffs of xopenex. Pt gave good effort Best pleth of two attempts    History of PFTs 02/24/2011    PFT acceptable and reproducible. Pt gave good effort. Pt used bronchodilator less than 2 hours prior to testing    Hyperlipidemia     Hypertension     IBS (irritable bowel syndrome)     Insomnia     Interstitial lung disease     History of rheumatoid lung with bronchiolitis obliterans    Kidney stones     x 1    Lumbar herniated disc     Nephrolithiasis     2007, passed    Ocular migraine     On home oxygen therapy     REPORTS USES AT 4L NC AT ALL TIMES    Overactive bladder     Panic attack     Peripheral edema     Pneumonia     PRB " (rectal bleeding)     RA (rheumatoid arthritis)     Renal insufficiency     Rheumatoid arthritis     · A.  Diagnosed in  with history of methotrexate and Enbrel therapy.    Rheumatoid lung     bronchiolitis obliterans    Sciatica     Sinus problem     Thrombophlebitis     Tinnitus     Urge incontinence     Vertigo        Past Surgical History:   Procedure Laterality Date    CATARACT EXTRACTION Right     COLONOSCOPY      COLONOSCOPY N/A 2018    Procedure: COLONOSCOPY WITH COLD SNARE POLYPECTOMY X 6; SUBMUCOSAL INJECTION OF SALINE; HOT SNARE POLYPECTOMY X 4; CLIP PLACEMENT X 1;  Surgeon: Kenney Pastrana MD;  Location: [facility] ENDOSCOPY;  Service:     DENTAL PROCEDURE      ENDOSCOPY N/A 12/15/2017    Procedure: ESOPHAGOGASTRODUODENOSCOPY with biopsies and esophageal balloon dilitation;  Surgeon: Kenney Pastrana MD;  Location: [facility] ENDOSCOPY;  Service:     MOUTH SURGERY      REPORTS IMPLANT X2    ORAL ANTRAL FISTULA CLOSURE      gums    TONSILLECTOMY      TUBAL ABDOMINAL LIGATION      UPPER GASTROINTESTINAL ENDOSCOPY      UPPER GASTROINTESTINAL ENDOSCOPY  12/15/2017       Social History     Socioeconomic History    Marital status:     Number of children: 0   Tobacco Use    Smoking status: Former     Current packs/day: 0.00     Average packs/day: 1 pack/day for 28.0 years (28.0 ttl pk-yrs)     Types: Cigarettes     Start date:      Quit date:      Years since quittin.4    Smokeless tobacco: Never   Vaping Use    Vaping status: Never Used   Substance and Sexual Activity    Alcohol use: No    Drug use: No    Sexual activity: Defer     Comment:        Family History   Problem Relation Age of Onset    Glaucoma Mother     Coronary artery disease Mother     Liver cancer Mother         bile duct cancer    Heart disease Mother     Cancer Mother     Hypertension Mother     Stroke Father     Stomach cancer Father     Cancer Father     Diabetes Sister     Other (systemic lupus  erythematosus) Sister     Heart disease Maternal Grandmother     Diabetes Maternal Grandmother     Diabetes Paternal Grandmother     Colon cancer Neg Hx     Ulcerative colitis Neg Hx        REVIEW OF SYSTEMS:     All systems reviewed and not pertinent unless noted.    Review of Systems   Constitutional:  Positive for fatigue.   Respiratory:  Positive for chest tightness, shortness of breath and wheezing.    Cardiovascular:  Positive for chest pain.   Neurological:  Positive for weakness.       Objective    Physical Exam  Vitals and nursing note reviewed.   Constitutional:       Appearance: Normal appearance.   HENT:      Head: Normocephalic and atraumatic.   Eyes:      Extraocular Movements: Extraocular movements intact.      Pupils: Pupils are equal, round, and reactive to light.   Cardiovascular:      Rate and Rhythm: Normal rate and regular rhythm.      Pulses: Normal pulses.      Heart sounds: Normal heart sounds.   Pulmonary:      Effort: Pulmonary effort is normal.      Breath sounds: Decreased breath sounds and wheezing present.   Abdominal:      General: Abdomen is flat. Bowel sounds are normal.      Palpations: Abdomen is soft.   Musculoskeletal:      Cervical back: Normal range of motion and neck supple.   Skin:     Capillary Refill: Capillary refill takes less than 2 seconds.   Neurological:      General: No focal deficit present.      Mental Status: She is alert and oriented to person, place, and time. Mental status is at baseline.      GCS: GCS eye subscore is 4. GCS verbal subscore is 5. GCS motor subscore is 6.      Sensory: Sensation is intact.      Motor: Motor function is intact.      Gait: Gait is intact.   Psychiatric:         Attention and Perception: Attention and perception normal.         Mood and Affect: Mood and affect normal.         Speech: Speech normal.         Behavior: Behavior normal. Behavior is cooperative.           HEART Score for Major Cardiac Events from [email or web address]  on  5/28/2025  ** All calculations should be rechecked by clinician prior to use **    RESULT SUMMARY:  6 points  Moderate Score (4-6 points)    Risk of MACE of 12-16.6%.    If troponin is positive, many experts recommend further workup and admission even with a low HEART Score.      INPUTS:  History --> 1 = Moderately suspicious  EKG --> 1 = Non-specific repolarization disturbance  Age --> 2 = >=65  Risk factors --> 1 = 1-2 risk factors  Initial troponin --> 1 = 1-3× normal limit                      Procedures    ED Course:    ECG 12 Lead Dyspnea   Preliminary Result   Test Reason : Dyspnea   Blood Pressure :   */*   mmHG   Vent. Rate : 145 BPM     Atrial Rate : 153 BPM      P-R Int :   * ms          QRS Dur :  74 ms       QT Int : 300 ms       P-R-T Axes :   *  58 269 degrees     QTcB Int : 466 ms      Atrial fibrillation with rapid ventricular response with premature   ventricular or aberrantly conducted complexes   ST & T wave abnormality, consider lateral ischemia   Abnormal ECG   No previous ECGs available      Referred By: KERLEY           Confirmed By:       ECG 12 Lead Other; shortness of air   Final Result          ED Course as of 05/29/25 0129   Wed May 28, 2025   2013   EKG Interpretation    Evaluated and interpreted by emergency department physician    Rhythm: Normal Sinus Rhythm  Rate: Normal  Axis: Анна  Ectopy: PACs  Conduction: Normal  ST Segments: Normal  T Waves: Deep T wave inversions in V4 V5  Q Waves: avr    Clinical Impression: Normal Sinus Rhythm with T wave inversions in the lateral leads that could indicate lateral ischemia    Juice Nance DO   [CR]      ED Course User Index  [CR] Juice Nance DO       Orders placed during this visit:    Orders Placed This Encounter   Procedures    Respiratory Panel PCR w/COVID-19(SARS-CoV-2) BERTO/KEY/ROMAIN/PAD/COR/GERALD In-House, NP Swab in UTM/VTM, 2 HR TAT - Swab, Nasopharynx    XR Chest 1 View    Mcgrew Draw    Comprehensive  Metabolic Panel    BNP    High Sensitivity Troponin T    CBC Auto Differential    Scan Slide    High Sensitivity Troponin T 1Hr    Basic Metabolic Panel    CBC Auto Differential    Diet: Cardiac, Fluid Restriction (240 mL/tray); Healthy Heart (2-3 Na+); 1500 mL/day; Fluid Consistency: Thin (IDDSI 0)    Undress & Gown    Continuous Pulse Oximetry    Vital Signs    Vital Signs    Up With Assistance    Intake & Output    Weigh Patient    Oral Care    Maintain IV Access    Telemetry - Place Orders & Notify Provider of Results When Patient Experiences Acute Chest Pain, Dysrhythmia or Respiratory Distress    May Be Off Telemetry for Tests    Daily Weights    Strict Intake & Output    Notify Provider (With Default Parameters)    Code Status and Medical Interventions: No CPR (Do Not Attempt to Resuscitate); Limited Support; No intubation (DNI)    Inpatient Nephrology Consult    Inpatient Case Management  Consult    OT Consult: Eval & Treat ADL Performance Below Baseline    PT Consult: Eval & Treat Discharge Placement Assessment    Oxygen Therapy- Nasal Cannula; Titrate 1-6 LPM Per SpO2; 90 - 95%    ECG 12 Lead Other; shortness of air    ECG 12 Lead Dyspnea    Adult Transthoracic Echo Complete w/ Color, Spectral and Contrast if necessary per protocol    Insert Peripheral IV    Insert Peripheral IV    Initiate Observation Status    CBC & Differential    Green Top (Gel)    Lavender Top    Gold Top - SST    Light Blue Top       LAB Results:    Lab Results (last 24 hours)       Procedure Component Value Units Date/Time    CBC & Differential [[id number]]  (Abnormal) Collected: 05/28/25 1907    Specimen: Blood Updated: 05/28/25 1938    Narrative:      The following orders were created for panel order CBC & Differential.  Procedure                               Abnormality         Status                     ---------                               -----------         ------                     CBC Auto  Differential[[id number]]        Abnormal            Final result               Scan Slide[[id number]]                                       Final result                 Please view results for these tests on the individual orders.    Comprehensive Metabolic Panel [[id number]]  (Abnormal) Collected: 05/28/25 1907    Specimen: Blood Updated: 05/28/25 1935     Glucose 117 mg/dL      BUN 15.0 mg/dL      Creatinine 1.32 mg/dL      Sodium 141 mmol/L      Potassium 4.3 mmol/L      Chloride 97 mmol/L      CO2 33.8 mmol/L      Calcium 9.2 mg/dL      Total Protein 6.8 g/dL      Albumin 4.0 g/dL      ALT (SGPT) 7 U/L      AST (SGOT) 15 U/L      Alkaline Phosphatase 85 U/L      Total Bilirubin 0.2 mg/dL      Globulin 2.8 gm/dL      A/G Ratio 1.4 g/dL      BUN/Creatinine Ratio 11.4     Anion Gap 10.2 mmol/L      eGFR 43.0 mL/min/1.73     Narrative:      GFR Categories in Chronic Kidney Disease (CKD)              GFR Category          GFR (mL/min/1.73)    Interpretation  G1                    90 or greater        Normal or high (1)  G2                    60-89                Mild decrease (1)  G3a                   45-59                Mild to moderate decrease  G3b                   30-44                Moderate to severe decrease  G4                    15-29                Severe decrease  G5                    14 or less           Kidney failure    (1)In the absence of evidence of kidney disease, neither GFR category G1 or G2 fulfill the criteria for CKD.    eGFR calculation 2021 CKD-EPI creatinine equation, which does not include race as a factor    BNP [[id number]]  (Abnormal) Collected: 05/28/25 1907    Specimen: Blood Updated: 05/28/25 1935     proBNP 17,587.0 pg/mL     Narrative:      This assay is used as an aid in the diagnosis of individuals suspected of having heart failure. It can be used as an aid in the diagnosis of acute decompensated heart failure (ADHF) in patients presenting with signs and symptoms of ADHF to the  emergency department (ED). In addition, NT-proBNP of <300 pg/mL indicates ADHF is not likely.    Age Range Result Interpretation  NT-proBNP Concentration (pg/mL:      <50             Positive            >450                   Gray                 300-450                    Negative             <300    50-75           Positive            >900                  Gray                300-900                  Negative            <300      >75             Positive            >1800                  Gray                300-1800                  Negative            <300    High Sensitivity Troponin T [[id number]]  (Abnormal) Collected: 05/28/25 1907    Specimen: Blood Updated: 05/28/25 1935     HS Troponin T 35 ng/L     Narrative:      High Sensitive Troponin T Reference Range:  <14.0 ng/L- Negative Female for AMI  <22.0 ng/L- Negative Male for AMI  >=14 - Abnormal Female indicating possible myocardial injury.  >=22 - Abnormal Male indicating possible myocardial injury.   Clinicians would have to utilize clinical acumen, EKG, Troponin, and serial changes to determine if it is an Acute Myocardial Infarction or myocardial injury due to an underlying chronic condition.         CBC Auto Differential [[id number]]  (Abnormal) Collected: 05/28/25 1907    Specimen: Blood Updated: 05/28/25 1938     WBC 5.03 10*3/mm3      RBC 3.88 10*6/mm3      Hemoglobin 9.0 g/dL      Hematocrit 31.9 %      MCV 82.2 fL      MCH 23.2 pg      MCHC 28.2 g/dL      RDW 15.9 %      RDW-SD 47.7 fl      MPV 10.5 fL      Platelets 124 10*3/mm3      Neutrophil % 77.5 %      Lymphocyte % 14.3 %      Monocyte % 7.6 %      Eosinophil % 0.2 %      Basophil % 0.2 %      Immature Grans % 0.2 %      Neutrophils, Absolute 3.90 10*3/mm3      Lymphocytes, Absolute 0.72 10*3/mm3      Monocytes, Absolute 0.38 10*3/mm3      Eosinophils, Absolute 0.01 10*3/mm3      Basophils, Absolute 0.01 10*3/mm3      Immature Grans, Absolute 0.01 10*3/mm3      nRBC 0.0 /100 WBC     Scan  Slide [[id number]] Collected: 05/28/25 1907    Specimen: Blood Updated: 05/28/25 1938     RBC Morphology Normal     WBC Morphology Normal     Platelet Estimate Decreased    High Sensitivity Troponin T 1Hr [[id number]]  (Abnormal) Collected: 05/28/25 2014    Specimen: Blood Updated: 05/28/25 2034     HS Troponin T 32 ng/L      Troponin T Numeric Delta -3 ng/L      Troponin T % Delta -9    Narrative:      High Sensitive Troponin T Reference Range:  <14.0 ng/L- Negative Female for AMI  <22.0 ng/L- Negative Male for AMI  >=14 - Abnormal Female indicating possible myocardial injury.  >=22 - Abnormal Male indicating possible myocardial injury.   Clinicians would have to utilize clinical acumen, EKG, Troponin, and serial changes to determine if it is an Acute Myocardial Infarction or myocardial injury due to an underlying chronic condition.         Respiratory Panel PCR w/COVID-19(SARS-CoV-2) BERTO/KEY/ROMAIN/PAD/COR/GERALD In-House, NP Swab in UTM/VTM, 2 HR TAT - Swab, Nasopharynx [[id number]] Collected: 05/29/25 0059    Specimen: Swab from Nasopharynx Updated: 05/29/25 0104             If labs were ordered, I have independently reviewed the results and considered them in the diagnosis and treatment plan for the patient    RADIOLOGY    No radiology results from the last 24 hrs     If I have ordered, I have independently reviewed the above noted radiographic studies.  Please see the radiologist dictation for the official interpretation    Medications given to patient in the ER    Medications   sodium chloride 0.9 % flush 10 mL (has no administration in time range)   budesonide-formoterol (SYMBICORT) 160-4.5 MCG/ACT inhaler 2 puff (2 puffs Inhalation Not Given 5/28/25 4463)   fentaNYL (DURAGESIC) 75 MCG/HR patch 1 patch (has no administration in time range)     And   Check Fentanyl Patch Placement (has no administration in time range)   sertraline (ZOLOFT) tablet 100 mg (has no administration in time range)   valsartan (DIOVAN) tablet  320 mg (has no administration in time range)     And   hydroCHLOROthiazide tablet 25 mg (has no administration in time range)   sodium chloride 0.9 % flush 10 mL (has no administration in time range)   sodium chloride 0.9 % flush 10 mL (has no administration in time range)   sodium chloride 0.9 % infusion 40 mL (has no administration in time range)   ondansetron (ZOFRAN) injection 4 mg (has no administration in time range)   nitroglycerin (NITROSTAT) SL tablet 0.4 mg (has no administration in time range)   heparin (porcine) 5000 UNIT/ML injection 5,000 Units (has no administration in time range)   Potassium Replacement - Follow Nurse / BPA Driven Protocol (has no administration in time range)   Magnesium Standard Dose Replacement - Follow Nurse / BPA Driven Protocol (has no administration in time range)   Phosphorus Replacement - Follow Nurse / BPA Driven Protocol (has no administration in time range)   Calcium Replacement - Follow Nurse / BPA Driven Protocol (has no administration in time range)   sennosides-docusate (PERICOLACE) 8.6-50 MG per tablet 2 tablet (has no administration in time range)     And   polyethylene glycol (MIRALAX) packet 17 g (has no administration in time range)     And   bisacodyl (DULCOLAX) EC tablet 5 mg (has no administration in time range)     And   bisacodyl (DULCOLAX) suppository 10 mg (has no administration in time range)   furosemide (LASIX) injection 40 mg (has no administration in time range)   metoprolol tartrate (LOPRESSOR) injection 5 mg (5 mg Intravenous Given 5/28/25 3451)       AS OF 01:29 EDT VITALS:    BP - (!) 139/104  HR - (!) 144  TEMP - 99.3 °F (37.4 °C) (Oral)  O2 SATS - 100%         Shared Decision Making: After my consideration of the clinical presentation and laboratory/radiology studies obtained, I have discussed the findings with the patient/patient representative who is in agreement with the treatment plan and final disposition. Risks and benefits of discharge  and/or observation admission were discussed.  Final disposition of the patient will be admitted to the hospital.  Patient is requested to follow-up with primary care provider and specialist in 1 week following final discharge.      Medical Decision Making   Vanna Rincon is a 74 y.o. female who presents to the ER complaining of increased weakness, shortness of breath, palpitations that started 3 days ago and is progressively worsened over the interval.  Patient states she wears oxygen all the time has a history of heart disease, COPD and wears approximately 6 to 8 L of oxygen at home.  Patient states that over the last 3 days she has felt much more short of breath, increased weakness, some chest discomfort that she describes as pressure in her substernal does not radiate.      Physical exam the patient shows auscultation lungs decreased in the bases with some scattered wheezing, labs were obtained which did show elevated troponin level, elevated BN P patient peripheral edema is +2+3, elevated troponin, heart score of 6.  I have reviewed all findings with patient and feel that this patient would benefit from admission and evaluation by cardiology.    DDX: includes but is not limited to: NSTEMI, STEMI, chest pain unspecified, COPD exacerbation, CHF exacerbation, other    Problems Addressed:  Chest pain, unspecified type: complicated acute illness or injury  Congestive heart failure, unspecified HF chronicity, unspecified heart failure type: complicated acute illness or injury    Amount and/or Complexity of Data Reviewed  Labs: ordered. Decision-making details documented in ED Course.     Details: I have personally reviewed and documented all results  Radiology: ordered. Decision-making details documented in ED Course.     Details: I have personally reviewed and documented all results  ECG/medicine tests: ordered. Decision-making details documented in ED Course.     Details: I have personally reviewed and  documented all results  Discussion of management or test interpretation with external provider(s): Discussed assessment, treatment and plan with ER attending, hospitalist    Risk  Prescription drug management.  Decision regarding hospitalization.  Risk Details: I have discussed with patient the finding of the test preformed today. Patient has been diagnosed with chest pain, unspecified, congestive heart failure exacerbation and will be admitted to the hospital.             Final diagnoses:   Chest pain, unspecified type   Congestive heart failure, unspecified HF chronicity, unspecified heart failure type       Please note that portions of this document were completed using voice recognition dictation software.       Dario Pinon, APRN  05/29/25 0129

## 2025-05-29 NOTE — DISCHARGE SUMMARY
"    [facility]      Name:  Vanna Rincon   Age:  74 y.o.  Sex:  female  :  1950  MRN:  [id number]   Visit Number:  [id number]    Admission Date:  2025  Date of Discharge:  2025  Primary Care Physician:  Farhad Chowdary MD      Discharge Diagnoses:     Acute exacerbation of heart failure with preserved ejection fraction  CKD  New onset A-fib with RVR  Elevated troponin    Problem List:     Active Hospital Problems    Diagnosis  POA    **CHF (congestive heart failure) [I50.9]  Yes      Resolved Hospital Problems   No resolved problems to display.     Presenting Problem:    Chief Complaint   Patient presents with    Shortness of Breath    Fatigue    Weakness - Generalized      Consults:     Consulting Physician(s)                     None              Procedures Performed:        History of presenting illness/Hospital Course:    Per H&P \"Vanna Rincon is a 74-year-old woman with past medical history of COPD on 6 L baseline, heart failure with preserved ejection fraction, CKD 3, anemia, urinary continence, history of kidney stone, rheumatoid arthritis with rheumatoid lung, hypertension, hyperlipidemia, GERD, remote history of smoking, degenerative disc disease, chronic pain syndrome and back, anxiety.  Presented to [facility] ED on 2025 with concern for shortness of air, generalized weakness, chest pain, onset 3 days progressively worse.  Denied any fevers or chills, abdominal pain, N/V/D.     ED summary: Afebrile, vital signs stable on 3 L.  EKG sinus rhythm, inferior and anterolateral T wave inversions.  Troponin 35, 32, ACS not suspected.  proBNP over 17,000.  Creatinine 1.32.  No leukocytosis.  Hemoglobin 9.  CXR radiology interpretation pending, no large opacities.\"    Observation General Floor admission 2025 with acute exacerbation of heart failure with preserved ejection fraction, elevated troponin ACS not suspected, new onset atrial " fibrillation.  Converted to normal sinus after receiving Lopressor.     Acute exacerbation of heart failure with preserved ejection fraction  Chronic kidney disease  IV Lasix.  Nephrology consultation given underlying CKD, recommendations appreciated.  Echocardiogram EF 40-45% with grade 2 diastolic dysfunction  New onset atrial fibrillation with RVR  Start Eliquis.  Lopressor as needed.  Cardiology consultation, recommendations appreciated.  Elevated troponin  ACS not suspected, likely demand ischemia.  Impaired mobility  PT/OT.     Discharged, outpatient follow-up    Vital Signs:    Temp:  [98.3 °F (36.8 °C)-99.3 °F (37.4 °C)] 98.4 °F (36.9 °C)  Heart Rate:  [] 71  Resp:  [18-26] 22  BP: (131-168)/() 161/93    Physical Exam:    General Appearance:  Alert and cooperative.    Head:  Atraumatic and normocephalic.   Eyes: Conjunctivae and sclerae normal, no icterus. No pallor.   Ears:  Ears with no abnormalities noted.   Throat: No oral lesions, no thrush, oral mucosa moist.   Neck: Supple, trachea midline, no thyromegaly.   Back:   No kyphoscoliosis present. No tenderness to palpation.   Lungs:   Breath sounds heard bilaterally equally.  No crackles or wheezing. No Pleural rub or bronchial breathing.   Heart:  Normal S1 and S2, no murmur, no gallop, no rub. No JVD.   Abdomen:   Normal bowel sounds, no masses, no organomegaly. Soft, nontender, nondistended, no rebound tenderness.   Extremities: Supple, no edema, no cyanosis, no clubbing.   Pulses: Pulses palpable bilaterally.   Skin: No bleeding or rash.   Neurologic: Alert and oriented x 3. No facial asymmetry. Moves all four limbs. No tremors.     Pertinent Lab Results:     Results from last 7 days   Lab Units 05/29/25  0912 05/28/25  1907   SODIUM mmol/L 141 141   POTASSIUM mmol/L 3.7 4.3   CHLORIDE mmol/L 97* 97*   CO2 mmol/L 37.5* 33.8*   BUN mg/dL 15.0 15.0   CREATININE mg/dL 1.12* 1.32*   CALCIUM mg/dL 9.4 9.2   BILIRUBIN mg/dL  --  0.2   ALK PHOS  U/L  --  85   ALT (SGPT) U/L  --  7   AST (SGOT) U/L  --  15   GLUCOSE mg/dL 99 117*     Results from last 7 days   Lab Units 05/29/25  0912 05/28/25 1907   WBC 10*3/mm3 5.22 5.03   HEMOGLOBIN g/dL 8.8* 9.0*   HEMATOCRIT % 31.1* 31.9*   PLATELETS 10*3/mm3 146 124*         Results from last 7 days   Lab Units 05/28/25 2014 05/28/25 1907   HSTROP T ng/L 32* 35*     Results from last 7 days   Lab Units 05/28/25  1907   PROBNP pg/mL 17,587.0*                       Pertinent Radiology Results:    Imaging Results (All)       Procedure Component Value Units Date/Time    XR Chest 1 View [[id number]] Collected: 05/29/25 0841     Updated: 05/29/25 0849    Narrative:      PROCEDURE: XR CHEST 1 VW-     HISTORY: SOA Triage Protocol     COMPARISON: 9/7/2022.     FINDINGS: The heart is normal in size. The mediastinum is unremarkable.  There is evidence of old calcified granulomatous disease. There are  small nonspecific opacities within the lateral right lung base which  could represent early pneumonitis. There is no pneumothorax.  There are  no acute osseous abnormalities.       Impression:      Right lung opacities as above. Follow-up may be of value.                    Images were reviewed, interpreted, and dictated by Dr. Zaida Daivs MD  Transcribed by Harsha Briceño PA-C.     This report was signed and finalized on 5/29/2025 8:47 AM by Zaida Davis MD.               Echo:    Results for orders placed during the hospital encounter of 08/19/22    Adult Transthoracic Echo Complete W/ Cont if Necessary Per Protocol    Interpretation Summary  · Estimated left ventricular EF = 70% Left ventricular ejection fraction appears to be 66 - 70%. Left ventricular systolic function is normal.  · Left ventricular diastolic function is consistent with (grade I) impaired relaxation.  · Left atrial volume is moderately increased.  · There is calcification of the aortic valve.    Condition on Discharge:      Stable.    Code status during the  hospital stay:    Code Status and Medical Interventions: No CPR (Do Not Attempt to Resuscitate); Limited Support; No intubation (DNI)   Ordered at: 05/28/25 2133     Code Status (Patient has no pulse and is not breathing):    No CPR (Do Not Attempt to Resuscitate)     Medical Interventions (Patient has pulse or is breathing):    Limited Support     Medical Intervention Limits:    No intubation (DNI)     Discharge Disposition:    Home or Self Care    Discharge Medications:       Discharge Medications        New Medications        Instructions Start Date   apixaban 5 MG tablet tablet  Commonly known as: ELIQUIS   5 mg, Oral, Every 12 Hours Scheduled      furosemide 20 MG tablet  Commonly known as: Lasix   20 mg, Oral, Daily      metoprolol tartrate 25 MG tablet  Commonly known as: LOPRESSOR   25 mg, Oral, 2 Times Daily             Continue These Medications        Instructions Start Date   budesonide 32 MCG/ACT nasal spray  Commonly known as: RINOCORT AQUA   Instill 2 sprays into each nostril once daily      budesonide-formoterol 160-4.5 MCG/ACT inhaler  Commonly known as: Symbicort   2 puffs, Inhalation, 2 Times Daily - RT      estradiol 0.1 MG/GM vaginal cream  Commonly known as: ESTRACE   1 applicator, Vaginal, 3 Times Weekly, Apply pea sized amount periurethral and perivaginally 3 times a week at bedtime.      etanercept 50 MG/ML solution prefilled syringe injection  Commonly known as: ENBREL   50 mg, Subcutaneous, Every 7 Days      fentaNYL 75 MCG/HR patch  Commonly known as: DURAGESIC   1 patch, Every 72 Hours      Gemtesa 75 MG tablet  Generic drug: Vibegron   75 mg, Oral, Daily      HYDROcodone-acetaminophen 5-325 MG per tablet  Commonly known as: NORCO   1 tablet, Every 6 Hours PRN      lactulose 10 GM/15ML solution solution (encephalopathy)  Commonly known as: CHRONULAC   30 g      levalbuterol 0.31 MG/3ML nebulizer solution  Commonly known as: XOPENEX   No dose, route, or frequency recorded.       levalbuterol 45 MCG/ACT inhaler  Commonly known as: XOPENEX HFA   2 puffs, Inhalation, 4 Times Daily PRN      multivitamin with minerals tablet tablet   1 tablet, Daily      O2  Commonly known as: OXYGEN   6 L/min, Continuous      Procrit 2000 UNIT/ML injection  Generic drug: epoetin chidi   Every 6 Weeks      sertraline 100 MG tablet  Commonly known as: ZOLOFT   100 mg, Oral, Daily      simethicone 125 MG chewable tablet  Commonly known as: MYLICON   125 mg, Every 6 Hours PRN      valsartan-hydrochlorothiazide 320-25 MG per tablet  Commonly known as: DIOVAN-HCT   0.5 tablets, Oral, Daily             Stop These Medications      amLODIPine 5 MG tablet  Commonly known as: NORVASC     fluconazole 100 MG tablet  Commonly known as: Diflucan     LORazepam 0.5 MG tablet  Commonly known as: ATIVAN            Discharge Diet:     Diet Instructions       Diet: Cardiac Diets; Healthy Heart (2-3 Na+); Regular (IDDSI 7); Thin (IDDSI 0)      Discharge Diet: Cardiac Diets    Cardiac Diet: Healthy Heart (2-3 Na+)    Texture: Regular (IDDSI 7)    Fluid Consistency: Thin (IDDSI 0)          Activity at Discharge:     Activity Instructions       Activity as Tolerated            Follow-up Appointments:    Additional Instructions for the Follow-ups that You Need to Schedule       Ambulatory Referral to Disease State Management   As directed      To dept: [facility] [[id number]]   What program(s) are you referring for?: Heart Failure   Follow-up needed: Yes        Discharge Follow-up with PCP   As directed       Currently Documented PCP:    Farhad Chowdary MD    PCP Phone Number:    [phone]     Follow Up Details: 1 week        Discharge Follow-up with Specified Provider: Dr Marquez; 2 Weeks   As directed      To: Dr Marquez   Follow Up: 2 Weeks               Follow-up Information       Farhad Chowdary MD .    Specialty: Family Medicine  Why: 1 week  Contact information:  [facility] hospital [facility]  [address]  [address]  [phone]                           Future Appointments   Date Time Provider Department Center   6/13/2025  3:00 PM FAST TRACK [facility]  [facility]   6/19/2025  3:00 PM Rosalina Montano APRN [facility]   7/25/2025  3:00 PM TREATMENT RM 12 [facility]   5/8/2026  3:40 PM Yvonne Regalado APRN [facility]   7/13/2026  9:45 AM Kraig Marquez MD [facility]     Test Results Pending at Discharge:    Pending Results       Procedure [Order ID] Specimen - Date/Time    Adult Transthoracic Echo Complete w/ Color, Spectral and Contrast if necessary per protocol [[id number]] Resulted: 05/29/25 1024     Updated: 05/29/25 1024     EF(MOD-bp) 37.2 %      LVIDd 4.8 cm      LVIDs 4.2 cm      IVSd 0.84 cm      LVPWd 1.14 cm      FS 12.6 %      IVS/LVPW 0.74 cm      ESV(cubed) 72.5 ml      LV Sys Vol (BSA corrected) 39.2 cm2      EDV(cubed) 108.5 ml      LV Guerrero Vol (BSA corrected) 63.0 cm2      LV mass(C)d 166.2 grams      LVOT area 2.9 cm2      LVOT diam 1.91 cm      EDV(MOD-sp2) 116.0 ml      EDV(MOD-sp4) 107.0 ml      ESV(MOD-sp2) 73.6 ml      ESV(MOD-sp4) 66.5 ml      SV(MOD-sp2) 42.4 ml      SV(MOD-sp4) 40.5 ml      SVi(MOD-SP2) 25.0 ml/m2      SVi(MOD-SP4) 23.9 ml/m2      SVi (LVOT) 32.1 ml/m2      EF(MOD-sp2) 36.6 %      EF(MOD-sp4) 37.9 %      MV E max cory 95.4 cm/sec      MV A max cory 131.0 cm/sec      MV dec time 0.21 sec      MV E/A 0.73     IVRT 141.0 ms      LA ESV Index (BP) 50.3 ml/m2      Med Peak E' Cory 4.4 cm/sec      Lat Peak E' Cory 4.0 cm/sec      Avg E/e' ratio 22.71     SV(LVOT) 54.4 ml      RV Base 3.6 cm      RV Mid 2.9 cm      RV Length 7.8 cm      TAPSE (>1.6) 1.84 cm      RV S' 9.5 cm/sec      LA dimension (2D)  3.8 cm      LV V1 max 92.2 cm/sec      LV V1 max PG 3.4 mmHg      LV V1 mean PG 2.00 mmHg      LV V1 VTI 19.0 cm      Ao pk cory 145.0 cm/sec      Ao max PG 8.4 mmHg      Ao mean PG 4.0 mmHg      Ao V2 VTI 30.6 cm      EMILY(I,D) 1.78 cm2       Dimensionless Index 0.62 (DI)      MV max PG 8.3 mmHg      MV mean PG 5.0 mmHg      MV V2 VTI 40.3 cm      MVA(VTI) 1.35 cm2      RAP systole 8.0 mmHg      RV V1 max PG 2.50 mmHg      RV V1 max 79.0 cm/sec      RV V1 VTI 17.5 cm      PA V2 max 92.7 cm/sec      Ao root diam 3.3 cm                  Juvenal Cespedes DO  05/29/25  10:30 EDT    Time: I spent >30 minutes on this discharge activity which included: face-to-face encounter with the patient, reviewing the data in the system, coordination of the care with the nursing staff as well as consultants, documentation, and entering orders.     Dictated utilizing Dragon dictation.

## 2025-05-29 NOTE — H&P
[facility]      Name:  Vanna Rincon   Age:  74 y.o.  Sex:  female  :  1950  MRN:  [id number]   Visit Number:  [id number]  Admission Date:  2025  Date Of Service:  25  Primary Care Physician:  Farhad Chowdary MD    Chief Complaint:     Shortness of air, chest pain, elevated heart rate    History Of Presenting Illness:      Vanna Rincon is a 74-year-old woman with past medical history of COPD on 6 L baseline, heart failure with preserved ejection fraction, CKD 3, anemia, urinary continence, history of kidney stone, rheumatoid arthritis with rheumatoid lung, hypertension, hyperlipidemia, GERD, remote history of smoking, degenerative disc disease, chronic pain syndrome and back, anxiety.  Presented to [facility] ED on 2025 with concern for shortness of air, generalized weakness, chest pain, onset 3 days progressively worse.  Denied any fevers or chills, abdominal pain, N/V/D.    ED summary: Afebrile, vital signs stable on 3 L.  EKG sinus rhythm, inferior and anterolateral T wave inversions.  Troponin 35, 32, ACS not suspected.  proBNP over 17,000.  Creatinine 1.32.  No leukocytosis.  Hemoglobin 9.  CXR radiology interpretation pending, no large opacities.    Review Of Systems:    All systems were reviewed and negative except as mentioned in history of presenting illness, assessment and plan.    Past Medical History: Patient  has a past medical history of Abnormal mammogram, Acute UTI, Allergic rhinitis, Anemia, Anxiety, Arthritis, Asthmatic bronchitis, Back pain, Candida vaginitis, Cataracts, bilateral, CHF (congestive heart failure), Chronic lung disease, Chronic pain syndrome, Colon polyps, Community acquired pneumonia, Conjunctivitis, COPD (chronic obstructive pulmonary disease), COPD (chronic obstructive pulmonary disease), Cough, DDD (degenerative disc disease), lumbar, Deafness, Dependence on supplemental oxygen, Disc disease,  degenerative, cervical, Dry eye, Former smoker, Gastric bleed, Genitourinary syndrome of menopause, GERD (gastroesophageal reflux disease), Glaucoma, H/O chest x-ray (08/11/2015), H/O chest x-ray (06/24/2015), H/O chest x-ray (03/09/2010), H/O echocardiogram (03/16/2010), Herpes zoster, History of PFTs (12/13/2011), History of PFTs (08/11/2011), History of PFTs (02/24/2011), Hyperlipidemia, Hypertension, IBS (irritable bowel syndrome), Insomnia, Interstitial lung disease, Kidney stones, Lumbar herniated disc, Nephrolithiasis, Ocular migraine, On home oxygen therapy, Overactive bladder, Panic attack, Peripheral edema, Pneumonia, PRB (rectal bleeding), RA (rheumatoid arthritis), Renal insufficiency, Rheumatoid arthritis, Rheumatoid lung, Sciatica, Sinus problem, Thrombophlebitis, Tinnitus, Urge incontinence, and Vertigo.    Past Surgical History: Patient  has a past surgical history that includes Tonsillectomy; Tubal ligation; Oral antral fistula closure; Colonoscopy (2012); Esophagogastroduodenoscopy (N/A, 12/15/2017); Mouth surgery; Colonoscopy (N/A, 01/29/2018); Upper gastrointestinal endoscopy (2012); Upper gastrointestinal endoscopy (12/15/2017); Dental surgery; and Cataract extraction (Right).    Social History: Patient  reports that she quit smoking about 34 years ago. Her smoking use included cigarettes. She started smoking about 62 years ago. She has a 28 pack-year smoking history. She has never used smokeless tobacco. She reports that she does not drink alcohol and does not use drugs.    Family History:  Patient's family history has been reviewed and found to be noncontributory.     Allergies:      Iodinated contrast media, Feraheme [ferumoxytol], Brimonidine tartrate, Ciprofloxacin, Doxycycline, Lipitor [atorvastatin], Nsaids, and Sulfa antibiotics    Home Medications:    Prior to Admission Medications       Prescriptions Last Dose Informant Patient Reported? Taking?    amLODIPine (NORVASC) 5 MG tablet    Yes No    Take 1 tablet by mouth As Needed.    budesonide (RINOCORT AQUA) 32 MCG/ACT nasal spray   No No    Instill 2 sprays into each nostril once daily    budesonide-formoterol (Symbicort) 160-4.5 MCG/ACT inhaler   No No    Inhale 2 puffs 2 (Two) Times a Day.    epoetin chidi (Procrit) 2000 UNIT/ML injection   Yes No    Inject  under the skin into the appropriate area as directed Every 6 (Six) Weeks.    estradiol (ESTRACE) 0.1 MG/GM vaginal cream   No No    Insert 1 g into the vagina 3 (Three) Times a Week. Apply pea sized amount periurethral and perivaginally 3 times a week at bedtime.    etanercept (ENBREL) 50 MG/ML solution prefilled syringe injection   No No    Inject 1 mL under the skin into the appropriate area as directed Every 7 (Seven) Days.    fentaNYL (DURAGESIC) 75 MCG/HR patch  Self Yes No    Place 1 patch on the skin as directed by provider Every 72 (Seventy-Two) Hours.    fluconazole (Diflucan) 100 MG tablet   No No    Take 1 tablet by mouth Daily.    HYDROcodone-acetaminophen (NORCO) 5-325 MG per tablet   Yes No    Take 1 tablet by mouth Every 6 (Six) Hours As Needed.    lactulose (CHRONULAC) 10 GM/15ML solution solution (encephalopathy)   Yes No    Take 45 mL by mouth.    levalbuterol (XOPENEX HFA) 45 MCG/ACT inhaler   No No    Inhale 2 puffs 4 (Four) Times a Day As Needed for Wheezing.    levalbuterol (XOPENEX) 0.31 MG/3ML nebulizer solution   Yes No    LORazepam (ATIVAN) 0.5 MG tablet  Self No No    Take 1 tablet by mouth Daily As Needed for Anxiety.    Multiple Vitamins-Minerals (CENTRUM ADULTS PO)  Self Yes No    Take 1 tablet by mouth Daily.    O2 (OXYGEN)  Self Yes No    Inhale 6 L/min Continuous.    sertraline (ZOLOFT) 100 MG tablet  Self No No    Take 1 tablet by mouth Daily.    simethicone (MYLICON) 125 MG chewable tablet  Self Yes No    Chew 1 tablet Every 6 (Six) Hours As Needed for Flatulence.    valsartan-hydrochlorothiazide (DIOVAN-HCT) 320-25 MG per tablet   No No    Take 0.5 tablets  "by mouth Daily.    Vibegron (Gemtesa) 75 MG tablet   No No    Take 1 tablet by mouth Daily.          ED Medications:    Medications   sodium chloride 0.9 % flush 10 mL (has no administration in time range)     Vital Signs:  Temp:  [98.4 °F (36.9 °C)-98.6 °F (37 °C)] 98.6 °F (37 °C)  Heart Rate:  [78-86] 86  Resp:  [18] 18  BP: (154-168)/(76-92) 157/76        05/28/25  1841   Weight: 61.7 kg (136 lb 0.4 oz)     Body mass index is 22.28 kg/m².    Physical Exam:     Most recent vital Signs: /76 (BP Location: Right arm, Patient Position: Lying)   Pulse 86   Temp 98.6 °F (37 °C) (Oral)   Resp 18   Ht 166.4 cm (65.51\")   Wt 61.7 kg (136 lb 0.4 oz)   LMP  (LMP Unknown)   SpO2 94%   BMI 22.28 kg/m²     Physical Exam  Constitutional:       General: She is not in acute distress.     Appearance: She is ill-appearing. She is not toxic-appearing.   HENT:      Mouth/Throat:      Mouth: Mucous membranes are moist.   Eyes:      Extraocular Movements: Extraocular movements intact.   Cardiovascular:      Rate and Rhythm: Normal rate and regular rhythm.      Pulses: Normal pulses.      Heart sounds: Normal heart sounds.   Neurological:      Mental Status: She is alert.         Laboratory data:    I have reviewed the labs done in the emergency room.    Results from last 7 days   Lab Units 05/28/25 1907   SODIUM mmol/L 141   POTASSIUM mmol/L 4.3   CHLORIDE mmol/L 97*   CO2 mmol/L 33.8*   BUN mg/dL 15.0   CREATININE mg/dL 1.32*   CALCIUM mg/dL 9.2   BILIRUBIN mg/dL 0.2   ALK PHOS U/L 85   ALT (SGPT) U/L 7   AST (SGOT) U/L 15   GLUCOSE mg/dL 117*     Results from last 7 days   Lab Units 05/28/25 1907   WBC 10*3/mm3 5.03   HEMOGLOBIN g/dL 9.0*   HEMATOCRIT % 31.9*   PLATELETS 10*3/mm3 124*         Results from last 7 days   Lab Units 05/28/25 2014 05/28/25 1907   HSTROP T ng/L 32* 35*     Results from last 7 days   Lab Units 05/28/25  1907   PROBNP pg/mL 17,587.0*                       Invalid input(s): \"USDES\", " "\"NITRITITE\", \"BACT\", \"EP\"    Pain Management Panel  More data exists         Latest Ref Rng & Units 12/27/2024 1/22/2024   Pain Management Panel   Creatinine, Urine mg/dL 64.4  108.8      Radiology:    No radiology results for the last 3 days    Assessment/Plan:    Observation General Floor admission 5/28/2025 with acute exacerbation of heart failure with preserved ejection fraction, elevated troponin ACS not suspected, new onset atrial fibrillation.  Converted to normal sinus after receiving Lopressor.    Acute exacerbation of heart failure with preserved ejection fraction  Chronic kidney disease  IV Lasix.  Nephrology consultation given underlying CKD, recommendations appreciated.  Echocardiogram.  New onset atrial fibrillation with RVR  Start Eliquis.  Lopressor as needed.  Cardiology consultation, recommendations appreciated.  Elevated troponin  ACS not suspected, likely demand ischemia.  Impaired mobility  PT/OT.    Chronic: COPD on 6 L baseline, heart failure with preserved ejection fraction, CKD 3, anemia, urinary continence, history of kidney stone, rheumatoid arthritis with rheumatoid lung, hypertension, hyperlipidemia, GERD, remote history of smoking, degenerative disc disease, chronic pain syndrome and back, anxiety  Continue home medications.    Risk Assessment: High  DVT Prophylaxis:   Code Status: DNR/DNI  Diet: Cardiac/fluid restriction    Advance Care Planning   ACP discussion was held with the patient during this visit. Patient does not have an advance directive, information provided.           Brian Joseph Kerley, DO  05/28/25  21:00 EDT    Dictated utilizing Dragon dictation.    I have reviewed the copied text and it is accurate as of 5/28/2025   "

## 2025-05-29 NOTE — CONSULTS
"     [facility] Cardiology Consult Note    Vanna Rincon  1950  [id number]  25    Requesting Physician: No ref. provider found    Chief Complaint: Shortness of breath    History of Present Illness:   Mrs. Vanna Rincon is a 74 y.o. female who is being seen by Cardiology for evaluation of shortness of breath and atrial fibrillation.  The patient has a past medical history significant for hypertension, hyperlipidemia, COPD, stage III CKD, and rheumatoid lung disease.  She does not have any significant past cardiac history.  She presented to the [facility] for evaluation of shortness of breath.  She reports chronic shortness of breath at baseline due to her underlying lung disease.  Over the past 3-4 days, she has had progressive worsening of her shortness of breath.  She also reports mild palpitations described as a \"fast heart rate.\"  No significant chest pain or chest discomfort.  No orthopnea or PND.  No other specific complaints.    While in the emergency department, the patient was found to go in atrial fibrillation with ventricular rates in the 140s.  She subsequently converted back to sinus rhythm.  On initial labs, her creatinine is found to be 1.32, which is near her baseline.  A proBNP was elevated at 17,587.  A high-sensitivity troponin was elevated in a plateau fashion in the 30s.  She was given IV Lasix for diuresis.  Given her diagnosed atrial fibrillation, she was started on Eliquis for anticoagulation.  Cardiology is being consulted for further recommendations.      Prior Cardiac Testin. Last Coronary Angio: None  2. Prior Stress Testing: None  3. Last Echo:    1.  Normal left ventricular size, LVEF 65-70%   2.  Grade 1 diastolic dysfunction   3.  Left atrial volume is moderately increased   4.  Calcification of the aortic valve without significant stenosis  4. Prior Holter Monitor: None    Review of Systems:   Review of Systems " "  Constitutional:  Negative for activity change, appetite change, chills, diaphoresis, fatigue, fever, unexpected weight gain and unexpected weight loss.   Eyes:  Negative for blurred vision and double vision.   Respiratory:  Positive for shortness of breath. Negative for cough, chest tightness and wheezing.    Cardiovascular:  Positive for palpitations. Negative for chest pain and leg swelling.   Gastrointestinal:  Negative for abdominal pain, anal bleeding, blood in stool and GERD.   Endocrine: Negative for cold intolerance and heat intolerance.   Genitourinary:  Negative for hematuria.   Neurological:  Negative for dizziness, syncope, weakness and light-headedness.   Hematological:  Does not bruise/bleed easily.   Psychiatric/Behavioral:  Negative for depressed mood and stress. The patient is not nervous/anxious.        Past Medical History:   Past Medical History:   Diagnosis Date    Abnormal mammogram     Acute UTI     Allergic rhinitis     Anemia     Anxiety     Arthritis     knee, right    Asthmatic bronchitis     Back pain     Candida vaginitis     Cataracts, bilateral     CHF (congestive heart failure)     not diagnosed    Chronic lung disease     bronchiolitis obliterans. - Ct scan stable with pulmonary intolerant to pft    Chronic pain syndrome     disc disease, lumbar, neck    Colon polyps     Community acquired pneumonia     Conjunctivitis     COPD (chronic obstructive pulmonary disease)     Emphysema with severe physiology on PFTs.  Patient is a former smoker.    COPD (chronic obstructive pulmonary disease)     4L of oxygen    Cough     DDD (degenerative disc disease), lumbar     Deafness     Dependence on supplemental oxygen     PT REPORTS \"4L ALL TIMES\"    Disc disease, degenerative, cervical     Dry eye     Former smoker     Gastric bleed     Genitourinary syndrome of menopause     GERD (gastroesophageal reflux disease)     Glaucoma     H/O chest x-ray 08/11/2015    Rase base opacity consistent with " pneumonia or atelectasis    H/O chest x-ray 06/24/2015    Right basilar airspace disease and effusion consistent with a pneumonia. F/U to radiographic reolution is recommended    H/O chest x-ray 03/09/2010    Cardiac silhoutte is not enlarged. Lungs aare inflated. Mild nonspecifiec interstitial changes. No pleual effusions or dense consolidations. Scattered granulomatous changes. Spine with mild kyphosis but no osteopenia. No significant adenopathy    H/O echocardiogram 03/16/2010    Normal study    Herpes zoster     History of PFTs 12/13/2011    Goo pt cooperation and effort. Pt given 3 puffs of xopenex. PFT is acceptable and reproducible    History of PFTs 08/11/2011    Pt unable to produce acceptabel and reproducible PFT. Pt was given 3 puffs of xopenex. Pt gave good effort Best pleth of two attempts    History of PFTs 02/24/2011    PFT acceptable and reproducible. Pt gave good effort. Pt used bronchodilator less than 2 hours prior to testing    Hyperlipidemia     Hypertension     IBS (irritable bowel syndrome)     Insomnia     Interstitial lung disease     History of rheumatoid lung with bronchiolitis obliterans    Kidney stones     x 1    Lumbar herniated disc     Nephrolithiasis     2007, passed    Ocular migraine     On home oxygen therapy     REPORTS USES AT 4L NC AT ALL TIMES    Overactive bladder     Panic attack     Peripheral edema     Pneumonia     PRB (rectal bleeding)     RA (rheumatoid arthritis)     Renal insufficiency     Rheumatoid arthritis     · A.  Diagnosed in 2001 with history of methotrexate and Enbrel therapy.    Rheumatoid lung     bronchiolitis obliterans    Sciatica     Sinus problem     Thrombophlebitis     Tinnitus     Urge incontinence     Vertigo        Past Surgical History:   Past Surgical History:   Procedure Laterality Date    CATARACT EXTRACTION Right     COLONOSCOPY  2012    COLONOSCOPY N/A 01/29/2018    Procedure: COLONOSCOPY WITH COLD SNARE POLYPECTOMY X 6; SUBMUCOSAL  INJECTION OF SALINE; HOT SNARE POLYPECTOMY X 4; CLIP PLACEMENT X 1;  Surgeon: Kenney Pastrana MD;  Location: [facility] ENDOSCOPY;  Service:     DENTAL PROCEDURE      ENDOSCOPY N/A 12/15/2017    Procedure: ESOPHAGOGASTRODUODENOSCOPY with biopsies and esophageal balloon dilitation;  Surgeon: Kenney Pastrana MD;  Location: [facility] ENDOSCOPY;  Service:     MOUTH SURGERY      REPORTS IMPLANT X2    ORAL ANTRAL FISTULA CLOSURE      gums    TONSILLECTOMY      TUBAL ABDOMINAL LIGATION      UPPER GASTROINTESTINAL ENDOSCOPY      UPPER GASTROINTESTINAL ENDOSCOPY  12/15/2017       Family History:   Family History   Problem Relation Age of Onset    Glaucoma Mother     Coronary artery disease Mother     Liver cancer Mother         bile duct cancer    Heart disease Mother     Cancer Mother     Hypertension Mother     Stroke Father     Stomach cancer Father     Cancer Father     Diabetes Sister     Other (systemic lupus erythematosus) Sister     Heart disease Maternal Grandmother     Diabetes Maternal Grandmother     Diabetes Paternal Grandmother     Colon cancer Neg Hx     Ulcerative colitis Neg Hx        Social History:   Social History     Socioeconomic History    Marital status:     Number of children: 0   Tobacco Use    Smoking status: Former     Current packs/day: 0.00     Average packs/day: 1 pack/day for 28.0 years (28.0 ttl pk-yrs)     Types: Cigarettes     Start date:      Quit date:      Years since quittin.4    Smokeless tobacco: Never   Vaping Use    Vaping status: Never Used   Substance and Sexual Activity    Alcohol use: No    Drug use: No    Sexual activity: Defer     Comment:        Medications:     Current Facility-Administered Medications:     apixaban (ELIQUIS) tablet 5 mg, 5 mg, Oral, Q12H, Kerley, Brian Joseph, DO, 5 mg at 25 0821    sennosides-docusate (PERICOLACE) 8.6-50 MG per tablet 2 tablet, 2 tablet, Oral, BID PRN **AND** polyethylene glycol (MIRALAX) packet 17 g, 17 g,  Oral, Daily PRN **AND** bisacodyl (DULCOLAX) EC tablet 5 mg, 5 mg, Oral, Daily PRN **AND** bisacodyl (DULCOLAX) suppository 10 mg, 10 mg, Rectal, Daily PRN, Kerley, Brian Joseph, DO    budesonide-formoterol (SYMBICORT) 160-4.5 MCG/ACT inhaler 2 puff, 2 puff, Inhalation, BID - RT, Kerley, Brian Joseph, DO, 2 puff at 05/29/25 0839    Calcium Replacement - Follow Nurse / BPA Driven Protocol, , Not Applicable, PRN, Kerley, Brian Joseph, DO    fentaNYL (DURAGESIC) 75 MCG/HR patch 1 patch, 1 patch, Transdermal, Q72H **AND** Check Fentanyl Patch Placement, 1 each, Not Applicable, Q12H, Kerley, Brian Joseph, DO    furosemide (LASIX) injection 40 mg, 40 mg, Intravenous, BID Diuretics, Kerley, Brian Joseph, DO, 40 mg at 05/29/25 0821    valsartan (DIOVAN) tablet 320 mg, 320 mg, Oral, Q24H, 320 mg at 05/29/25 0822 **AND** hydroCHLOROthiazide tablet 25 mg, 25 mg, Oral, Q24H, Kerley, Brian Joseph, DO, 25 mg at 05/29/25 0822    HYDROcodone-acetaminophen (NORCO) 5-325 MG per tablet 1 tablet, 1 tablet, Oral, Q6H PRN, Kerley, Brian Joseph, DO    LORazepam (ATIVAN) injection 1 mg, 1 mg, Intravenous, Q8H PRN, Kerley, Brian Joseph, DO, 1 mg at 05/29/25 0421    Magnesium Standard Dose Replacement - Follow Nurse / BPA Driven Protocol, , Not Applicable, PRN, Kerley, Brian Joseph, DO    metoprolol tartrate (LOPRESSOR) injection 5 mg, 5 mg, Intravenous, Q5 Min PRN, Kerley, Brian Joseph, DO, 5 mg at 05/28/25 2351    Morphine sulfate (PF) injection 1 mg, 1 mg, Intravenous, Q4H PRN, Kerley, Brian Joseph, DO, 1 mg at 05/29/25 0822    nitroglycerin (NITROSTAT) SL tablet 0.4 mg, 0.4 mg, Sublingual, Q5 Min PRN, Kerley, Brian Joseph,     ondansetron (ZOFRAN) injection 4 mg, 4 mg, Intravenous, Q6H PRN, Kerley, Brian Joseph,     Phosphorus Replacement - Follow Nurse / BPA Driven Protocol, , Not Applicable, PRN, Kerley, Brian Joseph,     Potassium Replacement - Follow Nurse / BPA Driven Protocol, , Not Applicable, PRN, Kerley, Brian Joseph, DO    " sertraline (ZOLOFT) tablet 100 mg, 100 mg, Oral, Daily, Kerley, Brian Joseph, DO, 100 mg at 05/29/25 0821    sodium chloride 0.9 % flush 10 mL, 10 mL, Intravenous, PRN, Kerley, Brian Joseph,     sodium chloride 0.9 % flush 10 mL, 10 mL, Intravenous, Q12H, Kerley, Brian Joseph, DO, 10 mL at 05/29/25 0823    sodium chloride 0.9 % flush 10 mL, 10 mL, Intravenous, PRN, Kerley, Brian Joseph,     sodium chloride 0.9 % infusion 40 mL, 40 mL, Intravenous, PRN, Kerley, Brian Joseph,     Allergies:   Allergies   Allergen Reactions    Iodinated Contrast Media Other (See Comments)     Kidney disease    Feraheme [Ferumoxytol] Palpitations and Other (See Comments)     hypertension    Brimonidine Tartrate Nausea And Vomiting    Ciprofloxacin Other (See Comments)     \"Hurt all over\"    Doxycycline Other (See Comments)     \"makes other medication stay in my system longer\"    Lipitor [Atorvastatin] Other (See Comments)     Hurt all over      Nsaids Other (See Comments)     Pt states she has stage three kidney disease    Sulfa Antibiotics Other (See Comments)     \"cant remember\"       Physical Exam:  Vital Signs:   Vitals:    05/29/25 0400 05/29/25 0700 05/29/25 0839 05/29/25 0939   BP: 168/92 161/93  161/93   BP Location: Right arm Right arm     Patient Position: Lying Sitting     Pulse: 75 71     Resp: 21 22     Temp: 98.3 °F (36.8 °C) 98.4 °F (36.9 °C)     TempSrc: Oral Oral     SpO2: 98% 97% 97%    Weight:    61.7 kg (136 lb 0.4 oz)   Height:    166.4 cm (65.51\")       Physical Exam  Vitals and nursing note reviewed.   Constitutional:       General: She is not in acute distress.     Appearance: Normal appearance. She is well-developed. She is not diaphoretic.   HENT:      Head: Normocephalic and atraumatic.   Eyes:      General: No scleral icterus.     Pupils: Pupils are equal, round, and reactive to light.   Neck:      Trachea: No tracheal deviation.   Cardiovascular:      Rate and Rhythm: Normal rate and regular rhythm. "      Heart sounds: Normal heart sounds. No murmur heard.     No friction rub. No gallop.      Comments: Normal JVD.    Pulmonary:      Effort: Pulmonary effort is normal. No respiratory distress.      Breath sounds: No stridor. No rales.      Comments: Diminished breath sounds bilaterally.  Scattered wheezing.  Chest:      Chest wall: No tenderness.   Abdominal:      General: Bowel sounds are normal. There is no distension.      Palpations: Abdomen is soft.      Tenderness: There is no abdominal tenderness. There is no guarding or rebound.   Musculoskeletal:         General: No swelling. Normal range of motion.      Cervical back: Neck supple. No tenderness.   Lymphadenopathy:      Cervical: No cervical adenopathy.   Skin:     General: Skin is warm and dry.      Findings: No erythema.   Neurological:      General: No focal deficit present.      Mental Status: She is alert and oriented to person, place, and time.   Psychiatric:         Mood and Affect: Mood normal.         Behavior: Behavior normal.         Results Review:   Results from last 7 days   Lab Units 05/29/25  0912 05/28/25 1907   SODIUM mmol/L 141 141   POTASSIUM mmol/L 3.7 4.3   CHLORIDE mmol/L 97* 97*   CO2 mmol/L 37.5* 33.8*   BUN mg/dL 15.0 15.0   CREATININE mg/dL 1.12* 1.32*   CALCIUM mg/dL 9.4 9.2   BILIRUBIN mg/dL  --  0.2   ALK PHOS U/L  --  85   ALT (SGPT) U/L  --  7   AST (SGOT) U/L  --  15   GLUCOSE mg/dL 99 117*     Results from last 7 days   Lab Units 05/28/25 2014 05/28/25 1907   HSTROP T ng/L 32* 35*     Results from last 7 days   Lab Units 05/29/25  0912 05/28/25 1907   WBC 10*3/mm3 5.22 5.03   HEMOGLOBIN g/dL 8.8* 9.0*   HEMATOCRIT % 31.1* 31.9*   PLATELETS 10*3/mm3 146 124*                   I personally viewed and interpreted the patient's EKG/Telemetry data     Assessment / Plan:     1.  Atrial fibrillation with rapid ventricular rate  -- Newly diagnosed, appears to be recent onset  -- Currently in sinus rhythm  -- Agree with  Eliquis for CVA prophylaxis  -- Recommend discontinuing amlodipine, starting metoprolol for rate control  -- If continued difficulty with A-fib/RVR despite attempts at rate control, would consider antiarrhythmic  -- Recommend close follow-up with her primary cardiologist upon discharge    2.  Volume overload  -- History of diastolic dysfunction, preserved EF on last echocardiogram  -- Repeat echocardiogram to reassess LVEF  -- Shortness of breath improved with diuresis and being in sinus rhythm  -- Recommend oral Lasix on discharge      Thank you for allowing me to participate in the care of your patient. Please do not hesitate to contact me with additional questions or concerns.     KANDY Mi MD  Interventional Cardiology   05/29/25  10:17 EDT

## 2025-05-29 NOTE — CONSULTS
[facility]      Nephrology Consultation      Referring Provider:   No ref. provider found    Reason for Consultation:  Chronic kidney disease 3a and associated problems.    Subjective:  Chief complaint   Chief Complaint   Patient presents with    Shortness of Breath    Fatigue    Weakness - Generalized     History of present illness:    Patient is 74-year-old female well-known to me from office with past medical history significant for chronic kidney disease stage III, anemia COPD with 6 L oxygen at baseline off-and-on has episodes of CHF with preserved ejection fraction, history of rheumatoid arthritis and rheumatoid lung hypertension, dyslipidemia, gastroesophageal reflux disease and significant degenerative arthritis at multiple sites with chronic back pain and anxiety.  He presented to the emergency room yesterday with worsening shortness of breath and generalized weakness as well as chest pain started about 3 days ago and has been progressively getting worse, she denies having any fever or chills nausea vomiting or diarrhea, no pain.  In the ER she was noted to have inferior and anterolateral T wave inversion troponin was stable provide BNP of about 17,000 creatinine at 1.32 which is slightly higher than her baseline hemoglobin 9.  She was admitted through the hospitalist service for further evaluation and treatment.  Currently patient is laying in the bed awake alert and interactive, denies any chest pain, she feels her shortness of breath is slightly better.  I have reviewed labs/imaging/records from this hospitalization, including ER staff and admitting/attending physicians H/P's and progress notes to establish a comprehensive understanding of this patient's clinical hospital course, as well as to establish plan of care appropriately.   Past Medical History:   Diagnosis Date    Abnormal mammogram     Acute UTI     Allergic rhinitis     Anemia     Anxiety     Arthritis     knee, right     "Asthmatic bronchitis     Back pain     Candida vaginitis     Cataracts, bilateral     CHF (congestive heart failure)     not diagnosed    Chronic lung disease     bronchiolitis obliterans. - Ct scan stable with pulmonary intolerant to pft    Chronic pain syndrome     disc disease, lumbar, neck    Colon polyps     Community acquired pneumonia     Conjunctivitis     COPD (chronic obstructive pulmonary disease)     Emphysema with severe physiology on PFTs.  Patient is a former smoker.    COPD (chronic obstructive pulmonary disease)     4L of oxygen    Cough     DDD (degenerative disc disease), lumbar     Deafness     Dependence on supplemental oxygen     PT REPORTS \"4L ALL TIMES\"    Disc disease, degenerative, cervical     Dry eye     Former smoker     Gastric bleed     Genitourinary syndrome of menopause     GERD (gastroesophageal reflux disease)     Glaucoma     H/O chest x-ray 08/11/2015    Rase base opacity consistent with pneumonia or atelectasis    H/O chest x-ray 06/24/2015    Right basilar airspace disease and effusion consistent with a pneumonia. F/U to radiographic reolution is recommended    H/O chest x-ray 03/09/2010    Cardiac silhoutte is not enlarged. Lungs aare inflated. Mild nonspecifiec interstitial changes. No pleual effusions or dense consolidations. Scattered granulomatous changes. Spine with mild kyphosis but no osteopenia. No significant adenopathy    H/O echocardiogram 03/16/2010    Normal study    Herpes zoster     History of PFTs 12/13/2011    Goo pt cooperation and effort. Pt given 3 puffs of xopenex. PFT is acceptable and reproducible    History of PFTs 08/11/2011    Pt unable to produce acceptabel and reproducible PFT. Pt was given 3 puffs of xopenex. Pt gave good effort Best pleth of two attempts    History of PFTs 02/24/2011    PFT acceptable and reproducible. Pt gave good effort. Pt used bronchodilator less than 2 hours prior to testing    Hyperlipidemia     Hypertension     IBS " (irritable bowel syndrome)     Insomnia     Interstitial lung disease     History of rheumatoid lung with bronchiolitis obliterans    Kidney stones     x 1    Lumbar herniated disc     Nephrolithiasis     2007, passed    Ocular migraine     On home oxygen therapy     REPORTS USES AT 4L NC AT ALL TIMES    Overactive bladder     Panic attack     Peripheral edema     Pneumonia     PRB (rectal bleeding)     RA (rheumatoid arthritis)     Renal insufficiency     Rheumatoid arthritis     · A.  Diagnosed in 2001 with history of methotrexate and Enbrel therapy.    Rheumatoid lung     bronchiolitis obliterans    Sciatica     Sinus problem     Thrombophlebitis     Tinnitus     Urge incontinence     Vertigo        Past Surgical History:   Procedure Laterality Date    CATARACT EXTRACTION Right     COLONOSCOPY  2012    COLONOSCOPY N/A 01/29/2018    Procedure: COLONOSCOPY WITH COLD SNARE POLYPECTOMY X 6; SUBMUCOSAL INJECTION OF SALINE; HOT SNARE POLYPECTOMY X 4; CLIP PLACEMENT X 1;  Surgeon: Kenney Pastrana MD;  Location: [facility] END[facility];  Service:     DENTAL PROCEDURE      ENDOSCOPY N/A 12/15/2017    Procedure: ESOPHAGOGASTRODUODENOSCOPY with biopsies and esophageal balloon dilitation;  Surgeon: Kenney Pastrana MD;  Location: [facility] ENDOSCOPY;  Service:     MOUTH SURGERY      REPORTS IMPLANT X2    ORAL ANTRAL FISTULA CLOSURE      gums    TONSILLECTOMY      TUBAL ABDOMINAL LIGATION      UPPER GASTROINTESTINAL ENDOSCOPY  2012    UPPER GASTROINTESTINAL ENDOSCOPY  12/15/2017     Family History   Problem Relation Age of Onset    Glaucoma Mother     Coronary artery disease Mother     Liver cancer Mother         bile duct cancer    Heart disease Mother     Cancer Mother     Hypertension Mother     Stroke Father     Stomach cancer Father     Cancer Father     Diabetes Sister     Other (systemic lupus erythematosus) Sister     Heart disease Maternal Grandmother     Diabetes Maternal Grandmother     Diabetes Paternal Grandmother     Colon  cancer Neg Hx     Ulcerative colitis Neg Hx      negative h/o ESRD     Social History     Tobacco Use    Smoking status: Former     Current packs/day: 0.00     Average packs/day: 1 pack/day for 28.0 years (28.0 ttl pk-yrs)     Types: Cigarettes     Start date:      Quit date:      Years since quittin.4    Smokeless tobacco: Never   Vaping Use    Vaping status: Never Used   Substance Use Topics    Alcohol use: No    Drug use: No     Home medications:   Prior to Admission Medications       Prescriptions Last Dose Informant Patient Reported? Taking?    budesonide-formoterol (Symbicort) 160-4.5 MCG/ACT inhaler   No Yes    Inhale 2 puffs 2 (Two) Times a Day.    epoetin chidi (Procrit) 2000 UNIT/ML injection   Yes Yes    Inject  under the skin into the appropriate area as directed Every 6 (Six) Weeks.    estradiol (ESTRACE) 0.1 MG/GM vaginal cream   No Yes    Insert 1 g into the vagina 3 (Three) Times a Week. Apply pea sized amount periurethral and perivaginally 3 times a week at bedtime.    etanercept (ENBREL) 50 MG/ML solution prefilled syringe injection   No Yes    Inject 1 mL under the skin into the appropriate area as directed Every 7 (Seven) Days.    fentaNYL (DURAGESIC) 75 MCG/HR patch  Self Yes Yes    Place 1 patch on the skin as directed by provider Every 72 (Seventy-Two) Hours.    fluconazole (Diflucan) 100 MG tablet   No Yes    Take 1 tablet by mouth Daily.    HYDROcodone-acetaminophen (NORCO) 5-325 MG per tablet   Yes Yes    Take 1 tablet by mouth Every 6 (Six) Hours As Needed.    lactulose (CHRONULAC) 10 GM/15ML solution solution (encephalopathy)   Yes Yes    Take 45 mL by mouth.    LORazepam (ATIVAN) 0.5 MG tablet  Self No Yes    Take 1 tablet by mouth Daily As Needed for Anxiety.    Multiple Vitamins-Minerals (CENTRUM ADULTS PO)  Self Yes Yes    Take 1 tablet by mouth Daily.    O2 (OXYGEN)  Self Yes Yes    Inhale 6 L/min Continuous.    sertraline (ZOLOFT) 100 MG tablet  Self No Yes    Take 1  tablet by mouth Daily.    simethicone (MYLICON) 125 MG chewable tablet  Self Yes Yes    Chew 1 tablet Every 6 (Six) Hours As Needed for Flatulence.    valsartan-hydrochlorothiazide (DIOVAN-HCT) 320-25 MG per tablet   No Yes    Take 0.5 tablets by mouth Daily.    Vibegron (Gemtesa) 75 MG tablet   No Yes    Take 1 tablet by mouth Daily.    amLODIPine (NORVASC) 5 MG tablet   Yes No    Take 1 tablet by mouth As Needed.    budesonide (RINOCORT AQUA) 32 MCG/ACT nasal spray   No No    Instill 2 sprays into each nostril once daily    levalbuterol (XOPENEX HFA) 45 MCG/ACT inhaler   No No    Inhale 2 puffs 4 (Four) Times a Day As Needed for Wheezing.    levalbuterol (XOPENEX) 0.31 MG/3ML nebulizer solution   Yes No          Emergency department medications:   Medications   sodium chloride 0.9 % flush 10 mL (has no administration in time range)   budesonide-formoterol (SYMBICORT) 160-4.5 MCG/ACT inhaler 2 puff (2 puffs Inhalation Not Given 5/28/25 5754)   fentaNYL (DURAGESIC) 75 MCG/HR patch 1 patch (has no administration in time range)     And   Check Fentanyl Patch Placement (has no administration in time range)   sertraline (ZOLOFT) tablet 100 mg (has no administration in time range)   valsartan (DIOVAN) tablet 320 mg (has no administration in time range)     And   hydroCHLOROthiazide tablet 25 mg (has no administration in time range)   sodium chloride 0.9 % flush 10 mL (has no administration in time range)   sodium chloride 0.9 % flush 10 mL (has no administration in time range)   sodium chloride 0.9 % infusion 40 mL (has no administration in time range)   ondansetron (ZOFRAN) injection 4 mg (has no administration in time range)   nitroglycerin (NITROSTAT) SL tablet 0.4 mg (has no administration in time range)   Potassium Replacement - Follow Nurse / BPA Driven Protocol (has no administration in time range)   Magnesium Standard Dose Replacement - Follow Nurse / BPA Driven Protocol (has no administration in time range)  "  Phosphorus Replacement - Follow Nurse / BPA Driven Protocol (has no administration in time range)   Calcium Replacement - Follow Nurse / BPA Driven Protocol (has no administration in time range)   sennosides-docusate (PERICOLACE) 8.6-50 MG per tablet 2 tablet (has no administration in time range)     And   polyethylene glycol (MIRALAX) packet 17 g (has no administration in time range)     And   bisacodyl (DULCOLAX) EC tablet 5 mg (has no administration in time range)     And   bisacodyl (DULCOLAX) suppository 10 mg (has no administration in time range)   furosemide (LASIX) injection 40 mg (has no administration in time range)   metoprolol tartrate (LOPRESSOR) injection 5 mg (5 mg Intravenous Given 5/28/25 6811)   HYDROcodone-acetaminophen (NORCO) 5-325 MG per tablet 1 tablet (has no administration in time range)   LORazepam (ATIVAN) injection 1 mg (1 mg Intravenous Given 5/29/25 0421)   Morphine sulfate (PF) injection 1 mg (1 mg Intravenous Given 5/29/25 0230)   apixaban (ELIQUIS) tablet 5 mg (has no administration in time range)       Allergies:  Iodinated contrast media, Feraheme [ferumoxytol], Brimonidine tartrate, Ciprofloxacin, Doxycycline, Lipitor [atorvastatin], Nsaids, and Sulfa antibiotics    Review of Systems  14 point review of system were done and are negative except as mentioned in the history of present illness and assessment and plan.      Physical Exam:  Objective:  Vital Signs  /92 (BP Location: Right arm, Patient Position: Lying)   Pulse 75   Temp 98.3 °F (36.8 °C) (Oral)   Resp 21   Ht 166.4 cm (65.51\")   Wt 61.7 kg (136 lb 0.4 oz)   LMP  (LMP Unknown)   SpO2 98%   BMI 22.28 kg/m²   Objective    No intake/output data recorded.    Intake/Output Summary (Last 24 hours) at 5/29/2025 0727  Last data filed at 5/29/2025 0430  Gross per 24 hour   Intake 240 ml   Output 550 ml   Net -310 ml        Physical Exam     Constitutional: Awake, alert  Eyes: sclerae anicteric, no conjunctival " "injection  HEENT: mucous membranes dry  Neck: Supple, no thyromegaly, no lymphadenopathy, trachea midline, No JVD  Respiratory: Decreased breath sounds all over the chest.  Cardiovascular: RRR, positive systolic murmurs, no rubs, or gallops.  Gastrointestinal: Positive bowel sounds, obese, soft, nontender, nondistended  Musculoskeletal: Trace edema, no clubbing or cyanosis  Psychiatric: Appropriate affect, cooperative  Neurologic: Oriented x 3, moving all extremities, Cranial Nerves grossly intact, speech clear  Skin: warm and dry, no rashes            Results Review:   Results from last 7 days   Lab Units 05/28/25  1907   SODIUM mmol/L 141   POTASSIUM mmol/L 4.3   CHLORIDE mmol/L 97*   CO2 mmol/L 33.8*   BUN mg/dL 15.0   CREATININE mg/dL 1.32*   CALCIUM mg/dL 9.2   ALBUMIN g/dL 4.0   BILIRUBIN mg/dL 0.2   ALK PHOS U/L 85   ALT (SGPT) U/L 7   AST (SGOT) U/L 15   GLUCOSE mg/dL 117*     Estimated Creatinine Clearance: 37.5 mL/min (A) (by C-G formula based on SCr of 1.32 mg/dL (H)).          Results from last 7 days   Lab Units 05/28/25 1907   WBC 10*3/mm3 5.03   HEMOGLOBIN g/dL 9.0*   PLATELETS 10*3/mm3 124*         Brief Urine Lab Results  (Last result in the past 365 days)        Color   Clarity   Blood   Leuk Est   Nitrite   Protein   CREAT   Urine HCG        05/08/25 0000 Yellow  Comment: REFERENCE RANGE: Yellow, Straw   Clear   Negative   Trace   Negative   Negative                 No results found for: \"UTPCR\"  Imaging Results (Last 24 Hours)       Procedure Component Value Units Date/Time    XR Chest 1 View [[id number]] Resulted: 05/28/25 1924     Updated: 05/28/25 1925          apixaban, 5 mg, Oral, Q12H  budesonide-formoterol, 2 puff, Inhalation, BID - RT  fentaNYL, 1 patch, Transdermal, Q72H   And  Check Fentanyl Patch Placement, 1 each, Not Applicable, Q12H  furosemide, 40 mg, Intravenous, BID Diuretics  valsartan, 320 mg, Oral, Q24H   And  hydroCHLOROthiazide, 25 mg, Oral, Q24H  sertraline, 100 mg, Oral, " Daily  sodium chloride, 10 mL, Intravenous, Q12H           Assessment/Plan:      CHF (congestive heart failure)    Chronic kidney disease stage IIIa: She has hypertensive glomerulosclerosis with some fluctuation in renal function because of her volume status.  Likely will settle down to her baseline, potassium and bicarb is stable.  No significant hematuria or proteinuria.  A-fib: It appears its new onset workup has been ordered.  Cardic evaluation is in progress.  CHF: Likely secondary to cardiac arrhythmia, responded well to the treatment appears to be euvolemic at this point.  Anemia: May have iron deficiency anemia added iron panel to her labs.  Patient has received KELBY as an outpatient.  COPD: Currently appears to be at her baseline.  Osteoarthritis multiple sites: Encouraged to avoid nonsteroidals.  Rheumatoid arthritis: Continue home medications      Risk and complexity: High       Plan:  Patient is scheduled for routine follow-up in the office.  I think she will benefit from closer follow-up, likely will need iron infusions.  Continue KELBY.  Continue with rest of the current treatment plan and surveillance labs, adjust medications to patient's GFR.  Details were discussed with the patient no family in the room.    Details were also discussed with the hospitalist service.   Further recommendations will depend on clinical course of the patient during the current hospitalization.    I also discussed the details with the nursing staff.  Rest as ordered.    In closing, I sincerely appreciate opportunity to participate in care of this patient. If I can be of any further assistance with the management of this patient, please don’t hesitate to contact me.    Elmer Trujillo MD, PETARN    05/29/25  07:34 EDT    Dictated using Dragon.

## 2025-05-29 NOTE — TELEPHONE ENCOUNTER
Caller: MONSE    Relationship to patient: DISCHARGE NURSE     Best call back number: PLEASE CALL PT     Chief complaint: AFIB     Type of visit:  2 WEEK HFU     Requested date: WITHIN 2 WEEKS     Additional notes: PT IS BEING DISCHARGED TODAY FROM [facility]. THERE ARE NO APPOINTMENTS AVAILABLE UNTIL JULY. PLEASE CALL PT TO SCHEDULE, THANK YOU

## 2025-05-29 NOTE — CASE MANAGEMENT/SOCIAL WORK
Case Management Discharge Note      Final Note: DC Home with  to transport. Denies any needs. Portable 02 on.    Provided Post Acute Provider List?: N/A    Selected Continued Care - Discharged on 5/29/2025 Admission date: 5/28/2025 - Discharge disposition: Home or Self Care      Destination    No services have been selected for the patient.                Durable Medical Equipment    No services have been selected for the patient.                Dialysis/Infusion    No services have been selected for the patient.                Home Medical Care    No services have been selected for the patient.                Therapy    No services have been selected for the patient.                Community Resources    No services have been selected for the patient.                Community & DME    No services have been selected for the patient.                    Selected Continued Care - Episodes Includes continued care and service providers with selected services from the active episodes listed below          Transportation Services  Private: Car    Final Discharge Disposition Code: 01 - home or self-care

## 2025-05-30 NOTE — OUTREACH NOTE
Prep Survey      Flowsheet Row Responses   [facility] facility patient discharged from? [facility]   Is LACE score < 7 ? No   Eligibility Readm Mgmt   Discharge diagnosis CHF (congestive heart failure), A-fib RVR   Does the patient have one of the following disease processes/diagnoses(primary or secondary)? CHF   Does the patient have Home health ordered? No   Is there a DME ordered? No   Prep survey completed? Yes            Sherlyn HARVEY - Registered Nurse

## 2025-06-03 ENCOUNTER — READMISSION MANAGEMENT (OUTPATIENT)
Dept: CALL CENTER | Facility: HOSPITAL | Age: 75
End: 2025-06-03
Payer: MEDICARE

## 2025-06-03 NOTE — OUTREACH NOTE
CHF Week 1 Survey      Flowsheet Row Responses   [facility] facility patient discharged from? [facility]   Does the patient have one of the following disease processes/diagnoses(primary or secondary)? CHF   CHF Week 1 attempt successful? No   Unsuccessful attempts Attempt 1   Discharge diagnosis CHF (congestive heart failure), A-fib RVR            Jhonny W - Registered Nurse

## 2025-06-06 ENCOUNTER — TELEPHONE (OUTPATIENT)
Dept: CARDIOLOGY | Facility: CLINIC | Age: 75
End: 2025-06-06
Payer: MEDICARE

## 2025-06-12 NOTE — ASSESSMENT & PLAN NOTE
Dexa scan 3/10/22 - Mid thoracic wedging. L femoral neck -1.7, L total hip -1.9, R total hip -1.3, R femoral neck -2.3    Recommend 1000 IU of vitamin D 3 gel caps per day and 800-1000 mg of calcium citrate per day.  She is not interested in pursuing further bone density testing

## 2025-06-12 NOTE — ASSESSMENT & PLAN NOTE
Sleeps poorly secondary to pain.  CK normal 5/21  Tender neck muscles.  Has flexeril at home but does not use secondary to dry mouth.    Heat , Ice, Biofreeze.  Massage.  She is currently on Pain med with relief.

## 2025-06-12 NOTE — ASSESSMENT & PLAN NOTE
Diagnosed many years ago.   Has had methotrexate in the past .  Has been treated with Enbrel since 2001 with improvement in sx.   in 2019  Hand film 5/21- Changes of severe inflammatory arthritis involving the L wrist with very large multiple erosions   Foot films 5/21- L first IP erosion and 5th mtp possible erosions.  5/21 CCP 89.7 , RF , IGG 34, IGA 85.    Pt reports low dz activity   Continue Enbrel  No nsaids secondary to renal.  Labs 5/28/25 reviewed and stable  New lab order placed to do with her next labs for her Procrit  RTC 6 months

## 2025-06-12 NOTE — PROGRESS NOTES
Telehealth E-Visit      Date: 2025   Patient Name: Vanna Rincon  : 1950   MRN: [id number]     Chief Complaint:    Chief Complaint   Patient presents with    Rheumatoid Arthritis       I have reviewed the E-Visit questionnaire and the patient's answers, my assessment and plan are listed below.     This provider is located at the [facility] (through [facility]), [address], [address] [place] [address] using a secure milabent Video Visit through [employer]. Patient is being seen remotely via telehealth at their home address in [place], and stated they are in a secure environment for this session. The patient's condition being diagnosed/treated is appropriate for telemedicine. The provider identified herself as well as her credentials. The patient, and/or patients guardian, consent to be seen remotely, and when consent is given they understand that the consent allows for patient identifiable information to be sent to a third party as needed. They may refuse to be seen remotely at any time. The electronic data is encrypted and password protected, and the patient and/or guardian has been advised of the potential risks to privacy not withstanding such measures.    You have chosen to receive care through a telehealth visit. Do you consent to use a video/audio connection for your medical care today? Yes    History of Present Illness: Vanna Rincon is a 74 y.o. female who is being seen today to follow up with JENNY Freeman for RA.    She reports since her last OV she has been having some issues with atrial fibrillation. She has seen Dr. Marquez and has also been in the hospital since we last saw her. He does not think she has CHF.   Continues on Enbrel weekly. RA has been doing well. Hands are doing well currently without swelling.  Some toe pain and stiffness when she has edema in the legs.  Has difficulty doing her ADL's.  She uses a walker at home and has a hospital bed. She is on Oxygen 24/7 due to COPD/ILD.  She is getting labs q 6 weeks for anemia. She is receiving procrit.      Subjective      I have reviewed and updated the patient's chief complaint, history of present illness, review of systems, past medical history, surgical history, family history, social history, medications and allergy list as appropriate.     Medications:     Current Outpatient Medications:     dilTIAZem HCl  MG tablet sustained-release 24 hour, , Disp: , Rfl:     apixaban (ELIQUIS) 5 MG tablet tablet, Take 1 tablet by mouth Every 12 (Twelve) Hours for 30 days. Indications: Atrial Fibrillation, Disp: 60 tablet, Rfl: 0    budesonide-formoterol (Symbicort) 160-4.5 MCG/ACT inhaler, Inhale 2 puffs 2 (Two) Times a Day., Disp: 3 each, Rfl: 3    epoetin chidi (Procrit) 2000 UNIT/ML injection, Inject  under the skin into the appropriate area as directed Every 6 (Six) Weeks., Disp: , Rfl:     etanercept (ENBREL) 50 MG/ML solution prefilled syringe injection, Inject 1 mL under the skin into the appropriate area as directed Every 7 (Seven) Days., Disp: 12 mL, Rfl: 0    fentaNYL (DURAGESIC) 75 MCG/HR patch, Place 1 patch on the skin as directed by provider Every 72 (Seventy-Two) Hours., Disp: , Rfl:     furosemide (Lasix) 20 MG tablet, Take 1 tablet by mouth Daily for 30 days., Disp: 30 tablet, Rfl: 0    HYDROcodone-acetaminophen (NORCO) 5-325 MG per tablet, Take 1 tablet by mouth Every 6 (Six) Hours As Needed., Disp: , Rfl:     lactulose (CHRONULAC) 10 GM/15ML solution solution (encephalopathy), Take 45 mL by mouth., Disp: , Rfl:     metoprolol tartrate (LOPRESSOR) 25 MG tablet, Take 1 tablet by mouth 2 (Two) Times a Day for 30 days., Disp: 60 tablet, Rfl: 0    Multiple Vitamins-Minerals (CENTRUM ADULTS PO), Take 1 tablet by mouth Daily., Disp: , Rfl:     O2 (OXYGEN), Inhale 6 L/min Continuous., Disp: , Rfl:     ondansetron (ZOFRAN) 8 MG tablet, Take 1 tablet by  "mouth Every 8 (Eight) Hours As Needed for Nausea or Vomiting., Disp: , Rfl:     sertraline (ZOLOFT) 100 MG tablet, Take 1 tablet by mouth Daily., Disp: 90 tablet, Rfl: 3    simethicone (MYLICON) 125 MG chewable tablet, Chew 1 tablet Every 6 (Six) Hours As Needed for Flatulence., Disp: , Rfl:     valsartan-hydrochlorothiazide (DIOVAN-HCT) 320-25 MG per tablet, Take 0.5 tablets by mouth Daily., Disp: 45 tablet, Rfl: 3    Allergies:   Allergies   Allergen Reactions    Iodinated Contrast Media Other (See Comments)     Kidney disease    Feraheme [Ferumoxytol] Palpitations and Other (See Comments)     hypertension    Brimonidine Tartrate Nausea And Vomiting    Ciprofloxacin Other (See Comments)     \"Hurt all over\"    Doxycycline Other (See Comments)     \"makes other medication stay in my system longer\"    Lipitor [Atorvastatin] Other (See Comments)     Hurt all over      Nsaids Other (See Comments)     Pt states she has stage three kidney disease    Sulfa Antibiotics Other (See Comments)     \"cant remember\"       Objective     Physical Exam:  Vital Signs:   Vitals:    06/19/25 1503   PainSc: 6    PainLoc: Generalized       Physical Exam  HENT:      Head: Normocephalic.   Pulmonary:      Comments: Wearing supplemental oxygen  Neurological:      Mental Status: She is alert and oriented to person, place, and time. Mental status is at baseline.   Psychiatric:         Mood and Affect: Mood normal.         Behavior: Behavior normal.         Assessment / Plan      Assessment & Plan  Rheumatoid arthritis involving multiple sites with positive rheumatoid factor  Diagnosed many years ago.   Has had methotrexate in the past .  Has been treated with Enbrel since 2001 with improvement in sx.   in 2019  Hand film 5/21- Changes of severe inflammatory arthritis involving the L wrist with very large multiple erosions   Foot films 5/21- L first IP erosion and 5th mtp possible erosions.  5/21 CCP 89.7 , RF , IGG 34, IGA " 85.    Pt reports low dz activity   Continue Enbrel  No nsaids secondary to renal.  Labs 5/28/25 reviewed and stable  New lab order placed to do with her next labs for her Procrit  RTC 6 months  Fibromyalgia  Sleeps poorly secondary to pain.  CK normal 5/21  Tender neck muscles.  Has flexeril at home but does not use secondary to dry mouth.    Heat , Ice, Biofreeze.  Massage.  She is currently on Pain med with relief.  Stage 3 chronic kidney disease, unspecified whether stage 3a or 3b CKD  Managed by Dr. Trujillo  No NSAIDs  Psoriasis  Picture of rash shown to her cardiologist who thought it was plaque psoriasis.  She has changes in toenails. ? Psoriatic versus fungal    Continue follow up with derm  Stable  Osteopenia, unspecified location  Dexa scan 3/10/22 - Mid thoracic wedging. L femoral neck -1.7, L total hip -1.9, R total hip -1.3, R femoral neck -2.3    Recommend 1000 IU of vitamin D 3 gel caps per day and 800-1000 mg of calcium citrate per day.  She is not interested in pursuing further bone density testing  Encounter for long-term (current) use of high-risk medication  Enbrel- Well tolerated and effective  5/2021- Hep C + with negative HCV RNA 11/2021; hep A IgM +  3/2022 Hep A IgM +  QTB negative 11/29/23- update today    Hold for illness/infection and resume when well. Hold perioperatively. No live vaccines.  Fatigue, unspecified type  Update hepatitis panel and QTB with next labs      Follow Up:   Return in about 6 months (around 12/19/2025) for JENNY Freeman, JENNY Hyatt, JENNY Patel.    15 minutes were spent reviewing the patient's questionnaire, formulating a treatment plan, and relaying information to the patient via [place].    JENNY Freeman  [facility] [place]

## 2025-06-12 NOTE — ASSESSMENT & PLAN NOTE
Enbrel- Well tolerated and effective  5/2021- Hep C + with negative HCV RNA 11/2021; hep A IgM +  3/2022 Hep A IgM +  QTB negative 11/29/23- update today    Hold for illness/infection and resume when well. Hold perioperatively. No live vaccines.

## 2025-06-13 ENCOUNTER — TELEPHONE (OUTPATIENT)
Dept: CARDIOLOGY | Facility: CLINIC | Age: 75
End: 2025-06-13
Payer: MEDICARE

## 2025-06-13 RX ORDER — ONDANSETRON 8 MG/1
8 TABLET, FILM COATED ORAL EVERY 8 HOURS PRN
COMMUNITY

## 2025-06-13 NOTE — TELEPHONE ENCOUNTER
Completed medication reconciliation.   Patient requested to speak to Dr. Denton nurse, Alexandria is aware and will contact her.

## 2025-06-16 ENCOUNTER — OFFICE VISIT (OUTPATIENT)
Dept: CARDIOLOGY | Facility: CLINIC | Age: 75
End: 2025-06-16
Payer: MEDICARE

## 2025-06-16 ENCOUNTER — TELEPHONE (OUTPATIENT)
Dept: INTERNAL MEDICINE | Facility: CLINIC | Age: 75
End: 2025-06-16
Payer: MEDICARE

## 2025-06-16 VITALS
OXYGEN SATURATION: 99 % | HEIGHT: 66 IN | WEIGHT: 126 LBS | HEART RATE: 62 BPM | SYSTOLIC BLOOD PRESSURE: 100 MMHG | BODY MASS INDEX: 20.25 KG/M2 | DIASTOLIC BLOOD PRESSURE: 58 MMHG

## 2025-06-16 DIAGNOSIS — I10 PRIMARY HYPERTENSION: ICD-10-CM

## 2025-06-16 DIAGNOSIS — I48.0 PAROXYSMAL ATRIAL FIBRILLATION: Primary | ICD-10-CM

## 2025-06-16 DIAGNOSIS — E78.2 MIXED HYPERLIPIDEMIA: ICD-10-CM

## 2025-06-16 PROCEDURE — 3074F SYST BP LT 130 MM HG: CPT | Performed by: INTERNAL MEDICINE

## 2025-06-16 PROCEDURE — 3078F DIAST BP <80 MM HG: CPT | Performed by: INTERNAL MEDICINE

## 2025-06-16 PROCEDURE — 99214 OFFICE O/P EST MOD 30 MIN: CPT | Performed by: INTERNAL MEDICINE

## 2025-06-16 NOTE — PROGRESS NOTES
"[facility]  Office Progress Note  Vanna Rincon  1950  [address] [place] [address]       Visit Date: 06/16/25    PCP: Farhad Chowdary MD  [address]  [place] [address]    IDENTIFICATION: A 74 y.o. female from [place], [place]    PROBLEM LIST:   8/22 [facility] ER evaluation for shortness of breath, chest pain, with elevation in proBNP    8/22 echo EF .65% IR AV scler  HTN  HLD  10/18/16 lipids:  TG 95 HDL 67 and   COPD, on chronic O2 >4L DO NOT INTUBATE  Follows with Harjeet   11/17 echo EF 60%, rvsp 13  Rheumatoid lung disease  Follows with Dr. Lna   Stage III CKD 1.57 9/23  Secondary hyperparathyroidism  GERD  Chronic pain  Former tobacco abuse, cessation 1991  Rheumatoid arthritis  Hiatal hernia  Surgical Hx  Tubal ligation  Tonsillectomy      CC:   Chief Complaint   Patient presents with    Atrial Fibrillation     HFU 2 WEEKS CONVERTED BACK SINUS RHYTHM       Allergies  Allergies   Allergen Reactions    Iodinated Contrast Media Other (See Comments)     Kidney disease    Feraheme [Ferumoxytol] Palpitations and Other (See Comments)     hypertension    Brimonidine Tartrate Nausea And Vomiting    Ciprofloxacin Other (See Comments)     \"Hurt all over\"    Doxycycline Other (See Comments)     \"makes other medication stay in my system longer\"    Lipitor [Atorvastatin] Other (See Comments)     Hurt all over      Nsaids Other (See Comments)     Pt states she has stage three kidney disease    Sulfa Antibiotics Other (See Comments)     \"cant remember\"       Current Medications    Current Outpatient Medications:     apixaban (ELIQUIS) 5 MG tablet tablet, Take 1 tablet by mouth Every 12 (Twelve) Hours for 30 days. Indications: Atrial Fibrillation, Disp: 60 tablet, Rfl: 0    budesonide-formoterol (Symbicort) 160-4.5 MCG/ACT inhaler, Inhale 2 puffs 2 (Two) Times a Day., Disp: 3 each, Rfl: 3    epoetin chidi (Procrit) 2000 UNIT/ML injection, Inject  under the skin " into the appropriate area as directed Every 6 (Six) Weeks., Disp: , Rfl:     etanercept (ENBREL) 50 MG/ML solution prefilled syringe injection, Inject 1 mL under the skin into the appropriate area as directed Every 7 (Seven) Days., Disp: 12 mL, Rfl: 0    fentaNYL (DURAGESIC) 75 MCG/HR patch, Place 1 patch on the skin as directed by provider Every 72 (Seventy-Two) Hours., Disp: , Rfl:     furosemide (Lasix) 20 MG tablet, Take 1 tablet by mouth Daily for 30 days., Disp: 30 tablet, Rfl: 0    HYDROcodone-acetaminophen (NORCO) 5-325 MG per tablet, Take 1 tablet by mouth Every 6 (Six) Hours As Needed., Disp: , Rfl:     lactulose (CHRONULAC) 10 GM/15ML solution solution (encephalopathy), Take 45 mL by mouth., Disp: , Rfl:     metoprolol tartrate (LOPRESSOR) 25 MG tablet, Take 1 tablet by mouth 2 (Two) Times a Day for 30 days., Disp: 60 tablet, Rfl: 0    Multiple Vitamins-Minerals (CENTRUM ADULTS PO), Take 1 tablet by mouth Daily., Disp: , Rfl:     O2 (OXYGEN), Inhale 6 L/min Continuous., Disp: , Rfl:     ondansetron (ZOFRAN) 8 MG tablet, Take 1 tablet by mouth Every 8 (Eight) Hours As Needed for Nausea or Vomiting., Disp: , Rfl:     sertraline (ZOLOFT) 100 MG tablet, Take 1 tablet by mouth Daily., Disp: 90 tablet, Rfl: 3    simethicone (MYLICON) 125 MG chewable tablet, Chew 1 tablet Every 6 (Six) Hours As Needed for Flatulence., Disp: , Rfl:     valsartan-hydrochlorothiazide (DIOVAN-HCT) 320-25 MG per tablet, Take 0.5 tablets by mouth Daily., Disp: 45 tablet, Rfl: 3      History of Present Illness   Vanna Rincon is a 74 y.o. year old female here for hospital follow-up.  Patient was admitted 5/28 through 5/29/2025 at [facility].  She was noted with A-fib but converted to sinus rhythm after given 1 dose of Lopressor.  She was started on metoprolol 25 twice daily apixaban and furosemide 20.  She was asked to hold amlodipine.  Patient is accompanied by her .  She is confused and has scattered stream of  "consciousness.  She is convinced that Eliquis and metoprolol have caused her heart issues despite these being started because of her atrial fibrillation  OBJECTIVE:  Vitals:    06/16/25 1333   BP: 100/58   BP Location: Right arm   Patient Position: Sitting   Cuff Size: Adult   Pulse: 62   SpO2: 99%   Weight: 57.2 kg (126 lb)   Height: 167.6 cm (66\")         Body mass index is 20.34 kg/m².    Vitals and nursing note reviewed.   Constitutional:       General: Awake. Not in acute distress.     Appearance: Well-developed and underweight. Chronically ill-appearing.   Eyes:      Conjunctiva/sclera: Conjunctivae normal.      Pupils: Pupils are equal, round, and reactive to light.   HENT:      Head: Normocephalic and atraumatic.      Nose: Nose normal.    Mouth/Throat:      Pharynx: Uvula midline.   Neck:      Vascular: No carotid bruit.      Trachea: No tracheal deviation.   Pulmonary:      Effort: Pulmonary effort is normal.      Breath sounds: Normal breath sounds. No wheezing. No rhonchi. No rales.      Comments: To nasal cannula as with high flow currently  Cardiovascular:      Tachycardia present. Irregularly irregular rhythm. Normal S1. Normal S2.       Murmurs: There is a diastolic murmur.      No gallop.  No click. No rub.   Edema:     Peripheral edema present.     Pretibial: 3+ edema of the left pretibial area and trace edema of the right pretibial area.     Ankle: 3+ edema of the left ankle and trace edema of the right ankle.  Abdominal:      General: Bowel sounds are normal.      Palpations: Abdomen is soft.   Musculoskeletal:      Cervical back: Normal range of motion and neck supple. Skin:     General: Skin is warm and dry.      Findings: No erythema.   Neurological:      Mental Status: Alert, oriented to person, place, and time and oriented to person, place and time.   Psychiatric:         Attention and Perception: Attention normal.         Mood and Affect: Mood normal.         Speech: Speech normal.         " Behavior: Behavior normal. Behavior is cooperative.         Diagnostic Data:  Procedures      ASSESSMENT:   Diagnosis Plan   1. Paroxysmal atrial fibrillation        2. Primary hypertension        3. Mixed hyperlipidemia                  PLAN:  1.  Atrial fibrillation [id number] 4.  Patient is noncompliant with her medications she does not wish to consider AV node ablation nor would she be an easy candidate to sedate.    I would recommend hospice    2.  Hypertension - patient in clinic blood pressure appreciated at 160/64.  Displays multiple  blood pressures that are fluctuating between 160 to 110.  We instructed the patient to follow her blood pressures over the next 2 weeks and inform the clinic if her blood pressures are persistently above goal <130/80.  3.  BAKER - patient has longstanding history of BAKER secondary to rheumatoid lung.  End-stage with escalating amounts of oxygen patient states that she has a living will and is a DO NOT INTUBATE  4.  Mixed hyperlipidemia- patient needs updated lipid panel per our records.        Kraig Marquez MD, FACC

## 2025-06-16 NOTE — TELEPHONE ENCOUNTER
Patient presented to office to request to re-establish with Dr. Jefferson. Patient states that she established with Dr. Jefferson in 2017 after Dr. Lundberg left the clinic but decided to follow Dr. Lundberg. After Dr. Lundberg retired, patient was established with his replacement but feels like Dr. Jefferson is a much better fit. Patient has several other [facility] providers and wishes to stay within the [facility] for PCP as well.

## 2025-06-19 ENCOUNTER — TELEMEDICINE (OUTPATIENT)
Age: 75
End: 2025-06-19
Payer: MEDICARE

## 2025-06-19 DIAGNOSIS — Z79.899 ENCOUNTER FOR LONG-TERM (CURRENT) USE OF HIGH-RISK MEDICATION: ICD-10-CM

## 2025-06-19 DIAGNOSIS — M05.79 RHEUMATOID ARTHRITIS INVOLVING MULTIPLE SITES WITH POSITIVE RHEUMATOID FACTOR: Primary | ICD-10-CM

## 2025-06-19 DIAGNOSIS — M79.7 FIBROMYALGIA: ICD-10-CM

## 2025-06-19 DIAGNOSIS — N18.30 STAGE 3 CHRONIC KIDNEY DISEASE, UNSPECIFIED WHETHER STAGE 3A OR 3B CKD: ICD-10-CM

## 2025-06-19 DIAGNOSIS — L40.9 PSORIASIS: ICD-10-CM

## 2025-06-19 DIAGNOSIS — M85.80 OSTEOPENIA, UNSPECIFIED LOCATION: ICD-10-CM

## 2025-06-19 DIAGNOSIS — R53.83 FATIGUE, UNSPECIFIED TYPE: ICD-10-CM

## 2025-06-19 RX ORDER — DILTIAZEM HYDROCHLORIDE EXTENDED-RELEASE TABLETS 120 MG/1
TABLET, EXTENDED RELEASE ORAL
COMMUNITY
Start: 2025-06-19

## 2025-06-20 ENCOUNTER — HOSPITAL ENCOUNTER (OUTPATIENT)
Facility: HOSPITAL | Age: 75
Discharge: HOME | End: 2025-06-20
Payer: MEDICARE

## 2025-06-20 VITALS
DIASTOLIC BLOOD PRESSURE: 81 MMHG | OXYGEN SATURATION: 100 % | SYSTOLIC BLOOD PRESSURE: 146 MMHG | HEART RATE: 69 BPM | RESPIRATION RATE: 20 BRPM

## 2025-06-20 DIAGNOSIS — M79.7 FIBROMYALGIA: ICD-10-CM

## 2025-06-20 DIAGNOSIS — Z79.899 ENCOUNTER FOR LONG-TERM (CURRENT) USE OF HIGH-RISK MEDICATION: ICD-10-CM

## 2025-06-20 DIAGNOSIS — N18.30 STAGE 3 CHRONIC KIDNEY DISEASE, UNSPECIFIED WHETHER STAGE 3A OR 3B CKD: ICD-10-CM

## 2025-06-20 DIAGNOSIS — R53.83 FATIGUE, UNSPECIFIED TYPE: ICD-10-CM

## 2025-06-20 DIAGNOSIS — M05.79 RHEUMATOID ARTHRITIS INVOLVING MULTIPLE SITES WITH POSITIVE RHEUMATOID FACTOR: ICD-10-CM

## 2025-06-20 DIAGNOSIS — D50.9 IRON DEFICIENCY ANEMIA, UNSPECIFIED IRON DEFICIENCY ANEMIA TYPE: Primary | ICD-10-CM

## 2025-06-20 LAB
ALBUMIN SERPL-MCNC: 3.9 G/DL (ref 3.5–5.2)
ALBUMIN/GLOB SERPL: 1.6 G/DL
ALP SERPL-CCNC: 87 U/L (ref 39–117)
ALT SERPL W P-5'-P-CCNC: 5 U/L (ref 1–33)
ANION GAP SERPL CALCULATED.3IONS-SCNC: 10.2 MMOL/L (ref 5–15)
AST SERPL-CCNC: 13 U/L (ref 1–32)
BILIRUB SERPL-MCNC: 0.2 MG/DL (ref 0–1.2)
BUN SERPL-MCNC: 19 MG/DL (ref 8–23)
BUN/CREAT SERPL: 13.7 (ref 7–25)
CALCIUM SPEC-SCNC: 8.4 MG/DL (ref 8.6–10.5)
CHLORIDE SERPL-SCNC: 97 MMOL/L (ref 98–107)
CO2 SERPL-SCNC: 32.8 MMOL/L (ref 22–29)
CREAT SERPL-MCNC: 1.39 MG/DL (ref 0.57–1)
CRP SERPL-MCNC: <0.3 MG/DL (ref 0–0.5)
DEPRECATED RDW RBC AUTO: 49.8 FL (ref 37–54)
EGFRCR SERPLBLD CKD-EPI 2021: 40.4 ML/MIN/1.73
ERYTHROCYTE [DISTWIDTH] IN BLOOD BY AUTOMATED COUNT: 16.3 % (ref 12.3–15.4)
ERYTHROCYTE [SEDIMENTATION RATE] IN BLOOD: 3 MM/HR (ref 0–30)
GLOBULIN UR ELPH-MCNC: 2.5 GM/DL
GLUCOSE SERPL-MCNC: 116 MG/DL (ref 65–99)
HCT VFR BLD AUTO: 26.4 % (ref 34–46.6)
HGB BLD-MCNC: 7.6 G/DL (ref 12–15.9)
MCH RBC QN AUTO: 24.1 PG (ref 26.6–33)
MCHC RBC AUTO-ENTMCNC: 28.8 G/DL (ref 31.5–35.7)
MCV RBC AUTO: 83.8 FL (ref 79–97)
PHOSPHATE SERPL-MCNC: 3.6 MG/DL (ref 2.5–4.5)
PLATELET # BLD AUTO: 109 10*3/MM3 (ref 140–450)
PMV BLD AUTO: 11.6 FL (ref 6–12)
POTASSIUM SERPL-SCNC: 4 MMOL/L (ref 3.5–5.2)
PROT SERPL-MCNC: 6.4 G/DL (ref 6–8.5)
RBC # BLD AUTO: 3.15 10*6/MM3 (ref 3.77–5.28)
SODIUM SERPL-SCNC: 140 MMOL/L (ref 136–145)
WBC NRBC COR # BLD AUTO: 4.76 10*3/MM3 (ref 3.4–10.8)

## 2025-06-20 PROCEDURE — 84100 ASSAY OF PHOSPHORUS: CPT

## 2025-06-20 PROCEDURE — 25010000002 EPOETIN ALFA PER 1000 UNITS

## 2025-06-20 PROCEDURE — 80053 COMPREHEN METABOLIC PANEL: CPT

## 2025-06-20 PROCEDURE — 86480 TB TEST CELL IMMUN MEASURE: CPT | Performed by: NURSE PRACTITIONER

## 2025-06-20 PROCEDURE — 85027 COMPLETE CBC AUTOMATED: CPT

## 2025-06-20 PROCEDURE — 80074 ACUTE HEPATITIS PANEL: CPT | Performed by: NURSE PRACTITIONER

## 2025-06-20 PROCEDURE — 96372 THER/PROPH/DIAG INJ SC/IM: CPT

## 2025-06-20 PROCEDURE — 86140 C-REACTIVE PROTEIN: CPT | Performed by: NURSE PRACTITIONER

## 2025-06-20 PROCEDURE — 85652 RBC SED RATE AUTOMATED: CPT | Performed by: NURSE PRACTITIONER

## 2025-06-20 PROCEDURE — 36415 COLL VENOUS BLD VENIPUNCTURE: CPT

## 2025-06-20 RX ADMIN — ERYTHROPOIETIN 40000 UNITS: 40000 INJECTION, SOLUTION INTRAVENOUS; SUBCUTANEOUS at 17:04

## 2025-06-20 NOTE — CODE DOCUMENTATION
Venipuncture x1 attempt to [facility] for ordered labs.  Specimens taken to inpatient lab for processing.  Patient tolerated well.    Hgb = 7.6 today which meets criteria for treatment; instructed patient to monitor for signs/symptoms of hypovolemia and if she experiences any of these symptoms to go to the Emergency Department for evaluation.  Message sent to her provider, Jose Daniel Marquez.

## 2025-06-21 LAB
HAV IGM SERPL QL IA: ABNORMAL
HBV CORE IGM SERPL QL IA: ABNORMAL
HBV SURFACE AG SERPL QL IA: ABNORMAL
HCV AB SER QL: REACTIVE

## 2025-06-23 NOTE — ADDENDUM NOTE
Encounter addended by: Magnolia Brandon RN on: 6/23/2025 1:03 PM   Actions taken: Clinical Note Signed

## 2025-06-23 NOTE — CODE DOCUMENTATION
"6/23/25 @ 1203) Called [facility] and was connected to MADHAV Mckeon.  This nurse reported to Linda that Ms. Rincon had a HGB of 7.6 and HCT of 26.4 on Friday's 6/20/25 CBC.  Also notified Linda that there has been a gradual decline in Hgb/Hct levels over that past few weeks/months.  Reported that the patient states she is having extreme fatigue and dizzy spells and that she was encouraged to go to the ED for evaluation on Friday or over the weekend if symptoms got worse.  Patient declined to go to the ED on Friday.  Linda stated she would send message back to the providers and would call back with any orders received.      This nurse notified patient's  per request of Ms. Rincon to inform him the results had been called to the [facility] office today.   reported Ms. Rincon is doing \"about the same\".  "

## 2025-06-24 ENCOUNTER — APPOINTMENT (OUTPATIENT)
Dept: GENERAL RADIOLOGY | Facility: HOSPITAL | Age: 75
DRG: 070 | End: 2025-06-24
Payer: MEDICARE

## 2025-06-24 ENCOUNTER — APPOINTMENT (OUTPATIENT)
Dept: CT IMAGING | Facility: HOSPITAL | Age: 75
DRG: 070 | End: 2025-06-24
Payer: MEDICARE

## 2025-06-24 ENCOUNTER — HOSPITAL ENCOUNTER (INPATIENT)
Facility: HOSPITAL | Age: 75
LOS: 1 days | Discharge: HOME | DRG: 070 | End: 2025-06-26
Attending: STUDENT IN AN ORGANIZED HEALTH CARE EDUCATION/TRAINING PROGRAM | Admitting: INTERNAL MEDICINE
Payer: MEDICARE

## 2025-06-24 ENCOUNTER — RESULTS FOLLOW-UP (OUTPATIENT)
Age: 75
End: 2025-06-24
Payer: MEDICARE

## 2025-06-24 DIAGNOSIS — I48.91 ATRIAL FIBRILLATION WITH RAPID VENTRICULAR RESPONSE: ICD-10-CM

## 2025-06-24 DIAGNOSIS — I48.91 ATRIAL FIBRILLATION WITH RVR: ICD-10-CM

## 2025-06-24 DIAGNOSIS — R06.89 HYPERCAPNIA: ICD-10-CM

## 2025-06-24 DIAGNOSIS — R76.8 HEPATITIS C ANTIBODY POSITIVE: Primary | ICD-10-CM

## 2025-06-24 DIAGNOSIS — R41.82 ALTERED MENTAL STATUS, UNSPECIFIED ALTERED MENTAL STATUS TYPE: Primary | ICD-10-CM

## 2025-06-24 LAB
A-A DO2: ABNORMAL
ALBUMIN SERPL-MCNC: 4.2 G/DL (ref 3.5–5.2)
ALBUMIN/GLOB SERPL: 1.4 G/DL
ALP SERPL-CCNC: 92 U/L (ref 39–117)
ALT SERPL W P-5'-P-CCNC: 24 U/L (ref 1–33)
ANION GAP SERPL CALCULATED.3IONS-SCNC: 10.9 MMOL/L (ref 5–15)
ANISOCYTOSIS BLD QL: NORMAL
ARTERIAL PATENCY WRIST A: ABNORMAL
AST SERPL-CCNC: 67 U/L (ref 1–32)
ATMOSPHERIC PRESS: 738 MMHG
B PARAPERT DNA SPEC QL NAA+PROBE: NOT DETECTED
B PERT DNA SPEC QL NAA+PROBE: NOT DETECTED
BACTERIA UR QL AUTO: NORMAL /HPF
BASE EXCESS BLDA CALC-SCNC: 9.1 MMOL/L (ref 0–2)
BASOPHILS # BLD AUTO: 0.01 10*3/MM3 (ref 0–0.2)
BASOPHILS NFR BLD AUTO: 0.1 % (ref 0–1.5)
BDY SITE: ABNORMAL
BILIRUB SERPL-MCNC: 0.2 MG/DL (ref 0–1.2)
BILIRUB UR QL STRIP: NEGATIVE
BUN SERPL-MCNC: 27 MG/DL (ref 8–23)
BUN/CREAT SERPL: 15.7 (ref 7–25)
C PNEUM DNA NPH QL NAA+NON-PROBE: NOT DETECTED
CALCIUM SPEC-SCNC: 9.1 MG/DL (ref 8.6–10.5)
CHLORIDE SERPL-SCNC: 98 MMOL/L (ref 98–107)
CLARITY UR: CLEAR
CO2 SERPL-SCNC: 34.1 MMOL/L (ref 22–29)
COHGB MFR BLD: 0.9 % (ref 0–2)
COLOR UR: YELLOW
CREAT SERPL-MCNC: 1.72 MG/DL (ref 0.57–1)
D DIMER PPP FEU-MCNC: 0.66 MCGFEU/ML (ref 0–0.72)
DEPRECATED RDW RBC AUTO: 49.5 FL (ref 37–54)
EGFRCR SERPLBLD CKD-EPI 2021: 31.3 ML/MIN/1.73
EOSINOPHIL # BLD AUTO: 0.01 10*3/MM3 (ref 0–0.4)
EOSINOPHIL NFR BLD AUTO: 0.1 % (ref 0.3–6.2)
ERYTHROCYTE [DISTWIDTH] IN BLOOD BY AUTOMATED COUNT: 16.1 % (ref 12.3–15.4)
FLUAV SUBTYP SPEC NAA+PROBE: NOT DETECTED
FLUBV RNA ISLT QL NAA+PROBE: NOT DETECTED
GAMMA INTERFERON BACKGROUND BLD IA-ACNC: 0.05 IU/ML
GAS FLOW AIRWAY: 6 LPM
GEN 5 1HR TROPONIN T REFLEX: 38 NG/L
GLOBULIN UR ELPH-MCNC: 3 GM/DL
GLUCOSE SERPL-MCNC: 118 MG/DL (ref 65–99)
GLUCOSE UR STRIP-MCNC: NEGATIVE MG/DL
HADV DNA SPEC NAA+PROBE: NOT DETECTED
HCO3 BLDA-SCNC: 35.9 MMOL/L (ref 22–28)
HCOV 229E RNA SPEC QL NAA+PROBE: NOT DETECTED
HCOV HKU1 RNA SPEC QL NAA+PROBE: NOT DETECTED
HCOV NL63 RNA SPEC QL NAA+PROBE: NOT DETECTED
HCOV OC43 RNA SPEC QL NAA+PROBE: NOT DETECTED
HCT VFR BLD AUTO: 30.3 % (ref 34–46.6)
HCT VFR BLD CALC: 24.7 %
HGB BLD-MCNC: 8.6 G/DL (ref 12–15.9)
HGB UR QL STRIP.AUTO: ABNORMAL
HMPV RNA NPH QL NAA+NON-PROBE: NOT DETECTED
HOLD SPECIMEN: NORMAL
HOLD SPECIMEN: NORMAL
HPIV1 RNA ISLT QL NAA+PROBE: NOT DETECTED
HPIV2 RNA SPEC QL NAA+PROBE: NOT DETECTED
HPIV3 RNA NPH QL NAA+PROBE: NOT DETECTED
HPIV4 P GENE NPH QL NAA+PROBE: NOT DETECTED
HYALINE CASTS UR QL AUTO: NORMAL /LPF
HYPOCHROMIA BLD QL: NORMAL
IMM GRANULOCYTES # BLD AUTO: 0.04 10*3/MM3 (ref 0–0.05)
IMM GRANULOCYTES NFR BLD AUTO: 0.4 % (ref 0–0.5)
KETONES UR QL STRIP: NEGATIVE
LEUKOCYTE ESTERASE UR QL STRIP.AUTO: ABNORMAL
LYMPHOCYTES # BLD AUTO: 0.46 10*3/MM3 (ref 0.7–3.1)
LYMPHOCYTES NFR BLD AUTO: 5.2 % (ref 19.6–45.3)
Lab: ABNORMAL
M PNEUMO IGG SER IA-ACNC: NOT DETECTED
M TB IFN-G BLD-IMP: NEGATIVE
M TB IFN-G CD4+ BCKGRND COR BLD-ACNC: 0.06 IU/ML
M TB IFN-G CD4+CD8+ BCKGRND COR BLD-ACNC: 0.06 IU/ML
MAGNESIUM SERPL-MCNC: 2.2 MG/DL (ref 1.6–2.4)
MCH RBC QN AUTO: 23.8 PG (ref 26.6–33)
MCHC RBC AUTO-ENTMCNC: 28.4 G/DL (ref 31.5–35.7)
MCV RBC AUTO: 83.9 FL (ref 79–97)
METHGB BLD QL: 1 % (ref 0–1.5)
MICROCYTES BLD QL: NORMAL
MITOGEN IGNF BCKGRD COR BLD-ACNC: >10 IU/ML
MODALITY: ABNORMAL
MONOCYTES # BLD AUTO: 0.44 10*3/MM3 (ref 0.1–0.9)
MONOCYTES NFR BLD AUTO: 4.9 % (ref 5–12)
NEUTROPHILS NFR BLD AUTO: 7.96 10*3/MM3 (ref 1.7–7)
NEUTROPHILS NFR BLD AUTO: 89.3 % (ref 42.7–76)
NITRITE UR QL STRIP: NEGATIVE
NRBC BLD AUTO-RTO: 0 /100 WBC (ref 0–0.2)
NT-PROBNP SERPL-MCNC: 7757 PG/ML (ref 0–900)
OXYHGB MFR BLDV: 97.4 % (ref 94–99)
PCO2 BLDA: 64.5 MM HG (ref 35–45)
PCO2 TEMP ADJ BLD: ABNORMAL MM[HG]
PH BLDA: 7.35 PH UNITS (ref 7.3–7.5)
PH UR STRIP.AUTO: <=5 [PH] (ref 5–8)
PH, TEMP CORRECTED: ABNORMAL
PLATELET # BLD AUTO: 180 10*3/MM3 (ref 140–450)
PMV BLD AUTO: 10.6 FL (ref 6–12)
PO2 BLDA: 111 MM HG (ref 75–100)
PO2 TEMP ADJ BLD: ABNORMAL MM[HG]
POTASSIUM SERPL-SCNC: 4 MMOL/L (ref 3.5–5.2)
PROT SERPL-MCNC: 7.2 G/DL (ref 6–8.5)
PROT UR QL STRIP: NEGATIVE
QUANTIFERON INCUBATION: NORMAL
RBC # BLD AUTO: 3.61 10*6/MM3 (ref 3.77–5.28)
RBC # UR STRIP: NORMAL /HPF
REF LAB TEST METHOD: NORMAL
RHINOVIRUS RNA SPEC NAA+PROBE: NOT DETECTED
RSV RNA NPH QL NAA+NON-PROBE: NOT DETECTED
SAO2 % BLDCOA: 99.3 % (ref 94–100)
SARS-COV-2 RNA RESP QL NAA+PROBE: NOT DETECTED
SERVICE CMNT-IMP: NORMAL
SMALL PLATELETS BLD QL SMEAR: ADEQUATE
SODIUM SERPL-SCNC: 143 MMOL/L (ref 136–145)
SP GR UR STRIP: 1.01 (ref 1–1.03)
SQUAMOUS #/AREA URNS HPF: NORMAL /HPF
TROPONIN T % DELTA: -5
TROPONIN T NUMERIC DELTA: -2 NG/L
TROPONIN T SERPL HS-MCNC: 40 NG/L
UROBILINOGEN UR QL STRIP: ABNORMAL
VENTILATOR MODE: ABNORMAL
WBC # UR STRIP: NORMAL /HPF
WBC MORPH BLD: NORMAL
WBC NRBC COR # BLD AUTO: 8.92 10*3/MM3 (ref 3.4–10.8)
WHOLE BLOOD HOLD COAG: NORMAL
WHOLE BLOOD HOLD SPECIMEN: NORMAL

## 2025-06-24 PROCEDURE — 94640 AIRWAY INHALATION TREATMENT: CPT

## 2025-06-24 PROCEDURE — 80053 COMPREHEN METABOLIC PANEL: CPT | Performed by: STUDENT IN AN ORGANIZED HEALTH CARE EDUCATION/TRAINING PROGRAM

## 2025-06-24 PROCEDURE — 85007 BL SMEAR W/DIFF WBC COUNT: CPT | Performed by: STUDENT IN AN ORGANIZED HEALTH CARE EDUCATION/TRAINING PROGRAM

## 2025-06-24 PROCEDURE — 0202U NFCT DS 22 TRGT SARS-COV-2: CPT

## 2025-06-24 PROCEDURE — 81001 URINALYSIS AUTO W/SCOPE: CPT

## 2025-06-24 PROCEDURE — 82375 ASSAY CARBOXYHB QUANT: CPT

## 2025-06-24 PROCEDURE — 36415 COLL VENOUS BLD VENIPUNCTURE: CPT

## 2025-06-24 PROCEDURE — 84484 ASSAY OF TROPONIN QUANT: CPT | Performed by: STUDENT IN AN ORGANIZED HEALTH CARE EDUCATION/TRAINING PROGRAM

## 2025-06-24 PROCEDURE — 82805 BLOOD GASES W/O2 SATURATION: CPT

## 2025-06-24 PROCEDURE — 25010000002 AMIODARONE IN DEXTROSE 5% 360-4.14 MG/200ML-% SOLUTION

## 2025-06-24 PROCEDURE — 84145 PROCALCITONIN (PCT): CPT | Performed by: INTERNAL MEDICINE

## 2025-06-24 PROCEDURE — 71045 X-RAY EXAM CHEST 1 VIEW: CPT

## 2025-06-24 PROCEDURE — 83735 ASSAY OF MAGNESIUM: CPT

## 2025-06-24 PROCEDURE — 83880 ASSAY OF NATRIURETIC PEPTIDE: CPT | Performed by: STUDENT IN AN ORGANIZED HEALTH CARE EDUCATION/TRAINING PROGRAM

## 2025-06-24 PROCEDURE — 25010000002 LORAZEPAM PER 2 MG: Performed by: STUDENT IN AN ORGANIZED HEALTH CARE EDUCATION/TRAINING PROGRAM

## 2025-06-24 PROCEDURE — 36600 WITHDRAWAL OF ARTERIAL BLOOD: CPT

## 2025-06-24 PROCEDURE — 85379 FIBRIN DEGRADATION QUANT: CPT

## 2025-06-24 PROCEDURE — 83050 HGB METHEMOGLOBIN QUAN: CPT

## 2025-06-24 PROCEDURE — 70450 CT HEAD/BRAIN W/O DYE: CPT

## 2025-06-24 PROCEDURE — 25010000002 METHYLPREDNISOLONE PER 125 MG

## 2025-06-24 PROCEDURE — 25010000002 AMIODARONE IN DEXTROSE 5% 150-4.21 MG/100ML-% SOLUTION

## 2025-06-24 PROCEDURE — 93005 ELECTROCARDIOGRAM TRACING: CPT | Performed by: STUDENT IN AN ORGANIZED HEALTH CARE EDUCATION/TRAINING PROGRAM

## 2025-06-24 PROCEDURE — 85025 COMPLETE CBC W/AUTO DIFF WBC: CPT | Performed by: STUDENT IN AN ORGANIZED HEALTH CARE EDUCATION/TRAINING PROGRAM

## 2025-06-24 PROCEDURE — 87086 URINE CULTURE/COLONY COUNT: CPT

## 2025-06-24 PROCEDURE — 99291 CRITICAL CARE FIRST HOUR: CPT | Performed by: STUDENT IN AN ORGANIZED HEALTH CARE EDUCATION/TRAINING PROGRAM

## 2025-06-24 RX ORDER — IPRATROPIUM BROMIDE AND ALBUTEROL SULFATE 2.5; .5 MG/3ML; MG/3ML
3 SOLUTION RESPIRATORY (INHALATION) ONCE
Status: COMPLETED | OUTPATIENT
Start: 2025-06-24 | End: 2025-06-24

## 2025-06-24 RX ORDER — LORAZEPAM 2 MG/ML
1 INJECTION INTRAMUSCULAR ONCE
Status: COMPLETED | OUTPATIENT
Start: 2025-06-24 | End: 2025-06-24

## 2025-06-24 RX ORDER — SODIUM CHLORIDE 0.9 % (FLUSH) 0.9 %
10 SYRINGE (ML) INJECTION AS NEEDED
Status: DISCONTINUED | OUTPATIENT
Start: 2025-06-24 | End: 2025-06-26 | Stop reason: HOSPADM

## 2025-06-24 RX ORDER — METHYLPREDNISOLONE SODIUM SUCCINATE 125 MG/2ML
125 INJECTION, POWDER, LYOPHILIZED, FOR SOLUTION INTRAMUSCULAR; INTRAVENOUS ONCE
Status: COMPLETED | OUTPATIENT
Start: 2025-06-24 | End: 2025-06-24

## 2025-06-24 RX ADMIN — AMIODARONE HYDROCHLORIDE 1 MG/MIN: 1.8 INJECTION, SOLUTION INTRAVENOUS at 23:05

## 2025-06-24 RX ADMIN — LORAZEPAM 1 MG: 2 INJECTION INTRAMUSCULAR; INTRAVENOUS at 19:31

## 2025-06-24 RX ADMIN — METHYLPREDNISOLONE SODIUM SUCCINATE 125 MG: 125 INJECTION, POWDER, FOR SOLUTION INTRAMUSCULAR; INTRAVENOUS at 19:32

## 2025-06-24 RX ADMIN — AMIODARONE HYDROCHLORIDE 150 MG: 1.5 INJECTION, SOLUTION INTRAVENOUS at 22:28

## 2025-06-24 RX ADMIN — IPRATROPIUM BROMIDE AND ALBUTEROL SULFATE 3 ML: 2.5; .5 SOLUTION RESPIRATORY (INHALATION) at 20:21

## 2025-06-24 NOTE — ED NOTES
Pt refusing any interventions att. Pt educated about the need for being checked out due to her concern for her oxygenation. Pt states she would like to go home. Provider notified.

## 2025-06-24 NOTE — ED NOTES
Pt's  now at bedside and encouraged pt to be checked out and allow interventions to be done. Pt agreed and provider notified att.

## 2025-06-24 NOTE — ED PROVIDER NOTES
" EMERGENCY DEPARTMENT ENCOUNTER    Pt Name: Vanna Rincon  MRN: [id number]  Pt :   1950  Room Number:  01SF/01  Date of encounter:  2025  PCP: Farhad Chowdary MD  ED Provider: Ja Root PA-C    Historian: Patient,  at bedside, nursing notes      HPI:  Chief Complaint: Confusion, altered mental status        Context: Vanna Rincon is a 74 y.o. female who presents to the ED c/o AMS per her  at bedside.  Patient states that she does not want to get any treatment today and is requesting transfer.  Patient reports history of COPD and CHF.  Per patient's  at bedside the patient has been progressively more and more confused over the last several days.      PAST MEDICAL HISTORY  Past Medical History:   Diagnosis Date    Abnormal mammogram     Acute UTI     Allergic rhinitis     Anemia     Anxiety     Arthritis     knee, right    Asthmatic bronchitis     Back pain     Candida vaginitis     Cataracts, bilateral     CHF (congestive heart failure)     not diagnosed    Chronic lung disease     bronchiolitis obliterans. - Ct scan stable with pulmonary intolerant to pft    Chronic pain syndrome     disc disease, lumbar, neck    Colon polyps     Community acquired pneumonia     Conjunctivitis     COPD (chronic obstructive pulmonary disease)     Emphysema with severe physiology on PFTs.  Patient is a former smoker.    COPD (chronic obstructive pulmonary disease)     4L of oxygen    Cough     DDD (degenerative disc disease), lumbar     Deafness     Dependence on supplemental oxygen     PT REPORTS \"4L ALL TIMES\"    Disc disease, degenerative, cervical     Dry eye     Former smoker     Gastric bleed     Genitourinary syndrome of menopause     GERD (gastroesophageal reflux disease)     Glaucoma     H/O chest x-ray 2015    Rase base opacity consistent with pneumonia or atelectasis    H/O chest x-ray 2015    Right basilar airspace disease and effusion consistent with a " pneumonia. F/U to radiographic reolution is recommended    H/O chest x-ray 03/09/2010    Cardiac silhoutte is not enlarged. Lungs aare inflated. Mild nonspecifiec interstitial changes. No pleual effusions or dense consolidations. Scattered granulomatous changes. Spine with mild kyphosis but no osteopenia. No significant adenopathy    H/O echocardiogram 03/16/2010    Normal study    Herpes zoster     History of PFTs 12/13/2011    Goo pt cooperation and effort. Pt given 3 puffs of xopenex. PFT is acceptable and reproducible    History of PFTs 08/11/2011    Pt unable to produce acceptabel and reproducible PFT. Pt was given 3 puffs of xopenex. Pt gave good effort Best pleth of two attempts    History of PFTs 02/24/2011    PFT acceptable and reproducible. Pt gave good effort. Pt used bronchodilator less than 2 hours prior to testing    Hyperlipidemia     Hypertension     IBS (irritable bowel syndrome)     Insomnia     Interstitial lung disease     History of rheumatoid lung with bronchiolitis obliterans    Kidney stones     x 1    Lumbar herniated disc     Nephrolithiasis     2007, passed    Ocular migraine     On home oxygen therapy     REPORTS USES AT 4L NC AT ALL TIMES    Overactive bladder     Panic attack     Peripheral edema     Pneumonia     PRB (rectal bleeding)     RA (rheumatoid arthritis)     Renal insufficiency     Rheumatoid arthritis     · A.  Diagnosed in 2001 with history of methotrexate and Enbrel therapy.    Rheumatoid lung     bronchiolitis obliterans    Sciatica     Sinus problem     Thrombophlebitis     Tinnitus     Urge incontinence     Vertigo          PAST SURGICAL HISTORY  Past Surgical History:   Procedure Laterality Date    CATARACT EXTRACTION Right     COLONOSCOPY  2012    COLONOSCOPY N/A 01/29/2018    Procedure: COLONOSCOPY WITH COLD SNARE POLYPECTOMY X 6; SUBMUCOSAL INJECTION OF SALINE; HOT SNARE POLYPECTOMY X 4; CLIP PLACEMENT X 1;  Surgeon: Kenney Pastrana MD;  Location: [facility] ENDOSCOPY;   Service:     DENTAL PROCEDURE      ENDOSCOPY N/A 12/15/2017    Procedure: ESOPHAGOGASTRODUODENOSCOPY with biopsies and esophageal balloon dilitation;  Surgeon: Kenney Pastrana MD;  Location: [facility] ENDOSCOPY;  Service:     MOUTH SURGERY      REPORTS IMPLANT X2    ORAL ANTRAL FISTULA CLOSURE      gums    TONSILLECTOMY      TUBAL ABDOMINAL LIGATION      UPPER GASTROINTESTINAL ENDOSCOPY      UPPER GASTROINTESTINAL ENDOSCOPY  12/15/2017         FAMILY HISTORY  Family History   Problem Relation Age of Onset    Glaucoma Mother     Coronary artery disease Mother     Liver cancer Mother         bile duct cancer    Heart disease Mother     Cancer Mother     Hypertension Mother     Stroke Father     Stomach cancer Father     Cancer Father     Diabetes Sister     Other (systemic lupus erythematosus) Sister     Heart disease Maternal Grandmother     Diabetes Maternal Grandmother     Diabetes Paternal Grandmother     Colon cancer Neg Hx     Ulcerative colitis Neg Hx          SOCIAL HISTORY  Social History     Socioeconomic History    Marital status:     Number of children: 0   Tobacco Use    Smoking status: Former     Current packs/day: 0.00     Average packs/day: 1 pack/day for 28.0 years (28.0 ttl pk-yrs)     Types: Cigarettes     Start date:      Quit date:      Years since quittin.5     Passive exposure: Past    Smokeless tobacco: Never   Vaping Use    Vaping status: Never Used   Substance and Sexual Activity    Alcohol use: No    Drug use: No    Sexual activity: Defer     Comment:          ALLERGIES  Iodinated contrast media, Feraheme [ferumoxytol], Brimonidine tartrate, Ciprofloxacin, Doxycycline, Lipitor [atorvastatin], Nsaids, and Sulfa antibiotics        REVIEW OF SYSTEMS  Review of Systems   Constitutional:  Negative for chills and fever.   HENT:  Negative for congestion and sore throat.    Respiratory:  Negative for cough and shortness of breath.    Cardiovascular:  Negative for chest  pain.   Gastrointestinal:  Negative for abdominal pain, nausea and vomiting.   Genitourinary:  Negative for dysuria.   Musculoskeletal:  Negative for back pain.   Skin:  Negative for wound.   Neurological:  Negative for dizziness and headaches.   Psychiatric/Behavioral:  Positive for confusion.    All other systems reviewed and are negative.         All systems reviewed and negative except for those discussed in HPI.       PHYSICAL EXAM    I have reviewed the triage vital signs and nursing notes.    ED Triage Vitals   Temp Heart Rate Resp BP SpO2   06/24/25 1703 06/24/25 1659 06/24/25 1659 -- 06/24/25 1659   98.6 °F (37 °C) 92 20  100 %      Temp src Heart Rate Source Patient Position BP Location FiO2 (%)   06/24/25 1703 06/24/25 1659 -- -- --   Oral Monitor          Physical Exam  Vitals and nursing note reviewed.   Constitutional:       General: She is not in acute distress.     Appearance: She is not ill-appearing, toxic-appearing or diaphoretic.   HENT:      Head: Normocephalic and atraumatic.      Mouth/Throat:      Mouth: Mucous membranes are moist.      Pharynx: Oropharynx is clear.   Eyes:      Extraocular Movements: Extraocular movements intact.   Cardiovascular:      Rate and Rhythm: Normal rate.      Heart sounds: Normal heart sounds.   Pulmonary:      Effort: Pulmonary effort is normal. No respiratory distress.      Breath sounds: Normal breath sounds. No wheezing or rales.   Abdominal:      Tenderness: There is no abdominal tenderness.   Skin:     General: Skin is warm and dry.      Findings: No rash.   Neurological:      Mental Status: She is alert.   Psychiatric:         Behavior: Behavior is uncooperative and agitated. Behavior is not aggressive.             LAB RESULTS  Recent Results (from the past 24 hours)   Blood Gas, Arterial With Co-Ox    Collection Time: 06/24/25  5:50 PM    Specimen: Arterial Blood   Result Value Ref Range    Site Right Radial     Dusty's Test N/A     pH, Arterial 7.354  7.300 - 7.500 pH units    pCO2, Arterial 64.5 (C) 35.0 - 45.0 mm Hg    pO2, Arterial 111.0 (H) 75.0 - 100.0 mm Hg    HCO3, Arterial 35.9 (H) 22.0 - 28.0 mmol/L    Base Excess, Arterial 9.1 (H) 0.0 - 2.0 mmol/L    O2 Saturation, Arterial 99.3 94.0 - 100.0 %    Hematocrit, Blood Gas 24.7 %    Oxyhemoglobin 97.4 94 - 99 %    Methemoglobin 1.00 0.00 - 1.50 %    Carboxyhemoglobin 0.9 0 - 2 %    A-a DO2      Barometric Pressure for Blood Gas 738 mmHg    Modality Nasal Cannula     Flow Rate 6.0 lpm    Ventilator Mode NA     Collected by [id number]     pH, Temp Corrected      pCO2, Temperature Corrected      pO2, Temperature Corrected     Comprehensive Metabolic Panel    Collection Time: 06/24/25  6:34 PM    Specimen: Blood   Result Value Ref Range    Glucose 118 (H) 65 - 99 mg/dL    BUN 27.0 (H) 8.0 - 23.0 mg/dL    Creatinine 1.72 (H) 0.57 - 1.00 mg/dL    Sodium 143 136 - 145 mmol/L    Potassium 4.0 3.5 - 5.2 mmol/L    Chloride 98 98 - 107 mmol/L    CO2 34.1 (H) 22.0 - 29.0 mmol/L    Calcium 9.1 8.6 - 10.5 mg/dL    Total Protein 7.2 6.0 - 8.5 g/dL    Albumin 4.2 3.5 - 5.2 g/dL    ALT (SGPT) 24 1 - 33 U/L    AST (SGOT) 67 (H) 1 - 32 U/L    Alkaline Phosphatase 92 39 - 117 U/L    Total Bilirubin 0.2 0.0 - 1.2 mg/dL    Globulin 3.0 gm/dL    A/G Ratio 1.4 g/dL    BUN/Creatinine Ratio 15.7 7.0 - 25.0    Anion Gap 10.9 5.0 - 15.0 mmol/L    eGFR 31.3 (L) >60.0 mL/min/1.73   BNP    Collection Time: 06/24/25  6:34 PM    Specimen: Blood   Result Value Ref Range    proBNP 7,757.0 (H) 0.0 - 900.0 pg/mL   High Sensitivity Troponin T    Collection Time: 06/24/25  6:34 PM    Specimen: Blood   Result Value Ref Range    HS Troponin T 40 (H) <14 ng/L   Green Top (Gel)    Collection Time: 06/24/25  6:34 PM   Result Value Ref Range    Extra Tube Hold for add-ons.    Lavender Top    Collection Time: 06/24/25  6:34 PM   Result Value Ref Range    Extra Tube hold for add-on    Gold Top - SST    Collection Time: 06/24/25  6:34 PM   Result Value Ref  Range    Extra Tube Hold for add-ons.    Light Blue Top    Collection Time: 06/24/25  6:34 PM   Result Value Ref Range    Extra Tube Hold for add-ons.    CBC Auto Differential    Collection Time: 06/24/25  6:34 PM    Specimen: Blood   Result Value Ref Range    WBC 8.92 3.40 - 10.80 10*3/mm3    RBC 3.61 (L) 3.77 - 5.28 10*6/mm3    Hemoglobin 8.6 (L) 12.0 - 15.9 g/dL    Hematocrit 30.3 (L) 34.0 - 46.6 %    MCV 83.9 79.0 - 97.0 fL    MCH 23.8 (L) 26.6 - 33.0 pg    MCHC 28.4 (L) 31.5 - 35.7 g/dL    RDW 16.1 (H) 12.3 - 15.4 %    RDW-SD 49.5 37.0 - 54.0 fl    MPV 10.6 6.0 - 12.0 fL    Platelets 180 140 - 450 10*3/mm3    Neutrophil % 89.3 (H) 42.7 - 76.0 %    Lymphocyte % 5.2 (L) 19.6 - 45.3 %    Monocyte % 4.9 (L) 5.0 - 12.0 %    Eosinophil % 0.1 (L) 0.3 - 6.2 %    Basophil % 0.1 0.0 - 1.5 %    Immature Grans % 0.4 0.0 - 0.5 %    Neutrophils, Absolute 7.96 (H) 1.70 - 7.00 10*3/mm3    Lymphocytes, Absolute 0.46 (L) 0.70 - 3.10 10*3/mm3    Monocytes, Absolute 0.44 0.10 - 0.90 10*3/mm3    Eosinophils, Absolute 0.01 0.00 - 0.40 10*3/mm3    Basophils, Absolute 0.01 0.00 - 0.20 10*3/mm3    Immature Grans, Absolute 0.04 0.00 - 0.05 10*3/mm3    nRBC 0.0 0.0 - 0.2 /100 WBC   D-dimer, Quantitative    Collection Time: 06/24/25  6:34 PM    Specimen: Blood   Result Value Ref Range    D-Dimer, Quantitative 0.66 0.00 - 0.72 MCGFEU/mL   Magnesium    Collection Time: 06/24/25  6:34 PM    Specimen: Blood   Result Value Ref Range    Magnesium 2.2 1.6 - 2.4 mg/dL   Scan Slide    Collection Time: 06/24/25  6:34 PM    Specimen: Blood   Result Value Ref Range    Anisocytosis Slight/1+ None Seen    Hypochromia Mod/2+ None Seen    Microcytes Slight/1+ None Seen    WBC Morphology Normal Normal    Platelet Estimate Adequate Normal   Procalcitonin    Collection Time: 06/24/25  6:34 PM    Specimen: Blood   Result Value Ref Range    Procalcitonin 0.13 0.00 - 0.25 ng/mL   Urinalysis With Culture If Indicated - Urine, Clean Catch    Collection Time:  06/24/25  7:29 PM    Specimen: Urine, Clean Catch   Result Value Ref Range    Color, UA Yellow Yellow, Straw    Appearance, UA Clear Clear    pH, UA <=5.0 5.0 - 8.0    Specific Gravity, UA 1.009 1.005 - 1.030    Glucose, UA Negative Negative    Ketones, UA Negative Negative    Bilirubin, UA Negative Negative    Blood, UA Small (1+) (A) Negative    Protein, UA Negative Negative    Leuk Esterase, UA Trace (A) Negative    Nitrite, UA Negative Negative    Urobilinogen, UA 0.2 E.U./dL 0.2 - 1.0 E.U./dL   Urinalysis, Microscopic Only - Urine, Clean Catch    Collection Time: 06/24/25  7:29 PM    Specimen: Urine, Clean Catch   Result Value Ref Range    RBC, UA 0-2 None Seen, 0-2 /HPF    WBC, UA 0-2 None Seen, 0-2 /HPF    Bacteria, UA None Seen None Seen /HPF    Squamous Epithelial Cells, UA 0-2 None Seen, 0-2 /HPF    Hyaline Casts, UA None Seen None Seen /LPF    Methodology Manual Light Microscopy    Respiratory Panel PCR w/COVID-19(SARS-CoV-2) BERTO/KEY/ROMAIN/PAD/COR/GERALD In-House, NP Swab in UTM/VTM, 2 HR TAT - Swab, Nasopharynx    Collection Time: 06/24/25  7:45 PM    Specimen: Nasopharynx; Swab   Result Value Ref Range    ADENOVIRUS, PCR Not Detected Not Detected    Coronavirus 229E Not Detected Not Detected    Coronavirus HKU1 Not Detected Not Detected    Coronavirus NL63 Not Detected Not Detected    Coronavirus OC43 Not Detected Not Detected    COVID19 Not Detected Not Detected - Ref. Range    Human Metapneumovirus Not Detected Not Detected    Human Rhinovirus/Enterovirus Not Detected Not Detected    Influenza A PCR Not Detected Not Detected    Influenza B PCR Not Detected Not Detected    Parainfluenza Virus 1 Not Detected Not Detected    Parainfluenza Virus 2 Not Detected Not Detected    Parainfluenza Virus 3 Not Detected Not Detected    Parainfluenza Virus 4 Not Detected Not Detected    RSV, PCR Not Detected Not Detected    Bordetella pertussis pcr Not Detected Not Detected    Bordetella parapertussis PCR Not Detected  Not Detected    Chlamydophila pneumoniae PCR Not Detected Not Detected    Mycoplasma pneumo by PCR Not Detected Not Detected   High Sensitivity Troponin T 1Hr    Collection Time: 06/24/25  8:28 PM    Specimen: Blood   Result Value Ref Range    HS Troponin T 38 (H) <14 ng/L    Troponin T Numeric Delta -2 ng/L    Troponin T % Delta -5 Abnormal if >/= 20%       If labs were ordered, I independently reviewed the results and considered them in treating the patient.        RADIOLOGY  CT Head Without Contrast  Result Date: 6/25/2025  FINAL REPORT TECHNIQUE: null CLINICAL HISTORY: AMS COMPARISON: null FINDINGS: CT head without IV contrast: Comparison: None Findings: Age-appropriate sulcation. No intracranial mass, midline shift, hydrocephalus, acute hemorrhage or infarct. Incidental note is made of a cavum vergae, an asymptomatic variant of normal. Unremarkable orbits. No significant sinus disease. Mastoid air cells are clear. No skull fracture. No significant scalp soft tissue swelling.     Impression: 1. No acute intracranial abnormality. Age-appropriate exam. Authenticated and Electronically Signed by Maxx Rock MD on 06/25/2025 01:19:09 AM      I ordered and independently reviewed the above noted radiographic studies.      I viewed images of CT head which showed no acute intracranial abnormalities per my independent interpretation.    I viewed images of chest x-ray which showed emphysematous changes, right perihilar congestion from independent interpretation    See radiologist's dictation for official interpretation.        PROCEDURES    Critical Care    Performed by: Ja oRot PA-C  Authorized by: Adilson Downs MD    Critical care provider statement:     Critical care time (minutes):  42    Critical care time was exclusive of:  Separately billable procedures and treating other patients and teaching time    Critical care was necessary to treat or prevent imminent or life-threatening deterioration of the  following conditions:  Respiratory failure (AMS)    Critical care was time spent personally by me on the following activities:  Discussions with consultants, development of treatment plan with patient or surrogate, evaluation of patient's response to treatment, examination of patient, interpretation of cardiac output measurements, obtaining history from patient or surrogate, ordering and performing treatments and interventions, ordering and review of laboratory studies, ordering and review of radiographic studies, pulse oximetry, re-evaluation of patient's condition and review of old charts    Care discussed with: admitting provider        ECG 12 Lead Rhythm Change   Final Result      ECG 12 Lead Dyspnea   Final Result          MEDICATIONS GIVEN IN ER    Medications   sodium chloride 0.9 % flush 10 mL (has no administration in time range)   amiodarone 150 mg in 100 mL D5W (loading dose) (0 mg Intravenous Stopped 6/24/25 2305)     Followed by   amiodarone 360 mg in 200 mL D5W infusion (1 mg/min Intravenous Currently Infusing 6/25/25 0259)     Followed by   amiodarone 360 mg in 200 mL D5W infusion (has no administration in time range)   sodium chloride 0.9 % flush 10 mL (10 mL Intravenous Given 6/25/25 0258)   sodium chloride 0.9 % flush 10 mL (has no administration in time range)   sodium chloride 0.9 % infusion 40 mL (has no administration in time range)   acetaminophen (TYLENOL) tablet 650 mg (has no administration in time range)     Or   acetaminophen (TYLENOL) 160 MG/5ML oral solution 650 mg (has no administration in time range)     Or   acetaminophen (TYLENOL) suppository 650 mg (has no administration in time range)   ondansetron (ZOFRAN) injection 4 mg (has no administration in time range)   apixaban (ELIQUIS) tablet 2.5 mg (has no administration in time range)   sennosides-docusate (PERICOLACE) 8.6-50 MG per tablet 2 tablet (has no administration in time range)     And   polyethylene glycol (MIRALAX) packet 17  g (has no administration in time range)     And   bisacodyl (DULCOLAX) EC tablet 5 mg (has no administration in time range)     And   bisacodyl (DULCOLAX) suppository 10 mg (has no administration in time range)   budesonide (PULMICORT) nebulizer solution 0.5 mg ( Nebulization Canceled Entry 6/25/25 0238)   ipratropium-albuterol (DUO-NEB) nebulizer solution 3 mL (has no administration in time range)   ipratropium-albuterol (DUO-NEB) nebulizer solution 3 mL ( Nebulization Canceled Entry 6/25/25 0239)   guaiFENesin (MUCINEX) 12 hr tablet 600 mg (600 mg Oral Not Given 6/25/25 0258)   methylPREDNISolone sodium succinate (SOLU-Medrol) injection 40 mg (has no administration in time range)   sertraline (ZOLOFT) tablet 100 mg (has no administration in time range)   valsartan (DIOVAN) tablet 320 mg (has no administration in time range)     And   hydroCHLOROthiazide tablet 25 mg (has no administration in time range)   HYDROcodone-acetaminophen (NORCO) 5-325 MG per tablet 1 tablet (has no administration in time range)   dilTIAZem CD (CARDIZEM CD) 24 hr capsule 120 mg (has no administration in time range)   methylPREDNISolone sodium succinate (SOLU-Medrol) injection 125 mg (125 mg Intravenous Given 6/24/25 1932)   ipratropium-albuterol (DUO-NEB) nebulizer solution 3 mL (3 mL Nebulization Given 6/24/25 2021)   LORazepam (ATIVAN) injection 1 mg (1 mg Intravenous Given 6/24/25 1931)         MEDICAL DECISION MAKING, PROGRESS, and CONSULTS    All labs, if obtained, have been independently reviewed by me.  All radiology studies, if obtained, have been reviewed by me and the radiologist dictating the report.  All EKG's, if obtained, have been independently viewed and interpreted by me/my attending physician.      Discussion below represents my analysis of pertinent findings related to patient's condition, differential diagnosis, treatment plan and final disposition.    74-year-old female with history of COPD, CHF, presenting to ER  today accompanied by  for evaluation of 2-day history of confusion and agitation.  On arrival today the patient is alert and oriented x 3 but does appear mildly confused, she is mildly agitated and largely uncooperative refusing blood work imaging and requesting transfer.  However during the ED course patient later consented to treatment once her  arrived in the ED.  On arrival her vital signs and pulse oximetry significantly interpreted by me were stable and within normal limits on 6 L of nasal cannula which is below patient's reported baseline oxygen use.  There is no appreciable wheezing patient does not sound volume overloaded on auscultation of her lungs but does have 2+ pitting edema of the bilateral lower extremities.  ABG obtained showing a CO2 of 64, pH normal, bicarb elevated.  Lab work revealed no leukocytosis, hemoglobin reduced at 8.6 near patient's baseline.  proBNP is elevated but below recent levels.  Troponin is elevated in the setting of kidney disease and heart failure.  D-dimer is normal lowering any suspicion for PE.  Respiratory panel is negative.  No concerns for ACS based on EKG or troponin which were elevated but stable.  However given concern for patient's confusion and hypercapnia, discussed patient's case with hospital medicine Dr. Mina who agreed to admit the patient to [facility] for further evaluation.                       Differential diagnosis:    Differential diagnosis included but was not limited to COPD exacerbation, altered mental status, electrolyte derangement, carbon dioxide narcosis, pneumonia, UTI, metabolic derangement, metabolic encephalopathy      Additional sources:    - Discussed/ obtained information from independent historians: Patient's  at bedside, EMS paramedic    - External (non-ED) record review: Previous medical records reviewed    - Chronic or social conditions impacting care: COPD, CHF    Orders placed during this  visit:  Orders Placed This Encounter   Procedures    Critical Care    Respiratory Panel PCR w/COVID-19(SARS-CoV-2) BERTO/KEY/ROMAIN/PAD/COR/GERALD In-House, NP Swab in UTM/VTM, 2 HR TAT - Swab, Nasopharynx    Urine Culture - Urine,    XR Chest 1 View    CT Head Without Contrast    [place] Draw    Comprehensive Metabolic Panel    BNP    High Sensitivity Troponin T    CBC Auto Differential    Blood Gas, Arterial -With Co-Ox Panel: Yes    D-dimer, Quantitative    Magnesium    Blood Gas, Arterial With Co-Ox    Scan Slide    Urinalysis With Culture If Indicated - Urine, Clean Catch    High Sensitivity Troponin T 1Hr    Urinalysis, Microscopic Only - Urine, Clean Catch    Procalcitonin    Basic Metabolic Panel    CBC (No Diff)    Diet: Cardiac; Healthy Heart (2-3 Na+); Fluid Consistency: Thin (IDDSI 0)    Undress & Gown    Vital Signs    Vital Signs    Up With Assistance    Intake & Output    Weigh patient    Oral Care    Saline Lock & Maintain IV Access    Continuous Pulse Oximetry    Code Status and Medical Interventions: CPR (Attempt to Resuscitate); Full Support    OT Consult: Eval & Treat ADL Performance Below Baseline, Early Mobility Assessment    PT Consult: Eval & Treat As Tolerated; Functional Mobility Below Baseline    Oxygen Therapy- Nasal Cannula; Titrate 1-6 LPM Per SpO2; 90 - 95%    Incentive Spirometry    ECG 12 Lead Dyspnea    ECG 12 Lead Rhythm Change    Insert Peripheral IV    Insert Peripheral IV    Inpatient Admission    Fall Precautions    CBC & Differential    Green Top (Gel)    Lavender Top    Gold Top - SST    Light Blue Top         Additional orders considered but not ordered: None      ED Course:    Consultants: Hospital medicine Dr. Downs    ED Course as of 06/25/25 3440   Tue Jun 24, 2025   4770 I have reviewed the mid-level provider(s) note and verbally discussed the case/plan of care.  I was available for consultation as needed at all times during the patient's visit in the Emergency Department.  I  agree with the clinical impression, plan, and disposition unless otherwise stated in the MDM below.    ATTENDING ATTESTATION  HPI: 74 y.o. female who presents with altered mental status and confusion times several days per patient's  at bedside.  Past medical history of COPD on baseline oxygen.  No reported falls or trauma.    MDM: ED workup reviewed.    EKG per my interpretation normal sinus rhythm, rate 83, normal axis, no STEMI, normal QRS QTC intervals.   [JS]   1754 pCO2, Arterial(!!): 64.5 [TG]   1826 Patient refused IV blood work, and refused CT scan.  Radiology tech had taken her to the CT scanner machine but stated that she refused to get the scan and took her back to the ER room [TG]   2239 ECG 12 Lead Rhythm Change  EKG per my interpretation A-fib with RVR, rate 141, normal axis, no STEMI, normal QRS QTC intervals.   [JS]      ED Course User Index  [JS] Donavon Jerome DO  [TG] Ja Root PA-C              Shared Decision Making:  After my consideration of clinical presentation and any laboratory/radiology studies obtained, I discussed the findings with the patient/patient representative who is in agreement with the treatment plan and the final disposition.   Risks and benefits of discharge and/or observation/admission were discussed.       AS OF 03:15 EDT VITALS:    BP - 112/64  HR - 96  TEMP - 97.9 °F (36.6 °C) (Oral)  O2 SATS - 100%                  DIAGNOSIS  Final diagnoses:   Hypercapnia   Altered mental status, unspecified altered mental status type   Atrial fibrillation with RVR         DISPOSITION  Admit-[facility]      Please note that portions of this document were completed with voice recognition software.      Ja Root PA-C  06/25/25 [id number]

## 2025-06-25 ENCOUNTER — READMISSION MANAGEMENT (OUTPATIENT)
Dept: CALL CENTER | Facility: HOSPITAL | Age: 75
End: 2025-06-25
Payer: MEDICARE

## 2025-06-25 PROBLEM — G93.41 ACUTE METABOLIC ENCEPHALOPATHY: Status: ACTIVE | Noted: 2025-06-25

## 2025-06-25 PROBLEM — I48.91 ATRIAL FIBRILLATION WITH RAPID VENTRICULAR RESPONSE: Status: ACTIVE | Noted: 2025-06-25

## 2025-06-25 PROBLEM — J96.22 ACUTE ON CHRONIC RESPIRATORY FAILURE WITH HYPOXIA AND HYPERCAPNIA: Status: ACTIVE | Noted: 2025-06-25

## 2025-06-25 PROBLEM — J96.21 ACUTE ON CHRONIC RESPIRATORY FAILURE WITH HYPOXIA AND HYPERCAPNIA: Status: ACTIVE | Noted: 2025-06-25

## 2025-06-25 PROBLEM — I48.0 PAROXYSMAL ATRIAL FIBRILLATION WITH RAPID VENTRICULAR RESPONSE: Status: ACTIVE | Noted: 2025-06-25

## 2025-06-25 LAB
ANION GAP SERPL CALCULATED.3IONS-SCNC: 12.1 MMOL/L (ref 5–15)
BUN SERPL-MCNC: 27 MG/DL (ref 8–23)
BUN/CREAT SERPL: 21.4 (ref 7–25)
CALCIUM SPEC-SCNC: 9.1 MG/DL (ref 8.6–10.5)
CHLORIDE SERPL-SCNC: 95 MMOL/L (ref 98–107)
CO2 SERPL-SCNC: 32.9 MMOL/L (ref 22–29)
CREAT SERPL-MCNC: 1.26 MG/DL (ref 0.57–1)
DEPRECATED RDW RBC AUTO: 49.1 FL (ref 37–54)
EGFRCR SERPLBLD CKD-EPI 2021: 45.5 ML/MIN/1.73
ERYTHROCYTE [DISTWIDTH] IN BLOOD BY AUTOMATED COUNT: 16.2 % (ref 12.3–15.4)
GLUCOSE BLDC GLUCOMTR-MCNC: 136 MG/DL (ref 70–130)
GLUCOSE SERPL-MCNC: 200 MG/DL (ref 65–99)
HCT VFR BLD AUTO: 28.8 % (ref 34–46.6)
HGB BLD-MCNC: 8.2 G/DL (ref 12–15.9)
MCH RBC QN AUTO: 23.8 PG (ref 26.6–33)
MCHC RBC AUTO-ENTMCNC: 28.5 G/DL (ref 31.5–35.7)
MCV RBC AUTO: 83.5 FL (ref 79–97)
PLATELET # BLD AUTO: 175 10*3/MM3 (ref 140–450)
PMV BLD AUTO: 10.8 FL (ref 6–12)
POTASSIUM SERPL-SCNC: 4.2 MMOL/L (ref 3.5–5.2)
PROCALCITONIN SERPL-MCNC: 0.13 NG/ML (ref 0–0.25)
RBC # BLD AUTO: 3.45 10*6/MM3 (ref 3.77–5.28)
SODIUM SERPL-SCNC: 140 MMOL/L (ref 136–145)
WBC NRBC COR # BLD AUTO: 3.39 10*3/MM3 (ref 3.4–10.8)

## 2025-06-25 PROCEDURE — 25010000002 METHYLPREDNISOLONE PER 40 MG: Performed by: INTERNAL MEDICINE

## 2025-06-25 PROCEDURE — 80048 BASIC METABOLIC PNL TOTAL CA: CPT | Performed by: INTERNAL MEDICINE

## 2025-06-25 PROCEDURE — 99223 1ST HOSP IP/OBS HIGH 75: CPT | Performed by: INTERNAL MEDICINE

## 2025-06-25 PROCEDURE — 85027 COMPLETE CBC AUTOMATED: CPT | Performed by: INTERNAL MEDICINE

## 2025-06-25 PROCEDURE — 82948 REAGENT STRIP/BLOOD GLUCOSE: CPT

## 2025-06-25 PROCEDURE — 82948 REAGENT STRIP/BLOOD GLUCOSE: CPT | Performed by: INTERNAL MEDICINE

## 2025-06-25 PROCEDURE — 25010000002 AMIODARONE IN DEXTROSE 5% 360-4.14 MG/200ML-% SOLUTION

## 2025-06-25 RX ORDER — HYDROCHLOROTHIAZIDE 25 MG/1
25 TABLET ORAL
Status: DISCONTINUED | OUTPATIENT
Start: 2025-06-25 | End: 2025-06-26 | Stop reason: HOSPADM

## 2025-06-25 RX ORDER — SODIUM CHLORIDE 0.9 % (FLUSH) 0.9 %
10 SYRINGE (ML) INJECTION AS NEEDED
Status: DISCONTINUED | OUTPATIENT
Start: 2025-06-25 | End: 2025-06-26 | Stop reason: HOSPADM

## 2025-06-25 RX ORDER — AMOXICILLIN 250 MG
2 CAPSULE ORAL 2 TIMES DAILY
Status: DISCONTINUED | OUTPATIENT
Start: 2025-06-25 | End: 2025-06-25

## 2025-06-25 RX ORDER — BISACODYL 5 MG/1
5 TABLET, DELAYED RELEASE ORAL DAILY PRN
Status: DISCONTINUED | OUTPATIENT
Start: 2025-06-25 | End: 2025-06-25

## 2025-06-25 RX ORDER — GUAIFENESIN 600 MG/1
600 TABLET, EXTENDED RELEASE ORAL EVERY 12 HOURS SCHEDULED
Status: DISCONTINUED | OUTPATIENT
Start: 2025-06-25 | End: 2025-06-26 | Stop reason: HOSPADM

## 2025-06-25 RX ORDER — METHYLPREDNISOLONE SODIUM SUCCINATE 40 MG/ML
40 INJECTION, POWDER, LYOPHILIZED, FOR SOLUTION INTRAMUSCULAR; INTRAVENOUS EVERY 12 HOURS
Status: DISCONTINUED | OUTPATIENT
Start: 2025-06-25 | End: 2025-06-26 | Stop reason: HOSPADM

## 2025-06-25 RX ORDER — VALSARTAN 80 MG/1
320 TABLET ORAL
Status: DISCONTINUED | OUTPATIENT
Start: 2025-06-25 | End: 2025-06-26 | Stop reason: HOSPADM

## 2025-06-25 RX ORDER — VALSARTAN 80 MG/1
320 TABLET ORAL
Status: DISCONTINUED | OUTPATIENT
Start: 2025-06-25 | End: 2025-06-25

## 2025-06-25 RX ORDER — BUDESONIDE 0.5 MG/2ML
0.5 INHALANT ORAL
Status: DISCONTINUED | OUTPATIENT
Start: 2025-06-25 | End: 2025-06-25

## 2025-06-25 RX ORDER — NICOTINE POLACRILEX 4 MG
15 LOZENGE BUCCAL
Status: DISCONTINUED | OUTPATIENT
Start: 2025-06-25 | End: 2025-06-26 | Stop reason: HOSPADM

## 2025-06-25 RX ORDER — HYDROCODONE BITARTRATE AND ACETAMINOPHEN 5; 325 MG/1; MG/1
1 TABLET ORAL EVERY 6 HOURS PRN
Refills: 0 | Status: DISCONTINUED | OUTPATIENT
Start: 2025-06-25 | End: 2025-06-26 | Stop reason: HOSPADM

## 2025-06-25 RX ORDER — GUAIFENESIN 600 MG/1
600 TABLET, EXTENDED RELEASE ORAL EVERY 12 HOURS SCHEDULED
Status: DISCONTINUED | OUTPATIENT
Start: 2025-06-25 | End: 2025-06-25

## 2025-06-25 RX ORDER — ONDANSETRON 2 MG/ML
4 INJECTION INTRAMUSCULAR; INTRAVENOUS EVERY 6 HOURS PRN
Status: DISCONTINUED | OUTPATIENT
Start: 2025-06-25 | End: 2025-06-26 | Stop reason: HOSPADM

## 2025-06-25 RX ORDER — DILTIAZEM HYDROCHLORIDE 120 MG/1
120 CAPSULE, COATED, EXTENDED RELEASE ORAL
Status: DISCONTINUED | OUTPATIENT
Start: 2025-06-25 | End: 2025-06-26 | Stop reason: HOSPADM

## 2025-06-25 RX ORDER — ACETAMINOPHEN 650 MG/1
650 SUPPOSITORY RECTAL EVERY 4 HOURS PRN
Status: DISCONTINUED | OUTPATIENT
Start: 2025-06-25 | End: 2025-06-26 | Stop reason: HOSPADM

## 2025-06-25 RX ORDER — ACETAMINOPHEN 160 MG/5ML
650 SOLUTION ORAL EVERY 4 HOURS PRN
Status: DISCONTINUED | OUTPATIENT
Start: 2025-06-25 | End: 2025-06-26 | Stop reason: HOSPADM

## 2025-06-25 RX ORDER — BISACODYL 10 MG
10 SUPPOSITORY, RECTAL RECTAL DAILY PRN
Status: DISCONTINUED | OUTPATIENT
Start: 2025-06-25 | End: 2025-06-25

## 2025-06-25 RX ORDER — IPRATROPIUM BROMIDE AND ALBUTEROL SULFATE 2.5; .5 MG/3ML; MG/3ML
3 SOLUTION RESPIRATORY (INHALATION)
Status: DISCONTINUED | OUTPATIENT
Start: 2025-06-25 | End: 2025-06-25

## 2025-06-25 RX ORDER — CLONAZEPAM 0.5 MG/1
0.5 TABLET ORAL 2 TIMES DAILY PRN
Status: DISCONTINUED | OUTPATIENT
Start: 2025-06-25 | End: 2025-06-25

## 2025-06-25 RX ORDER — ACETAMINOPHEN 325 MG/1
650 TABLET ORAL EVERY 4 HOURS PRN
Status: DISCONTINUED | OUTPATIENT
Start: 2025-06-25 | End: 2025-06-26 | Stop reason: HOSPADM

## 2025-06-25 RX ORDER — INSULIN LISPRO 100 [IU]/ML
2-7 INJECTION, SOLUTION INTRAVENOUS; SUBCUTANEOUS
Status: DISCONTINUED | OUTPATIENT
Start: 2025-06-25 | End: 2025-06-26 | Stop reason: HOSPADM

## 2025-06-25 RX ORDER — HYDROCHLOROTHIAZIDE 25 MG/1
25 TABLET ORAL
Status: DISCONTINUED | OUTPATIENT
Start: 2025-06-25 | End: 2025-06-25

## 2025-06-25 RX ORDER — SODIUM CHLORIDE 0.9 % (FLUSH) 0.9 %
10 SYRINGE (ML) INJECTION EVERY 12 HOURS SCHEDULED
Status: DISCONTINUED | OUTPATIENT
Start: 2025-06-25 | End: 2025-06-26 | Stop reason: HOSPADM

## 2025-06-25 RX ORDER — SODIUM CHLORIDE 9 MG/ML
40 INJECTION, SOLUTION INTRAVENOUS AS NEEDED
Status: DISCONTINUED | OUTPATIENT
Start: 2025-06-25 | End: 2025-06-26 | Stop reason: HOSPADM

## 2025-06-25 RX ORDER — BISACODYL 5 MG/1
5 TABLET, DELAYED RELEASE ORAL DAILY PRN
Status: DISCONTINUED | OUTPATIENT
Start: 2025-06-25 | End: 2025-06-26 | Stop reason: HOSPADM

## 2025-06-25 RX ORDER — BISACODYL 10 MG
10 SUPPOSITORY, RECTAL RECTAL DAILY PRN
Status: DISCONTINUED | OUTPATIENT
Start: 2025-06-25 | End: 2025-06-26 | Stop reason: HOSPADM

## 2025-06-25 RX ORDER — POLYETHYLENE GLYCOL 3350 17 G/17G
17 POWDER, FOR SOLUTION ORAL DAILY PRN
Status: DISCONTINUED | OUTPATIENT
Start: 2025-06-25 | End: 2025-06-25

## 2025-06-25 RX ORDER — AMOXICILLIN 250 MG
2 CAPSULE ORAL 2 TIMES DAILY
Status: DISCONTINUED | OUTPATIENT
Start: 2025-06-25 | End: 2025-06-26 | Stop reason: HOSPADM

## 2025-06-25 RX ORDER — DEXTROSE MONOHYDRATE 25 G/50ML
25 INJECTION, SOLUTION INTRAVENOUS
Status: DISCONTINUED | OUTPATIENT
Start: 2025-06-25 | End: 2025-06-26 | Stop reason: HOSPADM

## 2025-06-25 RX ORDER — POLYETHYLENE GLYCOL 3350 17 G/17G
17 POWDER, FOR SOLUTION ORAL DAILY PRN
Status: DISCONTINUED | OUTPATIENT
Start: 2025-06-25 | End: 2025-06-26 | Stop reason: HOSPADM

## 2025-06-25 RX ORDER — LORAZEPAM 0.5 MG/1
0.5 TABLET ORAL EVERY 6 HOURS PRN
Status: DISCONTINUED | OUTPATIENT
Start: 2025-06-25 | End: 2025-06-26 | Stop reason: HOSPADM

## 2025-06-25 RX ORDER — DILTIAZEM HYDROCHLORIDE 120 MG/1
120 CAPSULE, COATED, EXTENDED RELEASE ORAL
Status: DISCONTINUED | OUTPATIENT
Start: 2025-06-25 | End: 2025-06-25

## 2025-06-25 RX ORDER — IPRATROPIUM BROMIDE AND ALBUTEROL SULFATE 2.5; .5 MG/3ML; MG/3ML
3 SOLUTION RESPIRATORY (INHALATION) EVERY 4 HOURS PRN
Status: DISCONTINUED | OUTPATIENT
Start: 2025-06-25 | End: 2025-06-26 | Stop reason: HOSPADM

## 2025-06-25 RX ADMIN — APIXABAN 2.5 MG: 2.5 TABLET, FILM COATED ORAL at 20:50

## 2025-06-25 RX ADMIN — METHYLPREDNISOLONE SODIUM SUCCINATE 40 MG: 40 INJECTION INTRAMUSCULAR; INTRAVENOUS at 15:29

## 2025-06-25 RX ADMIN — Medication 10 ML: at 10:35

## 2025-06-25 RX ADMIN — LORAZEPAM 0.5 MG: 0.5 TABLET ORAL at 20:50

## 2025-06-25 RX ADMIN — CLONAZEPAM 0.5 MG: 0.5 TABLET ORAL at 10:27

## 2025-06-25 RX ADMIN — Medication 10 ML: at 12:49

## 2025-06-25 RX ADMIN — Medication 10 ML: at 02:58

## 2025-06-25 RX ADMIN — AMIODARONE HYDROCHLORIDE 0.5 MG/MIN: 1.8 INJECTION, SOLUTION INTRAVENOUS at 05:05

## 2025-06-25 RX ADMIN — AMIODARONE HYDROCHLORIDE 0.5 MG/MIN: 1.8 INJECTION, SOLUTION INTRAVENOUS at 17:26

## 2025-06-25 NOTE — CASE MANAGEMENT/SOCIAL WORK
Discharge Planning Assessment  [facility]     Patient Name: Vanna Rincon  MRN: [id number]  Today's Date: 6/25/2025    Admit Date: 6/24/2025        Discharge Needs Assessment       Row Name 06/25/25 1116       Living Environment    People in Home spouse    Name(s) of People in Home Michelle Rincon    Current Living Arrangements home    Duration at Residence 26 yrs    Potentially Unsafe Housing Conditions none    In the past 12 months has the electric, gas, oil, or water company threatened to shut off services in your home? No    Primary Care Provided by self    Family Caregiver if Needed spouse    Quality of Family Relationships involved    Able to Return to Prior Arrangements yes       Resource/Environmental Concerns    Transportation Concerns none       Transportation Needs    In the past 12 months, has lack of transportation kept you from medical appointments or from getting medications? no    In the past 12 months, has lack of transportation kept you from meetings, work, or from getting things needed for daily living? No       Food Insecurity    Within the past 12 months, you worried that your food would run out before you got the money to buy more. Never true    Within the past 12 months, the food you bought just didn't last and you didn't have money to get more. Never true       Discharge Needs Assessment    Readmission Within the Last 30 Days no previous admission in last 30 days    Equipment Currently Used at Home rollator;wheelchair;bp cuff;nebulizer;hospital bed;scales    Concerns to be Addressed denies needs/concerns at this time    Do you want help finding or keeping work or a job? I do not need or want help    Do you want help with school or training? For example, starting or completing job training or getting a high school diploma, GED or equivalent No    Anticipated Changes Related to Illness none    Equipment Needed After Discharge none                   Discharge Plan       Row Name 06/25/25  1124       Plan    Plan Comments Met with pt and spouse for dcp. Pt able to provide demographics. She has an AD on file, no POA or financial strssors. Home DME listed above, including 6 to 8L O2 supplied by [email or web address] in [place]. Dr. Farhad Chowdary was her primary, she states she is looking for a new primary and uses [facility]. Pt agreeable to meds to bed. She and her spouse have been at current residence for 26 yrs. He provides transportation. She plans to return home when discharged.                  Selected Continued Care - Episodes Includes continued care and service providers with selected services from the active episodes listed below             Demographic Summary       Row Name 06/25/25 1118       General Information    Admission Type inpatient    Arrived From emergency department    Required Notices Provided Important Message from Medicare    Referral Source admission list    Reason for Consult discharge planning    Preferred Language English       Contact Information    Permission Granted to Share Info With family/designee                   Functional Status       Row Name 06/25/25 1118       Functional Status    Usual Activity Tolerance fair    Current Activity Tolerance fair       Physical Activity    On average, how many days per week do you engage in moderate to strenuous exercise (like a brisk walk)? 0 days    On average, how many minutes do you engage in exercise at this level? 0 min    Number of minutes of exercise per week 0       Functional Status, IADL    Medications independent    Meal Preparation assistive equipment    Housekeeping assistive equipment    Laundry assistive equipment    Shopping assistive equipment and person    If for any reason you need help with day-to-day activities such as bathing, preparing meals, shopping, managing finances, etc., do you get the help you need? I don't need any help       Mental Status    General Appearance WDL WDL       Mental Status Summary     Recent Changes in Mental Status/Cognitive Functioning mental status       Employment/    Employment Status retired                   Psychosocial    No documentation.                  Abuse/Neglect    No documentation.                  Legal    No documentation.                  Substance Abuse    No documentation.                  Patient Forms    No documentation.                     Liza Menjivar RN

## 2025-06-25 NOTE — PLAN OF CARE
Goal Outcome Evaluation:  Plan of Care Reviewed With: patient            Interventions implemented as appropriate

## 2025-06-25 NOTE — PROGRESS NOTES
"Patient Name: Vanna Rincon  YOB: 1950  MRN: [id number]  Admission date: 6/24/2025  Reason for Encounter: MST 2-3 or Nursing Admission Screen    [facility] Clinical Nutrition Assessment     Subjective    Subjective Information     6/25: Pt screened for MST score of 2. Admitted d/t confusion and altered mental status. PO intake not yet established. Pt has had wt loss of 10# (7%) in 2 weeks. Will continue to monitor PO intake and weight loss.      Assessment    H&P and Current Problems      H&P  Past Medical History:   Diagnosis Date    Abnormal mammogram     Acute UTI     Allergic rhinitis     Anemia     Anxiety     Arthritis     knee, right    Asthmatic bronchitis     Back pain     Candida vaginitis     Cataracts, bilateral     CHF (congestive heart failure)     not diagnosed    Chronic lung disease     bronchiolitis obliterans. - Ct scan stable with pulmonary intolerant to pft    Chronic pain syndrome     disc disease, lumbar, neck    Colon polyps     Community acquired pneumonia     Conjunctivitis     COPD (chronic obstructive pulmonary disease)     Emphysema with severe physiology on PFTs.  Patient is a former smoker.    COPD (chronic obstructive pulmonary disease)     4L of oxygen    Cough     DDD (degenerative disc disease), lumbar     Deafness     Dependence on supplemental oxygen     PT REPORTS \"4L ALL TIMES\"    Disc disease, degenerative, cervical     Dry eye     Former smoker     Gastric bleed     Genitourinary syndrome of menopause     GERD (gastroesophageal reflux disease)     Glaucoma     H/O chest x-ray 08/11/2015    Rase base opacity consistent with pneumonia or atelectasis    H/O chest x-ray 06/24/2015    Right basilar airspace disease and effusion consistent with a pneumonia. F/U to radiographic reolution is recommended    H/O chest x-ray 03/09/2010    Cardiac silhoutte is not enlarged. Lungs aare inflated. Mild nonspecifiec interstitial changes. No pleual effusions or dense " consolidations. Scattered granulomatous changes. Spine with mild kyphosis but no osteopenia. No significant adenopathy    H/O echocardiogram 03/16/2010    Normal study    Herpes zoster     History of PFTs 12/13/2011    Goo pt cooperation and effort. Pt given 3 puffs of xopenex. PFT is acceptable and reproducible    History of PFTs 08/11/2011    Pt unable to produce acceptabel and reproducible PFT. Pt was given 3 puffs of xopenex. Pt gave good effort Best pleth of two attempts    History of PFTs 02/24/2011    PFT acceptable and reproducible. Pt gave good effort. Pt used bronchodilator less than 2 hours prior to testing    Hyperlipidemia     Hypertension     IBS (irritable bowel syndrome)     Insomnia     Interstitial lung disease     History of rheumatoid lung with bronchiolitis obliterans    Kidney stones     x 1    Lumbar herniated disc     Nephrolithiasis     2007, passed    Ocular migraine     On home oxygen therapy     REPORTS USES AT 4L NC AT ALL TIMES    Overactive bladder     Panic attack     Peripheral edema     Pneumonia     PRB (rectal bleeding)     RA (rheumatoid arthritis)     Renal insufficiency     Rheumatoid arthritis     · A.  Diagnosed in 2001 with history of methotrexate and Enbrel therapy.    Rheumatoid lung     bronchiolitis obliterans    Sciatica     Sinus problem     Thrombophlebitis     Tinnitus     Urge incontinence     Vertigo       Past Surgical History:   Procedure Laterality Date    CATARACT EXTRACTION Right     COLONOSCOPY  2012    COLONOSCOPY N/A 01/29/2018    Procedure: COLONOSCOPY WITH COLD SNARE POLYPECTOMY X 6; SUBMUCOSAL INJECTION OF SALINE; HOT SNARE POLYPECTOMY X 4; CLIP PLACEMENT X 1;  Surgeon: Kenney Pastrana MD;  Location: [facility] ENDOSCOPY;  Service:     DENTAL PROCEDURE      ENDOSCOPY N/A 12/15/2017    Procedure: ESOPHAGOGASTRODUODENOSCOPY with biopsies and esophageal balloon dilitation;  Surgeon: Kenney Pastrana MD;  Location: [facility] ENDOSCOPY;  Service:     MOUTH SURGERY       "REPORTS IMPLANT X2    ORAL ANTRAL FISTULA CLOSURE      gums    TONSILLECTOMY      TUBAL ABDOMINAL LIGATION      UPPER GASTROINTESTINAL ENDOSCOPY  2012    UPPER GASTROINTESTINAL ENDOSCOPY  12/15/2017      Current Problems   Admission Diagnosis:  Atrial fibrillation with rapid ventricular response [I48.91]    Problem List:    Atrial fibrillation with rapid ventricular response    Rheumatoid arthritis involving multiple sites with positive rheumatoid factor    Chronic kidney disease, stage III (moderate)    Acute metabolic encephalopathy    Paroxysmal atrial fibrillation with rapid ventricular response    Acute on chronic respiratory failure with hypoxia and hypercapnia      Other Applicable Nutrition Information:      Anthropometrics      BMI, Height, Weight Estimated body mass index is 20.4 kg/m² as calculated from the following:    Height as of this encounter: 167.6 cm (65.98\").    Weight as of this encounter: 57.3 kg (126 lb 5.2 oz).    Weight Method: Bed scale       Trending Weight Changes weight loss of 10 lbs (7%) over 2 week(s)    Significant?  Yes       Weight History  Wt Readings from Last 20 Encounters:   06/25/25 0444 57.3 kg (126 lb 5.2 oz)   06/24/25 1659 57.2 kg (126 lb 1.7 oz)   06/16/25 1333 57.2 kg (126 lb)   05/29/25 0939 61.7 kg (136 lb 0.4 oz)   05/28/25 1841 61.7 kg (136 lb 0.4 oz)   05/07/25 1619 61.7 kg (136 lb)   03/31/25 0957 61.7 kg (136 lb)   03/14/25 1428 61.7 kg (136 lb)   01/22/24 1431 65.3 kg (144 lb)   11/29/23 1438 66.2 kg (146 lb)   10/16/23 1247 66.5 kg (146 lb 9.7 oz)   10/16/23 1147 66.5 kg (146 lb 9.6 oz)   09/28/23 1354 69.4 kg (153 lb)   09/07/22 0713 64.4 kg (142 lb)   08/19/22 1351 63.5 kg (140 lb)   08/16/22 2335 63.5 kg (140 lb)   06/29/22 1335 64 kg (141 lb)   03/07/22 1312 65.8 kg (145 lb)   01/04/22 1300 68 kg (150 lb)   11/17/21 1439 70.7 kg (155 lb 12.8 oz)   10/07/21 1303 68 kg (150 lb)   06/14/21 1421 71 kg (156 lb 9.6 oz)        Labs      Comment: Elevated BUN and " creatinine     Results from last 7 days   Lab Units 06/24/25  1834 06/20/25  1535 06/20/25  1525   SODIUM mmol/L 143  --  140   POTASSIUM mmol/L 4.0  --  4.0   GLUCOSE mg/dL 118*  --  116*   BUN mg/dL 27.0*  --  19.0   CREATININE mg/dL 1.72*  --  1.39*   CALCIUM mg/dL 9.1  --  8.4*   PHOSPHORUS mg/dL  --   --  3.6   MAGNESIUM mg/dL 2.2  --   --    ALBUMIN g/dL 4.2  --  3.9   CRP mg/dL  --  <0.30  --    BILIRUBIN mg/dL 0.2  --  0.2   ALK PHOS U/L 92  --  87   AST (SGOT) U/L 67*  --  13   ALT (SGPT) U/L 24  --  5   PROBNP pg/mL 7,757.0*  --   --      Results from last 7 days   Lab Units 06/24/25 1834 06/20/25  1525   PLATELETS 10*3/mm3 180 109*   HEMOGLOBIN g/dL 8.6* 7.6*   HEMATOCRIT % 30.3* 26.4*     Lab Results   Component Value Date    HGBA1C 5.6 10/02/2015          Medications       Scheduled Medications apixaban, 2.5 mg, Oral, Q12H  budesonide, 0.5 mg, Nebulization, BID - RT  dilTIAZem CD, 120 mg, Oral, Q24H  guaiFENesin, 600 mg, Oral, Q12H  valsartan, 320 mg, Oral, Q24H   And  hydroCHLOROthiazide, 25 mg, Oral, Q24H  ipratropium-albuterol, 3 mL, Nebulization, Q6H - RT  methylPREDNISolone sodium succinate, 40 mg, Intravenous, Q12H  senna-docusate sodium, 2 tablet, Oral, BID  sertraline, 100 mg, Oral, Daily  sodium chloride, 10 mL, Intravenous, Q12H        Infusions amiodarone, 0.5 mg/min, Last Rate: 0.5 mg/min (06/25/25 0505)        PRN Medications   acetaminophen **OR** acetaminophen **OR** acetaminophen    senna-docusate sodium **AND** polyethylene glycol **AND** bisacodyl **AND** bisacodyl    HYDROcodone-acetaminophen    ipratropium-albuterol    ondansetron    sodium chloride    sodium chloride    sodium chloride     Physical Findings      Chewing/Swallowing  Teeth Status: Mouth/Teeth WDL: .WDL except, teeth Teeth Symptoms: tooth/teeth missing  Chewing/Swallowing Issues: No issues identified at this time   Edema                 Ankle, Left Edema: 2+ (Mild) Ankle, Right Edema: 2+ (Mild)  Foot, Left Edema: 2+  (Mild) Foot, Right Edema: 2+ (Mild)   Bowel Function     Last Bowel Movement: 06/24/25   I/Os  Intake & Output (last 3 days)         06/22 0701 06/23 0700 06/23 0701 06/24 0700 06/24 0701 06/25 0700 06/25 0701 06/26 0700    P.O.   240     I.V. (mL/kg)   299.3 (5.2)     Total Intake(mL/kg)   539.3 (9.4)     Net   +539.3             Urine Unmeasured Occurrence   1 x            Lines, Drains, Airways, & Wounds       Active LDAs       Name Placement date Placement time Site Days Last dressing change    Peripheral IV 05/28/25 1907 20 G Left Antecubital 05/28/25 1907  Antecubital  27                       Nutrition Focused Physical Exam     Trending NFPE      Malnutrition Severity Assessment            (1)   Current Nutrition Orders & Evaluation of Intake      Oral Nutrition     Food Allergies  and Intolerances NKFA   Current PO Diet Diet: Cardiac; Healthy Heart (2-3 Na+); Fluid Consistency: Thin (IDDSI 0)   Oral Nutrition Supplement None     Trending % PO Intake Not yet established    (2)  Assessment & Plan   Nutrition Diagnosis and Goals       Nutrition Diagnosis 1 Unintended Weight Loss  r/t multiple chronic illnesses AEB wt loss of 10# (7%) in 2 weeks.    Nutrition Diagnosis 2       Goal(s) Establish PO Intake, Meets Estimated Needs , Tolerates PO Diet , Maintain Weight, and No Significant Weight Loss     Nutrition Intervention and Prescription       Intervention  Continue to monitor for plan of care and Continue with current interventions      Diet Prescription HH, thin fluids    Supplement Prescription     Education Provided     (3)  Monitoring/Evaluation       Monitor/Evaluation Per Protocol, I&O, PO Intake, Pertinent Labs, Weight, and GI Status     RD Follow-Up Encounter 1-2 days     Electronically signed by:  Jacquelyn George  06/25/25 08:37 EDT

## 2025-06-25 NOTE — H&P
[facility]   HISTORY AND PHYSICAL      Name:  Vanna Rincon   Age:  74 y.o.  Sex:  female  :  1950  MRN:  [id number]   Visit Number:  [id number]  Admission Date:  2025  Date Of Service:  25  Primary Care Physician:  Farhad Chowdary MD    Chief Complaint:     Altered mental status.    History Of Presenting Illness:      Vanna Rincon is a 74-year-old female with history of COPD on 6 L of oxygen at baseline, HFpEF, chronic kidney disease stage III, anemia, rheumatoid arthritis with rheumatoid lung, GERD, degenerative disc disease, anxiety was brought to the emergency room by EMS secondary to altered mental status.    History of Present Illness  Reason for Admit: Confusion.    The patient presents to the emergency room for evaluation of confusion.  She does have baseline shortness of breath.  She is currently on home oxygen therapy. She resides with her  and maintains mobility within her home without the aid of a walker. She has a history of smoking but has since ceased this habit.    She was previously unaware of any irregularities in her heart rhythm.    SOCIAL HISTORY  She does not smoke. She lives with her .    MEDICATIONS  Current: amiodarone drip     In the emergency room, she was afebrile and hemodynamically stable saturating at 99% on 6 L of nasal cannula oxygen.  Initial troponin 40 with repeat at 38.  Initial ABG showed a pH of 7.35, pCO2 65, , bicarb 36 on 6 L of nasal cannula oxygen.  proBNP 7757.  CMP was remarkable for a BUN of 27, creatinine of 1.72 (baseline).  D-dimer was normal.  CBC was unremarkable except for hemoglobin of 8.6 (baseline).  Urine analysis was unremarkable.  Respiratory panel was negative.  Chest x-ray showed emphysematous feels with chronic appearing parenchymal changes.  Patient was given DuoNeb, IV Solu-Medrol and Ativan in the emergency room. Patient was noted to have atrial fibrillation with rapid  ventricular response in the emergency room and was initiated on amiodarone drip.    Review Of Systems:    All systems were reviewed and negative except as mentioned in history of presenting illness, assessment and plan.    Past Medical History: Patient's  has a past medical history of Abnormal mammogram, Acute UTI, Allergic rhinitis, Anemia, Anxiety, Arthritis, Asthmatic bronchitis, Back pain, Candida vaginitis, Cataracts, bilateral, CHF (congestive heart failure), Chronic lung disease, Chronic pain syndrome, Colon polyps, Community acquired pneumonia, Conjunctivitis, COPD (chronic obstructive pulmonary disease), COPD (chronic obstructive pulmonary disease), Cough, DDD (degenerative disc disease), lumbar, Deafness, Dependence on supplemental oxygen, Disc disease, degenerative, cervical, Dry eye, Former smoker, Gastric bleed, Genitourinary syndrome of menopause, GERD (gastroesophageal reflux disease), Glaucoma, H/O chest x-ray (08/11/2015), H/O chest x-ray (06/24/2015), H/O chest x-ray (03/09/2010), H/O echocardiogram (03/16/2010), Herpes zoster, History of PFTs (12/13/2011), History of PFTs (08/11/2011), History of PFTs (02/24/2011), Hyperlipidemia, Hypertension, IBS (irritable bowel syndrome), Insomnia, Interstitial lung disease, Kidney stones, Lumbar herniated disc, Nephrolithiasis, Ocular migraine, On home oxygen therapy, Overactive bladder, Panic attack, Peripheral edema, Pneumonia, PRB (rectal bleeding), RA (rheumatoid arthritis), Renal insufficiency, Rheumatoid arthritis, Rheumatoid lung, Sciatica, Sinus problem, Thrombophlebitis, Tinnitus, Urge incontinence, and Vertigo.    Past Surgical History: Patient's  has a past surgical history that includes Tonsillectomy; Tubal ligation; Oral antral fistula closure; Colonoscopy (2012); Esophagogastroduodenoscopy (N/A, 12/15/2017); Mouth surgery; Colonoscopy (N/A, 01/29/2018); Upper gastrointestinal endoscopy (2012); Upper gastrointestinal endoscopy (12/15/2017);  Dental surgery; and Cataract extraction (Right).    Social History: Patient's  reports that she quit smoking about 34 years ago. Her smoking use included cigarettes. She started smoking about 62 years ago. She has a 28 pack-year smoking history. She has been exposed to tobacco smoke. She has never used smokeless tobacco. She reports that she does not drink alcohol and does not use drugs.    Family History:  Patient's family history includes Cancer in her father and mother; Coronary artery disease in her mother; Diabetes in her maternal grandmother, paternal grandmother, and sister; Glaucoma in her mother; Heart disease in her maternal grandmother and mother; Hypertension in her mother; Liver cancer in her mother; Stomach cancer in her father; Stroke in her father; systemic lupus erythematosus in her sister.     Allergies:      Iodinated contrast media, Feraheme [ferumoxytol], Brimonidine tartrate, Ciprofloxacin, Doxycycline, Lipitor [atorvastatin], Nsaids, and Sulfa antibiotics    Home Medications:    Prior to Admission Medications       Prescriptions Last Dose Informant Patient Reported? Taking?    apixaban (ELIQUIS) 5 MG tablet tablet   No No    Take 1 tablet by mouth Every 12 (Twelve) Hours for 30 days. Indications: Atrial Fibrillation    budesonide-formoterol (Symbicort) 160-4.5 MCG/ACT inhaler   No No    Inhale 2 puffs 2 (Two) Times a Day.    dilTIAZem HCl  MG tablet sustained-release 24 hour   Yes No    epoetin chidi (Procrit) 2000 UNIT/ML injection   Yes No    Inject  under the skin into the appropriate area as directed Every 6 (Six) Weeks.    etanercept (ENBREL) 50 MG/ML solution prefilled syringe injection   No No    Inject 1 mL under the skin into the appropriate area as directed Every 7 (Seven) Days.    fentaNYL (DURAGESIC) 75 MCG/HR patch  Self Yes No    Place 1 patch on the skin as directed by provider Every 72 (Seventy-Two) Hours.    furosemide (Lasix) 20 MG tablet   No No    Take 1 tablet by  "mouth Daily for 30 days.    Patient taking differently:  Take 1 tablet by mouth As Needed.    HYDROcodone-acetaminophen (NORCO) 5-325 MG per tablet   Yes No    Take 1 tablet by mouth Every 6 (Six) Hours As Needed.    Multiple Vitamins-Minerals (CENTRUM ADULTS PO)  Self Yes No    Take 1 tablet by mouth Daily.    O2 (OXYGEN)  Self Yes No    Inhale 6 L/min Continuous.    ondansetron (ZOFRAN) 8 MG tablet   Yes No    Take 1 tablet by mouth Every 8 (Eight) Hours As Needed for Nausea or Vomiting.    sertraline (ZOLOFT) 100 MG tablet  Self No No    Take 1 tablet by mouth Daily.    simethicone (MYLICON) 125 MG chewable tablet  Self Yes No    Chew 1 tablet Every 6 (Six) Hours As Needed for Flatulence.    valsartan-hydrochlorothiazide (DIOVAN-HCT) 320-25 MG per tablet   No No    Take 0.5 tablets by mouth Daily.     ED Medications:    Medications   sodium chloride 0.9 % flush 10 mL (has no administration in time range)   amiodarone 150 mg in 100 mL D5W (loading dose) (0 mg Intravenous Stopped 6/24/25 2305)     Followed by   amiodarone 360 mg in 200 mL D5W infusion (1 mg/min Intravenous New Bag 6/24/25 2305)     Followed by   amiodarone 360 mg in 200 mL D5W infusion (has no administration in time range)   methylPREDNISolone sodium succinate (SOLU-Medrol) injection 125 mg (125 mg Intravenous Given 6/24/25 1932)   ipratropium-albuterol (DUO-NEB) nebulizer solution 3 mL (3 mL Nebulization Given 6/24/25 2021)   LORazepam (ATIVAN) injection 1 mg (1 mg Intravenous Given 6/24/25 1931)     Vital Signs:  Temp:  [97.9 °F (36.6 °C)-98.6 °F (37 °C)] 97.9 °F (36.6 °C)  Heart Rate:  [] 121  Resp:  [16-20] 16  BP: (107-150)/(66-84) 114/67        06/24/25  1659   Weight: 57.2 kg (126 lb 1.7 oz)     Body mass index is 20.36 kg/m².    Physical Exam:     Most recent vital Signs: /67   Pulse (!) 121   Temp 97.9 °F (36.6 °C) (Oral)   Resp 16   Ht 167.6 cm (65.98\")   Wt 57.2 kg (126 lb 1.7 oz)   LMP  (LMP Unknown)   SpO2 100%   " BMI 20.36 kg/m²     Physical Exam  Constitutional:       Comments: Drowsy but wakes up on call and was able to answer a few questions.   HENT:      Head: Normocephalic and atraumatic.      Right Ear: External ear normal.      Left Ear: External ear normal.      Nose: Nose normal.      Mouth/Throat:      Mouth: Mucous membranes are moist.   Eyes:      Extraocular Movements: Extraocular movements intact.      Conjunctiva/sclera: Conjunctivae normal.   Cardiovascular:      Rate and Rhythm: Tachycardia present. Rhythm irregular.      Pulses: Normal pulses.      Heart sounds: Normal heart sounds.   Pulmonary:      Effort: Pulmonary effort is normal.      Breath sounds: Wheezing present. No rales.   Abdominal:      General: Bowel sounds are normal.      Palpations: Abdomen is soft.      Tenderness: There is no abdominal tenderness. There is no guarding or rebound.   Musculoskeletal:         General: Normal range of motion.      Cervical back: Neck supple.      Right lower leg: No edema.      Left lower leg: No edema.   Skin:     General: Skin is warm.      Findings: No erythema or rash.   Neurological:      General: No focal deficit present.      Mental Status: She is alert. She is disoriented.      Comments: Asterixis noted.   Psychiatric:         Mood and Affect: Mood normal.         Behavior: Behavior normal.       Physical Exam  General: Drowsy  Cardiac: Irregular heartbeat  Musculoskeletal: Asterixis    Laboratory data:    I have reviewed the labs done in the emergency room.    Results from last 7 days   Lab Units 06/24/25  1834 06/20/25  1525   SODIUM mmol/L 143 140   POTASSIUM mmol/L 4.0 4.0   CHLORIDE mmol/L 98 97*   CO2 mmol/L 34.1* 32.8*   BUN mg/dL 27.0* 19.0   CREATININE mg/dL 1.72* 1.39*   CALCIUM mg/dL 9.1 8.4*   BILIRUBIN mg/dL 0.2 0.2   ALK PHOS U/L 92 87   ALT (SGPT) U/L 24 5   AST (SGOT) U/L 67* 13   GLUCOSE mg/dL 118* 116*     Results from last 7 days   Lab Units 06/24/25  1834 06/20/25  1525   WBC  10*3/mm3 8.92 4.76   HEMOGLOBIN g/dL 8.6* 7.6*   HEMATOCRIT % 30.3* 26.4*   PLATELETS 10*3/mm3 180 109*       Results from last 7 days   Lab Units 06/24/25 2028 06/24/25  1834   HSTROP T ng/L 38* 40*     Results from last 7 days   Lab Units 06/24/25  1834   PROBNP pg/mL 7,757.0*       Results from last 7 days   Lab Units 06/24/25  1750   PH, ARTERIAL pH units 7.354   PO2 ART mm Hg 111.0*   PCO2, ARTERIAL mm Hg 64.5*   HCO3 ART mmol/L 35.9*     Results from last 7 days   Lab Units 06/24/25  1929   COLOR UA  Yellow   GLUCOSE UA  Negative   KETONES UA  Negative   BLOOD UA  Small (1+)*   LEUKOCYTES UA  Trace*   PH, URINE  <=5.0   BILIRUBIN UA  Negative   UROBILINOGEN UA  0.2 E.U./dL   RBC UA /HPF 0-2   WBC UA /HPF 0-2     Results  Labs   - Blood gas: Increased carbon dioxide levels     EKG:      EKG done in the emergency room was reviewed by me.  It shows sinus rhythm at 83 bpm.  Normal axis.  No significant ST-T changes were noted.  Repeat EKG however showed atrial fibrillation with rapid ventricular response at 141 bpm.  Occasional PVCs noted.  T inversions noted in the lateral chest leads.    Radiology:    Portable chest x-ray done in the emergency room was reviewed by me.  It shows emphysematous lung fields.  Chronic appearing parenchymal changes noted with possible suspected early CHF.  An official report is currently pending.    CT Head Without Contrast  Result Date: 6/25/2025  FINAL REPORT TECHNIQUE: null CLINICAL HISTORY: AMS COMPARISON: null FINDINGS: CT head without IV contrast: Comparison: None Findings: Age-appropriate sulcation. No intracranial mass, midline shift, hydrocephalus, acute hemorrhage or infarct. Incidental note is made of a cavum vergae, an asymptomatic variant of normal. Unremarkable orbits. No significant sinus disease. Mastoid air cells are clear. No skull fracture. No significant scalp soft tissue swelling.     Impression: 1. No acute intracranial abnormality. Age-appropriate exam.  Authenticated and Electronically Signed by Maxx Rock MD on 06/25/2025 01:19:09 AM    Assessment:    Acute metabolic encephalopathy, POA.  Acute on chronic hypercapnic respiratory failure, POA.  Atrial fibrillation with rapid ventricular response, POA.  COPD with acute exacerbation, POA.  Chronic systolic and diastolic heart failure, no exacerbation.  Chronic kidney disease stage III.  Chronic anemia.  Assessment & Plan  The plan for chronic obstructive pulmonary disease exacerbation is as follows:  - Exhibits signs of asterixis  - Blood gas analysis indicates elevated carbon dioxide levels, suggesting a COPD exacerbation and hypercapnia  - BiPAP therapy will be initiated to manage her carbon dioxide levels  - Continue bronchodilators, budesonide, revefenacin and IV Solu-Medrol.  - Obtain procalcitonin levels.    The plan for atrial fibrillation is as follows:  - Irregular heartbeat detected  - Currently receiving an amiodarone drip for this condition  - Initiation of Eliquis, a blood thinner, is under consideration  - Recent echocardiogram from 5/29/2025 showed a left ventricular ejection fraction of 40 to 45% with grade 2 diastolic dysfunction.       Risk Assessment: Moderate  DVT Prophylaxis: Apixaban  Code Status: DNR/DNI  Diet: Cardiac      Adilson Downs MD  06/25/25  01:32 EDT    Patient or patient representative verbalized consent for the use of Ambient Listening during the visit for chart documentation.    Dictated utilizing Dragon dictation.

## 2025-06-25 NOTE — OUTREACH NOTE
CHF Week 3 Survey      Flowsheet Row Responses   [facility] discharged [facility]? [facility]   Does the patient have one of the following disease processes/diagnoses(primary or secondary)? CHF   Week 3 attempt successful? No   Unsuccessful attempts Attempt 1            Milady CHAVES - Registered Nurse

## 2025-06-26 ENCOUNTER — READMISSION MANAGEMENT (OUTPATIENT)
Dept: CALL CENTER | Facility: HOSPITAL | Age: 75
End: 2025-06-26
Payer: MEDICARE

## 2025-06-26 VITALS
DIASTOLIC BLOOD PRESSURE: 78 MMHG | OXYGEN SATURATION: 97 % | WEIGHT: 126.32 LBS | HEIGHT: 66 IN | TEMPERATURE: 98.1 F | BODY MASS INDEX: 20.3 KG/M2 | HEART RATE: 78 BPM | RESPIRATION RATE: 24 BRPM | SYSTOLIC BLOOD PRESSURE: 153 MMHG

## 2025-06-26 LAB
ANION GAP SERPL CALCULATED.3IONS-SCNC: 13.5 MMOL/L (ref 5–15)
BACTERIA SPEC AEROBE CULT: NO GROWTH
BUN SERPL-MCNC: 34 MG/DL (ref 8–23)
BUN/CREAT SERPL: 21.8 (ref 7–25)
CALCIUM SPEC-SCNC: 8.7 MG/DL (ref 8.6–10.5)
CHLORIDE SERPL-SCNC: 95 MMOL/L (ref 98–107)
CO2 SERPL-SCNC: 28.5 MMOL/L (ref 22–29)
CREAT SERPL-MCNC: 1.56 MG/DL (ref 0.57–1)
DEPRECATED RDW RBC AUTO: 48.4 FL (ref 37–54)
EGFRCR SERPLBLD CKD-EPI 2021: 35.2 ML/MIN/1.73
ERYTHROCYTE [DISTWIDTH] IN BLOOD BY AUTOMATED COUNT: 16.4 % (ref 12.3–15.4)
GLUCOSE BLDC GLUCOMTR-MCNC: 127 MG/DL (ref 70–130)
GLUCOSE BLDC GLUCOMTR-MCNC: 153 MG/DL (ref 70–130)
GLUCOSE SERPL-MCNC: 186 MG/DL (ref 65–99)
HCT VFR BLD AUTO: 30.5 % (ref 34–46.6)
HGB BLD-MCNC: 8.8 G/DL (ref 12–15.9)
MCH RBC QN AUTO: 23.7 PG (ref 26.6–33)
MCHC RBC AUTO-ENTMCNC: 28.9 G/DL (ref 31.5–35.7)
MCV RBC AUTO: 82 FL (ref 79–97)
PLATELET # BLD AUTO: 154 10*3/MM3 (ref 140–450)
PMV BLD AUTO: 10.6 FL (ref 6–12)
POTASSIUM SERPL-SCNC: 4.4 MMOL/L (ref 3.5–5.2)
RBC # BLD AUTO: 3.72 10*6/MM3 (ref 3.77–5.28)
SODIUM SERPL-SCNC: 137 MMOL/L (ref 136–145)
WBC NRBC COR # BLD AUTO: 7.38 10*3/MM3 (ref 3.4–10.8)

## 2025-06-26 PROCEDURE — 80048 BASIC METABOLIC PNL TOTAL CA: CPT | Performed by: INTERNAL MEDICINE

## 2025-06-26 PROCEDURE — 85027 COMPLETE CBC AUTOMATED: CPT | Performed by: INTERNAL MEDICINE

## 2025-06-26 PROCEDURE — 82948 REAGENT STRIP/BLOOD GLUCOSE: CPT | Performed by: INTERNAL MEDICINE

## 2025-06-26 PROCEDURE — 25010000002 METHYLPREDNISOLONE PER 40 MG: Performed by: INTERNAL MEDICINE

## 2025-06-26 PROCEDURE — 99239 HOSP IP/OBS DSCHRG MGMT >30: CPT | Performed by: INTERNAL MEDICINE

## 2025-06-26 RX ORDER — AMIODARONE HYDROCHLORIDE 200 MG/1
200 TABLET ORAL
Qty: 30 TABLET | Refills: 0 | Status: SHIPPED | OUTPATIENT
Start: 2025-06-27 | End: 2025-07-27

## 2025-06-26 RX ORDER — AMIODARONE HYDROCHLORIDE 200 MG/1
200 TABLET ORAL
Status: DISCONTINUED | OUTPATIENT
Start: 2025-06-26 | End: 2025-06-26 | Stop reason: HOSPADM

## 2025-06-26 RX ORDER — PREDNISONE 20 MG/1
40 TABLET ORAL DAILY
Qty: 10 TABLET | Refills: 0 | Status: SHIPPED | OUTPATIENT
Start: 2025-06-26 | End: 2025-07-01

## 2025-06-26 RX ADMIN — Medication 10 ML: at 09:15

## 2025-06-26 RX ADMIN — LORAZEPAM 0.5 MG: 0.5 TABLET ORAL at 09:10

## 2025-06-26 RX ADMIN — METHYLPREDNISOLONE SODIUM SUCCINATE 40 MG: 40 INJECTION INTRAMUSCULAR; INTRAVENOUS at 02:30

## 2025-06-26 RX ADMIN — AMIODARONE HYDROCHLORIDE 200 MG: 200 TABLET ORAL at 09:10

## 2025-06-26 NOTE — OUTREACH NOTE
Prep Survey      Flowsheet Row Responses   [facility] patient discharged from? [facility]   Is LACE score < 7 ? No   Eligibility [facility]   Date of Admission 06/24/25   Date of Discharge 06/26/25   Discharge Disposition Home-Health Care Svc   Discharge diagnosis Atrial fibrillation with rapid ventricular response   Does the patient have one of the following disease processes/diagnoses(primary or secondary)? Other   Does the patient have Home health ordered? Yes   What is the Home health agency?  [facility] (current patient)   Is there a DME ordered? Yes   What DME was ordered? [employer] [place] ([facility])   Comments regarding appointments New PCP appt   Prep survey completed? Yes            MEENA MONTES - Registered Nurse

## 2025-06-26 NOTE — THERAPY DISCHARGE NOTE
PT attempted evaluation, however pt reports that she is at baseline and is not interested in receiving PT services .

## 2025-06-26 NOTE — CASE MANAGEMENT/SOCIAL WORK
Case Management Discharge Note                Selected Continued Care - Discharged on 6/26/2025 Admission date: 6/24/2025 - Discharge disposition: Home or Self Care      Destination    No services have been selected for the patient.                Durable Medical Equipment    No services have been selected for the patient.                Dialysis/Infusion    No services have been selected for the patient.                Home Medical Care Coordination complete.      Service Provider Services Address Phone Fax Patient Preferred    [facility] [place], [place] [id number] [phone] --                 Transportation Services  Transportation: Private Transportation  Private: Car    Final Discharge Disposition Code: 06 - home with home health care

## 2025-06-26 NOTE — CASE MANAGEMENT/SOCIAL WORK
Continued Stay Note   [facility]     Patient Name: Vanna Rincon  MRN: [id number]  Today's Date: 6/26/2025    Admit Date: 6/24/2025    Plan: spoke with pt in room this am; stated no cm needs; stated spouse bringing o2 for transport home; then [facility]anna called and stated current with them; informed md that we need up to date order; Vanna aware leaving today and will pull order   Discharge Plan       Row Name 06/26/25 1247       Plan    Plan spoke with pt in room this am; stated no cm needs; stated spouse bringing o2 for transport home; then [facility] called and stated current with them; informed md that we need up to date order; Vanna aware leaving today and will pull order                   Discharge Codes    No documentation.                 Expected Discharge Date and Time       Expected Discharge Date Expected Discharge Time    Jun 26, 2025               Anastasia Doll RN

## 2025-06-26 NOTE — DISCHARGE SUMMARY
"    [facility]   DISCHARGE SUMMARY      Name:  Vanna Rincon   Age:  74 y.o.  Sex:  female  :  1950  MRN:  [id number]   Visit Number:  [id number]    Admission Date:  2025  Date of Discharge:  2025  Primary Care Physician:  Farhad Chowdary MD      Discharge Diagnoses:     Acute metabolic encephalopathy  Acute on chronic hypercapnic respiratory failure  Atrial fibrillation with rapid ventricular response  COPD with acute exacerbation  Chronic systolic and diastolic heart failure, no exacerbation.  Chronic kidney disease stage III.  Chronic anemia.    Problem List:     Active Hospital Problems    Diagnosis  POA    **Atrial fibrillation with rapid ventricular response [I48.91]  Yes    Acute metabolic encephalopathy [G93.41]  Yes    Paroxysmal atrial fibrillation with rapid ventricular response [I48.0]  Yes    Acute on chronic respiratory failure with hypoxia and hypercapnia [J96.21, J96.22]  Yes    Chronic kidney disease, stage III (moderate) [N18.30]  Yes    Rheumatoid arthritis involving multiple sites with positive rheumatoid factor [M05.79]  Yes      Resolved Hospital Problems   No resolved problems to display.     Presenting Problem:    Chief Complaint   Patient presents with    Altered Mental Status     EMS reports pt. Called EMS due to concerns of her oxygen and concerned she was retaining CO2, pt. Gcs 14, and slightly confused according to EMS. Pt. On 10L O2 at baseline according to EMS. CO2 26 en route. Pt. On 6L in triage, o2 sat 97%..Hx of copd.    Shortness of Breath      Consults:     Consulting Physician(s)                     None              Procedures Performed:        History of presenting illness/Hospital Course:    Per H&P \"Vanna Rincon is a 74-year-old female with history of COPD on 6 L of oxygen at baseline, HFpEF, chronic kidney disease stage III, anemia, rheumatoid arthritis with rheumatoid lung, GERD, degenerative disc disease, anxiety was " "brought to the emergency room by EMS secondary to altered mental status.   In the emergency room, she was afebrile and hemodynamically stable saturating at 99% on 6 L of nasal cannula oxygen.  Initial troponin 40 with repeat at 38.  Initial ABG showed a pH of 7.35, pCO2 65, , bicarb 36 on 6 L of nasal cannula oxygen.  proBNP 7757.  CMP was remarkable for a BUN of 27, creatinine of 1.72 (baseline).  D-dimer was normal.  CBC was unremarkable except for hemoglobin of 8.6 (baseline).  Urine analysis was unremarkable.  Respiratory panel was negative.  Chest x-ray showed emphysematous feels with chronic appearing parenchymal changes.  Patient was given DuoNeb, IV Solu-Medrol and Ativan in the emergency room. Patient was noted to have atrial fibrillation with rapid ventricular response in the emergency room and was initiated on amiodarone drip.\"    Patient initiated on amiodarone drip.  She did convert to normal sinus rhythm was transition to oral amiodarone.  P.o. Cardizem discontinued.  Patient stable on her home oxygen requirement.  She was also started on IV Solu-Medrol and transition to p.o. prednisone at discharge.  Patient returned to baseline level of functioning, encephalopathy resolved.  At discharge, patient's Eliquis was decreased to 2.5 mg.  Patient stable for discharge.  Advise close follow-up with primary physician and cardiologist.    Vital Signs:    Temp:  [98.1 °F (36.7 °C)-98.4 °F (36.9 °C)] 98.1 °F (36.7 °C)  Heart Rate:  [61-85] 79  Resp:  [20-22] 20  BP: (139-173)/(68-79) 166/75    Physical Exam:    General Appearance:  Alert and cooperative. Frail, chronically ill appearing. NAD   Head:  Atraumatic and normocephalic.   Eyes: Conjunctivae and sclerae normal, no icterus. No pallor.   Ears:  Ears with no abnormalities noted.   Throat: No oral lesions, no thrush, oral mucosa moist.   Neck: Supple, trachea midline, no thyromegaly.       Lungs:   Breath sounds heard bilaterally equally.  No crackles " or wheezing.    Heart:  Normal S1 and S2, no murmur, No JVD.   Abdomen:   Normal bowel sounds, Soft, nontender, nondistended, no rebound tenderness.   Extremities: Supple, no edema, no cyanosis, no clubbing.   Pulses: Pulses palpable bilaterally.   Skin: No bleeding or rash.   Neurologic: Alert and oriented x 3. Generalized weakness.     Pertinent Lab Results:     Results from last 7 days   Lab Units 06/26/25  1021 06/25/25  0812 06/24/25  1834 06/20/25  1525   SODIUM mmol/L 137 140 143 140   POTASSIUM mmol/L 4.4 4.2 4.0 4.0   CHLORIDE mmol/L 95* 95* 98 97*   CO2 mmol/L 28.5 32.9* 34.1* 32.8*   BUN mg/dL 34.0* 27.0* 27.0* 19.0   CREATININE mg/dL 1.56* 1.26* 1.72* 1.39*   CALCIUM mg/dL 8.7 9.1 9.1 8.4*   BILIRUBIN mg/dL  --   --  0.2 0.2   ALK PHOS U/L  --   --  92 87   ALT (SGPT) U/L  --   --  24 5   AST (SGOT) U/L  --   --  67* 13   GLUCOSE mg/dL 186* 200* 118* 116*     Results from last 7 days   Lab Units 06/26/25  1021 06/25/25  0811 06/24/25  1834   WBC 10*3/mm3 7.38 3.39* 8.92   HEMOGLOBIN g/dL 8.8* 8.2* 8.6*   HEMATOCRIT % 30.5* 28.8* 30.3*   PLATELETS 10*3/mm3 154 175 180         Results from last 7 days   Lab Units 06/24/25 2028 06/24/25  1834   HSTROP T ng/L 38* 40*     Results from last 7 days   Lab Units 06/24/25  1834   PROBNP pg/mL 7,757.0*             Results from last 7 days   Lab Units 06/24/25  1750   PH, ARTERIAL pH units 7.354   PO2 ART mm Hg 111.0*   PCO2, ARTERIAL mm Hg 64.5*   HCO3 ART mmol/L 35.9*     Results from last 7 days   Lab Units 06/24/25  1929   URINECX  No growth       Pertinent Radiology Results:    Imaging Results (All)       Procedure Component Value Units Date/Time    XR Chest 1 View [[id number]] Collected: 06/25/25 0907     Updated: 06/25/25 0924    Narrative:      PROCEDURE: XR CHEST 1 VW-        HISTORY: SOA Triage Protocol     COMPARISON: May 28, 2025.     FINDINGS: Patient is rotated to the left. The heart is borderline in  size. The mediastinum is unremarkable. There is  bilateral interstitial  disease which is stable. There is no new consolidation. There is no  pneumothorax. There are no acute osseous abnormalities.       Impression:      Stable bilateral interstitial disease.                       Images were reviewed, interpreted, and dictated by Dr. Zaida Davis MD  Transcribed by Denisse Frederick PA-C.     This report was signed and finalized on 6/25/2025 9:21 AM by Zaida Davis MD.       CT Head Without Contrast [[id number]] Collected: 06/25/25 0119     Updated: 06/25/25 0120    Narrative:      FINAL REPORT    TECHNIQUE:  null    CLINICAL HISTORY:  AMS    COMPARISON:  null    FINDINGS:  CT head without IV contrast:    Comparison: None    Findings:    Age-appropriate sulcation.    No intracranial mass, midline shift, hydrocephalus, acute hemorrhage or infarct.    Incidental note is made of a cavum vergae, an asymptomatic variant of normal.    Unremarkable orbits.    No significant sinus disease.    Mastoid air cells are clear.    No skull fracture. No significant scalp soft tissue swelling.      Impression:      Impression:    1. No acute intracranial abnormality. Age-appropriate exam.    Authenticated and Electronically Signed by Maxx Rock MD on  06/25/2025 01:19:09 AM            Echo:    Results for orders placed during the hospital encounter of 05/28/25    Adult Transthoracic Echo Complete w/ Color, Spectral and Contrast if necessary per protocol    Interpretation Summary  1.  Normal left ventricular size with mildly reduced LV systolic function, LVEF 40-45%.  2.  Grade 2 diastolic dysfunction.  3.  Normal right ventricular size and systolic function.  4.  Severely increased left atrial volume index.  5.  Mild calcification of the aortic valve without significant stenosis.  6.  Mitral annular calcification and calcification of the mitral valve without significant stenosis.  Trace MR.    Condition on Discharge:      Stable.    Code status during the hospital stay:    Code  Status and Medical Interventions: No CPR (Do Not Attempt to Resuscitate); Limited Support; No intubation (DNI), No cardioversion   Ordered at: 06/25/25 0727     Code Status (Patient has no pulse and is not breathing):    No CPR (Do Not Attempt to Resuscitate)     Medical Interventions (Patient has pulse or is breathing):    Limited Support     Medical Intervention Limits:    No intubation (DNI)       No cardioversion     Discharge Disposition:    Home or Self Care    Discharge Medications:       Discharge Medications        New Medications        Instructions Start Date   amiodarone 200 MG tablet  Commonly known as: PACERONE   200 mg, Oral, Every 24 Hours Scheduled   Start Date: June 27, 2025     predniSONE 20 MG tablet  Commonly known as: DELTASONE   40 mg, Oral, Daily             Changes to Medications        Instructions Start Date   apixaban 5 MG tablet tablet  Commonly known as: ELIQUIS  What changed: how much to take   2.5 mg, Oral, Every 12 Hours Scheduled             Continue These Medications        Instructions Start Date   budesonide-formoterol 160-4.5 MCG/ACT inhaler  Commonly known as: Symbicort   2 puffs, Inhalation, 2 Times Daily - RT      etanercept 50 MG/ML solution prefilled syringe injection  Commonly known as: ENBREL   50 mg, Subcutaneous, Every 7 Days      fentaNYL 75 MCG/HR patch  Commonly known as: DURAGESIC   1 patch, Every 72 Hours      furosemide 20 MG tablet  Commonly known as: Lasix   20 mg, Oral, Daily      HYDROcodone-acetaminophen 5-325 MG per tablet  Commonly known as: NORCO   1 tablet, Every 6 Hours PRN      multivitamin with minerals tablet tablet   1 tablet, Daily      O2  Commonly known as: OXYGEN   6 L/min, Continuous      ondansetron 8 MG tablet  Commonly known as: ZOFRAN   8 mg, Every 8 Hours PRN      Procrit 2000 UNIT/ML injection  Generic drug: epoetin chidi   Every 6 Weeks      sertraline 100 MG tablet  Commonly known as: ZOLOFT   100 mg, Oral, Daily      simethicone 125 MG  chewable tablet  Commonly known as: MYLICON   125 mg, Every 6 Hours PRN      valsartan-hydrochlorothiazide 320-25 MG per tablet  Commonly known as: DIOVAN-HCT   0.5 tablets, Oral, Daily             Stop These Medications      dilTIAZem HCl  MG tablet sustained-release 24 hour            Discharge Diet:     Diet Instructions       Diet: Cardiac Diets; Healthy Heart (2-3 Na+); Regular (IDDSI 7); Thin (IDDSI 0)      Discharge Diet: Cardiac Diets    Cardiac Diet: Healthy Heart (2-3 Na+)    Texture: Regular (IDDSI 7)    Fluid Consistency: Thin (IDDSI 0)          Activity at Discharge:     Activity Instructions       Activity as Tolerated            Follow-up Appointments:    Additional Instructions for the Follow-ups that You Need to Schedule       Discharge Follow-up with PCP   As directed       Currently Documented PCP:    Farhad Chowdary MD    PCP Phone Number:    [phone]     Follow Up Details: 1 week        Discharge Follow-up with Specified Provider: Dr Marquez; 6 Weeks   As directed      To: Dr Marquez   Follow Up: 6 Weeks               Follow-up Information       Farhad Chowdary MD .    Specialty: Family Medicine  Why: 1 week  Contact information:  [address]  [place] [address]  [phone]                           Future Appointments   Date Time Provider Department Center   8/1/2025  3:15 PM TREATMENT RM 15 [facility]   5/8/2026  3:40 PM Yvonne Regalado APRN [facility]   7/13/2026  9:45 AM Kraig Marquez MD [facility]     Test Results Pending at Discharge:    Pending Results       None                 Juvenal Cespedes DO  06/26/25  11:19 EDT    Time: I spent >30 minutes on this discharge activity which included: face-to-face encounter with the patient, reviewing the data in the system, coordination of the care with the nursing staff as well as consultants, documentation, and entering orders.     Dictated utilizing Dragon dictation.

## 2025-06-27 ENCOUNTER — TRANSITIONAL CARE MANAGEMENT TELEPHONE ENCOUNTER (OUTPATIENT)
Dept: CALL CENTER | Facility: HOSPITAL | Age: 75
End: 2025-06-27
Payer: MEDICARE

## 2025-06-27 ENCOUNTER — NURSE TRIAGE (OUTPATIENT)
Dept: CALL CENTER | Facility: HOSPITAL | Age: 75
End: 2025-06-27
Payer: MEDICARE

## 2025-06-27 NOTE — CASE MANAGEMENT/SOCIAL WORK
Continued Stay Note  [facility]     Patient Name: Vanna Rincon  MRN: [id number]  Today's Date: 6/27/2025    Admit Date: 6/24/2025    Plan: FAXED UPDATED HOME HEALTH ORDER   Discharge Plan       Row Name 06/27/25 0725       Plan    Plan FAXED UPDATED HOME HEALTH ORDER                   Discharge Codes    No documentation.                 Expected Discharge Date and Time       Expected Discharge Date Expected Discharge Time    Jun 26, 2025               Anastasia Doll RN

## 2025-06-27 NOTE — TELEPHONE ENCOUNTER
Pt states that she did not get her Ativan prescription when she left the hospital. Does not have a PCP.  Is wondering if the hospitalist would call this in to the [facility] in [place]. It is not listed on her discharge medications. Will send note to case management. Pt encouraged to go to urgent care or her PCP for medication if she is unable to obtain it.  Reason for Disposition   [1] Caller has URGENT medicine question about med that PCP or specialist prescribed AND [2] triager unable to answer question    Additional Information   Negative: [1] Intentional drug overdose AND [2] suicidal thoughts or ideas   Negative: Drug overdose and triager unable to answer question   Negative: Caller requesting a renewal or refill of a medicine patient is currently taking   Negative: Caller requesting information unrelated to medicine   Negative: Caller requesting information about COVID-19 Vaccine   Negative: Caller requesting information about Emergency Contraception   Negative: Caller requesting information about Combined Birth Control Pills   Negative: Caller requesting information about Progestin Birth Control Pills   Negative: Caller requesting information about Post-Op pain or medicines   Negative: Caller requesting a prescription antibiotic (such as Penicillin) for Strep throat and has a positive culture result   Negative: Caller requesting a prescription anti-viral med (such as Tamiflu) and has influenza (flu) symptoms   Negative: Immunization reaction suspected   Negative: Rash while taking a medicine or within 3 days of stopping it   Negative: [1] Asthma and [2] having symptoms of asthma (cough, wheezing, etc.)   Negative: [1] Symptom of illness (e.g., headache, abdominal pain, earache, vomiting) AND [2] more than mild   Negative: Breastfeeding questions about mother's medicines and diet   Negative: MORE THAN A DOUBLE DOSE of a prescription or over-the-counter (OTC) drug   Negative: [1] DOUBLE DOSE (an extra dose or  "lesser amount) of prescription drug AND [2] any symptoms (e.g., dizziness, nausea, pain, sleepiness)   Negative: [1] DOUBLE DOSE (an extra dose or lesser amount) of over-the-counter (OTC) drug AND [2] any symptoms (e.g., dizziness, nausea, pain, sleepiness)   Negative: Took another person's prescription drug   Negative: [1] DOUBLE DOSE (an extra dose or lesser amount) of prescription drug AND [2] NO symptoms  (Exception: A double dose of antibiotics.)   Negative: Diabetes drug error or overdose (e.g., took wrong type of insulin or took extra dose)   Negative: [1] Prescription not at pharmacy AND [2] was prescribed by PCP recently (Exception: Triager has access to EMR and prescription is recorded there. Go to Home Care and confirm for pharmacy.)   Negative: [1] Pharmacy calling with prescription question AND [2] triager unable to answer question    Answer Assessment - Initial Assessment Questions  1. NAME of MEDICINE: \"What medicine(s) are you calling about?\"      Ativan  2. QUESTION: \"What is your question?\" (e.g., double dose of medicine, side effect)      Needs a prescription called in  3. PRESCRIBER: \"Who prescribed the medicine?\" Reason: if prescribed by specialist, call should be referred to that group.      Hospitalist  4. SYMPTOMS: \"Do you have any symptoms?\" If Yes, ask: \"What symptoms are you having?\"  \"How bad are the symptoms (e.g., mild, moderate, severe)      Some panic attacks  5. PREGNANCY:  \"Is there any chance that you are pregnant?\" \"When was your last menstrual period?\"      na    Protocols used: Medication Question Call-ADULT-    "

## 2025-06-27 NOTE — OUTREACH NOTE
Call Center TCM Note      Flowsheet Row Responses   [facility] patient discharged from? [facility]   Does the patient have one of the following disease processes/diagnoses(primary or secondary)? Other   TCM attempt successful? Yes   Call start time 0932   Call end time 0936   Discharge diagnosis Atrial fibrillation with rapid ventricular response   Is patient permission given to speak with other caregiver? Yes   List who call center can speak with Spouse   Person spoke with today (if not patient) and relationship Spouse   Meds reviewed with patient/caregiver? Yes   Is the patient having any side effects they believe may be caused by any medication additions or changes? No   Does the patient have all medications ordered at discharge? Yes   Is the patient taking all medications as directed (includes completed medication regime)? Yes   Comments Hospital d/c follow up appt 7/3/25@0900.   Does the patient have an appointment with their PCP within 7-14 days of discharge? Yes   What is the Home health agency?  [facility] (current patient)   Has home health visited the patient within 72 hours of discharge? Yes   What DME was ordered? [employer] (O2)   DME comments Patient on 6L O2 continuous. O2 sats >90%   Psychosocial issues? No   Did the patient receive a copy of their discharge instructions? Yes   Nursing interventions Reviewed instructions with patient   What is the patient's perception of their health status since discharge? Improving   Is the patient/caregiver able to teach back signs and symptoms related to disease process for when to call PCP? Yes   Is the patient/caregiver able to teach back signs and symptoms related to disease process for when to call 911? Yes   Is the patient/caregiver able to teach back the hierarchy of who to call/visit for symptoms/problems? PCP, Specialist, Home health nurse, Urgent Care, ED, 911 Yes   If the patient is a current smoker, are  they able to teach back resources for cessation? Not a smoker   TCM call completed? Yes   Wrap up additional comments Spouse denies concerns today.   Call end time 0936   Would this patient benefit from a Referral to [facility] Social Work? No   Is the patient interested in additional calls from an ambulatory ? Priya CROWDER - Registered Nurse    6/27/2025, 09:37 EDT

## 2025-06-27 NOTE — DISCHARGE PLACEMENT REQUEST
UPDATED  ORDER; PT DISCHARGED ON 6/26/25    ; JAMES FUNES, [phone] AND [phone]        Ambulatory Referral to Home Health [[id number]] (Order [id number])  Order  Date: 6/26/2025 Department: [facility] OVERFLOW Ordering/Authorizing: Juvenal Cespedes DO     Order History  Outpatient  Date/Time Action Taken User Additional Information   06/26/25 1731 Sign Juvenal Cespedes DO      Order Details    Frequency Duration Priority Order Class   None None Routine Outgoing Referral     Start Date/Time    Start Date   06/26/25     Order Information    Order Date Service Start Date Start Time   06/26/25 Medicine 06/26/25      Reference Links    Associated Reports   View Encounter     Order Questions    Question Answer   Face to Face Visit Date: 6/26/2025   Follow-up provider for Plan of Care? I treated the patient in an acute care facility and will not continue treatment after discharge.   Follow-up provider: BALJIT WAY   Reason/Clinical Findings afib, debility   Describe mobility limitations that make leaving home difficult: afib, debility   Nursing/Therapeutic Services Requested Skilled Nursing    Physical Therapy    Occupational Therapy    Medical / Social Work   Skilled nursing orders: O2 instruction    Medication education    Cardiopulmonary assessments   PT orders: Total joint pathway    Strengthening    Therapeutic exercise    Electrical stimulation    Ultrasound    Gait Training    Transfer training    Home safety assessment   Weight Bearing Status As Tolerated   Occupational orders: Activities of daily living    Home safety assessment    Energy conservation    Strengthening    Cognition    Fine motor   Social work orders: Community resources    Long range planning   Frequency: 1 Week 1            Source Order Set / Preference List    Preference List   AMB FACILITY REFERRALS [7073]           Clinical Indications     ICD-10-CM ICD-9-CM   Atrial fibrillation  "with rapid ventricular response    I48.91 427.31                             Reprint Order Requisition    Ambulatory Referral to Home Health (Order #[id number]) on 6/26/25         Encounter    View Encounter                Order Provider Info        Phone Pager E-mail   Ordering User  Juvenal Cespedes DO  [phone] (Office Phone) -- --   Authorizing Provider  Juvenal Cespedes DO  [phone] (Office Phone) -- --     Tracking Reports    Cosign Tracking Order Transmittal Tracking     Authorized by:  Juvenal Cespedes DO  (NPI: [id number])                Lab Component SmartPhrase Guide                    Vanna Rincon (74 y.o. Female)       Date of Birth   1950    Social Security Number       Address   [address] [place] [address] [address] [place] [address]    Home Phone   [phone]    MRN   [id number]       Adventism   Anabaptist    Marital Status                               Admission Date   6/24/2025    Admission Type   Emergency    Admitting Provider   Adilson Downs MD    Attending Provider       Department, Room/Bed   [facility] TELEMETRY SDS OVERFLOW, T02/01       Discharge Date   6/26/2025    Discharge Disposition   Home or Self Care    Discharge Destination   Home                              Attending Provider: (none)   Allergies: Iodinated Contrast Media, Feraheme [Ferumoxytol], Brimonidine Tartrate, Ciprofloxacin, Doxycycline, Lipitor [Atorvastatin], Nsaids, Sulfa Antibiotics    Isolation: None   Infection: None   Code Status: Prior    Ht: 167.6 cm (65.98\")   Wt: 57.3 kg (126 lb 5.2 oz)    Admission Cmt: None   Principal Problem: Atrial fibrillation with rapid ventricular response [I48.91]                   Active Insurance as of 6/24/2025       Primary Coverage       Payor Plan Insurance Group Employer/Plan Group    MEDICARE MEDICARE A & B        Payor Plan Address Payor Plan Phone Number Payor Plan Fax Number Effective Dates    [address] [phone]  10/1/2002 - None Entered    " [facility] [id number]         Subscriber Name Subscriber Birth Date Member ID       VANNA WILKES 1950 [id number]               Secondary Coverage       Payor Plan Insurance Group Employer/Plan Group    AARP MC SUP AAR HEALTH CARE OPTIONS PLAN F       Payor Plan Address Payor Plan Phone Number Payor Plan Fax Number Effective Dates    [facility] [phone]  2017 - None Entered    [address]       [facility] [address]         Subscriber Name Subscriber Birth Date Member ID       VANNA WILKES 1950 [id number]                     Emergency Contacts        (Rel.) Home Phone Work Phone Mobile Phone    Michelle Wilkes (Spouse) [phone] -- [phone]              Physician Progress Notes (last 24 hours)  Notes from 25 0725 through 25 0725   No notes of this type exist for this encounter.          Discharge Summary        Juvenal Cespedes DO at 25 1119              [facility]   DISCHARGE SUMMARY      Name:  Vanna Wilkes   Age:  74 y.o.  Sex:  female  :  1950  MRN:  [id number]   Visit Number:  [id number]    Admission Date:  2025  Date of Discharge:  2025  Primary Care Physician:  Farhad Chowdary MD      Discharge Diagnoses:     Acute metabolic encephalopathy  Acute on chronic hypercapnic respiratory failure  Atrial fibrillation with rapid ventricular response  COPD with acute exacerbation  Chronic systolic and diastolic heart failure, no exacerbation.  Chronic kidney disease stage III.  Chronic anemia.    Problem List:     Active Hospital Problems    Diagnosis  POA    **Atrial fibrillation with rapid ventricular response [I48.91]  Yes    Acute metabolic encephalopathy [G93.41]  Yes    Paroxysmal atrial fibrillation with rapid ventricular response [I48.0]  Yes    Acute on chronic respiratory failure with hypoxia and hypercapnia [J96.21, J96.22]  Yes    Chronic kidney disease, stage III (moderate) [N18.30]  Yes  "   Rheumatoid arthritis involving multiple sites with positive rheumatoid factor [M05.79]  Yes      Resolved Hospital Problems   No resolved problems to display.     Presenting Problem:    Chief Complaint   Patient presents with    Altered Mental Status     EMS reports pt. Called EMS due to concerns of her oxygen and concerned she was retaining CO2, pt. Gcs 14, and slightly confused according to EMS. Pt. On 10L O2 at baseline according to EMS. CO2 26 en route. Pt. On 6L in triage, o2 sat 97%..Hx of copd.    Shortness of Breath      Consults:     Consulting Physician(s)                     None              Procedures Performed:        History of presenting illness/Hospital Course:    Per H&P \"Vanna Rincon is a 74-year-old female with history of COPD on 6 L of oxygen at baseline, HFpEF, chronic kidney disease stage III, anemia, rheumatoid arthritis with rheumatoid lung, GERD, degenerative disc disease, anxiety was brought to the emergency room by EMS secondary to altered mental status.   In the emergency room, she was afebrile and hemodynamically stable saturating at 99% on 6 L of nasal cannula oxygen.  Initial troponin 40 with repeat at 38.  Initial ABG showed a pH of 7.35, pCO2 65, , bicarb 36 on 6 L of nasal cannula oxygen.  proBNP 7757.  CMP was remarkable for a BUN of 27, creatinine of 1.72 (baseline).  D-dimer was normal.  CBC was unremarkable except for hemoglobin of 8.6 (baseline).  Urine analysis was unremarkable.  Respiratory panel was negative.  Chest x-ray showed emphysematous feels with chronic appearing parenchymal changes.  Patient was given DuoNeb, IV Solu-Medrol and Ativan in the emergency room. Patient was noted to have atrial fibrillation with rapid ventricular response in the emergency room and was initiated on amiodarone drip.\"    Patient initiated on amiodarone drip.  She did convert to normal sinus rhythm was transition to oral amiodarone.  P.o. Cardizem discontinued.  Patient stable " on her home oxygen requirement.  She was also started on IV Solu-Medrol and transition to p.o. prednisone at discharge.  Patient returned to baseline level of functioning, encephalopathy resolved.  At discharge, patient's Eliquis was decreased to 2.5 mg.  Patient stable for discharge.  Advise close follow-up with primary physician and cardiologist.    Vital Signs:    Temp:  [98.1 °F (36.7 °C)-98.4 °F (36.9 °C)] 98.1 °F (36.7 °C)  Heart Rate:  [61-85] 79  Resp:  [20-22] 20  BP: (139-173)/(68-79) 166/75    Physical Exam:    General Appearance:  Alert and cooperative. Frail, chronically ill appearing. NAD   Head:  Atraumatic and normocephalic.   Eyes: Conjunctivae and sclerae normal, no icterus. No pallor.   Ears:  Ears with no abnormalities noted.   Throat: No oral lesions, no thrush, oral mucosa moist.   Neck: Supple, trachea midline, no thyromegaly.       Lungs:   Breath sounds heard bilaterally equally.  No crackles or wheezing.    Heart:  Normal S1 and S2, no murmur, No JVD.   Abdomen:   Normal bowel sounds, Soft, nontender, nondistended, no rebound tenderness.   Extremities: Supple, no edema, no cyanosis, no clubbing.   Pulses: Pulses palpable bilaterally.   Skin: No bleeding or rash.   Neurologic: Alert and oriented x 3. Generalized weakness.     Pertinent Lab Results:     Results from last 7 days   Lab Units 06/26/25  1021 06/25/25  0812 06/24/25  1834 06/20/25  1525   SODIUM mmol/L 137 140 143 140   POTASSIUM mmol/L 4.4 4.2 4.0 4.0   CHLORIDE mmol/L 95* 95* 98 97*   CO2 mmol/L 28.5 32.9* 34.1* 32.8*   BUN mg/dL 34.0* 27.0* 27.0* 19.0   CREATININE mg/dL 1.56* 1.26* 1.72* 1.39*   CALCIUM mg/dL 8.7 9.1 9.1 8.4*   BILIRUBIN mg/dL  --   --  0.2 0.2   ALK PHOS U/L  --   --  92 87   ALT (SGPT) U/L  --   --  24 5   AST (SGOT) U/L  --   --  67* 13   GLUCOSE mg/dL 186* 200* 118* 116*     Results from last 7 days   Lab Units 06/26/25  1021 06/25/25  0811 06/24/25  1834   WBC 10*3/mm3 7.38 3.39* 8.92   HEMOGLOBIN g/dL  8.8* 8.2* 8.6*   HEMATOCRIT % 30.5* 28.8* 30.3*   PLATELETS 10*3/mm3 154 175 180         Results from last 7 days   Lab Units 06/24/25 2028 06/24/25  1834   HSTROP T ng/L 38* 40*     Results from last 7 days   Lab Units 06/24/25  1834   PROBNP pg/mL 7,757.0*             Results from last 7 days   Lab Units 06/24/25  1750   PH, ARTERIAL pH units 7.354   PO2 ART mm Hg 111.0*   PCO2, ARTERIAL mm Hg 64.5*   HCO3 ART mmol/L 35.9*     Results from last 7 days   Lab Units 06/24/25  1929   URINECX  No growth       Pertinent Radiology Results:    Imaging Results (All)       Procedure Component Value Units Date/Time    XR Chest 1 View [[id number]] Collected: 06/25/25 0907     Updated: 06/25/25 0924    Narrative:      PROCEDURE: XR CHEST 1 VW-        HISTORY: SOA Triage Protocol     COMPARISON: May 28, 2025.     FINDINGS: Patient is rotated to the left. The heart is borderline in  size. The mediastinum is unremarkable. There is bilateral interstitial  disease which is stable. There is no new consolidation. There is no  pneumothorax. There are no acute osseous abnormalities.       Impression:      Stable bilateral interstitial disease.                       Images were reviewed, interpreted, and dictated by Dr. Zaida Davis MD  Transcribed by Denisse Frederick PA-C.     This report was signed and finalized on 6/25/2025 9:21 AM by Zaida Davis MD.       CT Head Without Contrast [[id number]] Collected: 06/25/25 0119     Updated: 06/25/25 0120    Narrative:      FINAL REPORT    TECHNIQUE:  null    CLINICAL HISTORY:  AMS    COMPARISON:  null    FINDINGS:  CT head without IV contrast:    Comparison: None    Findings:    Age-appropriate sulcation.    No intracranial mass, midline shift, hydrocephalus, acute hemorrhage or infarct.    Incidental note is made of a cavum vergae, an asymptomatic variant of normal.    Unremarkable orbits.    No significant sinus disease.    Mastoid air cells are clear.    No skull fracture. No significant  scalp soft tissue swelling.      Impression:      Impression:    1. No acute intracranial abnormality. Age-appropriate exam.    Authenticated and Electronically Signed by Maxx Rock MD on  06/25/2025 01:19:09 AM            Echo:    Results for orders placed during the hospital encounter of 05/28/25    Adult Transthoracic Echo Complete w/ Color, Spectral and Contrast if necessary per protocol    Interpretation Summary  1.  Normal left ventricular size with mildly reduced LV systolic function, LVEF 40-45%.  2.  Grade 2 diastolic dysfunction.  3.  Normal right ventricular size and systolic function.  4.  Severely increased left atrial volume index.  5.  Mild calcification of the aortic valve without significant stenosis.  6.  Mitral annular calcification and calcification of the mitral valve without significant stenosis.  Trace MR.    Condition on Discharge:      Stable.    Code status during the hospital stay:    Code Status and Medical Interventions: No CPR (Do Not Attempt to Resuscitate); Limited Support; No intubation (DNI), No cardioversion   Ordered at: 06/25/25 0727     Code Status (Patient has no pulse and is not breathing):    No CPR (Do Not Attempt to Resuscitate)     Medical Interventions (Patient has pulse or is breathing):    Limited Support     Medical Intervention Limits:    No intubation (DNI)       No cardioversion     Discharge Disposition:    Home or Self Care    Discharge Medications:       Discharge Medications        New Medications        Instructions Start Date   amiodarone 200 MG tablet  Commonly known as: PACERONE   200 mg, Oral, Every 24 Hours Scheduled   Start Date: June 27, 2025     predniSONE 20 MG tablet  Commonly known as: DELTASONE   40 mg, Oral, Daily             Changes to Medications        Instructions Start Date   apixaban 5 MG tablet tablet  Commonly known as: ELIQUIS  What changed: how much to take   2.5 mg, Oral, Every 12 Hours Scheduled             Continue These Medications         Instructions Start Date   budesonide-formoterol 160-4.5 MCG/ACT inhaler  Commonly known as: Symbicort   2 puffs, Inhalation, 2 Times Daily - RT      etanercept 50 MG/ML solution prefilled syringe injection  Commonly known as: ENBREL   50 mg, Subcutaneous, Every 7 Days      fentaNYL 75 MCG/HR patch  Commonly known as: DURAGESIC   1 patch, Every 72 Hours      furosemide 20 MG tablet  Commonly known as: Lasix   20 mg, Oral, Daily      HYDROcodone-acetaminophen 5-325 MG per tablet  Commonly known as: NORCO   1 tablet, Every 6 Hours PRN      multivitamin with minerals tablet tablet   1 tablet, Daily      O2  Commonly known as: OXYGEN   6 L/min, Continuous      ondansetron 8 MG tablet  Commonly known as: ZOFRAN   8 mg, Every 8 Hours PRN      Procrit 2000 UNIT/ML injection  Generic drug: epoetin chidi   Every 6 Weeks      sertraline 100 MG tablet  Commonly known as: ZOLOFT   100 mg, Oral, Daily      simethicone 125 MG chewable tablet  Commonly known as: MYLICON   125 mg, Every 6 Hours PRN      valsartan-hydrochlorothiazide 320-25 MG per tablet  Commonly known as: DIOVAN-HCT   0.5 tablets, Oral, Daily             Stop These Medications      dilTIAZem HCl  MG tablet sustained-release 24 hour            Discharge Diet:     Diet Instructions       Diet: Cardiac Diets; Healthy Heart (2-3 Na+); Regular (IDDSI 7); Thin (IDDSI 0)      Discharge Diet: Cardiac Diets    Cardiac Diet: Healthy Heart (2-3 Na+)    Texture: Regular (IDDSI 7)    Fluid Consistency: Thin (IDDSI 0)          Activity at Discharge:     Activity Instructions       Activity as Tolerated            Follow-up Appointments:    Additional Instructions for the Follow-ups that You Need to Schedule       Discharge Follow-up with PCP   As directed       Currently Documented PCP:    Farhad Chowdary MD    PCP Phone Number:    [phone]     Follow Up Details: 1 week        Discharge Follow-up with Specified Provider: Dr Marquez; 6 Weeks   As directed      To:  Dr Marquez   Follow Up: 6 Weeks               Follow-up Information       Farhad Chowdary MD .    Specialty: Family Medicine  Why: 1 week  Contact information:  [address]  [address]  [facility] [id number] [phone]                           Future Appointments   Date Time Provider Department Center   8/1/2025  3:15 PM TREATMENT RM 15 [facility] OP INFUS [facility]   5/8/2026  3:40 PM Yvonne Regalado APRN [facility]   7/13/2026  9:45 AM Kraig Marquez MD [facility]     Test Results Pending at Discharge:    Pending Results       None                 Juvenal Cespedes DO  06/26/25  11:19 EDT    Time: I spent >30 minutes on this discharge activity which included: face-to-face encounter with the patient, reviewing the data in the system, coordination of the care with the nursing staff as well as consultants, documentation, and entering orders.     Dictated utilizing Dragon dictation.        Electronically signed by Juvenal Cespedes DO at 06/26/25 1122

## 2025-07-01 ENCOUNTER — NURSE TRIAGE (OUTPATIENT)
Dept: CALL CENTER | Facility: HOSPITAL | Age: 75
End: 2025-07-01
Payer: MEDICARE

## 2025-07-01 NOTE — TELEPHONE ENCOUNTER
Patient called trying to get in touch with someone to get her medication changed from liquid form to tablet form. Her medication was prescribed by Dr Donato, with pain management, but she cannot get through to the office and they have not returned her messages. She was given the nurse call center's number and told they could help her.   I looked through the patient's hcart and realized that Dr Donato's clinic is separate from , but he does see patient's at [facility]. The patient said she is at her wits end and doesn't know who else to call because she is not getting anywhere. She asked if I could call his office and see if they will call her back.   I did call the office, but was not able to speak to anyone. I left a VM on their secure VM line with the patient's information, and details about what is going on. I asked that they please reach out to her soon to help resolve this issue.   I called the patient back and let her know that I left a message with the office.   While I was talking to her, the office called on the other line.   Reason for Disposition   Caller has medicine question only, adult not sick, AND triager answers question    Additional Information   Negative: [1] Intentional drug overdose AND [2] suicidal thoughts or ideas   Negative: Drug overdose and triager unable to answer question   Negative: Caller requesting a renewal or refill of a medicine patient is currently taking   Negative: Caller requesting information unrelated to medicine   Negative: Caller requesting information about COVID-19 Vaccine   Negative: Caller requesting information about Emergency Contraception   Negative: Caller requesting information about Combined Birth Control Pills   Negative: Caller requesting information about Progestin Birth Control Pills   Negative: Caller requesting information about Post-Op pain or medicines   Negative: Caller requesting a prescription antibiotic (such as Penicillin) for Strep throat and  has a positive culture result   Negative: Caller requesting a prescription anti-viral med (such as Tamiflu) and has influenza (flu) symptoms   Negative: Immunization reaction suspected   Negative: Rash while taking a medicine or within 3 days of stopping it   Negative: [1] Asthma and [2] having symptoms of asthma (cough, wheezing, etc.)   Negative: [1] Symptom of illness (e.g., headache, abdominal pain, earache, vomiting) AND [2] more than mild   Negative: Breastfeeding questions about mother's medicines and diet   Negative: MORE THAN A DOUBLE DOSE of a prescription or over-the-counter (OTC) drug   Negative: [1] DOUBLE DOSE (an extra dose or lesser amount) of prescription drug AND [2] any symptoms (e.g., dizziness, nausea, pain, sleepiness)   Negative: [1] DOUBLE DOSE (an extra dose or lesser amount) of over-the-counter (OTC) drug AND [2] any symptoms (e.g., dizziness, nausea, pain, sleepiness)   Negative: Took another person's prescription drug   Negative: [1] DOUBLE DOSE (an extra dose or lesser amount) of prescription drug AND [2] NO symptoms  (Exception: A double dose of antibiotics.)   Negative: Diabetes drug error or overdose (e.g., took wrong type of insulin or took extra dose)   Negative: [1] Prescription not at pharmacy AND [2] was prescribed by PCP recently (Exception: Triager has access to EMR and prescription is recorded there. Go to Home Care and confirm for pharmacy.)   Negative: [1] Pharmacy calling with prescription question AND [2] triager unable to answer question   Negative: [1] Caller has URGENT medicine question about med that PCP or specialist prescribed AND [2] triager unable to answer question   Negative: Medicine patch causing local rash or itching   Negative: [1] Caller has medicine question about med NOT prescribed by PCP AND [2] triager unable to answer question (e.g., compatibility with other med, storage)   Negative: Prescription request for new medicine (not a refill)   Negative: [1]  "Caller has NON-URGENT medicine question about med that PCP prescribed AND [2] triager unable to answer question   Negative: Caller wants to use a complementary or alternative medicine   Negative: [1] Prescription prescribed recently is not at pharmacy AND [2] triager has access to patient's EMR AND [3] prescription is recorded in the EMR   Negative: [1] DOUBLE DOSE (an extra dose or lesser amount) of over-the-counter (OTC) drug AND [2] NO symptoms   Negative: [1] DOUBLE DOSE (an extra dose or lesser amount) of antibiotic drug AND [2] NO symptoms    Answer Assessment - Initial Assessment Questions  1. NAME of MEDICINE: \"What medicine(s) are you calling about?\"      Hydrocodone-acetaminophen 7.5/325/15  2. QUESTION: \"What is your question?\" (e.g., double dose of medicine, side effect)      can the switch her medication from liquid to tablet form because Priyanka said that they will not fill it   3. PRESCRIBER: \"Who prescribed the medicine?\" Reason: if prescribed by specialist, call should be referred to that group.      Dr Donato- pain management   4. SYMPTOMS: \"Do you have any symptoms?\" If Yes, ask: \"What symptoms are you having?\"  \"How bad are the symptoms (e.g., mild, moderate, severe)      Pain   5. PREGNANCY:  \"Is there any chance that you are pregnant?\" \"When was your last menstrual period?\"      NA    Protocols used: Medication Question Call-ADULT-AH    "

## 2025-07-07 ENCOUNTER — READMISSION MANAGEMENT (OUTPATIENT)
Dept: CALL CENTER | Facility: HOSPITAL | Age: 75
End: 2025-07-07
Payer: MEDICARE

## 2025-07-07 NOTE — OUTREACH NOTE
Medical Week 2 Survey      Flowsheet Row Responses   [facility] [place] patient discharged from? [facility]   Does the patient have one of the following disease processes/diagnoses(primary or secondary)? Other   Week 2 attempt successful? No   Unsuccessful attempts Attempt 1            GLENN Dickinson Registered Nurse

## 2025-07-11 ENCOUNTER — READMISSION MANAGEMENT (OUTPATIENT)
Dept: CALL CENTER | Facility: HOSPITAL | Age: 75
End: 2025-07-11
Payer: MEDICARE

## 2025-07-11 NOTE — OUTREACH NOTE
Medical Week 2 Survey      Flowsheet Row Responses   [facility] patient discharged from? [facility]   Does the patient have one of the following disease processes/diagnoses(primary or secondary)? Other   Week 2 attempt successful? Yes   Call start time 1634   Discharge diagnosis Atrial fibrillation with rapid ventricular response   Revoke Decline to participate  [Pt said she was fine thanks.]   Call end time 1635   Is patient permission given to speak with other caregiver? Yes   List who call center can speak with Spouse   Meds reviewed with patient/caregiver? Yes   Is the patient having any side effects they believe may be caused by any medication additions or changes? No   Does the patient have all medications ordered at discharge? Yes   Is the patient taking all medications as directed (includes completed medication regime)? Yes   Call end time 1635            Milady CHAVES - Registered Nurse

## 2025-07-24 ENCOUNTER — TRANSCRIBE ORDERS (OUTPATIENT)
Facility: HOSPITAL | Age: 75
End: 2025-07-24
Payer: MEDICARE

## 2025-07-24 ENCOUNTER — OFFICE VISIT (OUTPATIENT)
Age: 75
End: 2025-07-24
Payer: MEDICARE

## 2025-07-24 VITALS
DIASTOLIC BLOOD PRESSURE: 79 MMHG | SYSTOLIC BLOOD PRESSURE: 124 MMHG | TEMPERATURE: 98.6 F | OXYGEN SATURATION: 98 % | HEART RATE: 136 BPM | BODY MASS INDEX: 20.89 KG/M2 | WEIGHT: 130 LBS | HEIGHT: 66 IN

## 2025-07-24 DIAGNOSIS — K22.2 SCHATZKI'S RING: ICD-10-CM

## 2025-07-24 DIAGNOSIS — J96.11 CHRONIC RESPIRATORY FAILURE WITH HYPOXIA: ICD-10-CM

## 2025-07-24 DIAGNOSIS — M25.551 PAIN OF RIGHT HIP: ICD-10-CM

## 2025-07-24 DIAGNOSIS — N18.30 STAGE 3 CHRONIC KIDNEY DISEASE, UNSPECIFIED WHETHER STAGE 3A OR 3B CKD: Primary | ICD-10-CM

## 2025-07-24 DIAGNOSIS — M05.79 RHEUMATOID ARTHRITIS INVOLVING MULTIPLE SITES WITH POSITIVE RHEUMATOID FACTOR: ICD-10-CM

## 2025-07-24 DIAGNOSIS — E78.5 HYPERLIPIDEMIA, UNSPECIFIED HYPERLIPIDEMIA TYPE: ICD-10-CM

## 2025-07-24 DIAGNOSIS — R14.1 GAS PAIN: Chronic | ICD-10-CM

## 2025-07-24 DIAGNOSIS — L60.2 THICKENED NAILS: ICD-10-CM

## 2025-07-24 DIAGNOSIS — F41.9 ANXIETY: ICD-10-CM

## 2025-07-24 DIAGNOSIS — R14.0 BLOATING: Primary | Chronic | ICD-10-CM

## 2025-07-24 RX ORDER — HYDROXYZINE HYDROCHLORIDE 25 MG/1
25 TABLET, FILM COATED ORAL 3 TIMES DAILY PRN
Qty: 90 TABLET | Refills: 3 | Status: SHIPPED | OUTPATIENT
Start: 2025-07-24

## 2025-07-25 NOTE — PROGRESS NOTES
New Patient Office Visit      Date: 2025  Patient Name: Vanna Rincon  : 1950   MRN: [id number]     Chief Complaint:    Chief Complaint   Patient presents with    Establish Care     History of Present Illness  The patient is a 74-year-old female who presents to [facility] and for a hospital follow-up.    She has been experiencing bloating and gas for several years, which she is unable to pass. She does not belch either. Her gastroenterologist has warned her that the bloating could be life-threatening if left untreated. She also has a history of hiatal hernia and swallowing difficulties. She has undergone stretching for Schatzki's ring, but it did not provide relief.    She has a history of panic attacks and was previously prescribed Ativan by her primary care physician, Dr. Farhad Chowdary. However, her prescription was not renewed after her hospital discharge. She had two refills left, but they  as she only took the medication during panic attacks. She finds the pain more severe than before and no medicine helps. She was given Ativan in the hospital to bring her heart rate down. She has been on Ativan for panic attacks in the past.    She has a history of heart failure and atrial fibrillation. She experiences leg swelling, which is not alleviated by crossing her legs. She is on Lasix as needed for swelling. She wears compression stockings and tries to stay in bed as much as possible. She is on amiodarone 200 mg once daily and Eliquis twice daily. She has a cardiology appointment scheduled for 2025 at 2:15 PM with JENNY Landa.    She has a history of high blood pressure and hyperlipidemia. She tried one cholesterol pill once, but it made her sore all over, so she was advised to discontinue it.    She has a history of kidney disease and receives Procrit injections every month. She is scheduled for blood work on 2025. She follows up with Dr. Trujillo for her kidney  condition.    She has a history of rheumatoid arthritis and fibromyalgia. Her leg swells up due to rheumatoid arthritis, and she experiences pain in her toe. She also reports thickening of her toenails and suspects a fungal infection. She is under the care of a pain management specialist for her pain medication.    She has a history of COPD and is on oxygen therapy, requiring 6 to 8 liters per minute. She consults a pulmonologist in [place] for her lung condition.    She experiences hip pain, which she believes may be due to her rheumatoid arthritis. She fell in 05/2025 and had a large bruise on her hip, but did not seek medical attention or have an x-ray done.    Subjective      Review of Systems:   Review of Systems   Constitutional:  Negative for chills and fever.   HENT:  Negative for congestion, sneezing and sore throat.    Respiratory:  Negative for cough and shortness of breath.    Cardiovascular:  Negative for chest pain.   Gastrointestinal:  Positive for abdominal distention and abdominal pain. Negative for nausea.   Genitourinary:  Negative for dysuria.   Skin:  Negative for rash.   Psychiatric/Behavioral:  The patient is not nervous/anxious.        Past Medical History:   Past Medical History:   Diagnosis Date    Abnormal mammogram     Acute UTI     Allergic rhinitis     Anemia     Anxiety     Arthritis     knee, right    Asthmatic bronchitis     Back pain     Candida vaginitis     Cataracts, bilateral     CHF (congestive heart failure)     not diagnosed    Chronic lung disease     bronchiolitis obliterans. - Ct scan stable with pulmonary intolerant to pft    Chronic pain syndrome     disc disease, lumbar, neck    Colon polyps     Community acquired pneumonia     Conjunctivitis     COPD (chronic obstructive pulmonary disease)     Emphysema with severe physiology on PFTs.  Patient is a former smoker.    COPD (chronic obstructive pulmonary disease)     4L of oxygen    Cough     DDD (degenerative disc  "disease), lumbar     Deafness     Dependence on supplemental oxygen     PT REPORTS \"4L ALL TIMES\"    Disc disease, degenerative, cervical     Dry eye     Former smoker     Gastric bleed     Genitourinary syndrome of menopause     GERD (gastroesophageal reflux disease)     Glaucoma     H/O chest x-ray 08/11/2015    Rase base opacity consistent with pneumonia or atelectasis    H/O chest x-ray 06/24/2015    Right basilar airspace disease and effusion consistent with a pneumonia. F/U to radiographic reolution is recommended    H/O chest x-ray 03/09/2010    Cardiac silhoutte is not enlarged. Lungs aare inflated. Mild nonspecifiec interstitial changes. No pleual effusions or dense consolidations. Scattered granulomatous changes. Spine with mild kyphosis but no osteopenia. No significant adenopathy    H/O echocardiogram 03/16/2010    Normal study    Herpes zoster     History of PFTs 12/13/2011    Goo pt cooperation and effort. Pt given 3 puffs of xopenex. PFT is acceptable and reproducible    History of PFTs 08/11/2011    Pt unable to produce acceptabel and reproducible PFT. Pt was given 3 puffs of xopenex. Pt gave good effort Best pleth of two attempts    History of PFTs 02/24/2011    PFT acceptable and reproducible. Pt gave good effort. Pt used bronchodilator less than 2 hours prior to testing    Hyperlipidemia     Hypertension     IBS (irritable bowel syndrome)     Insomnia     Interstitial lung disease     History of rheumatoid lung with bronchiolitis obliterans    Kidney stones     x 1    Lumbar herniated disc     Nephrolithiasis     2007, passed    Ocular migraine     On home oxygen therapy     REPORTS USES AT 4L NC AT ALL TIMES    Overactive bladder     Panic attack     Peripheral edema     Pneumonia     PRB (rectal bleeding)     RA (rheumatoid arthritis)     Renal insufficiency     Rheumatoid arthritis     · A.  Diagnosed in 2001 with history of methotrexate and Enbrel therapy.    Rheumatoid lung     bronchiolitis " obliterans    Sciatica     Sinus problem     Thrombophlebitis     Tinnitus     Urge incontinence     Vertigo        Past Surgical History:   Past Surgical History:   Procedure Laterality Date    CATARACT EXTRACTION Right     COLONOSCOPY  2012    COLONOSCOPY N/A 2018    Procedure: COLONOSCOPY WITH COLD SNARE POLYPECTOMY X 6; SUBMUCOSAL INJECTION OF SALINE; HOT SNARE POLYPECTOMY X 4; CLIP PLACEMENT X 1;  Surgeon: Kenney Pastrana MD;  Location: [facility] ENDOSCOPY;  Service:     DENTAL PROCEDURE      ENDOSCOPY N/A 12/15/2017    Procedure: ESOPHAGOGASTRODUODENOSCOPY with biopsies and esophageal balloon dilitation;  Surgeon: Kenney Pastrana MD;  Location: [facility] ENDOSCOPY;  Service:     MOUTH SURGERY      REPORTS IMPLANT X2    ORAL ANTRAL FISTULA CLOSURE      gums    TONSILLECTOMY      TUBAL ABDOMINAL LIGATION      UPPER GASTROINTESTINAL ENDOSCOPY      UPPER GASTROINTESTINAL ENDOSCOPY  12/15/2017       Family History:   Family History   Problem Relation Age of Onset    Glaucoma Mother     Coronary artery disease Mother     Liver cancer Mother         bile duct cancer    Heart disease Mother     Cancer Mother     Hypertension Mother     Stroke Father     Stomach cancer Father     Cancer Father     Diabetes Sister     Other (systemic lupus erythematosus) Sister     Heart disease Maternal Grandmother     Diabetes Maternal Grandmother     Diabetes Paternal Grandmother     Colon cancer Neg Hx     Ulcerative colitis Neg Hx        Social History:   Social History     Socioeconomic History    Marital status:     Number of children: 0   Tobacco Use    Smoking status: Former     Current packs/day: 0.00     Average packs/day: 1 pack/day for 28.0 years (28.0 ttl pk-yrs)     Types: Cigarettes     Start date:      Quit date:      Years since quittin.5     Passive exposure: Past    Smokeless tobacco: Never   Vaping Use    Vaping status: Never Used   Substance and Sexual Activity    Alcohol use: No    Drug use:  No    Sexual activity: Defer     Comment:        Medications:     Current Outpatient Medications:     amiodarone (PACERONE) 200 MG tablet, Take 1 tablet by mouth Daily for 30 days., Disp: 30 tablet, Rfl: 0    apixaban (ELIQUIS) 2.5 MG tablet tablet, Take 1 tablet by mouth Every 12 (Twelve) Hours for 30 days. Indications: Atrial Fibrillation, Disp: 60 tablet, Rfl: 0    budesonide-formoterol (Symbicort) 160-4.5 MCG/ACT inhaler, Inhale 2 puffs 2 (Two) Times a Day., Disp: 3 each, Rfl: 3    epoetin chidi (Procrit) 2000 UNIT/ML injection, Inject  under the skin into the appropriate area as directed Every 6 (Six) Weeks., Disp: , Rfl:     etanercept (ENBREL) 50 MG/ML solution prefilled syringe injection, Inject 1 mL under the skin into the appropriate area as directed Every 7 (Seven) Days., Disp: 12 mL, Rfl: 0    fentaNYL (DURAGESIC) 75 MCG/HR patch, Place 1 patch on the skin as directed by provider Every 72 (Seventy-Two) Hours., Disp: , Rfl:     HYDROcodone-acetaminophen (NORCO) 5-325 MG per tablet, Take 1 tablet by mouth Every 6 (Six) Hours As Needed., Disp: , Rfl:     Multiple Vitamins-Minerals (CENTRUM ADULTS PO), Take 1 tablet by mouth Daily., Disp: , Rfl:     O2 (OXYGEN), Inhale 6 L/min Continuous., Disp: , Rfl:     ondansetron (ZOFRAN) 8 MG tablet, Take 1 tablet by mouth Every 8 (Eight) Hours As Needed for Nausea or Vomiting., Disp: , Rfl:     sertraline (ZOLOFT) 100 MG tablet, Take 1 tablet by mouth Daily., Disp: 90 tablet, Rfl: 3    simethicone (MYLICON) 125 MG chewable tablet, Chew 1 tablet Every 6 (Six) Hours As Needed for Flatulence., Disp: , Rfl:     valsartan-hydrochlorothiazide (DIOVAN-HCT) 320-25 MG per tablet, Take 0.5 tablets by mouth Daily., Disp: 45 tablet, Rfl: 3    furosemide (Lasix) 20 MG tablet, Take 1 tablet by mouth Daily for 30 days. (Patient taking differently: Take 1 tablet by mouth As Needed.), Disp: 30 tablet, Rfl: 0    hydrOXYzine (ATARAX) 25 MG tablet, Take 1 tablet by mouth 3 (Three)  "Times a Day As Needed for Itching., Disp: 90 tablet, Rfl: 3    Allergies:   Allergies   Allergen Reactions    Iodinated Contrast Media Other (See Comments)     Kidney disease    Feraheme [Ferumoxytol] Palpitations and Other (See Comments)     hypertension    Brimonidine Tartrate Nausea And Vomiting    Ciprofloxacin Other (See Comments)     \"Hurt all over\"    Doxycycline Other (See Comments)     \"makes other medication stay in my system longer\"    Lipitor [Atorvastatin] Other (See Comments)     Hurt all over      Nsaids Other (See Comments)     Pt states she has stage three kidney disease    Sulfa Antibiotics Other (See Comments)     \"cant remember\"       Objective     Physical Exam:  Vital Signs:   Vitals:    07/24/25 1041   BP: 124/79   Pulse: (!) 136   Temp: 98.6 °F (37 °C)   SpO2: 98%   Weight: 59 kg (130 lb)   Height: 167.6 cm (65.98\")     Body mass index is 21 kg/m².   Facility age limit for growth %elmo is 20 years.  BMI is within normal parameters. No other follow-up for BMI required.       Physical Exam  Vitals and nursing note reviewed.   Constitutional:       Appearance: Normal appearance.   HENT:      Head: Atraumatic.   Eyes:      Pupils: Pupils are equal, round, and reactive to light.   Cardiovascular:      Heart sounds: No murmur heard.  Pulmonary:      Effort: Pulmonary effort is normal.      Breath sounds: Normal breath sounds.   Musculoskeletal:         General: Normal range of motion.      Cervical back: Normal range of motion.   Skin:     General: Skin is warm and dry.   Neurological:      General: No focal deficit present.      Mental Status: She is alert and oriented to person, place, and time.      Gait: Gait is intact.   Psychiatric:         Mood and Affect: Mood normal.         Behavior: Behavior normal.       Physical Exam  Cardiovascular: Atrial fibrillation  Extremities: 2+ pitting edema on the right leg and 1+ pitting edema on the left leg  Musculoskeletal: Tenderness over the right " sacroiliac joint    POCT Results (if applicable):   Results for orders placed or performed during the hospital encounter of 06/24/25   Blood Gas, Arterial With Co-Ox    Collection Time: 06/24/25  5:50 PM    Specimen: Arterial Blood   Result Value Ref Range    Site Right Radial     Dusty's Test N/A     pH, Arterial 7.354 7.300 - 7.500 pH units    pCO2, Arterial 64.5 (C) 35.0 - 45.0 mm Hg    pO2, Arterial 111.0 (H) 75.0 - 100.0 mm Hg    HCO3, Arterial 35.9 (H) 22.0 - 28.0 mmol/L    Base Excess, Arterial 9.1 (H) 0.0 - 2.0 mmol/L    O2 Saturation, Arterial 99.3 94.0 - 100.0 %    Hematocrit, Blood Gas 24.7 %    Oxyhemoglobin 97.4 94 - 99 %    Methemoglobin 1.00 0.00 - 1.50 %    Carboxyhemoglobin 0.9 0 - 2 %    A-a DO2      Barometric Pressure for Blood Gas 738 mmHg    Modality Nasal Cannula     Flow Rate 6.0 lpm    Ventilator Mode NA     Collected by [id number]     pH, Temp Corrected      pCO2, Temperature Corrected      pO2, Temperature Corrected     Comprehensive Metabolic Panel    Collection Time: 06/24/25  6:34 PM    Specimen: Blood   Result Value Ref Range    Glucose 118 (H) 65 - 99 mg/dL    BUN 27.0 (H) 8.0 - 23.0 mg/dL    Creatinine 1.72 (H) 0.57 - 1.00 mg/dL    Sodium 143 136 - 145 mmol/L    Potassium 4.0 3.5 - 5.2 mmol/L    Chloride 98 98 - 107 mmol/L    CO2 34.1 (H) 22.0 - 29.0 mmol/L    Calcium 9.1 8.6 - 10.5 mg/dL    Total Protein 7.2 6.0 - 8.5 g/dL    Albumin 4.2 3.5 - 5.2 g/dL    ALT (SGPT) 24 1 - 33 U/L    AST (SGOT) 67 (H) 1 - 32 U/L    Alkaline Phosphatase 92 39 - 117 U/L    Total Bilirubin 0.2 0.0 - 1.2 mg/dL    Globulin 3.0 gm/dL    A/G Ratio 1.4 g/dL    BUN/Creatinine Ratio 15.7 7.0 - 25.0    Anion Gap 10.9 5.0 - 15.0 mmol/L    eGFR 31.3 (L) >60.0 mL/min/1.73   BNP    Collection Time: 06/24/25  6:34 PM    Specimen: Blood   Result Value Ref Range    proBNP 7,757.0 (H) 0.0 - 900.0 pg/mL   High Sensitivity Troponin T    Collection Time: 06/24/25  6:34 PM    Specimen: Blood   Result Value Ref Range    HS  Troponin T 40 (H) <14 ng/L   CBC Auto Differential    Collection Time: 06/24/25  6:34 PM    Specimen: Blood   Result Value Ref Range    WBC 8.92 3.40 - 10.80 10*3/mm3    RBC 3.61 (L) 3.77 - 5.28 10*6/mm3    Hemoglobin 8.6 (L) 12.0 - 15.9 g/dL    Hematocrit 30.3 (L) 34.0 - 46.6 %    MCV 83.9 79.0 - 97.0 fL    MCH 23.8 (L) 26.6 - 33.0 pg    MCHC 28.4 (L) 31.5 - 35.7 g/dL    RDW 16.1 (H) 12.3 - 15.4 %    RDW-SD 49.5 37.0 - 54.0 fl    MPV 10.6 6.0 - 12.0 fL    Platelets 180 140 - 450 10*3/mm3    Neutrophil % 89.3 (H) 42.7 - 76.0 %    Lymphocyte % 5.2 (L) 19.6 - 45.3 %    Monocyte % 4.9 (L) 5.0 - 12.0 %    Eosinophil % 0.1 (L) 0.3 - 6.2 %    Basophil % 0.1 0.0 - 1.5 %    Immature Grans % 0.4 0.0 - 0.5 %    Neutrophils, Absolute 7.96 (H) 1.70 - 7.00 10*3/mm3    Lymphocytes, Absolute 0.46 (L) 0.70 - 3.10 10*3/mm3    Monocytes, Absolute 0.44 0.10 - 0.90 10*3/mm3    Eosinophils, Absolute 0.01 0.00 - 0.40 10*3/mm3    Basophils, Absolute 0.01 0.00 - 0.20 10*3/mm3    Immature Grans, Absolute 0.04 0.00 - 0.05 10*3/mm3    nRBC 0.0 0.0 - 0.2 /100 WBC   D-dimer, Quantitative    Collection Time: 06/24/25  6:34 PM    Specimen: Blood   Result Value Ref Range    D-Dimer, Quantitative 0.66 0.00 - 0.72 MCGFEU/mL   Magnesium    Collection Time: 06/24/25  6:34 PM    Specimen: Blood   Result Value Ref Range    Magnesium 2.2 1.6 - 2.4 mg/dL   Scan Slide    Collection Time: 06/24/25  6:34 PM    Specimen: Blood   Result Value Ref Range    Anisocytosis Slight/1+ None Seen    Hypochromia Mod/2+ None Seen    Microcytes Slight/1+ None Seen    WBC Morphology Normal Normal    Platelet Estimate Adequate Normal   Procalcitonin    Collection Time: 06/24/25  6:34 PM    Specimen: Blood   Result Value Ref Range    Procalcitonin 0.13 0.00 - 0.25 ng/mL   Green Top (Gel)    Collection Time: 06/24/25  6:34 PM   Result Value Ref Range    Extra Tube Hold for add-ons.    Lavender Top    Collection Time: 06/24/25  6:34 PM   Result Value Ref Range    Extra Tube  hold for add-on    Gold Top - SST    Collection Time: 06/24/25  6:34 PM   Result Value Ref Range    Extra Tube Hold for add-ons.    Light Blue Top    Collection Time: 06/24/25  6:34 PM   Result Value Ref Range    Extra Tube Hold for add-ons.    Urine Culture - Urine, Urine, Clean Catch    Collection Time: 06/24/25  7:29 PM    Specimen: Urine, Clean Catch   Result Value Ref Range    Urine Culture No growth    Urinalysis With Culture If Indicated - Urine, Clean Catch    Collection Time: 06/24/25  7:29 PM    Specimen: Urine, Clean Catch   Result Value Ref Range    Color, UA Yellow Yellow, Straw    Appearance, UA Clear Clear    pH, UA <=5.0 5.0 - 8.0    Specific Gravity, UA 1.009 1.005 - 1.030    Glucose, UA Negative Negative    Ketones, UA Negative Negative    Bilirubin, UA Negative Negative    Blood, UA Small (1+) (A) Negative    Protein, UA Negative Negative    Leuk Esterase, UA Trace (A) Negative    Nitrite, UA Negative Negative    Urobilinogen, UA 0.2 E.U./dL 0.2 - 1.0 E.U./dL   Urinalysis, Microscopic Only - Urine, Clean Catch    Collection Time: 06/24/25  7:29 PM    Specimen: Urine, Clean Catch   Result Value Ref Range    RBC, UA 0-2 None Seen, 0-2 /HPF    WBC, UA 0-2 None Seen, 0-2 /HPF    Bacteria, UA None Seen None Seen /HPF    Squamous Epithelial Cells, UA 0-2 None Seen, 0-2 /HPF    Hyaline Casts, UA None Seen None Seen /LPF    Methodology Manual Light Microscopy    Respiratory Panel PCR w/COVID-19(SARS-CoV-2) BERTO/KEY/ROMAIN/PAD/COR/GERALD In-House, NP Swab in [facility], 2 HR TAT - Swab, Nasopharynx    Collection Time: 06/24/25  7:45 PM    Specimen: Nasopharynx; Swab   Result Value Ref Range    ADENOVIRUS, PCR Not Detected Not Detected    Coronavirus 229E Not Detected Not Detected    Coronavirus HKU1 Not Detected Not Detected    Coronavirus NL63 Not Detected Not Detected    Coronavirus OC43 Not Detected Not Detected    COVID19 Not Detected Not Detected - Ref. Range    Human Metapneumovirus Not Detected Not Detected     Human Rhinovirus/Enterovirus Not Detected Not Detected    Influenza A PCR Not Detected Not Detected    Influenza B PCR Not Detected Not Detected    Parainfluenza Virus 1 Not Detected Not Detected    Parainfluenza Virus 2 Not Detected Not Detected    Parainfluenza Virus 3 Not Detected Not Detected    Parainfluenza Virus 4 Not Detected Not Detected    RSV, PCR Not Detected Not Detected    Bordetella pertussis pcr Not Detected Not Detected    Bordetella parapertussis PCR Not Detected Not Detected    Chlamydophila pneumoniae PCR Not Detected Not Detected    Mycoplasma pneumo by PCR Not Detected Not Detected   High Sensitivity Troponin T 1Hr    Collection Time: 06/24/25  8:28 PM    Specimen: Blood   Result Value Ref Range    HS Troponin T 38 (H) <14 ng/L    Troponin T Numeric Delta -2 ng/L    Troponin T % Delta -5 Abnormal if >/= 20%   CBC (No Diff)    Collection Time: 06/25/25  8:11 AM    Specimen: Blood   Result Value Ref Range    WBC 3.39 (L) 3.40 - 10.80 10*3/mm3    RBC 3.45 (L) 3.77 - 5.28 10*6/mm3    Hemoglobin 8.2 (L) 12.0 - 15.9 g/dL    Hematocrit 28.8 (L) 34.0 - 46.6 %    MCV 83.5 79.0 - 97.0 fL    MCH 23.8 (L) 26.6 - 33.0 pg    MCHC 28.5 (L) 31.5 - 35.7 g/dL    RDW 16.2 (H) 12.3 - 15.4 %    RDW-SD 49.1 37.0 - 54.0 fl    MPV 10.8 6.0 - 12.0 fL    Platelets 175 140 - 450 10*3/mm3   Basic Metabolic Panel    Collection Time: 06/25/25  8:12 AM    Specimen: Blood   Result Value Ref Range    Glucose 200 (H) 65 - 99 mg/dL    BUN 27.0 (H) 8.0 - 23.0 mg/dL    Creatinine 1.26 (H) 0.57 - 1.00 mg/dL    Sodium 140 136 - 145 mmol/L    Potassium 4.2 3.5 - 5.2 mmol/L    Chloride 95 (L) 98 - 107 mmol/L    CO2 32.9 (H) 22.0 - 29.0 mmol/L    Calcium 9.1 8.6 - 10.5 mg/dL    BUN/Creatinine Ratio 21.4 7.0 - 25.0    Anion Gap 12.1 5.0 - 15.0 mmol/L    eGFR 45.5 (L) >60.0 mL/min/1.73   POC Glucose 4x Daily Before Meals & at Bedtime    Collection Time: 06/25/25  5:01 PM    Specimen: Blood   Result Value Ref Range    Glucose 136 (H)  70 - 130 mg/dL   POC Glucose Once    Collection Time: 06/25/25  8:17 PM    Specimen: Blood   Result Value Ref Range    Glucose 153 (H) 70 - 130 mg/dL   POC Glucose 4x Daily Before Meals & at Bedtime    Collection Time: 06/26/25  8:05 AM    Specimen: Blood   Result Value Ref Range    Glucose 127 70 - 130 mg/dL   CBC (No Diff)    Collection Time: 06/26/25 10:21 AM    Specimen: Blood   Result Value Ref Range    WBC 7.38 3.40 - 10.80 10*3/mm3    RBC 3.72 (L) 3.77 - 5.28 10*6/mm3    Hemoglobin 8.8 (L) 12.0 - 15.9 g/dL    Hematocrit 30.5 (L) 34.0 - 46.6 %    MCV 82.0 79.0 - 97.0 fL    MCH 23.7 (L) 26.6 - 33.0 pg    MCHC 28.9 (L) 31.5 - 35.7 g/dL    RDW 16.4 (H) 12.3 - 15.4 %    RDW-SD 48.4 37.0 - 54.0 fl    MPV 10.6 6.0 - 12.0 fL    Platelets 154 140 - 450 10*3/mm3   Basic Metabolic Panel    Collection Time: 06/26/25 10:21 AM    Specimen: Blood   Result Value Ref Range    Glucose 186 (H) 65 - 99 mg/dL    BUN 34.0 (H) 8.0 - 23.0 mg/dL    Creatinine 1.56 (H) 0.57 - 1.00 mg/dL    Sodium 137 136 - 145 mmol/L    Potassium 4.4 3.5 - 5.2 mmol/L    Chloride 95 (L) 98 - 107 mmol/L    CO2 28.5 22.0 - 29.0 mmol/L    Calcium 8.7 8.6 - 10.5 mg/dL    BUN/Creatinine Ratio 21.8 7.0 - 25.0    Anion Gap 13.5 5.0 - 15.0 mmol/L    eGFR 35.2 (L) >60.0 mL/min/1.73       Procedures      Assessment / Plan      Assessment/Plan:   Diagnoses and all orders for this visit:    1. Bloating (Primary)  -     Ambulatory Referral to Gastroenterology    2. Gas pain  -     Ambulatory Referral to Gastroenterology    3. Schatzki's ring  -     Ambulatory Referral to Gastroenterology    4. Rheumatoid arthritis involving multiple sites with positive rheumatoid factor    5. Chronic respiratory failure with hypoxia    6. Thickened nails  -     Ambulatory Referral to Podiatry    7. Hyperlipidemia, unspecified hyperlipidemia type  -     Lipid panel; Future  -     Lipid panel    8. Anxiety  -     hydrOXYzine (ATARAX) 25 MG tablet; Take 1 tablet by mouth 3 (Three)  Times a Day As Needed for Itching.  Dispense: 90 tablet; Refill: 3    9. Pain of right hip  -     XR Hip With or Without Pelvis 2 - 3 View Right; Future       Assessment & Plan  1. Bloating and gas:  - Reports chronic bloating and gas, which she cannot pass.  - Referral to a gastroenterologist for further evaluation and management.  - Discussed need for gastroenterology consultation due to prolonged symptoms.  - Gastroenterology referral ordered.    2. Anxiety:  - History of panic attacks, previously on Ativan (not renewed after hospital stay).  - Hydroxyzine 25 mg up to three times per day prescribed as alternative to Ativan.  - Discussed potential side effects of hydroxyzine, including drowsiness; advised to monitor and adjust dose accordingly.    3. Atrial fibrillation:  - Remains in atrial fibrillation despite amiodarone 200 mg once daily and Eliquis twice daily.  - Elevated heart rate during visit however patient is hemodynamically stable  - Follow-up appointment with cardiology on 08/06/2025 at 2:15 PM with JENNY Landa.  - No changes to current medications.    4. Hyperlipidemia:  - Last cholesterol check in 2016, level of 200.  - Lipid panel ordered to assess current cholesterol and triglyceride levels.  - Discussed importance of monitoring lipid levels due to heart disease and other risk factors.  - Lipid panel ordered to coincide with nephrology appointment.    5. Kidney disease:  - Scheduled for blood work on 08/01/2025.  - Labs ordered to coincide with nephrology appointment.  - Reviewed need for regular monitoring of kidney function.  - Lab orders placed for nephrology follow-up.    6. Rheumatoid arthritis:  - Reports widespread pain and swelling, particularly affecting right side.  - X-ray of right hip to be obtained today to evaluate for underlying issues.  - Discussed impact of rheumatoid arthritis on overall pain and mobility.  - X-ray of right hip ordered.    7. Chronic obstructive  pulmonary disease:  - Currently on 6-8 L of oxygen.  - Will continue follow-up with pulmonologist in [place].  - Reviewed importance of maintaining oxygen therapy and regular pulmonology visits.  - No changes to current management; continue with pulmonology follow-up.    8. Hip pain:  - Reports persistent hip pain since fall in May.  - X-ray of right hip to be obtained today to evaluate for underlying issues.  - Discussed potential causes of hip pain and need for imaging.  - X-ray of right hip ordered.    9. Thickened toenails:  - Referral to podiatrist for further evaluation and management of thickened toenails.  - Discussed possible causes of toenail thickening, including fungal infection and trauma.  - Podiatry referral ordered.    Follow-up: 08/06/2025 with cardiology.       Follow Up:   Return in about 1 month (around 8/24/2025) for Follow Up.    Patient or patient representative verbalized consent for the use of Ambient Listening during the visit with  Kathi Arrington MD for chart documentation. 7/25/2025  09:00 EDT      Kathi Arrington MD  [facility]

## 2025-07-28 ENCOUNTER — TELEPHONE (OUTPATIENT)
Age: 75
End: 2025-07-28
Payer: MEDICARE

## 2025-07-28 ENCOUNTER — PATIENT ROUNDING (BHMG ONLY) (OUTPATIENT)
Age: 75
End: 2025-07-28
Payer: MEDICARE

## 2025-07-28 RX ORDER — AMIODARONE HYDROCHLORIDE 200 MG/1
200 TABLET ORAL
Qty: 30 TABLET | Refills: 0 | Status: SHIPPED | OUTPATIENT
Start: 2025-07-28 | End: 2025-07-28 | Stop reason: SDUPTHER

## 2025-07-28 RX ORDER — AMIODARONE HYDROCHLORIDE 200 MG/1
200 TABLET ORAL
Qty: 30 TABLET | Refills: 1 | Status: SHIPPED | OUTPATIENT
Start: 2025-07-28 | End: 2025-09-26

## 2025-07-28 NOTE — TELEPHONE ENCOUNTER
Caller: Sergey Vanna L    Relationship: Self    Best call back number: [phone]     Requested Prescriptions:   Requested Prescriptions     Pending Prescriptions Disp Refills    amiodarone (PACERONE) 200 MG tablet 30 tablet 0     Sig: Take 1 tablet by mouth Daily for 30 days.        Pharmacy where request should be sent: [facility] [id number] [address] AT [facility] [phone] [facility] [phone] FX     Last office visit with prescribing clinician: 7/24/2025   Last telemedicine visit with prescribing clinician: Visit date not found   Next office visit with prescribing clinician: 8/26/2025     Additional details provided by patient:     Does the patient have less than a 3 day supply:  [x] Yes  [] No    Would you like a call back once the refill request has been completed: [] Yes [x] No    If the office needs to give you a call back, can they leave a voicemail: [] Yes [x] No    Kiara Hansen Rep   07/28/25 13:42 EDT

## 2025-07-28 NOTE — TELEPHONE ENCOUNTER
Caller: Vanna Rincon    Relationship: Self    Best call back number: [phone]     What is the best time to reach you: ANY    Who are you requesting to speak with (clinical staff, provider,  specific staff member): NURSE    Do you know the name of the person who called: PATIENT    What was the call regarding: PATIENT HAS TAKEN ALL OF THE AMIODARONE 200 MG.  SHOULD SHE GET PRESCRIPTION AND CONTINUE OR NOT?    Is it okay if the provider responds through MyChart: PHONE CALL PLEASE

## 2025-07-28 NOTE — PROGRESS NOTES
A Adzuna message has been sent to the patient for PATIENT ROUNDING with [facility] 1.

## 2025-08-01 ENCOUNTER — HOSPITAL ENCOUNTER (OUTPATIENT)
Facility: HOSPITAL | Age: 75
Discharge: HOME | End: 2025-08-01
Payer: MEDICARE

## 2025-08-01 VITALS
SYSTOLIC BLOOD PRESSURE: 123 MMHG | DIASTOLIC BLOOD PRESSURE: 68 MMHG | RESPIRATION RATE: 18 BRPM | HEART RATE: 77 BPM | OXYGEN SATURATION: 94 % | TEMPERATURE: 98 F

## 2025-08-01 DIAGNOSIS — D50.9 IRON DEFICIENCY ANEMIA, UNSPECIFIED IRON DEFICIENCY ANEMIA TYPE: Primary | ICD-10-CM

## 2025-08-01 DIAGNOSIS — N18.30 STAGE 3 CHRONIC KIDNEY DISEASE, UNSPECIFIED WHETHER STAGE 3A OR 3B CKD: ICD-10-CM

## 2025-08-01 LAB
ALBUMIN SERPL-MCNC: 3.9 G/DL (ref 3.5–5.2)
ALBUMIN/GLOB SERPL: 1.5 G/DL
ALP SERPL-CCNC: 90 U/L (ref 39–117)
ALT SERPL W P-5'-P-CCNC: 8 U/L (ref 1–33)
ANION GAP SERPL CALCULATED.3IONS-SCNC: 7.5 MMOL/L (ref 5–15)
AST SERPL-CCNC: 18 U/L (ref 1–32)
BASOPHILS # BLD AUTO: 0.01 10*3/MM3 (ref 0–0.2)
BASOPHILS NFR BLD AUTO: 0.2 % (ref 0–1.5)
BILIRUB SERPL-MCNC: <0.2 MG/DL (ref 0–1.2)
BUN SERPL-MCNC: 23 MG/DL (ref 8–23)
BUN/CREAT SERPL: 18.5 (ref 7–25)
CALCIUM SPEC-SCNC: 9.3 MG/DL (ref 8.6–10.5)
CHLORIDE SERPL-SCNC: 93 MMOL/L (ref 98–107)
CO2 SERPL-SCNC: 38.5 MMOL/L (ref 22–29)
CREAT SERPL-MCNC: 1.24 MG/DL (ref 0.57–1)
DEPRECATED RDW RBC AUTO: 48.2 FL (ref 37–54)
EGFRCR SERPLBLD CKD-EPI 2021: 46 ML/MIN/1.73
EOSINOPHIL # BLD AUTO: 0.08 10*3/MM3 (ref 0–0.4)
EOSINOPHIL NFR BLD AUTO: 1.4 % (ref 0.3–6.2)
ERYTHROCYTE [DISTWIDTH] IN BLOOD BY AUTOMATED COUNT: 15 % (ref 12.3–15.4)
FERRITIN SERPL-MCNC: 20.49 NG/ML (ref 13–150)
GLOBULIN UR ELPH-MCNC: 2.6 GM/DL
GLUCOSE SERPL-MCNC: 183 MG/DL (ref 65–99)
HCT VFR BLD AUTO: 26 % (ref 34–46.6)
HGB BLD-MCNC: 7.1 G/DL (ref 12–15.9)
HYPOCHROMIA BLD QL: NORMAL
IMM GRANULOCYTES # BLD AUTO: 0.02 10*3/MM3 (ref 0–0.05)
IMM GRANULOCYTES NFR BLD AUTO: 0.3 % (ref 0–0.5)
IRON 24H UR-MRATE: 28 MCG/DL (ref 37–145)
IRON SATN MFR SERPL: 7 % (ref 20–50)
LYMPHOCYTES # BLD AUTO: 0.62 10*3/MM3 (ref 0.7–3.1)
LYMPHOCYTES NFR BLD AUTO: 10.6 % (ref 19.6–45.3)
MCH RBC QN AUTO: 23.7 PG (ref 26.6–33)
MCHC RBC AUTO-ENTMCNC: 27.3 G/DL (ref 31.5–35.7)
MCV RBC AUTO: 86.7 FL (ref 79–97)
MONOCYTES # BLD AUTO: 0.31 10*3/MM3 (ref 0.1–0.9)
MONOCYTES NFR BLD AUTO: 5.3 % (ref 5–12)
NEUTROPHILS NFR BLD AUTO: 4.8 10*3/MM3 (ref 1.7–7)
NEUTROPHILS NFR BLD AUTO: 82.2 % (ref 42.7–76)
NRBC BLD AUTO-RTO: 0 /100 WBC (ref 0–0.2)
PHOSPHATE SERPL-MCNC: 3.5 MG/DL (ref 2.5–4.5)
PLAT MORPH BLD: NORMAL
PLATELET # BLD AUTO: 127 10*3/MM3 (ref 140–450)
PMV BLD AUTO: 10.4 FL (ref 6–12)
POTASSIUM SERPL-SCNC: 4 MMOL/L (ref 3.5–5.2)
PROT SERPL-MCNC: 6.5 G/DL (ref 6–8.5)
RBC # BLD AUTO: 3 10*6/MM3 (ref 3.77–5.28)
SODIUM SERPL-SCNC: 139 MMOL/L (ref 136–145)
TIBC SERPL-MCNC: 410 MCG/DL (ref 298–536)
TRANSFERRIN SERPL-MCNC: 275 MG/DL (ref 200–360)
URATE SERPL-MCNC: 5.5 MG/DL (ref 2.4–5.7)
WBC MORPH BLD: NORMAL
WBC NRBC COR # BLD AUTO: 5.84 10*3/MM3 (ref 3.4–10.8)

## 2025-08-01 PROCEDURE — 82570 ASSAY OF URINE CREATININE: CPT

## 2025-08-01 PROCEDURE — 84156 ASSAY OF PROTEIN URINE: CPT

## 2025-08-01 PROCEDURE — 83970 ASSAY OF PARATHORMONE: CPT

## 2025-08-01 PROCEDURE — 36415 COLL VENOUS BLD VENIPUNCTURE: CPT

## 2025-08-01 PROCEDURE — 85025 COMPLETE CBC W/AUTO DIFF WBC: CPT

## 2025-08-01 PROCEDURE — 82306 VITAMIN D 25 HYDROXY: CPT

## 2025-08-01 PROCEDURE — 96372 THER/PROPH/DIAG INJ SC/IM: CPT

## 2025-08-01 PROCEDURE — 84550 ASSAY OF BLOOD/URIC ACID: CPT

## 2025-08-01 PROCEDURE — 80053 COMPREHEN METABOLIC PANEL: CPT

## 2025-08-01 PROCEDURE — 25010000002 EPOETIN ALFA PER 1000 UNITS

## 2025-08-01 PROCEDURE — 84466 ASSAY OF TRANSFERRIN: CPT

## 2025-08-01 PROCEDURE — 84100 ASSAY OF PHOSPHORUS: CPT

## 2025-08-01 PROCEDURE — 87522 HEPATITIS C REVRS TRNSCRPJ: CPT

## 2025-08-01 PROCEDURE — 83540 ASSAY OF IRON: CPT

## 2025-08-01 PROCEDURE — 82728 ASSAY OF FERRITIN: CPT

## 2025-08-01 PROCEDURE — 85007 BL SMEAR W/DIFF WBC COUNT: CPT

## 2025-08-01 RX ADMIN — ERYTHROPOIETIN 40000 UNITS: 40000 INJECTION, SOLUTION INTRAVENOUS; SUBCUTANEOUS at 15:03

## 2025-08-01 NOTE — CODE DOCUMENTATION
1405) Labs drawn from LAC x1 attempt and taken to inpatient lab for processing.  Patient tolerated well.    1450) Hgb = 7.1 which meets criteria for Procrit.  Patient informed and she VU.    1513) Spoke with MADHAV Nunez for Dr. Trujillo regarding today's HGB level of 7.1.  She states she will get the message to a provider and notify the patient with further instructions.  Informed patient and her .

## 2025-08-02 LAB
25(OH)D3 SERPL-MCNC: 25.9 NG/ML (ref 30–100)
CREAT UR-MCNC: 99.3 MG/DL
PROT ?TM UR-MCNC: 17.7 MG/DL
PROT/CREAT UR: 178.2 MG/G CREA (ref 0–200)
PTH-INTACT SERPL-MCNC: 55.5 PG/ML (ref 15–65)

## 2025-08-04 LAB
HCV RNA SERPL NAA+PROBE-ACNC: NORMAL IU/ML
REF LAB TEST REF RANGE: NORMAL

## 2025-08-07 RX ORDER — SODIUM CHLORIDE 9 MG/ML
20 INJECTION, SOLUTION INTRAVENOUS ONCE
Status: CANCELLED | OUTPATIENT
Start: 2025-08-08

## 2025-08-08 ENCOUNTER — HOSPITAL ENCOUNTER (OUTPATIENT)
Facility: HOSPITAL | Age: 75
Discharge: HOME | End: 2025-08-08
Payer: MEDICARE

## 2025-08-08 VITALS
TEMPERATURE: 98 F | SYSTOLIC BLOOD PRESSURE: 117 MMHG | HEART RATE: 76 BPM | RESPIRATION RATE: 18 BRPM | OXYGEN SATURATION: 98 % | DIASTOLIC BLOOD PRESSURE: 82 MMHG

## 2025-08-08 DIAGNOSIS — D63.1 ANEMIA DUE TO STAGE 3 CHRONIC KIDNEY DISEASE TREATED WITH ERYTHROPOIETIN: Primary | ICD-10-CM

## 2025-08-08 DIAGNOSIS — N18.30 ANEMIA DUE TO STAGE 3 CHRONIC KIDNEY DISEASE TREATED WITH ERYTHROPOIETIN: Primary | ICD-10-CM

## 2025-08-08 PROCEDURE — 25010000002 FERUMOXYTOL 510 MG/17ML SOLUTION 17 ML VIAL: Performed by: INTERNAL MEDICINE

## 2025-08-08 PROCEDURE — 96365 THER/PROPH/DIAG IV INF INIT: CPT

## 2025-08-08 RX ORDER — SODIUM CHLORIDE 9 MG/ML
20 INJECTION, SOLUTION INTRAVENOUS ONCE
Status: DISCONTINUED | OUTPATIENT
Start: 2025-08-08 | End: 2025-08-09 | Stop reason: HOSPADM

## 2025-08-08 RX ORDER — SODIUM CHLORIDE 9 MG/ML
20 INJECTION, SOLUTION INTRAVENOUS ONCE
OUTPATIENT
Start: 2025-08-15

## 2025-08-08 RX ADMIN — FERUMOXYTOL 510 MG: 510 INJECTION INTRAVENOUS at 15:02

## 2025-08-14 ENCOUNTER — OFFICE VISIT (OUTPATIENT)
Dept: GASTROENTEROLOGY | Facility: CLINIC | Age: 75
End: 2025-08-14
Payer: MEDICARE

## 2025-08-14 VITALS — OXYGEN SATURATION: 99 % | HEART RATE: 101 BPM | SYSTOLIC BLOOD PRESSURE: 122 MMHG | DIASTOLIC BLOOD PRESSURE: 60 MMHG

## 2025-08-14 DIAGNOSIS — K44.9 HIATAL HERNIA: ICD-10-CM

## 2025-08-14 DIAGNOSIS — R93.5 ABNORMAL CT OF THE ABDOMEN: ICD-10-CM

## 2025-08-14 DIAGNOSIS — R13.19 ESOPHAGEAL DYSPHAGIA: ICD-10-CM

## 2025-08-14 DIAGNOSIS — K59.04 CHRONIC IDIOPATHIC CONSTIPATION: ICD-10-CM

## 2025-08-14 DIAGNOSIS — K21.9 GASTROESOPHAGEAL REFLUX DISEASE, UNSPECIFIED WHETHER ESOPHAGITIS PRESENT: ICD-10-CM

## 2025-08-14 DIAGNOSIS — D50.9 IRON DEFICIENCY ANEMIA, UNSPECIFIED IRON DEFICIENCY ANEMIA TYPE: ICD-10-CM

## 2025-08-14 DIAGNOSIS — R14.0 BLOATING: Primary | ICD-10-CM

## 2025-08-14 DIAGNOSIS — R14.1 GAS PAIN: ICD-10-CM

## 2025-08-14 RX ORDER — VONOPRAZAN FUMARATE 13.36 MG/1
10 TABLET ORAL DAILY
Qty: 25 TABLET | Refills: 0 | Status: SHIPPED | OUTPATIENT
Start: 2025-08-14

## 2025-08-15 RX ORDER — VONOPRAZAN FUMARATE 13.36 MG/1
10 TABLET ORAL DAILY
Qty: 25 TABLET | Refills: 0 | COMMUNITY
Start: 2025-08-15

## 2025-08-26 RX ORDER — AMIODARONE HYDROCHLORIDE 200 MG/1
200 TABLET ORAL
Qty: 30 TABLET | Refills: 1 | Status: SHIPPED | OUTPATIENT
Start: 2025-08-26

## 2025-08-27 RX ORDER — AMIODARONE HYDROCHLORIDE 200 MG/1
200 TABLET ORAL
Qty: 30 TABLET | Refills: 1 | OUTPATIENT
Start: 2025-08-27

## 2025-08-28 ENCOUNTER — HOSPITAL ENCOUNTER (OUTPATIENT)
Dept: CT IMAGING | Facility: HOSPITAL | Age: 75
Discharge: HOME | End: 2025-08-28
Admitting: PHYSICIAN ASSISTANT
Payer: MEDICARE

## 2025-08-28 DIAGNOSIS — R13.19 ESOPHAGEAL DYSPHAGIA: ICD-10-CM

## 2025-08-28 DIAGNOSIS — D50.9 IRON DEFICIENCY ANEMIA, UNSPECIFIED IRON DEFICIENCY ANEMIA TYPE: ICD-10-CM

## 2025-08-28 DIAGNOSIS — R93.5 ABNORMAL CT OF THE ABDOMEN: ICD-10-CM

## 2025-08-28 PROCEDURE — 74176 CT ABD & PELVIS W/O CONTRAST: CPT

## 2025-08-29 ENCOUNTER — HOSPITAL ENCOUNTER (OUTPATIENT)
Facility: HOSPITAL | Age: 75
Discharge: HOME | End: 2025-08-29
Payer: MEDICARE

## 2025-08-29 VITALS
DIASTOLIC BLOOD PRESSURE: 74 MMHG | SYSTOLIC BLOOD PRESSURE: 98 MMHG | OXYGEN SATURATION: 94 % | RESPIRATION RATE: 18 BRPM | HEART RATE: 64 BPM

## 2025-08-29 DIAGNOSIS — N18.30 STAGE 3 CHRONIC KIDNEY DISEASE, UNSPECIFIED WHETHER STAGE 3A OR 3B CKD: ICD-10-CM

## 2025-08-29 DIAGNOSIS — N18.30 ANEMIA DUE TO STAGE 3 CHRONIC KIDNEY DISEASE TREATED WITH ERYTHROPOIETIN: ICD-10-CM

## 2025-08-29 DIAGNOSIS — D50.9 IRON DEFICIENCY ANEMIA, UNSPECIFIED IRON DEFICIENCY ANEMIA TYPE: Primary | ICD-10-CM

## 2025-08-29 DIAGNOSIS — D63.1 ANEMIA DUE TO STAGE 3 CHRONIC KIDNEY DISEASE TREATED WITH ERYTHROPOIETIN: ICD-10-CM

## 2025-08-29 LAB
25(OH)D3 SERPL-MCNC: 28.7 NG/ML (ref 30–100)
ALBUMIN SERPL-MCNC: 3.9 G/DL (ref 3.5–5.2)
ALBUMIN/GLOB SERPL: 1.4 G/DL
ALP SERPL-CCNC: 88 U/L (ref 39–117)
ALT SERPL W P-5'-P-CCNC: 16 U/L (ref 1–33)
ANION GAP SERPL CALCULATED.3IONS-SCNC: 5.5 MMOL/L (ref 5–15)
AST SERPL-CCNC: 19 U/L (ref 1–32)
BILIRUB SERPL-MCNC: 0.2 MG/DL (ref 0–1.2)
BUN SERPL-MCNC: 25 MG/DL (ref 8–23)
BUN/CREAT SERPL: 18.7 (ref 7–25)
CALCIUM SPEC-SCNC: 9.3 MG/DL (ref 8.6–10.5)
CHLORIDE SERPL-SCNC: 93 MMOL/L (ref 98–107)
CO2 SERPL-SCNC: 43.5 MMOL/L (ref 22–29)
CREAT SERPL-MCNC: 1.34 MG/DL (ref 0.57–1)
DEPRECATED RDW RBC AUTO: 51.6 FL (ref 37–54)
EGFRCR SERPLBLD CKD-EPI 2021: 42 ML/MIN/1.73
ERYTHROCYTE [DISTWIDTH] IN BLOOD BY AUTOMATED COUNT: 15.6 % (ref 12.3–15.4)
FERRITIN SERPL-MCNC: 99.03 NG/ML (ref 13–150)
GLOBULIN UR ELPH-MCNC: 2.7 GM/DL
GLUCOSE SERPL-MCNC: 146 MG/DL (ref 65–99)
HCT VFR BLD AUTO: 30.5 % (ref 34–46.6)
HGB BLD-MCNC: 8.4 G/DL (ref 12–15.9)
IRON 24H UR-MRATE: 31 MCG/DL (ref 37–145)
IRON SATN MFR SERPL: 10 % (ref 20–50)
MCH RBC QN AUTO: 24.9 PG (ref 26.6–33)
MCHC RBC AUTO-ENTMCNC: 27.5 G/DL (ref 31.5–35.7)
MCV RBC AUTO: 90.2 FL (ref 79–97)
PHOSPHATE SERPL-MCNC: 3.4 MG/DL (ref 2.5–4.5)
PLATELET # BLD AUTO: 167 10*3/MM3 (ref 140–450)
PMV BLD AUTO: 10.6 FL (ref 6–12)
POTASSIUM SERPL-SCNC: 3.8 MMOL/L (ref 3.5–5.2)
PROT SERPL-MCNC: 6.6 G/DL (ref 6–8.5)
PTH-INTACT SERPL-MCNC: 67.2 PG/ML (ref 15–65)
RBC # BLD AUTO: 3.38 10*6/MM3 (ref 3.77–5.28)
SODIUM SERPL-SCNC: 142 MMOL/L (ref 136–145)
TIBC SERPL-MCNC: 325 MCG/DL (ref 298–536)
TRANSFERRIN SERPL-MCNC: 218 MG/DL (ref 200–360)
URATE SERPL-MCNC: 5.4 MG/DL (ref 2.4–5.7)
WBC NRBC COR # BLD AUTO: 6.25 10*3/MM3 (ref 3.4–10.8)

## 2025-08-29 PROCEDURE — 96372 THER/PROPH/DIAG INJ SC/IM: CPT

## 2025-08-29 PROCEDURE — 25010000002 EPOETIN ALFA PER 1000 UNITS

## 2025-08-29 PROCEDURE — 84466 ASSAY OF TRANSFERRIN: CPT

## 2025-08-29 PROCEDURE — 83970 ASSAY OF PARATHORMONE: CPT | Performed by: INTERNAL MEDICINE

## 2025-08-29 PROCEDURE — 80053 COMPREHEN METABOLIC PANEL: CPT

## 2025-08-29 PROCEDURE — 82306 VITAMIN D 25 HYDROXY: CPT | Performed by: INTERNAL MEDICINE

## 2025-08-29 PROCEDURE — 85027 COMPLETE CBC AUTOMATED: CPT

## 2025-08-29 PROCEDURE — 84100 ASSAY OF PHOSPHORUS: CPT

## 2025-08-29 PROCEDURE — 83540 ASSAY OF IRON: CPT

## 2025-08-29 PROCEDURE — 84550 ASSAY OF BLOOD/URIC ACID: CPT | Performed by: INTERNAL MEDICINE

## 2025-08-29 PROCEDURE — 82728 ASSAY OF FERRITIN: CPT | Performed by: INTERNAL MEDICINE

## 2025-08-29 RX ADMIN — ERYTHROPOIETIN 40000 UNITS: 40000 INJECTION, SOLUTION INTRAVENOUS; SUBCUTANEOUS at 16:06

## (undated) DEVICE — ESOPHAGEAL BALLOON DILATATION CATHETER: Brand: CRE FIXED WIRE

## (undated) DEVICE — PAD GRND REM POLYHESIVE A/ DISP

## (undated) DEVICE — DEV INFL ALLIANCE2 SYS

## (undated) DEVICE — SNAR POLYP SENSATION STDOVL 27 240 BX40

## (undated) DEVICE — FRCP BIOP COLD ENDOJAW ALLGTR W/NDL 2.8X2300MM BLU

## (undated) DEVICE — JELLY,LUBE,STERILE,FLIP TOP,TUBE,2-OZ: Brand: MEDLINE

## (undated) DEVICE — 2000CC GUARDIAN II: Brand: GUARDIAN

## (undated) DEVICE — SYR PREFIL W/SALINE FLSH 10ML

## (undated) DEVICE — TRAP,MUCUS SPECIMEN,40CC: Brand: MEDLINE

## (undated) DEVICE — Device

## (undated) DEVICE — CONMED SCOPE SAVER BITE BLOCK, 20X27 MM: Brand: SCOPE SAVER

## (undated) DEVICE — ENDOGATOR AUXILIARY WATER JET CONNECTOR: Brand: ENDOGATOR

## (undated) DEVICE — NDL SCLEROTHERAPY INTERJECT 25G 4 240CM